# Patient Record
Sex: MALE | Race: WHITE | Employment: UNEMPLOYED | URBAN - METROPOLITAN AREA
[De-identification: names, ages, dates, MRNs, and addresses within clinical notes are randomized per-mention and may not be internally consistent; named-entity substitution may affect disease eponyms.]

---

## 2017-01-29 DIAGNOSIS — I10 PRIMARY HYPERTENSION: Primary | ICD-10-CM

## 2017-01-30 NOTE — TELEPHONE ENCOUNTER
metoprolol       Last Written Prescription Date: 12/26/16  Last Fill Quantity: 90, # refills: 0    Last Office Visit with G, P or [facility] prescribing provider:  8/31/16   Future Office Visit:        BP Readings from Last 3 Encounters:   12/23/16 134/102   08/31/16 160/110   05/14/15 132/84

## 2017-02-02 RX ORDER — METOPROLOL SUCCINATE 25 MG/1
TABLET, EXTENDED RELEASE ORAL
Qty: 90 TABLET | Refills: 3 | Status: SHIPPED | OUTPATIENT
Start: 2017-02-02 | End: 2017-06-07

## 2017-02-02 NOTE — TELEPHONE ENCOUNTER
Prescription approved per [facility] Refill Protocol.  Rsoelyn Marrero RN

## 2017-03-24 DIAGNOSIS — I10 ESSENTIAL HYPERTENSION WITH GOAL BLOOD PRESSURE LESS THAN 140/90: ICD-10-CM

## 2017-03-24 NOTE — TELEPHONE ENCOUNTER
hydrochlorothiazide      Last Written Prescription Date: 12/26/16  Last Fill Quantity: 90, # refills: 0  Last Office Visit with [facility], [facility] or [facility] prescribing provider: 08/31/16 Dr. Demarco         Potassium   Date Value Ref Range Status   08/31/2016 3.7 3.4 - 5.3 mmol/L Final     Creatinine   Date Value Ref Range Status   08/31/2016 0.79 0.66 - 1.25 mg/dL Final     BP Readings from Last 3 Encounters:   12/23/16 (!) 134/102   08/31/16 (!) 160/110   05/14/15 132/84     JOSH Kim (R)

## 2017-03-24 NOTE — TELEPHONE ENCOUNTER
Routing refill request to provider for review/approval because:  BP above goal last 2 readings  Anupama Youngblood RN

## 2017-03-25 RX ORDER — HYDROCHLOROTHIAZIDE 25 MG/1
25 TABLET ORAL DAILY
Qty: 90 TABLET | Refills: 0 | Status: SHIPPED | OUTPATIENT
Start: 2017-03-25 | End: 2017-06-30

## 2017-03-26 NOTE — TELEPHONE ENCOUNTER
Patient needs to be seen by Jennifer Demarco MD  (provider or resource)  Is there a time frame in which the patient should be seen Yes: within month  What does the patient need to be seen regarding BP   Does patient need to come fasting No  Phone: [phone] (home)  When workflow completed Close Encounter    Clinic: [facility] at [phone]    'Hi, this is (your name) I am calling from (provider's name) office. After reviewing your chart, (Provider's name) has indicated that you need an appointment to review (reason for visit). May I assist you in making this appointment? (Please contact us via [employer] or by phone at (Clinic name and Phone number) to schedule this appointment at your earliest convenience)'    Was first outreach attempted?No  If yes, the Second attempt due on:

## 2017-03-27 NOTE — TELEPHONE ENCOUNTER
Anay Lopez contacted Db on 03/27/17 and left a message. If patient calls back please schedule appointment in 1 month.      Anay OSHEA, Patient Care

## 2017-03-31 NOTE — TELEPHONE ENCOUNTER
Anay Lopez contacted Db on 03/31/17 and left a message. If patient calls back please schedule appointment in 1 month.      Anay OSHEA, Patient Care

## 2017-04-03 NOTE — TELEPHONE ENCOUNTER
LM for pt to call clinic.  3rd attempt, with no response.  Please advise.      Anay OSHEA, Patient Care

## 2017-05-30 ENCOUNTER — TELEPHONE (OUTPATIENT)
Dept: FAMILY MEDICINE | Facility: CLINIC | Age: 52
End: 2017-05-30

## 2017-05-30 NOTE — TELEPHONE ENCOUNTER
Called pt, he has not tried anything else and is unsure what is covered.   Insurance: [employer]   Bin: [id number]  ID#: [id number]    PA started.  Key Code: [id number]      Anthony Mustafa CMA

## 2017-05-30 NOTE — TELEPHONE ENCOUNTER
Call patient re:  Insurance not covering medication.  They may cover it better if filled from the eye doctor.  He could ask them to fill it again if he has gotten it there previously.  Alternatively, he can try artificial tears for use as needed - over the counter.   ALEX

## 2017-05-30 NOTE — TELEPHONE ENCOUNTER
Db Watson (Patel: [id number]) - [id number]  Restasis 0.05% emulsion  Status: PA Response - Denied  Created: May 30th, 2017  Sent: May 30th, 2017  Open  Archive      Routing to Dr. Demarco to review    Jewel Mazariegos MA

## 2017-05-30 NOTE — TELEPHONE ENCOUNTER
Attempt PA - will need to contact patient re:  What he has tried previously for eye and response.  ALMAK

## 2017-05-30 NOTE — TELEPHONE ENCOUNTER
Informed pt of message below. He will try the eye doctor option first.    Katey Warren MA

## 2017-05-30 NOTE — TELEPHONE ENCOUNTER
PA needed for cycloSPORINE (RESTASIS) 0.05 % ophthalmic emulsion. Please advise.      Anthony Mustafa, CMA

## 2017-06-07 DIAGNOSIS — I10 PRIMARY HYPERTENSION: ICD-10-CM

## 2017-06-07 NOTE — LETTER
[address]  [address]  [phone]    June 14, 2017      Db Watson  [address]   [place] [address]              Dear Db,    We have refilled your Metoprolol.    We will need to see you for an office visit before any additional refills can be given.  Please call [phone] to schedule this appointment.        Sincerely,    Jennifer Demarco M.D.

## 2017-06-09 RX ORDER — METOPROLOL SUCCINATE 25 MG/1
TABLET, EXTENDED RELEASE ORAL
Qty: 90 TABLET | Refills: 0 | Status: SHIPPED | OUTPATIENT
Start: 2017-06-09 | End: 2017-07-17 | Stop reason: DRUGHIGH

## 2017-06-09 NOTE — TELEPHONE ENCOUNTER
Routing refill request to provider for review/approval because:  BP above goal    Anupama Youngblood RN

## 2017-06-09 NOTE — TELEPHONE ENCOUNTER
metoprolol (TOPROL-XL) 25 MG 24 hr tablet      Last Written Prescription Date: 2/2/17  Last Fill Quantity: 90, # refills: 3    Last Office Visit with [facility] or [facility] prescribing provider:  8/31/17 Dr. Demarco     Future Office Visit:        BP Readings from Last 3 Encounters:   12/23/16 (!) 134/102   08/31/16 (!) 160/110   05/14/15 132/84

## 2017-06-10 NOTE — TELEPHONE ENCOUNTER
Patient needs to be seen by Jennifer Demarco MD  (provider or resource)  Is there a time frame in which the patient should be seen Yes: this month  What does the patient need to be seen regarding HTN, physical.     Does patient need to come fasting No  Phone: [phone] (home)  When workflow completed Close Encounter    Clinic: [facility] at [phone]    'Hi, this is (your name) I am calling from (provider's name) office. After reviewing your chart, (Provider's name) has indicated that you need an appointment to review (reason for visit). May I assist you in making this appointment? (Please contact us via [employer] or by phone at (Clinic name and Phone number) to schedule this appointment at your earliest convenience)'    Was first outreach attempted?No  If yes, the Second attempt due on:

## 2017-06-12 NOTE — TELEPHONE ENCOUNTER
This writer attempted to contact Db on 06/12/17      Reason for call appointment and left message to return call.      When patient calls back, please schedule Office Visit appointment within 1 month with PCP, document that pt called and close encounter .          Kartik Mazariegos MA

## 2017-06-13 NOTE — TELEPHONE ENCOUNTER
This writer attempted to contact Db on 06/13/17      Reason for call schedule appt and left message to return call.      When patient calls back, please schedule Office Visit appointment within 1 month with PCP, document that pt called and close encounter .          Anay Lopez MA

## 2017-06-14 NOTE — TELEPHONE ENCOUNTER
LM for pt to call clinic.  3rd attempt, with no response.  Letter sent.    Anay OSHEA, Patient Care         Anay OSHEA, Patient Care

## 2017-06-29 ENCOUNTER — TELEPHONE (OUTPATIENT)
Dept: FAMILY MEDICINE | Facility: CLINIC | Age: 52
End: 2017-06-29

## 2017-06-29 NOTE — TELEPHONE ENCOUNTER
cycloSPORINE (RESTASIS) 0.05 % ophthalmic emulsion not covered. Please advise PA or alternative.      Anthony Mustafa CMA

## 2017-06-29 NOTE — TELEPHONE ENCOUNTER
This writer attempted to contact Db on 06/29/17      Reason for call PA and left detailed message.      When patient calls back, please contact [place] Care Team (MA/TC). If no one available, document that pt called and route to care team. routine priority .          Anthony Mustafa, CMA

## 2017-06-29 NOTE — TELEPHONE ENCOUNTER
Contact patient re:  PA previously denied - patient was going to go through eye doctor for recommendations.    See copy below.    Katey Griffin CMA        5/30/17 2:23 PM   Note      Informed pt of message below. He will try the eye doctor option first.     Katey Warren MA                                 5/30/17 2:14 PM   You routed this conversation to [facility] [place]    Me        5/30/17 2:11 PM   Note      Call patient re:  Insurance not covering medication.  They may cover it better if filled from the eye doctor.  He could ask them to fill it again if he has gotten it there previously.  Alternatively, he can try artificial tears for use as needed - over the counter.   ALEX

## 2017-06-30 DIAGNOSIS — I10 ESSENTIAL HYPERTENSION WITH GOAL BLOOD PRESSURE LESS THAN 140/90: ICD-10-CM

## 2017-06-30 NOTE — TELEPHONE ENCOUNTER
hydrochlorothiazide (HYDRODIURIL) 25 MG tablet      Last Written Prescription Date: 03/25/17  Last Fill Quantity: 90, # refills: 0  Last Office Visit with G, P or [facility] prescribing provider: 08/31/16 Dr. Demarco       Potassium   Date Value Ref Range Status   08/31/2016 3.7 3.4 - 5.3 mmol/L Final     Creatinine   Date Value Ref Range Status   08/31/2016 0.79 0.66 - 1.25 mg/dL Final     BP Readings from Last 3 Encounters:   12/23/16 (!) 134/102   08/31/16 (!) 160/110   05/14/15 132/84

## 2017-07-03 ENCOUNTER — OFFICE VISIT (OUTPATIENT)
Dept: FAMILY MEDICINE | Facility: CLINIC | Age: 52
End: 2017-07-03
Payer: COMMERCIAL

## 2017-07-03 VITALS
BODY MASS INDEX: 38.65 KG/M2 | SYSTOLIC BLOOD PRESSURE: 166 MMHG | WEIGHT: 243.1 LBS | TEMPERATURE: 98.8 F | OXYGEN SATURATION: 100 % | DIASTOLIC BLOOD PRESSURE: 102 MMHG | HEART RATE: 84 BPM

## 2017-07-03 DIAGNOSIS — E66.01 MORBID OBESITY DUE TO EXCESS CALORIES (H): Chronic | ICD-10-CM

## 2017-07-03 DIAGNOSIS — K42.9 UMBILICAL HERNIA WITHOUT OBSTRUCTION AND WITHOUT GANGRENE: ICD-10-CM

## 2017-07-03 DIAGNOSIS — R79.89 ABNORMAL LFTS: ICD-10-CM

## 2017-07-03 DIAGNOSIS — I10 ESSENTIAL HYPERTENSION WITH GOAL BLOOD PRESSURE LESS THAN 140/90: Primary | ICD-10-CM

## 2017-07-03 LAB
ALBUMIN SERPL-MCNC: 3.9 G/DL (ref 3.4–5)
ALP SERPL-CCNC: 83 U/L (ref 40–150)
ALT SERPL W P-5'-P-CCNC: 70 U/L (ref 0–70)
ANION GAP SERPL CALCULATED.3IONS-SCNC: 11 MMOL/L (ref 3–14)
AST SERPL W P-5'-P-CCNC: 64 U/L (ref 0–45)
BILIRUB SERPL-MCNC: 0.6 MG/DL (ref 0.2–1.3)
BUN SERPL-MCNC: 12 MG/DL (ref 7–30)
CALCIUM SERPL-MCNC: 8.8 MG/DL (ref 8.5–10.1)
CHLORIDE SERPL-SCNC: 103 MMOL/L (ref 94–109)
CO2 SERPL-SCNC: 27 MMOL/L (ref 20–32)
CREAT SERPL-MCNC: 0.8 MG/DL (ref 0.66–1.25)
CREAT UR-MCNC: 192 MG/DL
GFR SERPL CREATININE-BSD FRML MDRD: ABNORMAL ML/MIN/1.7M2
GLUCOSE SERPL-MCNC: 101 MG/DL (ref 70–99)
MICROALBUMIN UR-MCNC: 37 MG/L
MICROALBUMIN/CREAT UR: 19.11 MG/G CR (ref 0–17)
POTASSIUM SERPL-SCNC: 3.4 MMOL/L (ref 3.4–5.3)
PROT SERPL-MCNC: 7.7 G/DL (ref 6.8–8.8)
SODIUM SERPL-SCNC: 141 MMOL/L (ref 133–144)

## 2017-07-03 PROCEDURE — 82043 UR ALBUMIN QUANTITATIVE: CPT | Performed by: FAMILY MEDICINE

## 2017-07-03 PROCEDURE — 36415 COLL VENOUS BLD VENIPUNCTURE: CPT | Performed by: FAMILY MEDICINE

## 2017-07-03 PROCEDURE — 99214 OFFICE O/P EST MOD 30 MIN: CPT | Performed by: FAMILY MEDICINE

## 2017-07-03 PROCEDURE — 80053 COMPREHEN METABOLIC PANEL: CPT | Performed by: FAMILY MEDICINE

## 2017-07-03 RX ORDER — HYDROCHLOROTHIAZIDE 25 MG/1
25 TABLET ORAL DAILY
Qty: 30 TABLET | Refills: 1 | Status: SHIPPED | OUTPATIENT
Start: 2017-07-03 | End: 2017-10-03

## 2017-07-03 RX ORDER — METOPROLOL SUCCINATE 50 MG/1
50 TABLET, EXTENDED RELEASE ORAL 2 TIMES DAILY
Qty: 60 TABLET | Refills: 1 | Status: SHIPPED | OUTPATIENT
Start: 2017-07-03 | End: 2017-10-03

## 2017-07-03 RX ORDER — HYDROCHLOROTHIAZIDE 25 MG/1
25 TABLET ORAL DAILY
Qty: 30 TABLET | Refills: 0 | Status: SHIPPED | OUTPATIENT
Start: 2017-07-03 | End: 2017-07-03

## 2017-07-03 NOTE — MR AVS SNAPSHOT
After Visit Summary   7/3/2017    Db Watson    MRN: [id number]           Patient Information     Date Of Birth          1965        Visit Information        Provider Department      7/3/2017 6:40 PM Jennifer Demarco MD [facility]        Today's Diagnoses     Umbilical hernia without obstruction and without gangrene    -  1    Essential hypertension with goal blood pressure less than 140/90          Care Instructions    Increase the metoprolol to 50 mg twice a day.  Continue the HCTZ once daily.  Labs today with results to carito and additional recommendations.    Referral general surgery for evaluation of the umbilical hernia.              Follow-ups after your visit        Additional Services     GENERAL SURG ADULT REFERRAL       Your provider has referred you to: FMG: [facility] [phone]   [email or web address]  UMP: [facility] [phone]   [email or web address]    Please be aware that coverage of these services is subject to the terms and limitations of your health insurance plan.  Call member services at your health plan with any benefit or coverage questions.      Please bring the following with you to your appointment:    (1) Any X-Rays, CTs or MRIs which have been performed.  Contact the facility where they were done to arrange for  prior to your scheduled appointment.   (2) List of current medications   (3) This referral request   (4) Any documents/labs given to you for this referral                  Who to contact     If you have questions or need follow up information about today's clinic visit or your schedule please contact [facility] directly at [phone].  Normal or non-critical lab and imaging results will be communicated to you by MyChart, letter or phone within 4 business days after the clinic  has received the results. If you do not hear from us within 7 days, please contact the clinic through [employer] or phone. If you have a critical or abnormal lab result, we will notify you by phone as soon as possible.  Submit refill requests through [employer] or call your pharmacy and they will forward the refill request to us. Please allow 3 business days for your refill to be completed.          Additional Information About Your Visit        [employer] Information     [employer] gives you secure access to your electronic health record. If you see a primary care provider, you can also send messages to your care team and make appointments. If you have questions, please call your primary care clinic.  If you do not have a primary care provider, please call [phone] and they will assist you.        Care EveryWhere ID     This is your Care EveryWhere ID. This could be used by other organizations to access your [facility] medical records  [id number]        Your Vitals Were     Pulse Temperature Pulse Oximetry BMI (Body Mass Index)          84 98.8  F (37.1  C) (Oral) 100% 38.65 kg/m2         Blood Pressure from Last 3 Encounters:   07/03/17 (!) 166/102   12/23/16 (!) 134/102   08/31/16 (!) 160/110    Weight from Last 3 Encounters:   07/03/17 110.3 kg (243 lb 1.6 oz)   08/31/16 107 kg (236 lb)   05/14/15 100 kg (220 lb 8 oz)              We Performed the Following     Albumin Random Urine Quantitative     Comprehensive metabolic panel (BMP + Alb, Alk Phos, ALT, AST, Total. Bili, TP)     GENERAL SURG ADULT REFERRAL          Today's Medication Changes          These changes are accurate as of: 7/3/17  7:16 PM.  If you have any questions, ask your nurse or doctor.               These medicines have changed or have updated prescriptions.        Dose/Directions    hydrochlorothiazide 25 MG tablet   Commonly known as:  HYDRODIURIL   This may have changed:  additional instructions   Used for:  Essential hypertension with goal blood  pressure less than 140/90   Changed by:  Jennifer Demarco MD        Dose:  25 mg   Take 1 tablet (25 mg) by mouth daily   Quantity:  30 tablet   Refills:  1       * metoprolol 25 MG 24 hr tablet   Commonly known as:  TOPROL-XL   This may have changed:  Another medication with the same name was added. Make sure you understand how and when to take each.   Used for:  Primary hypertension   Changed by:  Jennifer Demarco MD        TAKE THREE TABLETS BY MOUTH DAILY   Quantity:  90 tablet   Refills:  0       * metoprolol 50 MG 24 hr tablet   Commonly known as:  TOPROL-XL   This may have changed:  You were already taking a medication with the same name, and this prescription was added. Make sure you understand how and when to take each.   Used for:  Essential hypertension with goal blood pressure less than 140/90   Changed by:  Jennifer Demarco MD        Dose:  50 mg   Take 1 tablet (50 mg) by mouth 2 times daily   Quantity:  60 tablet   Refills:  1       * Notice:  This list has 2 medication(s) that are the same as other medications prescribed for you. Read the directions carefully, and ask your doctor or other care provider to review them with you.      Stop taking these medicines if you haven't already. Please contact your care team if you have questions.     cycloSPORINE 0.05 % ophthalmic emulsion   Commonly known as:  RESTASIS   Stopped by:  Jennifer Demarco MD                Where to get your medicines      These medications were sent to [id number] [place], [place] - [address]  [address] [address]     Phone:  [phone]     hydrochlorothiazide 25 MG tablet    metoprolol 50 MG 24 hr tablet                Primary Care Provider Office Phone #    [facility] [phone]       No address on file        Equal Access to Services     CARMENCITA RAMOS : Lynn Miller, mike palacios, katlin helton.  [facility] [phone].    ATENCIÓN: Si ibrahima beck, tiene a [place] disposición servicios gratuitos de asistencia lingüística. Mango jason [phone].    We comply with applicable federal civil rights laws and [place] laws. We do not discriminate on the basis of race, color, national origin, age, disability sex, sexual orientation or gender identity.            Thank you!     Thank you for choosing [facility]  for your care. Our goal is always to provide you with excellent care. Hearing back from our patients is one way we can continue to improve our services. Please take a few minutes to complete the written survey that you may receive in the mail after your visit with us. Thank you!             Your Updated Medication List - Protect others around you: Learn how to safely use, store and throw away your medicines at www.disposemymeds.org.          This list is accurate as of: 7/3/17  7:16 PM.  Always use your most recent med list.                   Brand Name Dispense Instructions for use Diagnosis    aspirin 81 MG tablet      Take 1 tablet by mouth daily.    Essential hypertension       fish oil-omega-3 fatty acids 1000 MG capsule      Take 2 g by mouth daily.        hydrochlorothiazide 25 MG tablet    HYDRODIURIL    30 tablet    Take 1 tablet (25 mg) by mouth daily    Essential hypertension with goal blood pressure less than 140/90       meclizine 25 MG Chew      Take 25 mg by mouth every 6 hours as needed.    Indications: Sensation of Spinning or Whirling        * metoprolol 25 MG 24 hr tablet    TOPROL-XL    90 tablet    TAKE THREE TABLETS BY MOUTH DAILY    Primary hypertension       * metoprolol 50 MG 24 hr tablet    TOPROL-XL    60 tablet    Take 1 tablet (50 mg) by mouth 2 times daily    Essential hypertension with goal blood pressure less than 140/90       vitamin B complex with vitamin C Tabs tablet    STRESS TAB    100 tablet    Take 1 tablet by mouth daily.        * Notice:  This list has 2  medication(s) that are the same as other medications prescribed for you. Read the directions carefully, and ask your doctor or other care provider to review them with you.

## 2017-07-03 NOTE — NURSING NOTE
"Chief Complaint   Patient presents with     Recheck Medication       Initial BP (!) 162/104 (BP Location: Right arm, Patient Position: Sitting, Cuff Size: Adult Large)  Pulse 84  Temp 98.8  F (37.1  C) (Oral)  Wt 110.3 kg (243 lb 1.6 oz)  SpO2 100%  BMI 38.65 kg/m2 Estimated body mass index is 38.65 kg/(m^2) as calculated from the following:    Height as of 8/31/16: 1.689 m (5' 6.5\").    Weight as of this encounter: 110.3 kg (243 lb 1.6 oz).  Medication Reconciliation: complete       Anthony Mustafa CMA      "

## 2017-07-03 NOTE — PROGRESS NOTES
SUBJECTIVE:                                                    Db Watson is a 51 year old male who presents to clinic today for the following health issues:      Hypertension Follow-up      Outpatient blood pressures are not being checked.    Low Salt Diet: no added salt      Amount of exercise or physical activity: 1-2 days/week for an average of 30-45 minutes  - walking, gym      Problems taking medications regularly: No    Medication side effects: none    Diet: low salt      Abnormality noted at belly button - present for weeks. No pain.  Squishy and pokes out at times.  No heavy lifting described. No N/V    Problem list and histories reviewed & adjusted, as indicated.  Additional history: as documented    BP Readings from Last 3 Encounters:   07/03/17 (!) 162/104   12/23/16 (!) 134/102   08/31/16 (!) 160/110    Wt Readings from Last 3 Encounters:   07/03/17 110.3 kg (243 lb 1.6 oz)   08/31/16 107 kg (236 lb)   05/14/15 100 kg (220 lb 8 oz)                    Reviewed and updated as needed this visit by clinical staff  Tobacco  Allergies  Meds  Med Hx  Surg Hx  Fam Hx  Soc Hx      Reviewed and updated as needed this visit by Provider  Tobacco  Allergies  Meds  Med Hx  Surg Hx  Fam Hx  Soc Hx        ROS:  Constitutional, HEENT, cardiovascular, pulmonary, gi and gu systems are negative, except as otherwise noted.    OBJECTIVE:     BP (!) 162/104 (BP Location: Right arm, Patient Position: Sitting, Cuff Size: Adult Large)  Pulse 84  Temp 98.8  F (37.1  C) (Oral)  Wt 110.3 kg (243 lb 1.6 oz)  SpO2 100%  BMI 38.65 kg/m2  Body mass index is 38.65 kg/(m^2).  GENERAL: alert, no distress and obese  NECK: no adenopathy, no asymmetry, masses, or scars and thyroid normal to palpation  RESP: lungs clear to auscultation - no rales, rhonchi or wheezes  CV: regular rate and rhythm, normal S1 S2, no S3 or S4, no murmur, click or rub, no peripheral edema and peripheral pulses strong  ABDOMEN: soft, nontender,  "without hepatosplenomegaly or masses, bowel sounds normal and hernia umbilical - reducible.  MS: IRISH, grossly normal with trace edema to ankles bilaterally   BACK: no CVA tenderness, no paralumbar tenderness    Diagnostic Test Results:  Results for orders placed or performed in visit on 07/03/17   Albumin Random Urine Quantitative   Result Value Ref Range    Creatinine Urine 192 mg/dL    Albumin Urine mg/L 37 mg/L    Albumin Urine mg/g Cr 19.11 (H) 0 - 17 mg/g Cr   Comprehensive metabolic panel (BMP + Alb, Alk Phos, ALT, AST, Total. Bili, TP)   Result Value Ref Range    Sodium 141 133 - 144 mmol/L    Potassium 3.4 3.4 - 5.3 mmol/L    Chloride 103 94 - 109 mmol/L    Carbon Dioxide 27 20 - 32 mmol/L    Anion Gap 11 3 - 14 mmol/L    Glucose 101 (H) 70 - 99 mg/dL    Urea Nitrogen 12 7 - 30 mg/dL    Creatinine 0.80 0.66 - 1.25 mg/dL    GFR Estimate >90  Non  GFR Calc   >60 mL/min/1.7m2    GFR Estimate If Black >90   GFR Calc   >60 mL/min/1.7m2    Calcium 8.8 8.5 - 10.1 mg/dL    Bilirubin Total 0.6 0.2 - 1.3 mg/dL    Albumin 3.9 3.4 - 5.0 g/dL    Protein Total 7.7 6.8 - 8.8 g/dL    Alkaline Phosphatase 83 40 - 150 U/L    ALT 70 0 - 70 U/L    AST 64 (H) 0 - 45 U/L       ASSESSMENT/PLAN:         BMI:   Estimated body mass index is 38.65 kg/(m^2) as calculated from the following:    Height as of 8/31/16: 1.689 m (5' 6.5\").    Weight as of this encounter: 110.3 kg (243 lb 1.6 oz).   Weight management plan: Discussed healthy diet and exercise guidelines and patient will follow up in 6 months in clinic to re-evaluate.      1. Essential hypertension with goal blood pressure less than 140/90  Increase metoprolol dose.  Continue HCTZ.    - metoprolol (TOPROL-XL) 50 MG 24 hr tablet; Take 1 tablet (50 mg) by mouth 2 times daily  Dispense: 60 tablet; Refill: 1  - hydrochlorothiazide (HYDRODIURIL) 25 MG tablet; Take 1 tablet (25 mg) by mouth daily  Dispense: 30 tablet; Refill: 1  - Albumin Random Urine " Quantitative  - Comprehensive metabolic panel (BMP + Alb, Alk Phos, ALT, AST, Total. Bili, TP)    2. Umbilical hernia without obstruction and without gangrene  - GENERAL SURG ADULT REFERRAL    3. Abnormal LFTs  Stable to improved.  Discussed limiting etoh.    4. Morbid obesity due to excess calories (H)  Exercise recommended.  Patient planning back to gym.      Patient Instructions   Increase the metoprolol to 50 mg twice a day.  Continue the HCTZ once daily.  Labs today with results to carito and additional recommendations.    Referral general surgery for evaluation of the umbilical hernia.          Jennifer Demarco MD  [facility]

## 2017-07-04 NOTE — PATIENT INSTRUCTIONS
Increase the metoprolol to 50 mg twice a day.  Continue the HCTZ once daily.  Labs today with results to carito and additional recommendations.    Referral general surgery for evaluation of the umbilical hernia.

## 2017-07-05 PROBLEM — E66.01 MORBID OBESITY (H): Status: ACTIVE | Noted: 2017-07-03

## 2017-07-05 NOTE — PROGRESS NOTES
Your blood sugar is listed as elevated but this is normal since you were not fasting.  Your kidney function is normal.  Liver testing is stable to improved.  Your urine testing does show a borderline elevated protein level but this is improved from previous.  Recheck in 6 months is recommended.  Please call or MyChart message me if you have any questions.    PSK

## 2017-07-17 ENCOUNTER — OFFICE VISIT (OUTPATIENT)
Dept: SURGERY | Facility: CLINIC | Age: 52
End: 2017-07-17
Payer: COMMERCIAL

## 2017-07-17 VITALS
DIASTOLIC BLOOD PRESSURE: 88 MMHG | WEIGHT: 243 LBS | SYSTOLIC BLOOD PRESSURE: 137 MMHG | HEART RATE: 77 BPM | BODY MASS INDEX: 38.63 KG/M2

## 2017-07-17 DIAGNOSIS — K42.9 UMBILICAL HERNIA WITHOUT OBSTRUCTION AND WITHOUT GANGRENE: Primary | ICD-10-CM

## 2017-07-17 PROCEDURE — 99244 OFF/OP CNSLTJ NEW/EST MOD 40: CPT | Performed by: SURGERY

## 2017-07-17 NOTE — NURSING NOTE
"Db Watson's goals for this visit include: hernia consult  He requests these members of his care team be copied on today's visit information: none    PCP: [facility] [place]    Referring Provider:  No referring provider defined for this encounter.    Chief Complaint   Patient presents with     Consult     hernia consult, Umbilical hernia without obstruction and without gangrene       Initial /88  Pulse 77  Wt 110.2 kg (243 lb)  BMI 38.63 kg/m2 Estimated body mass index is 38.63 kg/(m^2) as calculated from the following:    Height as of 8/31/16: 1.689 m (5' 6.5\").    Weight as of this encounter: 110.2 kg (243 lb).  Medication Reconciliation: complete    "

## 2017-07-17 NOTE — PROGRESS NOTES
Chief Complaint: Umbilical protrusion    History of Present Illness:   51 year old woman sent in consultation by Dr Jennifer Demarco for evaluation of a protrusion at the umbilicus which has been present for 7-8 months. He notes rare twinges of discomfort which are mild and do not radiate without any aggravating or alleviating factors. He is able to easily reduce the protrusion, and denies any episodes of incarceration.          Past Medical History:   Diagnosis Date     Abnormal MRI of head 1/11/2013     Atypical chest pain 9/4/2012     BMI 33.0-33.9,adult 5/24/2013     Hyperlipidemia LDL goal <160 5/4/2013     Hypertension      Ménière's disease      Past Surgical History:   Procedure Laterality Date     C NONSPECIFIC PROCEDURE      shoulder surgery for dislocation      C NONSPECIFIC PROCEDURE      eye surgery, lens implant right     EYE SURGERY       FRACTURE TX, WRIST RT/LT      Fracture TX Wrist RT/LT     ORTHOPEDIC SURGERY       Current Outpatient Prescriptions   Medication     metoprolol (TOPROL-XL) 50 MG 24 hr tablet     hydrochlorothiazide (HYDRODIURIL) 25 MG tablet     [DISCONTINUED] metoprolol (TOPROL-XL) 25 MG 24 hr tablet     fish oil-omega-3 fatty acids (FISH OIL) 1000 MG capsule     aspirin 81 MG tablet     meclizine 25 MG CHEW     B Complex Vitamins (VITAMIN B COMPLEX) tablet     No current facility-administered medications for this visit.       No Known Allergies  Social History     Social History     Marital status: Single     Spouse name: N/A     Number of children: N/A     Years of education: N/A     Occupational History     Not on file.     Social History Main Topics     Smoking status: Never Smoker     Smokeless tobacco: Never Used      Comment: rarely, once per year maybe      Alcohol use Yes      Comment: 10 drinks / week      Drug use: No     Sexual activity: Yes     Partners: Female     Other Topics Concern     Parent/Sibling W/ Cabg, Mi Or Angioplasty Before 65f 55m? Yes     Social History  Narrative     Family History   Problem Relation Age of Onset     Hypertension Mother      DIABETES Father      C.A.D. Father      bypass surgery           Review of Systems:  No chest pain, dyspnea, fevers or night sweats. Some weight gain over the last 3-4 years.  All other systems questioned and negative.     Exam:  Vital signs for exam: /88  Pulse 77  Wt 110.2 kg (243 lb)  BMI 38.63 kg/m2  Constitutional: healthy, alert and no distress.  Head: Normocephalic. No masses, lesions, tenderness or abnormalities.  ENT: ENT exam normal, no neck nodes or sinus tenderness.  Cardiovascular: Normal S1, S2, no murmurs  Respiratory: Clear to auscultation.   Gastrointestinal:  Abdomen soft, non-tender. Reducible umbilical hernia. 1.5 cm fascial defect. No tenderness to palpation   : Deferred  Musculoskeletal: extremities normal- no gross deformities noted and normal muscle tone  Skin: no jaundice, rashes or petechiae.   Neurologic: Gait normal.  Psychiatric: Mentation appears normal and affect normal.      IMPRESSION AND PLAN:  Minimally symptomatic umbilical hernia in an obese gentleman. We discussed options and he would like to proceed with weight loss and and contact us when his symptoms increase. At that time, if the hernia is of a similar size, we could consider an open repair with mesh. The small risks of incarceration discussed with the patient who expressed understanding.

## 2017-07-17 NOTE — MR AVS SNAPSHOT
After Visit Summary   7/17/2017    Db Watson    MRN: [id number]           Patient Information     Date Of Birth          1965        Visit Information        Provider Department      7/17/2017 8:30 AM Sisi Rachel MD [facility]        Today's Diagnoses     Umbilical hernia without obstruction and without gangrene    -  1       Follow-ups after your visit        Who to contact     If you have questions or need follow up information about today's clinic visit or your schedule please contact [facility] directly at [phone].  Normal or non-critical lab and imaging results will be communicated to you by [employer], letter or phone within 4 business days after the clinic has received the results. If you do not hear from us within 7 days, please contact the clinic through [employer] or phone. If you have a critical or abnormal lab result, we will notify you by phone as soon as possible.  Submit refill requests through [employer] or call your pharmacy and they will forward the refill request to us. Please allow 3 business days for your refill to be completed.          Additional Information About Your Visit        MyChart Information     [employer] gives you secure access to your electronic health record. If you see a primary care provider, you can also send messages to your care team and make appointments. If you have questions, please call your primary care clinic.  If you do not have a primary care provider, please call [phone] and they will assist you.      [employer] is an electronic gateway that provides easy, online access to your medical records. With [employer], you can request a clinic appointment, read your test results, renew a prescription or communicate with your care team.     To access your existing account, please contact your [facility] or call [phone] for assistance.        Care EveryWhere ID     This is your Care  EveryWhere ID. This could be used by other organizations to access your [place] medical records  [id number]        Your Vitals Were     Pulse BMI (Body Mass Index)                77 38.63 kg/m2           Blood Pressure from Last 3 Encounters:   07/17/17 137/88   07/03/17 (!) 166/102   12/23/16 (!) 134/102    Weight from Last 3 Encounters:   07/17/17 110.2 kg (243 lb)   07/03/17 110.3 kg (243 lb 1.6 oz)   08/31/16 107 kg (236 lb)              Today, you had the following     No orders found for display         Today's Medication Changes          These changes are accurate as of: 7/17/17  9:36 AM.  If you have any questions, ask your nurse or doctor.               These medicines have changed or have updated prescriptions.        Dose/Directions    metoprolol 50 MG 24 hr tablet   Commonly known as:  TOPROL-XL   This may have changed:  Another medication with the same name was removed. Continue taking this medication, and follow the directions you see here.   Used for:  Essential hypertension with goal blood pressure less than 140/90   Changed by:  Jennifer Demarco MD        Dose:  50 mg   Take 1 tablet (50 mg) by mouth 2 times daily   Quantity:  60 tablet   Refills:  1                Primary Care Provider Office Phone #    [facility] [phone]       No address on file        Equal Access to Services     CARMENCITA RAMOS AH: Hadii myles murphyo Sokatiln, waaxda luqadaha, qaybta kaalmada adeegyada, katlin cohen. So [facility] [phone].    ATENCIÓN: Si habla español, tiene a [place] disposición servicios gratuitos de asistencia lingüística. Llame al [phone].    We comply with applicable federal civil rights laws and [place] laws. We do not discriminate on the basis of race, color, national origin, age, disability sex, sexual orientation or gender identity.            Thank you!     Thank you for choosing [facility]  for your care. Our goal is always to  provide you with excellent care. Hearing back from our patients is one way we can continue to improve our services. Please take a few minutes to complete the written survey that you may receive in the mail after your visit with us. Thank you!             Your Updated Medication List - Protect others around you: Learn how to safely use, store and throw away your medicines at www.disposemymeds.org.          This list is accurate as of: 7/17/17  9:36 AM.  Always use your most recent med list.                   Brand Name Dispense Instructions for use Diagnosis    aspirin 81 MG tablet      Take 1 tablet by mouth daily.    Essential hypertension       fish oil-omega-3 fatty acids 1000 MG capsule      Take 2 g by mouth daily.        hydrochlorothiazide 25 MG tablet    HYDRODIURIL    30 tablet    Take 1 tablet (25 mg) by mouth daily    Essential hypertension with goal blood pressure less than 140/90       meclizine 25 MG Chew      Take 25 mg by mouth every 6 hours as needed.    Indications: Sensation of Spinning or Whirling        metoprolol 50 MG 24 hr tablet    TOPROL-XL    60 tablet    Take 1 tablet (50 mg) by mouth 2 times daily    Essential hypertension with goal blood pressure less than 140/90       vitamin B complex with vitamin C Tabs tablet    STRESS TAB    100 tablet    Take 1 tablet by mouth daily.

## 2017-07-19 ENCOUNTER — TELEPHONE (OUTPATIENT)
Dept: FAMILY MEDICINE | Facility: CLINIC | Age: 52
End: 2017-07-19

## 2017-10-03 DIAGNOSIS — I10 ESSENTIAL HYPERTENSION WITH GOAL BLOOD PRESSURE LESS THAN 140/90: ICD-10-CM

## 2017-10-04 NOTE — TELEPHONE ENCOUNTER
hydrochlorothiazide (HYDRODIURIL) 25 MG tablet      Last Written Prescription Date: 7/3/17  Last Fill Quantity: 30, # refills: 1  Last Office Visit with [facility], [facility] or  Health prescribing provider: 7/3/17       Potassium   Date Value Ref Range Status   07/03/2017 3.4 3.4 - 5.3 mmol/L Final     Creatinine   Date Value Ref Range Status   07/03/2017 0.80 0.66 - 1.25 mg/dL Final     BP Readings from Last 3 Encounters:   07/17/17 137/88   07/03/17 (!) 166/102   12/23/16 (!) 134/102     metoprolol (TOPROL-XL) 50 MG 24 hr tablet      Last Written Prescription Date: 7/3/17  Last Fill Quantity: 60, # refills: 1    Last Office Visit with [facility], [facility] or [facility] prescribing provider:  7/3/17   Future Office Visit:        BP Readings from Last 3 Encounters:   07/17/17 137/88   07/03/17 (!) 166/102   12/23/16 (!) 134/102

## 2017-10-07 DIAGNOSIS — I10 ESSENTIAL HYPERTENSION WITH GOAL BLOOD PRESSURE LESS THAN 140/90: ICD-10-CM

## 2017-10-09 RX ORDER — HYDROCHLOROTHIAZIDE 25 MG/1
TABLET ORAL
Qty: 30 TABLET | Refills: 2 | Status: SHIPPED | OUTPATIENT
Start: 2017-10-09 | End: 2017-11-28

## 2017-10-09 RX ORDER — METOPROLOL SUCCINATE 50 MG/1
TABLET, EXTENDED RELEASE ORAL
Qty: 60 TABLET | Refills: 2 | Status: SHIPPED | OUTPATIENT
Start: 2017-10-09 | End: 2017-12-20

## 2017-10-12 RX ORDER — HYDROCHLOROTHIAZIDE 25 MG/1
TABLET ORAL
Qty: 90 TABLET | Refills: 0 | Status: SHIPPED | OUTPATIENT
Start: 2017-10-12 | End: 2017-11-06

## 2017-10-12 RX ORDER — METOPROLOL SUCCINATE 50 MG/1
TABLET, EXTENDED RELEASE ORAL
Qty: 90 TABLET | Refills: 0 | Status: SHIPPED | OUTPATIENT
Start: 2017-10-12 | End: 2017-11-06

## 2017-11-06 ENCOUNTER — OFFICE VISIT (OUTPATIENT)
Dept: FAMILY MEDICINE | Facility: CLINIC | Age: 52
End: 2017-11-06
Payer: COMMERCIAL

## 2017-11-06 VITALS
WEIGHT: 226.4 LBS | OXYGEN SATURATION: 95 % | TEMPERATURE: 98 F | SYSTOLIC BLOOD PRESSURE: 136 MMHG | HEART RATE: 87 BPM | RESPIRATION RATE: 14 BRPM | DIASTOLIC BLOOD PRESSURE: 86 MMHG | BODY MASS INDEX: 35.99 KG/M2

## 2017-11-06 DIAGNOSIS — F32.A ANXIETY AND DEPRESSION: Primary | ICD-10-CM

## 2017-11-06 DIAGNOSIS — F41.9 ANXIETY AND DEPRESSION: Primary | ICD-10-CM

## 2017-11-06 PROCEDURE — 99213 OFFICE O/P EST LOW 20 MIN: CPT | Performed by: NURSE PRACTITIONER

## 2017-11-06 RX ORDER — SERTRALINE HYDROCHLORIDE 25 MG/1
25 TABLET, FILM COATED ORAL DAILY
Qty: 30 TABLET | Refills: 0 | Status: SHIPPED | OUTPATIENT
Start: 2017-11-06 | End: 2017-11-08

## 2017-11-06 ASSESSMENT — ANXIETY QUESTIONNAIRES
3. WORRYING TOO MUCH ABOUT DIFFERENT THINGS: SEVERAL DAYS
7. FEELING AFRAID AS IF SOMETHING AWFUL MIGHT HAPPEN: NOT AT ALL
2. NOT BEING ABLE TO STOP OR CONTROL WORRYING: SEVERAL DAYS
IF YOU CHECKED OFF ANY PROBLEMS ON THIS QUESTIONNAIRE, HOW DIFFICULT HAVE THESE PROBLEMS MADE IT FOR YOU TO DO YOUR WORK, TAKE CARE OF THINGS AT HOME, OR GET ALONG WITH OTHER PEOPLE: NOT DIFFICULT AT ALL
5. BEING SO RESTLESS THAT IT IS HARD TO SIT STILL: SEVERAL DAYS
6. BECOMING EASILY ANNOYED OR IRRITABLE: SEVERAL DAYS
GAD7 TOTAL SCORE: 6
1. FEELING NERVOUS, ANXIOUS, OR ON EDGE: SEVERAL DAYS

## 2017-11-06 ASSESSMENT — PATIENT HEALTH QUESTIONNAIRE - PHQ9
SUM OF ALL RESPONSES TO PHQ QUESTIONS 1-9: 6
5. POOR APPETITE OR OVEREATING: SEVERAL DAYS

## 2017-11-06 NOTE — PATIENT INSTRUCTIONS
Sertraline oral solution  Brand Name: Zoloft  What is this medicine?  SERTRALINE (SER tra rosy) is used to treat depression. It may also be used to treat obsessive compulsive disorder, panic disorder, post-trauma stress, premenstrual dysphoric disorder (PMDD) or social anxiety.  How should I use this medicine?  Take this medicine by mouth. Follow the directions on the prescription label.  Before taking your dose, you need to dilute the solution in a beverage. Measure your medicine dose using the dropper in the bottle. Next, place the measured dose in at least 4 ounces (one-half cup) of water, ginger-noris, lemon-lime soda, lemonade or orange juice and mix. Do not mix in grapefruit juice or in any other liquids other than those listed.  Drink all of mixed liquid immediately. Do not mix the dose and store it for later. It must be taken right away. You may take this medicine with or without food. Take your medicine at regular intervals. Do not take your medicine more often than directed. Do not stop taking this medicine suddenly except upon the advice of your doctor. Stopping this medicine too quickly may cause serious side effects or your condition may worsen.  A special MedGuide will be given to you by the pharmacist with each prescription and refill. Be sure to read this information carefully each time.  Talk to your pediatrician regarding the use of this medicine in children. While this drug may be prescribed for children as young as 7 years for selected conditions, precautions do apply.  What side effects may I notice from receiving this medicine?  Side effects that you should report to your doctor or health care professional as soon as possible:    allergic reactions like skin rash, itching or hives, swelling of the face, lips, or tongue    anxious    black, tarry stools    changes in vision    confusion    elevated mood, decreased need for sleep, racing thoughts, impulsive behavior    eye pain    fast,  irregular heartbeat    feeling faint or lightheaded, falls    feeling agitated, angry, or irritable    hallucination, loss of contact with reality    loss of balance or coordination    loss of memory    painful or prolonged erections    restlessness, pacing, inability to keep still    seizures    stiff muscles    suicidal thoughts or other mood changes    trouble sleeping    unusual bleeding or bruising    unusually weak or tired    vomiting  Side effects that usually do not require medical attention (report to your doctor or health care professional if they continue or are bothersome):    change in appetite or weight    change in sex drive or performance    diarrhea    increased sweating    indigestion, nausea    tremors  What may interact with this medicine?  Do not take this medicine with any of the following medications:    certain medicines for fungal infections like fluconazole, itraconazole, ketoconazole, posaconazole, voriconazole    cisapride    disulfiram    dofetilide    linezolid    MAOIs like Carbex, Eldepryl, Marplan, Nardil, and Parnate    metronidazole    methylene blue (injected into a vein)    pimozide    thioridazine    ziprasidone  This medicine may also interact with the following medications:    alcohol    amphetamines    aspirin and aspirin-like medicines    certain medicines for depression, anxiety, or psychotic disturbances    certain medicines for irregular heart beat like flecainide, propafenone    certain medicines for migraine headaches like almotriptan, eletriptan, frovatriptan, naratriptan, rizatriptan, sumatriptan, zolmitriptan    certain medicines for sleep    certain medicines for seizures like carbamazepine, valproic acid, phenytoin    certain medicines that treat or prevent blood clots like warfarin, enoxaparin, dalteparin    cimetidine    digoxin    diuretics    fentanyl    furazolidone    isoniazid    lithium    NSAIDs, medicines for pain and inflammation, like ibuprofen or  naproxen    other medicines that prolong the QT interval (cause an abnormal heart rhythm)    procarbazine    rasagiline    supplements like Argenis's wort, kava kava, valerian    tolbutamide    tramadol    tryptophan  What if I miss a dose?  If you miss a dose, take it as soon as you can. If it is almost time for your next dose, take only that dose. Do not take double or extra doses.  Where should I keep my medicine?  Keep out of the reach of children.  Store at room temperature between 15 and 30 degrees C (59 and 86 degrees F). Throw away any unused medicine after the expiration date.  What should I tell my health care provider before I take this medicine?  They need to know if you have any of these conditions:    bleeding disorders    bipolar disorder or a family history of bipolar disorder    glaucoma    heart disease    high blood pressure    history of irregular heartbeat    history of low levels of calcium, magnesium, or potassium in the blood    if you often drink alcohol    liver disease    receiving electroconvulsive therapy    seizures    suicidal thoughts, plans, or attempt; a previous suicide attempt by you or a family member    take medicines that treat or prevent blood clots    thyroid disease    an unusual or allergic reaction to sertraline, other medicines, foods, dyes, or preservatives    pregnant or trying to get pregnant    breast-feeding  What should I watch for while using this medicine?  Tell your doctor if your symptoms do not get better or if they get worse. Visit your doctor or health care professional for regular checks on your progress. Because it may take several weeks to see the full effects of this medicine, it is important to continue your treatment as prescribed by your doctor.  Patients and their families should watch out for new or worsening thoughts of suicide or depression. Also watch out for sudden changes in feelings such as feeling anxious, agitated, panicky, irritable,  hostile, aggressive, impulsive, severely restless, overly excited and hyperactive, or not being able to sleep. If this happens, especially at the beginning of treatment or after a change in dose, call your health care professional.  You may get drowsy or dizzy. Do not drive, use machinery, or do anything that needs mental alertness until you know how this medicine affects you. Do not stand or sit up quickly, especially if you are an older patient. This reduces the risk of dizzy or fainting spells. Alcohol may interfere with the effect of this medicine. Avoid alcoholic drinks.  This medicine contains a high percentage of alcohol that may interact with medicines used to treat alcohol abuse, like Antabuse (disulfiram). You should not take these medicines together.  Your mouth may get dry. Chewing sugarless gum or sucking hard candy, and drinking plenty of water may help. Contact your doctor if the problem does not go away or is severe.  Some products may contain alcohol. Ask your pharmacist or healthcare provider if this medicine contains alcohol. Be sure to tell all healthcare providers you are taking this medicine. Certain medicines, like metronidazole and disulfiram, can cause an unpleasant reaction when taken with alcohol. The reaction includes flushing, headache, nausea, vomiting, sweating, and increased thirst. The reaction can last from 30 minutes to several hours.  NOTE:This sheet is a summary. It may not cover all possible information. If you have questions about this medicine, talk to your doctor, pharmacist, or health care provider. Copyright  2017 [employer]

## 2017-11-06 NOTE — MR AVS SNAPSHOT
After Visit Summary   11/6/2017    Db Watson    MRN: [id number]           Patient Information     Date Of Birth          1965        Visit Information        Provider Department      11/6/2017 2:20 PM Rashmi Acosta NP [facility]        Today's Diagnoses     Anxiety and depression    -  1    Screen for colon cancer        Need for prophylactic vaccination and inoculation against influenza          Care Instructions        Sertraline oral solution  Brand Name: Zoloft  What is this medicine?  SERTRALINE (SER tra rosy) is used to treat depression. It may also be used to treat obsessive compulsive disorder, panic disorder, post-trauma stress, premenstrual dysphoric disorder (PMDD) or social anxiety.  How should I use this medicine?  Take this medicine by mouth. Follow the directions on the prescription label.  Before taking your dose, you need to dilute the solution in a beverage. Measure your medicine dose using the dropper in the bottle. Next, place the measured dose in at least 4 ounces (one-half cup) of water, ginger-noris, lemon-lime soda, lemonade or orange juice and mix. Do not mix in grapefruit juice or in any other liquids other than those listed.  Drink all of mixed liquid immediately. Do not mix the dose and store it for later. It must be taken right away. You may take this medicine with or without food. Take your medicine at regular intervals. Do not take your medicine more often than directed. Do not stop taking this medicine suddenly except upon the advice of your doctor. Stopping this medicine too quickly may cause serious side effects or your condition may worsen.  A special MedGuide will be given to you by the pharmacist with each prescription and refill. Be sure to read this information carefully each time.  Talk to your pediatrician regarding the use of this medicine in children. While this drug may be prescribed for children as young as 7 years for selected  conditions, precautions do apply.  What side effects may I notice from receiving this medicine?  Side effects that you should report to your doctor or health care professional as soon as possible:    allergic reactions like skin rash, itching or hives, swelling of the face, lips, or tongue    anxious    black, tarry stools    changes in vision    confusion    elevated mood, decreased need for sleep, racing thoughts, impulsive behavior    eye pain    fast, irregular heartbeat    feeling faint or lightheaded, falls    feeling agitated, angry, or irritable    hallucination, loss of contact with reality    loss of balance or coordination    loss of memory    painful or prolonged erections    restlessness, pacing, inability to keep still    seizures    stiff muscles    suicidal thoughts or other mood changes    trouble sleeping    unusual bleeding or bruising    unusually weak or tired    vomiting  Side effects that usually do not require medical attention (report to your doctor or health care professional if they continue or are bothersome):    change in appetite or weight    change in sex drive or performance    diarrhea    increased sweating    indigestion, nausea    tremors  What may interact with this medicine?  Do not take this medicine with any of the following medications:    certain medicines for fungal infections like fluconazole, itraconazole, ketoconazole, posaconazole, voriconazole    cisapride    disulfiram    dofetilide    linezolid    MAOIs like Carbex, Eldepryl, Marplan, Nardil, and Parnate    metronidazole    methylene blue (injected into a vein)    pimozide    thioridazine    ziprasidone  This medicine may also interact with the following medications:    alcohol    amphetamines    aspirin and aspirin-like medicines    certain medicines for depression, anxiety, or psychotic disturbances    certain medicines for irregular heart beat like flecainide, propafenone    certain medicines for migraine headaches  like almotriptan, eletriptan, frovatriptan, naratriptan, rizatriptan, sumatriptan, zolmitriptan    certain medicines for sleep    certain medicines for seizures like carbamazepine, valproic acid, phenytoin    certain medicines that treat or prevent blood clots like warfarin, enoxaparin, dalteparin    cimetidine    digoxin    diuretics    fentanyl    furazolidone    isoniazid    lithium    NSAIDs, medicines for pain and inflammation, like ibuprofen or naproxen    other medicines that prolong the QT interval (cause an abnormal heart rhythm)    procarbazine    rasagiline    supplements like Argenis's wort, kava kava, valerian    tolbutamide    tramadol    tryptophan  What if I miss a dose?  If you miss a dose, take it as soon as you can. If it is almost time for your next dose, take only that dose. Do not take double or extra doses.  Where should I keep my medicine?  Keep out of the reach of children.  Store at room temperature between 15 and 30 degrees C (59 and 86 degrees F). Throw away any unused medicine after the expiration date.  What should I tell my health care provider before I take this medicine?  They need to know if you have any of these conditions:    bleeding disorders    bipolar disorder or a family history of bipolar disorder    glaucoma    heart disease    high blood pressure    history of irregular heartbeat    history of low levels of calcium, magnesium, or potassium in the blood    if you often drink alcohol    liver disease    receiving electroconvulsive therapy    seizures    suicidal thoughts, plans, or attempt; a previous suicide attempt by you or a family member    take medicines that treat or prevent blood clots    thyroid disease    an unusual or allergic reaction to sertraline, other medicines, foods, dyes, or preservatives    pregnant or trying to get pregnant    breast-feeding  What should I watch for while using this medicine?  Tell your doctor if your symptoms do not get better or if  they get worse. Visit your doctor or health care professional for regular checks on your progress. Because it may take several weeks to see the full effects of this medicine, it is important to continue your treatment as prescribed by your doctor.  Patients and their families should watch out for new or worsening thoughts of suicide or depression. Also watch out for sudden changes in feelings such as feeling anxious, agitated, panicky, irritable, hostile, aggressive, impulsive, severely restless, overly excited and hyperactive, or not being able to sleep. If this happens, especially at the beginning of treatment or after a change in dose, call your health care professional.  You may get drowsy or dizzy. Do not drive, use machinery, or do anything that needs mental alertness until you know how this medicine affects you. Do not stand or sit up quickly, especially if you are an older patient. This reduces the risk of dizzy or fainting spells. Alcohol may interfere with the effect of this medicine. Avoid alcoholic drinks.  This medicine contains a high percentage of alcohol that may interact with medicines used to treat alcohol abuse, like Antabuse (disulfiram). You should not take these medicines together.  Your mouth may get dry. Chewing sugarless gum or sucking hard candy, and drinking plenty of water may help. Contact your doctor if the problem does not go away or is severe.  Some products may contain alcohol. Ask your pharmacist or healthcare provider if this medicine contains alcohol. Be sure to tell all healthcare providers you are taking this medicine. Certain medicines, like metronidazole and disulfiram, can cause an unpleasant reaction when taken with alcohol. The reaction includes flushing, headache, nausea, vomiting, sweating, and increased thirst. The reaction can last from 30 minutes to several hours.  NOTE:This sheet is a summary. It may not cover all possible information. If you have questions about this  medicine, talk to your doctor, pharmacist, or health care provider. Copyright  2017 [employer]                Follow-ups after your visit        Who to contact     If you have questions or need follow up information about today's clinic visit or your schedule please contact [facility] directly at [phone].  Normal or non-critical lab and imaging results will be communicated to you by MyChart, letter or phone within 4 business days after the clinic has received the results. If you do not hear from us within 7 days, please contact the clinic through [email or web address] or phone. If you have a critical or abnormal lab result, we will notify you by phone as soon as possible.  Submit refill requests through Myze or call your pharmacy and they will forward the refill request to us. Please allow 3 business days for your refill to be completed.          Additional Information About Your Visit        MyChart Information     Myze gives you secure access to your electronic health record. If you see a primary care provider, you can also send messages to your care team and make appointments. If you have questions, please call your primary care clinic.  If you do not have a primary care provider, please call [phone] and they will assist you.        Care EveryWhere ID     This is your Care EveryWhere ID. This could be used by other organizations to access your [place] medical records  [id number]        Your Vitals Were     Pulse Temperature Respirations Pulse Oximetry BMI (Body Mass Index)       87 98  F (36.7  C) (Oral) 14 95% 35.99 kg/m2        Blood Pressure from Last 3 Encounters:   11/06/17 136/86   07/17/17 137/88   07/03/17 (!) 166/102    Weight from Last 3 Encounters:   11/06/17 102.7 kg (226 lb 6.4 oz)   07/17/17 110.2 kg (243 lb)   07/03/17 110.3 kg (243 lb 1.6 oz)              Today, you had the following     No orders found for display         Today's Medication Changes          These changes are  accurate as of: 11/6/17  2:58 PM.  If you have any questions, ask your nurse or doctor.               Start taking these medicines.        Dose/Directions    sertraline 25 MG tablet   Commonly known as:  ZOLOFT   Used for:  Anxiety and depression   Started by:  Rashmi Acosta NP        Dose:  25 mg   Take 1 tablet (25 mg) by mouth daily   Quantity:  30 tablet   Refills:  0            Where to get your medicines      These medications were sent to [id number] [address] [address]     Phone:  [phone]     sertraline 25 MG tablet                Primary Care Provider Office Phone # Fax #    [facility] [phone] [address] [facility] [address]        Equal Access to Services     [facility] RICHARD : Hadii aad ku hadasho Sokatlin, waaxda luqadaha, qaybta kaalmada ademerissayacynthia, katlin cohen. So [facility] [phone].    ATENCIÓN: Si habla español, tiene a [place] disposición servicios gratuitos de asistencia lingüística. Mango al [phone].    We comply with applicable federal civil rights laws and [place] laws. We do not discriminate on the basis of race, color, national origin, age, disability, sex, sexual orientation, or gender identity.            Thank you!     Thank you for choosing [facility]  for your care. Our goal is always to provide you with excellent care. Hearing back from our patients is one way we can continue to improve our services. Please take a few minutes to complete the written survey that you may receive in the mail after your visit with us. Thank you!             Your Updated Medication List - Protect others around you: Learn how to safely use, store and throw away your medicines at www.disposemymeds.org.          This list is accurate as of: 11/6/17  2:58 PM.  Always use your most recent med list.                   Brand Name Dispense  Instructions for use Diagnosis    aspirin 81 MG tablet      Take 1 tablet by mouth daily.    Essential hypertension       fish oil-omega-3 fatty acids 1000 MG capsule      Take 2 g by mouth daily.        hydrochlorothiazide 25 MG tablet    HYDRODIURIL    30 tablet    TAKE 1 TABLET BY MOUTH ONCE DAILY    Essential hypertension with goal blood pressure less than 140/90       meclizine 25 MG Chew      Take 25 mg by mouth every 6 hours as needed.    Indications: Sensation of Spinning or Whirling        metoprolol 50 MG 24 hr tablet    TOPROL-XL    60 tablet    TAKE 1 TABLET BY MOUTH TWICE DAILY    Essential hypertension with goal blood pressure less than 140/90       sertraline 25 MG tablet    ZOLOFT    30 tablet    Take 1 tablet (25 mg) by mouth daily    Anxiety and depression       vitamin B complex with vitamin C Tabs tablet    STRESS TAB    100 tablet    Take 1 tablet by mouth daily.

## 2017-11-06 NOTE — NURSING NOTE
"Chief Complaint   Patient presents with     Anxiety       Initial /86 (BP Location: Right arm, Patient Position: Sitting, Cuff Size: Adult Large)  Pulse 87  Temp 98  F (36.7  C) (Oral)  Resp 14  Wt 102.7 kg (226 lb 6.4 oz)  SpO2 95%  BMI 35.99 kg/m2 Estimated body mass index is 35.99 kg/(m^2) as calculated from the following:    Height as of 8/31/16: 1.689 m (5' 6.5\").    Weight as of this encounter: 102.7 kg (226 lb 6.4 oz).  Medication Reconciliation: juanjose Warren        "

## 2017-11-06 NOTE — PROGRESS NOTES
SUBJECTIVE:   Db Watson is a 52 year old male who presents to clinic today for the following health issues:      Abnormal Mood Symptoms  Onset: this past weekend    Description:   Depression: YES  Anxiety: YES    Accompanying Signs & Symptoms:  Still participating in activities that you used to enjoy: no  Fatigue: YES  Irritability: no  Difficulty concentrating: YES  Changes in appetite: no  Problems with sleep: YES  Heart racing/beating fast : no  Thoughts of hurting yourself or others: yes    History:   Recent stress: YES-   Prior depression hospitalization: None  Family history of depression: no  Family history of anxiety: no    Precipitating factors:   Alcohol/drug use: YES    Alleviating factors:  nothing    Therapies Tried and outcome: None    Was with lady significant other for past 10 yrs, they had a house together, then 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 She then told him she couldn't be with him any longer. Then the business he was in started to fail. He does currently have a job which he is now having more difficulty doing due to his depression. Mood is very up/down as all of these things occurred since July this year. This weekend he normally watches sports and instead of doing that, slept which is not normal for him. He doesn't have hobby, wants to find one to help keep him busy. Is not interested in therapy. He talks to his brother daily, and has good support system. Denies current thoughts of hurting himself although it has crossed him mind in the past. He has know people to commit suicide and sees how devastating it is, he denies wanting to do that to his friends/family.       Problem list and histories reviewed & adjusted, as indicated.  Additional history: as documented    Patient Active Problem List   Diagnosis     Hypertension goal BP (blood pressure) < 140/90     Abnormal LFTs     Habitual alcohol use     Atypical chest pain     HL (hearing loss)     Tinnitus      Abnormal MRI of head     Hyperlipidemia LDL goal <160     BMI 33.0-33.9,adult     Dizziness     Elevated fasting glucose     Meniere's disease, left     Morbid obesity (H)     Past Surgical History:   Procedure Laterality Date     C NONSPECIFIC PROCEDURE      shoulder surgery for dislocation      C NONSPECIFIC PROCEDURE      eye surgery, lens implant right     EYE SURGERY       FRACTURE TX, WRIST RT/LT      Fracture TX Wrist RT/LT     ORTHOPEDIC SURGERY         Social History   Substance Use Topics     Smoking status: Never Smoker     Smokeless tobacco: Never Used      Comment: rarely, once per year maybe      Alcohol use Yes      Comment: 10 drinks / week      Family History   Problem Relation Age of Onset     Hypertension Mother      DIABETES Father      C.A.D. Father      bypass surgery         Current Outpatient Prescriptions   Medication Sig Dispense Refill     sertraline (ZOLOFT) 25 MG tablet Take 1 tablet (25 mg) by mouth daily 30 tablet 0     metoprolol (TOPROL-XL) 50 MG 24 hr tablet TAKE 1 TABLET BY MOUTH TWICE DAILY 60 tablet 2     hydrochlorothiazide (HYDRODIURIL) 25 MG tablet TAKE 1 TABLET BY MOUTH ONCE DAILY 30 tablet 2     fish oil-omega-3 fatty acids (FISH OIL) 1000 MG capsule Take 2 g by mouth daily.       aspirin 81 MG tablet Take 1 tablet by mouth daily.  3     meclizine 25 MG CHEW Take 25 mg by mouth every 6 hours as needed.    Indications: Sensation of Spinning or Whirling       B Complex Vitamins (VITAMIN B COMPLEX) tablet Take 1 tablet by mouth daily. 100 tablet 12     No Known Allergies      Reviewed and updated as needed this visit by clinical staffTobacco  Allergies  Meds  Problems  Med Hx  Surg Hx  Fam Hx  Soc Hx        Reviewed and updated as needed this visit by Provider  Allergies  Meds  Problems         ROS:  Constitutional, cardiovascular, pulmonary, psych systems are negative, except as otherwise noted in HPI.      OBJECTIVE:   /86 (BP Location: Right arm, Patient  Position: Sitting, Cuff Size: Adult Large)  Pulse 87  Temp 98  F (36.7  C) (Oral)  Resp 14  Wt 102.7 kg (226 lb 6.4 oz)  SpO2 95%  BMI 35.99 kg/m2  Body mass index is 35.99 kg/(m^2).  GENERAL: tired appearing, alert and no acute distress  RESP: lungs clear to auscultation - no rales, rhonchi or wheezes  CV: regular rate and rhythm, normal S1 S2, no S3 or S4, no murmur, click or rub  MS: no gross musculoskeletal defects noted, no edema  Psych: slightly scattered thought process, denies thoughts of SI, feels anxious and depressed like mood is 'up and down' a lot    Diagnostic Test Results:  none     ASSESSMENT/PLAN:       1. Anxiety and depression  Start sertraline daily. Discussed side effects. Return in 4 weeks for follow up. Sooner if worsening thoughts of depression. He does not want to be on this long term. Discussed that with this situational depression we should give it 6-8 months then talk about decreasing/weaning off medication. He is okay with this. He is not interested in therapy.   - sertraline (ZOLOFT) 25 MG tablet; Take 1 tablet (25 mg) by mouth daily  Dispense: 30 tablet; Refill: 0    FUTURE APPOINTMENTS:       - Follow-up visit in 4 weeks    LAURY Eugene, NP-C  [facility]

## 2017-11-07 ENCOUNTER — TELEPHONE (OUTPATIENT)
Dept: FAMILY MEDICINE | Facility: CLINIC | Age: 52
End: 2017-11-07

## 2017-11-07 ENCOUNTER — HOSPITAL ENCOUNTER (EMERGENCY)
Facility: CLINIC | Age: 52
Discharge: HOME | End: 2017-11-07
Attending: EMERGENCY MEDICINE | Admitting: EMERGENCY MEDICINE
Payer: COMMERCIAL

## 2017-11-07 VITALS
DIASTOLIC BLOOD PRESSURE: 100 MMHG | SYSTOLIC BLOOD PRESSURE: 167 MMHG | RESPIRATION RATE: 19 BRPM | TEMPERATURE: 98.5 F | HEART RATE: 76 BPM | HEIGHT: 67 IN | OXYGEN SATURATION: 100 % | WEIGHT: 224 LBS | BODY MASS INDEX: 35.16 KG/M2

## 2017-11-07 DIAGNOSIS — R42 DIZZINESS: ICD-10-CM

## 2017-11-07 DIAGNOSIS — F32.A ANXIETY AND DEPRESSION: Primary | ICD-10-CM

## 2017-11-07 DIAGNOSIS — F41.9 ANXIETY AND DEPRESSION: Primary | ICD-10-CM

## 2017-11-07 LAB
ANION GAP SERPL CALCULATED.3IONS-SCNC: 9 MMOL/L (ref 3–14)
BUN SERPL-MCNC: 8 MG/DL (ref 7–30)
CALCIUM SERPL-MCNC: 8.9 MG/DL (ref 8.5–10.1)
CHLORIDE SERPL-SCNC: 96 MMOL/L (ref 94–109)
CO2 SERPL-SCNC: 29 MMOL/L (ref 20–32)
CREAT SERPL-MCNC: 0.69 MG/DL (ref 0.66–1.25)
ERYTHROCYTE [DISTWIDTH] IN BLOOD BY AUTOMATED COUNT: 12.8 % (ref 10–15)
GFR SERPL CREATININE-BSD FRML MDRD: >90 ML/MIN/1.7M2
GLUCOSE SERPL-MCNC: 156 MG/DL (ref 70–99)
HCT VFR BLD AUTO: 43.6 % (ref 40–53)
HGB BLD-MCNC: 15.6 G/DL (ref 13.3–17.7)
INTERPRETATION ECG - MUSE: NORMAL
MCH RBC QN AUTO: 35.4 PG (ref 26.5–33)
MCHC RBC AUTO-ENTMCNC: 35.8 G/DL (ref 31.5–36.5)
MCV RBC AUTO: 99 FL (ref 78–100)
PLATELET # BLD AUTO: 104 10E9/L (ref 150–450)
POTASSIUM SERPL-SCNC: 3 MMOL/L (ref 3.4–5.3)
RBC # BLD AUTO: 4.41 10E12/L (ref 4.4–5.9)
SODIUM SERPL-SCNC: 134 MMOL/L (ref 133–144)
TROPONIN I SERPL-MCNC: <0.015 UG/L (ref 0–0.04)
WBC # BLD AUTO: 4.6 10E9/L (ref 4–11)

## 2017-11-07 PROCEDURE — 85027 COMPLETE CBC AUTOMATED: CPT | Performed by: EMERGENCY MEDICINE

## 2017-11-07 PROCEDURE — 84484 ASSAY OF TROPONIN QUANT: CPT | Performed by: EMERGENCY MEDICINE

## 2017-11-07 PROCEDURE — 25000132 ZZH RX MED GY IP 250 OP 250 PS 637: Performed by: EMERGENCY MEDICINE

## 2017-11-07 PROCEDURE — 93005 ELECTROCARDIOGRAM TRACING: CPT

## 2017-11-07 PROCEDURE — 99284 EMERGENCY DEPT VISIT MOD MDM: CPT | Mod: 25

## 2017-11-07 PROCEDURE — 96374 THER/PROPH/DIAG INJ IV PUSH: CPT

## 2017-11-07 PROCEDURE — 25000128 H RX IP 250 OP 636: Performed by: EMERGENCY MEDICINE

## 2017-11-07 PROCEDURE — 80048 BASIC METABOLIC PNL TOTAL CA: CPT | Performed by: EMERGENCY MEDICINE

## 2017-11-07 RX ORDER — ONDANSETRON 2 MG/ML
4 INJECTION INTRAMUSCULAR; INTRAVENOUS ONCE
Status: COMPLETED | OUTPATIENT
Start: 2017-11-07 | End: 2017-11-07

## 2017-11-07 RX ORDER — POTASSIUM CHLORIDE 1500 MG/1
40 TABLET, EXTENDED RELEASE ORAL ONCE
Status: COMPLETED | OUTPATIENT
Start: 2017-11-07 | End: 2017-11-07

## 2017-11-07 RX ADMIN — ONDANSETRON 4 MG: 2 SOLUTION INTRAMUSCULAR; INTRAVENOUS at 22:45

## 2017-11-07 RX ADMIN — POTASSIUM CHLORIDE 40 MEQ: 1500 TABLET, EXTENDED RELEASE ORAL at 22:45

## 2017-11-07 RX ADMIN — SODIUM CHLORIDE 1000 ML: 9 INJECTION, SOLUTION INTRAVENOUS at 22:47

## 2017-11-07 ASSESSMENT — ENCOUNTER SYMPTOMS
BACK PAIN: 0
COLOR CHANGE: 1
SHORTNESS OF BREATH: 0
ABDOMINAL PAIN: 0
LIGHT-HEADEDNESS: 1
NERVOUS/ANXIOUS: 1
HEADACHES: 1
VOMITING: 1
NAUSEA: 1
DIAPHORESIS: 1

## 2017-11-07 ASSESSMENT — ANXIETY QUESTIONNAIRES: GAD7 TOTAL SCORE: 6

## 2017-11-07 NOTE — ED AVS SNAPSHOT
Emergency Department    [address] [facility] [address]    Phone:  [phone]    Fax:  [phone]                                       Db Watson   MRN: [id number]    Department:   Emergency Department   Date of Visit:  11/7/2017           Patient Information     Date Of Birth          1965        Your diagnoses for this visit were:     Dizziness        You were seen by Fannie Hagen MD.      Follow-up Information     Schedule an appointment as soon as possible for a visit with Clinic, [facility].    Contact information:    [address]  [facility] [id number] [phone]          Follow up with  Emergency Department.    Specialty:  EMERGENCY MEDICINE    Why:  If symptoms worsen    Contact information:    [address] [facility] [address] [phone]        Discharge Instructions       Hold off on the zoloft until you talk to your doctor    Discharge Instructions  Dizziness (Lightheaded)  Today you were seen for dizziness.  Dizziness can be caused by many things and it can be very difficult to determine the cause of dizziness.  At this time, your provider has found no signs that your dizziness is due to a serious or life-threatening condition. However, sometimes there is a serious problem that does not show up right away, and it is important for you to follow up with your regular provider as instructed.  Generally, every Emergency Department visit should have a follow-up clinic visit with either a primary or a specialty clinic/provider. Please follow-up as instructed by your emergency provider today.      Return to the Emergency Department if:      You pass out (fainting or falling out), especially during exercise.      You develop chest pain, chest pressure or difficulty breathing.    Your feel an irregular heartbeat.    You have excessive vaginal bleeding, or blood in your stool or vomit (throw up).    You have a high fever.    Your  symptoms get worse or more frequent.    If when you begin to feel dizzy or lightheaded, it is important to sit down or lay down immediately to prevent injury from falling.  If you were given a prescription for medicine here today, be sure to read all of the information (including the package insert) that comes with your prescription.  This will include important information about the medicine, its side effects, and any warnings that you need to know about.  The pharmacist who fills the prescription can provide more information and answer questions you may have about the medicine.  If you have questions or concerns that the pharmacist cannot address, please call or return to the Emergency Department.   Remember that you can always come back to the Emergency Department if you are not able to see your regular provider in the amount of time listed above, if you get any new symptoms, or if there is anything that worries you.      24 Hour Appointment Hotline       To make an appointment at any [facility], call [phone] ([phone]). If you don't have a family doctor or clinic, we will help you find one. [facility] are conveniently located to serve the needs of you and your family.             Review of your medicines      Our records show that you are taking the medicines listed below. If these are incorrect, please call your family doctor or clinic.        Dose / Directions Last dose taken    aspirin 81 MG tablet   Dose:  81 mg        Take 1 tablet by mouth daily.   Refills:  3        fish oil-omega-3 fatty acids 1000 MG capsule   Dose:  2 g        Take 2 g by mouth daily.   Refills:  0        hydrochlorothiazide 25 MG tablet   Commonly known as:  HYDRODIURIL   Quantity:  30 tablet        TAKE 1 TABLET BY MOUTH ONCE DAILY   Refills:  2        meclizine 25 MG Chew   Dose:  25 mg   Indication:  Sensation of Spinning or Whirling        Take 25 mg by mouth every 6 hours as needed.    Indications:  Sensation of Spinning or Whirling   Refills:  0        metoprolol 50 MG 24 hr tablet   Commonly known as:  TOPROL-XL   Quantity:  60 tablet        TAKE 1 TABLET BY MOUTH TWICE DAILY   Refills:  2        sertraline 25 MG tablet   Commonly known as:  ZOLOFT   Dose:  25 mg   Quantity:  30 tablet        Take 1 tablet (25 mg) by mouth daily   Refills:  0        vitamin B complex with vitamin C Tabs tablet   Commonly known as:  STRESS TAB   Dose:  1 tablet   Quantity:  100 tablet        Take 1 tablet by mouth daily.   Refills:  12                Procedures and tests performed during your visit     Basic metabolic panel    CBC (+ platelets, no diff)    EKG 12 lead    Troponin I (now)      Orders Needing Specimen Collection     None      Pending Results     No orders found from 11/5/2017 to 11/8/2017.            Pending Culture Results     No orders found from 11/5/2017 to 11/8/2017.            Pending Results Instructions     If you had any lab results that were not finalized at the time of your Discharge, you can call the ED Lab Result RN at [phone]. You will be contacted by this team for any positive Lab results or changes in treatment. The nurses are available 7 days a week from 10A to 6:30P.  You can leave a message 24 hours per day and they will return your call.        Test Results From Your Hospital Stay        11/7/2017 10:31 PM      Component Results     Component Value Ref Range & Units Status    Sodium 134 133 - 144 mmol/L Final    Potassium 3.0 (L) 3.4 - 5.3 mmol/L Final    Chloride 96 94 - 109 mmol/L Final    Carbon Dioxide 29 20 - 32 mmol/L Final    Anion Gap 9 3 - 14 mmol/L Final    Glucose 156 (H) 70 - 99 mg/dL Final    Urea Nitrogen 8 7 - 30 mg/dL Final    Creatinine 0.69 0.66 - 1.25 mg/dL Final    GFR Estimate >90 >60 mL/min/1.7m2 Final    Non  GFR Calc    GFR Estimate If Black >90 >60 mL/min/1.7m2 Final    African American GFR Calc    Calcium 8.9 8.5 - 10.1 mg/dL Final          11/7/2017 10:15 PM      Component Results     Component Value Ref Range & Units Status    WBC 4.6 4.0 - 11.0 10e9/L Final    RBC Count 4.41 4.4 - 5.9 10e12/L Final    Hemoglobin 15.6 13.3 - 17.7 g/dL Final    Hematocrit 43.6 40.0 - 53.0 % Final    MCV 99 78 - 100 fl Final    MCH 35.4 (H) 26.5 - 33.0 pg Final    MCHC 35.8 31.5 - 36.5 g/dL Final    RDW 12.8 10.0 - 15.0 % Final    Platelet Count 104 (L) 150 - 450 10e9/L Final         11/7/2017 10:35 PM      Component Results     Component Value Ref Range & Units Status    Troponin I ES <0.015 0.000 - 0.045 ug/L Final    The 99th percentile for upper reference range is 0.045 ug/L.  Troponin values   in the range of 0.045 - 0.120 ug/L may be associated with risks of adverse   clinical events.                  Clinical Quality Measure: Blood Pressure Screening     Your blood pressure was checked while you were in the emergency department today. The last reading we obtained was  BP: (!) 167/100 . Please read the guidelines below about what these numbers mean and what you should do about them.  If your systolic blood pressure (the top number) is less than 120 and your diastolic blood pressure (the bottom number) is less than 80, then your blood pressure is normal. There is nothing more that you need to do about it.  If your systolic blood pressure (the top number) is 120-139 or your diastolic blood pressure (the bottom number) is 80-89, your blood pressure may be higher than it should be. You should have your blood pressure rechecked within a year by a primary care provider.  If your systolic blood pressure (the top number) is 140 or greater or your diastolic blood pressure (the bottom number) is 90 or greater, you may have high blood pressure. High blood pressure is treatable, but if left untreated over time it can put you at risk for heart attack, stroke, or kidney failure. You should have your blood pressure rechecked by a primary care provider within the next 4  weeks.  If your provider in the emergency department today gave you specific instructions to follow-up with your doctor or provider even sooner than that, you should follow that instruction and not wait for up to 4 weeks for your follow-up visit.        Thank you for choosing [facility]       Thank you for choosing [facility] for your care. Our goal is always to provide you with excellent care. Hearing back from our patients is one way we can continue to improve our services. Please take a few minutes to complete the written survey that you may receive in the mail after you visit with us. Thank you!        [employer] Information     [employer] gives you secure access to your electronic health record. If you see a primary care provider, you can also send messages to your care team and make appointments. If you have questions, please call your primary care clinic.  If you do not have a primary care provider, please call [phone] and they will assist you.        Care EveryWhere ID     This is your Care EveryWhere ID. This could be used by other organizations to access your [facility] medical records  [id number]        Equal Access to Services     CARMENCITA RAMOS : Hadii myles murphyo Sokatlin, waaxda luqadaha, qaybta kaalmada wes, katlin oneil . So [facility] [phone].    ATENCIÓN: Si habla español, tiene a [place] disposición servicios gratuitos de asistencia lingüística. Llame al [phone].    We comply with applicable federal civil rights laws and [place] laws. We do not discriminate on the basis of race, color, national origin, age, disability, sex, sexual orientation, or gender identity.            After Visit Summary       This is your record. Keep this with you and show to your community pharmacist(s) and doctor(s) at your next visit.

## 2017-11-07 NOTE — ED AVS SNAPSHOT
Emergency Department    [address] [address]    Phone:  [phone]    Fax:  [phone]                                       Db Watson   MRN: [id number]    Department:   Emergency Department   Date of Visit:  11/7/2017           After Visit Summary Signature Page     I have received my discharge instructions, and my questions have been answered. I have discussed any challenges I see with this plan with the nurse or doctor.    ..........................................................................................................................................  Patient/Patient Representative Signature      ..........................................................................................................................................  Patient Representative Print Name and Relationship to Patient    ..................................................               ................................................  Date                                            Time    ..........................................................................................................................................  Reviewed by Signature/Title    ...................................................              ..............................................  Date                                                            Time

## 2017-11-08 ENCOUNTER — MYC MEDICAL ADVICE (OUTPATIENT)
Dept: FAMILY MEDICINE | Facility: CLINIC | Age: 52
End: 2017-11-08

## 2017-11-08 RX ORDER — TRAZODONE HYDROCHLORIDE 50 MG/1
50 TABLET, FILM COATED ORAL
Qty: 30 TABLET | Refills: 0 | Status: SHIPPED | OUTPATIENT
Start: 2017-11-08 | End: 2017-11-28

## 2017-11-08 NOTE — TELEPHONE ENCOUNTER
Unsure if zoloft contributed to his symptoms but it is very possible. Will add to allergy list for now and discontinue the medication. Trazodone can be used for sleep and for anxiety. Will prescribe that, take 1 tab (50 mg) at bedtime for sleep and anxiety.  Common side effects can include dizziness, nausea, headaches, fatigue. Less common but more serious side effects include heart palpitations, low blood pressure. Call if he develops any of these symptoms or SOB or chest pain.  From the labs that were drawn in the hospital he also had slightly low potassium, encourage him to intake more bananas, yogurt, avocados, dairy to increase his potassium sources. If he is still feeling nauseated, electrolyte water can also help with that. His decreased appetite is likely from his anxiety and depression and should improve as his symptoms improve. Return to clinic sooner than 4 weeks if symptoms not improving.   LAURY Eugene, NP-C

## 2017-11-08 NOTE — PROGRESS NOTES
Pt states he was at work today when he felt sick. Describes vomiting and diaphoresis. Denies LOC. EMS called and cleared pt. Pt  [place] same symptoms at 20:30 this evening.

## 2017-11-08 NOTE — TELEPHONE ENCOUNTER
Patient sent in additional details to Dr. Demarco. Sending to Rashmi Acosta NP as she was the prescribing provider. Please advise.    Halina Strange, RN   [facility] Triage           11/8/17 5:56 AM   Case Demarco,     I was in on Monday and saw the NP because I was feeling anxious and she prescribed Zoloft.  I took one dosage on Tuesday at 9 am and immediately started getting sweaty and a developed a headache.  Around 4 pm I started dry heaving while in the restroom.   Upon returning to my workstation I was very anxious and when I stood up to discus with my manager I lost all color according to co-workers and turned gray.  [facility] was summoned and everything was ok.  I was forced to leave work and later that evening at 8:30 this was repeated and I had someone take me to [facility] where the did test and efrem blood.  They said I might have a virus but I have no body pain just general anxiety and sweating.  My appetite has been down (which results in some weight loss}  Do we have better options?  Maybe just a mild sedative so I can sleep?     Thanks     Db Watson

## 2017-11-08 NOTE — DISCHARGE INSTRUCTIONS
Hold off on the zoloft until you talk to your doctor    Discharge Instructions  Dizziness (Lightheaded)  Today you were seen for dizziness.  Dizziness can be caused by many things and it can be very difficult to determine the cause of dizziness.  At this time, your provider has found no signs that your dizziness is due to a serious or life-threatening condition. However, sometimes there is a serious problem that does not show up right away, and it is important for you to follow up with your regular provider as instructed.  Generally, every Emergency Department visit should have a follow-up clinic visit with either a primary or a specialty clinic/provider. Please follow-up as instructed by your emergency provider today.      Return to the Emergency Department if:      You pass out (fainting or falling out), especially during exercise.      You develop chest pain, chest pressure or difficulty breathing.    Your feel an irregular heartbeat.    You have excessive vaginal bleeding, or blood in your stool or vomit (throw up).    You have a high fever.    Your symptoms get worse or more frequent.    If when you begin to feel dizzy or lightheaded, it is important to sit down or lay down immediately to prevent injury from falling.  If you were given a prescription for medicine here today, be sure to read all of the information (including the package insert) that comes with your prescription.  This will include important information about the medicine, its side effects, and any warnings that you need to know about.  The pharmacist who fills the prescription can provide more information and answer questions you may have about the medicine.  If you have questions or concerns that the pharmacist cannot address, please call or return to the Emergency Department.   Remember that you can always come back to the Emergency Department if you are not able to see your regular provider in the amount of time listed above, if you get any  new symptoms, or if there is anything that worries you.

## 2017-11-08 NOTE — TELEPHONE ENCOUNTER
Patient called back and was given the information as written below by the provider.    Sara Barros RN, [facility]

## 2017-11-08 NOTE — TELEPHONE ENCOUNTER
Reason for Call:  Other call back    Detailed comments: Db was seen 11/06 by Rashmi Acosta. She prescribed some medication which he took this morning 11/07 with food at 9am.  He was not feeling too good afterwards by 4pm EMS was called to his place of employment.  He is unsure if the two are related but is going to hold off on taking anymore medication until he hears back from her.  Please call as soon as possible to advise.   Thank you     Phone Number Patient can be reached at: Home number on file [phone] (home)    Best Time: Any    Can we leave a detailed message on this number? YES    Call taken on 11/7/2017 at 6:03 PM by Aida Bernstein

## 2017-11-08 NOTE — ED PROVIDER NOTES
"  History     Chief Complaint:  Near Syncopal Event    HPI   Db Watson is a 52 year old male who presents after two near syncopal events. The patient was just started on Zoloft for depression this morning. While sitting at work today, the patient started feeling diaphoretic and went to the bathroom and started dry heaving. He returned to his desk and reports feeling extremely anxious with the onset of a lighheadedness. There was one moment where he felt like both his arms were falling asleep. He stood up to inform his boss that he thought that he should go home when he started to lose his vision, feel worsening lightheadedness. He sat down right away and reports that he did not fully lose consciousness. His coworkers report that he had turned \"gray.\" EMS was called and the patient was checked out. According to them he was cleared and he went home. He had a very similar event while at home tonight so he decided to present here to the emergency department. Was told by pharmacist that he could get diaphoretic with the zoloft. The patient denies having any rash, difficulty breathing, chest pain and abdominal pain.     Allergies:  Lisinopril    Medications:    Zoloft  Toprol  Hydrodiuril  Fish Oil  Aspirin  Meclizine  Vitamin B     Past Medical History:    Hypertension   Abnormal LFTs  Habitual alcohol use  Atypical chest pain  HL (hearing loss)  Tinnitus  Hyperlipidemia  Elevated fasting glucose  Meniere's disease  Morbid obesity     Past Surgical History:    Shoulder Surgery for Dislocation  Lens Implant Right  Eye Surgery  Fracture Wrist  Orthopedic Surgery    Family History:    Mother: Hypertension  Father: Diabetes, CAD    Social History:  Never Smoker  Alcohol Use Positive  Marital Status:  Single     Review of Systems   Constitutional: Positive for diaphoresis.   Respiratory: Negative for shortness of breath.    Cardiovascular: Negative for chest pain.   Gastrointestinal: Positive for nausea and vomiting. " "Negative for abdominal pain.   Musculoskeletal: Negative for back pain.   Skin: Positive for color change. Negative for rash.   Neurological: Positive for light-headedness and headaches. Negative for syncope.   Psychiatric/Behavioral: The patient is nervous/anxious.    All other systems reviewed and are negative.      Physical Exam     Patient Vitals for the past 24 hrs:   BP Temp Temp src Pulse Heart Rate Resp SpO2 Height Weight   11/07/17 2308 (!) 167/100 - - - 81 19 - - -   11/07/17 2207 (!) 169/101 - - - 65 20 100 % - -   11/07/17 2107 (!) 171/99 98.5  F (36.9  C) Oral 76 - 18 98 % 1.702 m (5' 7\") 101.6 kg (224 lb)       Physical Exam  General: Resting comfortably on the gurney  Eyes:  The pupils are equal and round  ENT:    Moist mucous membranes  Neck:  Normal range of motion  CV:  Regular rate and rhythm    Skin warm and well perfused   Resp:  Lungs are clear    Non-labored    No rales    No wheezing   GI:  Abdomen is soft, there is no rigidity    No distension    No rebound tenderness     No abdominal tenderness    No guarding  MS:  Normal muscular tone  Skin:  No rash or acute skin lesions noted. No diaphoresis  Neuro:   Awake, alert.      Speech is normal and fluent.    Face is symmetric.     Moves all extremities equally  Psych: Normal affect.  Appropriate interactions.      Emergency Department Course   ECG:  Indication: Near Syncopy  Time: 2110  Vent. Rate 78 bpm. AZ interval 138. QRS duration 106. QT/QTc 422/481. P-R-T axis 22 28 40. Normal sinus rhythm. Prolonged QT. Abnormal ECG. Read time: 2115    Laboratory:  BMP: Glucose 156 (H), Potassium 3.0(L), o/w WNL (Creatinine: 0.69)  CBC: WBC: 4.6, HGB: 15.6, PLT: 104 (L)  Troponin: <0.015    Interventions:  22:45 Zofran 4 mg IV  22:45 Potassium Chloride 40 mEq PO  22:47 0.9% Sodium Chloride 1,000 mL IV    Emergency Department Course:  Nursing notes and vitals reviewed.  I performed an exam of the patient as documented above.   EKG obtained in the ED, see " results above.   Blood drawn. This was sent to the lab for further testing, results above.  IV inserted. Medicine administered as documented above.   Oral medications were administered as documented above.    22:45 I reevaluated the patient and provided an update in regards to his ED course.      I personally reviewed the laboratory results with the Patient and answered all related questions prior to discharge.   Findings and plan explained to the Patient. Patient discharged home with instructions regarding supportive care, medications, and reasons to return. The importance of close follow-up was reviewed.       Impression & Plan      Medical Decision Making:  Db Watson is a 52 year old male who presents to the emergency department with dizziness and lightheadedness. He did not actually have loss of consciousness. Vital signs noted for hypertension. Otherwise unremarkable exam. He had no chest pain or SOB to suggest ACS or PE with these episodes. He was started on a new medication Zoloft today which the pharmacist told him he could have diaphoresis which is what he had twice today. Symptoms today may be side affects related to the Zoloft and he will not take it until he talks with his physician. Laboratory values were obtained and troponin is normal. Potassium is slightly low. Platelets slightly low, is fairly heavy drinker per nurse, this is likely cause. No evidence of alcohol withdrawal at this time. Did vomit in ED once which he states is from him not eating which happens to him frequently. Given zofran and ate food which resolved the vomiting. Continues to have no abdominal pain, chest pain. I do not feel further workup is indicated in the emergency department. He has no infectious symptoms that he notes to suggest infection as the cause of his diaphoresis today and he is afebrile with nl WBC count. Reasons to return to the emergency department were discussed with the patient.     Diagnosis:    ICD-10-CM     1. Dizziness R42        Disposition:  discharged to home    Discharge Medications:  New Prescriptions    No medications on file       I, Giovanni Mojica, am serving as a scribe on 11/7/2017 at 9:48 PM to personally document services performed by Fannie Hagen MD based on my observations and the provider's statements to me.     11/7/2017    EMERGENCY DEPARTMENT       Fannie Hagen MD  11/08/17 0053

## 2017-11-08 NOTE — TELEPHONE ENCOUNTER
This writer attempted to contact Db on 11/08/17      Reason for call results and left message to return call.      If patient calls back:   ESCOBAR SHARP.        Anupama Llamas, RN

## 2017-11-27 ENCOUNTER — HOSPITAL ENCOUNTER (EMERGENCY)
Facility: CLINIC | Age: 52
Discharge: HOME | End: 2017-11-27
Attending: EMERGENCY MEDICINE | Admitting: EMERGENCY MEDICINE
Payer: COMMERCIAL

## 2017-11-27 ENCOUNTER — OFFICE VISIT (OUTPATIENT)
Dept: URGENT CARE | Facility: URGENT CARE | Age: 52
End: 2017-11-27
Payer: COMMERCIAL

## 2017-11-27 VITALS
BODY MASS INDEX: 34.83 KG/M2 | WEIGHT: 222.4 LBS | OXYGEN SATURATION: 96 % | DIASTOLIC BLOOD PRESSURE: 80 MMHG | SYSTOLIC BLOOD PRESSURE: 158 MMHG | HEART RATE: 81 BPM | TEMPERATURE: 98.3 F

## 2017-11-27 VITALS
DIASTOLIC BLOOD PRESSURE: 76 MMHG | BODY MASS INDEX: 34.53 KG/M2 | SYSTOLIC BLOOD PRESSURE: 138 MMHG | WEIGHT: 220 LBS | HEART RATE: 84 BPM | TEMPERATURE: 98.5 F | HEIGHT: 67 IN | OXYGEN SATURATION: 99 % | RESPIRATION RATE: 16 BRPM

## 2017-11-27 DIAGNOSIS — F41.1 GAD (GENERALIZED ANXIETY DISORDER): Primary | ICD-10-CM

## 2017-11-27 DIAGNOSIS — I10 ELEVATED BLOOD PRESSURE READING WITH DIAGNOSIS OF HYPERTENSION: ICD-10-CM

## 2017-11-27 DIAGNOSIS — G47.00 INSOMNIA, UNSPECIFIED TYPE: ICD-10-CM

## 2017-11-27 DIAGNOSIS — F41.9 ANXIETY: ICD-10-CM

## 2017-11-27 LAB
ANION GAP SERPL CALCULATED.3IONS-SCNC: 14 MMOL/L (ref 3–14)
BUN SERPL-MCNC: 7 MG/DL (ref 7–30)
CALCIUM SERPL-MCNC: 8.7 MG/DL (ref 8.5–10.1)
CHLORIDE SERPL-SCNC: 98 MMOL/L (ref 94–109)
CO2 SERPL-SCNC: 25 MMOL/L (ref 20–32)
CREAT SERPL-MCNC: 0.68 MG/DL (ref 0.66–1.25)
GFR SERPL CREATININE-BSD FRML MDRD: >90 ML/MIN/1.7M2
GLUCOSE SERPL-MCNC: 84 MG/DL (ref 70–99)
POTASSIUM SERPL-SCNC: 3.2 MMOL/L (ref 3.4–5.3)
SODIUM SERPL-SCNC: 137 MMOL/L (ref 133–144)
TSH SERPL DL<=0.005 MIU/L-ACNC: 0.9 MU/L (ref 0.4–4)

## 2017-11-27 PROCEDURE — 84443 ASSAY THYROID STIM HORMONE: CPT | Performed by: PHYSICIAN ASSISTANT

## 2017-11-27 PROCEDURE — 36415 COLL VENOUS BLD VENIPUNCTURE: CPT | Performed by: PHYSICIAN ASSISTANT

## 2017-11-27 PROCEDURE — 99283 EMERGENCY DEPT VISIT LOW MDM: CPT

## 2017-11-27 PROCEDURE — 80048 BASIC METABOLIC PNL TOTAL CA: CPT | Performed by: PHYSICIAN ASSISTANT

## 2017-11-27 PROCEDURE — 99214 OFFICE O/P EST MOD 30 MIN: CPT | Performed by: PHYSICIAN ASSISTANT

## 2017-11-27 RX ORDER — HYDROXYZINE HYDROCHLORIDE 50 MG/1
50 TABLET, FILM COATED ORAL EVERY 6 HOURS PRN
Qty: 30 TABLET | Refills: 0 | Status: ON HOLD | OUTPATIENT
Start: 2017-11-27 | End: 2018-02-17

## 2017-11-27 ASSESSMENT — ENCOUNTER SYMPTOMS
DIARRHEA: 0
CONSTIPATION: 0
VOMITING: 1
BLOOD IN STOOL: 0
APPETITE CHANGE: 1
SLEEP DISTURBANCE: 1
NERVOUS/ANXIOUS: 1

## 2017-11-27 ASSESSMENT — ANXIETY QUESTIONNAIRES
IF YOU CHECKED OFF ANY PROBLEMS ON THIS QUESTIONNAIRE, HOW DIFFICULT HAVE THESE PROBLEMS MADE IT FOR YOU TO DO YOUR WORK, TAKE CARE OF THINGS AT HOME, OR GET ALONG WITH OTHER PEOPLE: SOMEWHAT DIFFICULT
5. BEING SO RESTLESS THAT IT IS HARD TO SIT STILL: MORE THAN HALF THE DAYS
GAD7 TOTAL SCORE: 7
2. NOT BEING ABLE TO STOP OR CONTROL WORRYING: SEVERAL DAYS
7. FEELING AFRAID AS IF SOMETHING AWFUL MIGHT HAPPEN: NOT AT ALL
1. FEELING NERVOUS, ANXIOUS, OR ON EDGE: SEVERAL DAYS
3. WORRYING TOO MUCH ABOUT DIFFERENT THINGS: SEVERAL DAYS
6. BECOMING EASILY ANNOYED OR IRRITABLE: SEVERAL DAYS

## 2017-11-27 ASSESSMENT — PATIENT HEALTH QUESTIONNAIRE - PHQ9
5. POOR APPETITE OR OVEREATING: SEVERAL DAYS
SUM OF ALL RESPONSES TO PHQ QUESTIONS 1-9: 17

## 2017-11-27 NOTE — MR AVS SNAPSHOT
After Visit Summary   11/27/2017    Db Watson    MRN: [id number]           Patient Information     Date Of Birth          1965        Visit Information        Provider Department      11/27/2017 11:45 AM Lesa Lyn PA-C [facility]        Today's Diagnoses     SHERMAN (generalized anxiety disorder)    -  1    Elevated blood pressure reading with diagnosis of hypertension           Follow-ups after your visit        Additional Services     PRIMARY CARE INTEGRATED BEHAVIORAL HEALTH REFERRAL       Services are provided by a Behavioral Health Clinican (B[facility]) for [facility] patients' with co-occuring medical / behavioral health needs or mental health / substance use issues referred by Care Team Members (MTM and Care Coordinators)    Services can be provided in person / in clinic or telephonically for the [place]: Javad Banks, Integrated Primary Care, and Complex Mobile Care Clinic patients.      Telephone / Consultation support can be provided to Care Team Members for [facility] patients.    C's will respond to routine orders within 3-5 business days.  C's will provide telephonic support to referred patients as indicated.    ~~~~~~~~~~~~~~~~~~~~~~~~~~~~~~~~~~~~~~~~~~~~~~~~~~~~~~~~~~~~~~~    Care Team Member creating referral: Pamela Lyn PA-C    REFERRAL REASON:    Anxiety    Provide additional details for Behavioral Health Clinician to best meet patient's current needs: Tried Zoloft, bad reaction.Stopped taking Trazodone after 2 nights.    Clinic Staff has discussed Behavioral Health Clinician Referral with the Patient/Caregiver: yes                  Who to contact     If you have questions or need follow up information about today's clinic visit or your schedule please contact [facility] directly at [phone].  Normal or non-critical lab and imaging results will be communicated to you by MyChart, letter or phone within 4 business days after the clinic has  received the results. If you do not hear from us within 7 days, please contact the clinic through [employer] or phone. If you have a critical or abnormal lab result, we will notify you by phone as soon as possible.  Submit refill requests through [employer] or call your pharmacy and they will forward the refill request to us. Please allow 3 business days for your refill to be completed.          Additional Information About Your Visit        [email or web address] Information     [employer] gives you secure access to your electronic health record. If you see a primary care provider, you can also send messages to your care team and make appointments. If you have questions, please call your primary care clinic.  If you do not have a primary care provider, please call [phone] and they will assist you.        Care EveryWhere ID     This is your Care EveryWhere ID. This could be used by other organizations to access your [facility] medical records  [id number]        Your Vitals Were     Pulse Temperature Pulse Oximetry BMI (Body Mass Index)          81 98.3  F (36.8  C) (Oral) 96% 34.83 kg/m2         Blood Pressure from Last 3 Encounters:   11/27/17 158/80   11/07/17 (!) 167/100   11/06/17 136/86    Weight from Last 3 Encounters:   11/27/17 222 lb 6.4 oz (100.9 kg)   11/07/17 224 lb (101.6 kg)   11/06/17 226 lb 6.4 oz (102.7 kg)              We Performed the Following     Basic metabolic panel  (Ca, Cl, CO2, Creat, Gluc, K, Na, BUN)     PRIMARY CARE INTEGRATED BEHAVIORAL HE[facility] REFERRAL     TSH with free T4 reflex        Primary Care Provider Office Phone # Fax #    [facility] [phone] [address] [address]        Equal Access to Services     CARMENCITA RAMOS : Hadii myles Miller, wakmda luqadaha, qaybaracelis kaalmada wes, katlin cohen. So [facility] [phone].    ATENCIÓN: Si habla español, tiene a [place] disposición servicios gratuitos de asistencia  lingüística. Mango al [phone].    We comply with applicable federal civil rights laws and [place] laws. We do not discriminate on the basis of race, color, national origin, age, disability, sex, sexual orientation, or gender identity.            Thank you!     Thank you for choosing [facility]  for your care. Our goal is always to provide you with excellent care. Hearing back from our patients is one way we can continue to improve our services. Please take a few minutes to complete the written survey that you may receive in the mail after your visit with us. Thank you!             Your Updated Medication List - Protect others around you: Learn how to safely use, store and throw away your medicines at www.disposemymeds.org.          This list is accurate as of: 11/27/17 12:43 PM.  Always use your most recent med list.                   Brand Name Dispense Instructions for use Diagnosis    aspirin 81 MG tablet      Take 1 tablet by mouth daily.    Essential hypertension       fish oil-omega-3 fatty acids 1000 MG capsule      Take 2 g by mouth daily.        hydrochlorothiazide 25 MG tablet    HYDRODIURIL    30 tablet    TAKE 1 TABLET BY MOUTH ONCE DAILY    Essential hypertension with goal blood pressure less than 140/90       meclizine 25 MG Chew      Take 25 mg by mouth every 6 hours as needed.    Indications: Sensation of Spinning or Whirling        metoprolol 50 MG 24 hr tablet    TOPROL-XL    60 tablet    TAKE 1 TABLET BY MOUTH TWICE DAILY    Essential hypertension with goal blood pressure less than 140/90       traZODone 50 MG tablet    DESYREL    30 tablet    Take 1 tablet (50 mg) by mouth nightly as needed for sleep    Anxiety and depression       vitamin B complex with vitamin C Tabs tablet    STRESS TAB    100 tablet    Take 1 tablet by mouth daily.

## 2017-11-27 NOTE — NURSING NOTE
"Chief Complaint   Patient presents with     Shaking     3x days loss of appetite, anxiety, recently change med's       Initial /80 (BP Location: Left arm, Patient Position: Chair, Cuff Size: Adult Large)  Pulse 81  Temp 98.3  F (36.8  C) (Oral)  Wt 222 lb 6.4 oz (100.9 kg)  SpO2 96%  BMI 34.83 kg/m2 Estimated body mass index is 34.83 kg/(m^2) as calculated from the following:    Height as of 11/7/17: 5' 7\" (1.702 m).    Weight as of this encounter: 222 lb 6.4 oz (100.9 kg).  Medication Reconciliation: complete     Vashti Moore MA      "

## 2017-11-27 NOTE — ED AVS SNAPSHOT
Emergency Department    [address] [place] [address]    Phone:  [phone]    Fax:  [phone]                                       Db Watson   MRN: [id number]    Department:   Emergency Department   Date of Visit:  11/27/2017           After Visit Summary Signature Page     I have received my discharge instructions, and my questions have been answered. I have discussed any challenges I see with this plan with the nurse or doctor.    ..........................................................................................................................................  Patient/Patient Representative Signature      ..........................................................................................................................................  Patient Representative Print Name and Relationship to Patient    ..................................................               ................................................  Date                                            Time    ..........................................................................................................................................  Reviewed by Signature/Title    ...................................................              ..............................................  Date                                                            Time

## 2017-11-27 NOTE — ED PROVIDER NOTES
History     Chief Complaint:  Insomnia and Vomiting    HPI   Db Watson is a 52 year old male who presents to the emergency department today for evaluation of anxiety and insomnia. He had a panic attack two days ago on 11/25/2017 in the morning. He woke up at 0300 this morning with racing thoughts and shakes. He notes that recently he has been having difficulty sleeping. He was seen in clinic at [place] today at which point it was felt that the patient was suffering from depression. He experienced loss of appetite and vomited after he tried eating lunch. He notes that he has experienced sleeplessness in the past for which he has been prescribed Trazodone. He notes that he has taken this Trazodone intermittently and it has helped. The patient reported that he drove himself to the hospital and does not have any thoughts of hurting himself or others. He has a history of high blood pressure and drinking, five to six drinks daily. His last drink was last night at 23:30. He denies any physical pain, chest pain, irregular bowel movements, or any significant medical issues.    Allergies:  Zoloft- reacts by nausea and vomiting     Medications:    Desyrel  Toprol  Hydrodiuril  Fish oil  Asprin   Meclizine  B complex vitamin    Past Medical History:    Abnormal MRI of head    Atypical chest pain   BMI 33.0-33.9  Hyperlipidemia LDL   Hypertension   Ménière's disease     Past Surgical History:    C  Nonspecific procedure shoulder  C Nonspecific procedure - eye   Eye surgery   Fracture TX, wrist RT/LT  Orthopedic surgery     Family History:    Mother- Hypertension  Father- Diabetes, C.A.D (bypasss)    Social History:  Non smoker or Tobacco user  Alcohol- 10 drinks per week  Marital Status:  Single [1]    Review of Systems   Constitutional: Positive for appetite change (Decreased).   Cardiovascular: Negative for chest pain.   Gastrointestinal: Positive for vomiting (Resolved). Negative for blood in stool, constipation and  "diarrhea.   Psychiatric/Behavioral: Positive for sleep disturbance. Negative for self-injury and suicidal ideas. The patient is nervous/anxious.    All other systems reviewed and are negative.    Physical Exam     Patient Vitals for the past 24 hrs:   BP Temp Temp src Pulse Resp SpO2 Height Weight   11/27/17 1700 138/76 - - 84 16 99 % - -   11/27/17 1502 147/82 98.5  F (36.9  C) Oral 87 18 98 % 1.702 m (5' 7\") 99.8 kg (220 lb)     Physical Exam  General: Alert and cooperative with exam. Patient in mild distress/anxious in appearance. Normal mentation.  Head:  Scalp is NC/AT  Eyes:  No scleral icterus, PERRL  ENT:  The external nose and ears are normal. The oropharynx is normal and without erythema; mucus membranes are moist.   Neck:  Normal range of motion without rigidity.  CV:  Regular rate  Resp:  Non-labored, no retractions or accessory muscle use  GI:  Abdomen is soft, no distension, no tenderness. Overweight  MS:  No lower extremity edema   Skin:  Warm and dry, No rash or lesions noted.  Neuro: Oriented x 3. No gross motor deficits.  Psych: Awake. Alert. Mildly anxious in appearance. Denies symptoms of kimberly, significant depression, or SI/HI        Emergency Department Course     Emergency Department Course:    1531 Nursing notes and vitals reviewed.    1553 I performed an exam of the patient as documented above.     1647 I personally reviewed the physical exam results with the patient and answered all related questions prior to discharge.    Impression & Plan      Medical Decision Making:  Db Watson is a 52 year old male who presents to the emergency department today with anxiety and insomnia. Patient's medical history and records were reviewed. On evaluation the patient did note that he drinks 5 alcoholic beverages per night. He was offered chemical dependency counseling which he deferred; no evidence or history of significant withdrawal symptoms. Given the patient's significant alcohol abuse, I elected " not to prescribe Benzodiazepines. The patient denied significant depression or concern for self harm or harming others. I recommended close follow up with mental health as previously recommended; patient has contact information available to set up outpatient appointment. Additionally I recommended close follow up with his primary care physician. I prescribed Hydroxyzine for anxiety and insomnia. Additional supportive care was discussed. The patient was discharged to home in good/stable condition. No indication for labs or imaging at this time.     Diagnosis:    ICD-10-CM    1. Anxiety F41.9    2. Insomnia, unspecified type G47.00      Disposition:   The patient is discharged to home.    Discharge Medications:  Discharge Medication List as of 11/27/2017  4:52 PM      START taking these medications    Details   hydrOXYzine (ATARAX) 50 MG tablet Take 1 tablet (50 mg) by mouth every 6 hours as needed for anxiety, Disp-30 tablet, R-0, Local Print           Scribe Disclosure:  I, Merly Merlos, am serving as a scribe at 3:38 PM on 11/27/2017 to document services personally performed by Jamie Yu DO, based on my observations and the provider's statements to me.     EMERGENCY DEPARTMENT       Jamie Yu DO  11/27/17 5234

## 2017-11-27 NOTE — PROGRESS NOTES
SUBJECTIVE:   Db Watson is a 52 year old male who presents to clinic today for the following health issues:      Shaky- he says he means constant state of anxiety by this.      Duration: 3x days    Description (location/character/radiation): All over body    Intensity:  moderate    Accompanying signs and symptoms: loss of appetite, shaky,    History (similar episodes/previous evaluation): None    Precipitating or alleviating factors: None, never had it until it seems on Metoprolol    Therapies tried and outcome: None     Drinks 3-4 alcohol beverages a day.  Not very compliant with meds. Worries that meds cause side effects. Skipped his Metoprolol last 2 days. Only took Trazodone for 2 nights. Also given Zoloft in the past but had nausea and vomiting from it. Denies any suicidal thoughts. Also has some relationship problems with his girlfriend        Allergies   Allergen Reactions     Zoloft [Sertraline] Nausea and Vomiting       Past Medical History:   Diagnosis Date     Abnormal MRI of head 1/11/2013     Atypical chest pain 9/4/2012     BMI 33.0-33.9,adult 5/24/2013     Hyperlipidemia LDL goal <160 5/4/2013     Hypertension      Ménière's disease          Current Outpatient Prescriptions on File Prior to Visit:  hydrochlorothiazide (HYDRODIURIL) 25 MG tablet TAKE 1 TABLET BY MOUTH ONCE DAILY   fish oil-omega-3 fatty acids (FISH OIL) 1000 MG capsule Take 2 g by mouth daily.   aspirin 81 MG tablet Take 1 tablet by mouth daily.   meclizine 25 MG CHEW Take 25 mg by mouth every 6 hours as needed.    Indications: Sensation of Spinning or Whirling   B Complex Vitamins (VITAMIN B COMPLEX) tablet Take 1 tablet by mouth daily.   traZODone (DESYREL) 50 MG tablet Take 1 tablet (50 mg) by mouth nightly as needed for sleep (Patient not taking: Reported on 11/27/2017)   metoprolol (TOPROL-XL) 50 MG 24 hr tablet TAKE 1 TABLET BY MOUTH TWICE DAILY (Patient not taking: Reported on 11/27/2017)     No current facility-administered  medications on file prior to visit.     Social History   Substance Use Topics     Smoking status: Never Smoker     Smokeless tobacco: Never Used      Comment: rarely, once per year maybe      Alcohol use Yes      Comment: 10 drinks / week        ROS:  CONSTITUTIONAL: Negative for fatigue or fever.  EYES: Negative for vision changes or eye problems.  ENT: As above.  RESP: As above.  CV: Negative for chest pains.  GI: Negative  For nausea, vomiting, constipation, diarrhea, abdominal pain.  : Negative for dysuria.  MUSCULOSKELETAL:  Negative for significant muscle or joint pains.  NEUROLOGIC: Negative for headaches, numbness, tingling, weakness.  Skin: Negative for rash.    OBJECTIVE:  BP (!) 151/95 (BP Location: Left arm, Patient Position: Chair, Cuff Size: Adult Large)  Pulse 81  Temp 98.3  F (36.8  C) (Oral)  Wt 222 lb 6.4 oz (100.9 kg)  SpO2 96%  BMI 34.83 kg/m2  GENERAL APPEARANCE: Healthy, alert and no distress.  EYES:Cconjunctiva/sclera clear.  EARS: No cerumen.   Ear canals w/o erythema.  TM's intact w/o erythema.    NOSE/MOUTH: Nose without ulcers, erythema or lesions.  THROAT: No erythema w/o tonsillar enlargement . No exudates.  NECK: Supple, nontender, no lymphadenopathy.  RESP: Lungs clear to auscultation - no rales, rhonchi or wheezes  CV: Regular rate and rhythm, normal S1 S2, no murmur noted.  NEURO: Awake, alert    SKIN: No rashes. No shaking of hands or head noted.  PHQ was 17, SHERMAN was 7    Results for orders placed or performed in visit on 11/27/17   TSH with free T4 reflex   Result Value Ref Range    TSH 0.90 0.40 - 4.00 mU/L   Basic metabolic panel  (Ca, Cl, CO2, Creat, Gluc, K, Na, BUN)   Result Value Ref Range    Sodium 137 133 - 144 mmol/L    Potassium 3.2 (L) 3.4 - 5.3 mmol/L    Chloride 98 94 - 109 mmol/L    Carbon Dioxide 25 20 - 32 mmol/L    Anion Gap 14 3 - 14 mmol/L    Glucose 84 70 - 99 mg/dL    Urea Nitrogen 7 7 - 30 mg/dL    Creatinine 0.68 0.66 - 1.25 mg/dL    GFR Estimate >90  >60 mL/min/1.7m2    GFR Estimate If Black >90 >60 mL/min/1.7m2    Calcium 8.7 8.5 - 10.1 mg/dL         ASSESSMENT:     ICD-10-CM    1. SHERMAN (generalized anxiety disorder) F41.1 PRIMARY CARE INTEGRATED BEHAVIORAL HEALTH REFERRAL     TSH with free T4 reflex     Basic metabolic panel  (Ca, Cl, CO2, Creat, Gluc, K, Na, BUN)   2. Elevated blood pressure reading with diagnosis of hypertension I10            PLAN: Extensive time spent trying to sort out what he was actually here for today. Seems to have anxiety about medications in addition to other things. Had Behavioral Health briefly speak to him- will get him in for appt this week. Told him to use metoprolol and his trazodone. F/U with PCP to discuss maybe trying Celexa or other med.  Lesa Lyn PA-C

## 2017-11-27 NOTE — ED AVS SNAPSHOT
Emergency Department    [address] [facility] [address]    Phone:  [phone]    Fax:  [phone]                                       Db Watson   MRN: [id number]    Department:   Emergency Department   Date of Visit:  11/27/2017           Patient Information     Date Of Birth          1965        Your diagnoses for this visit were:     Anxiety     Insomnia, unspecified type        You were seen by Jamie Yu DO.      Follow-up Information     Follow up with Clinic, [facility] In 3 days.    Contact information:    [address] [facility] [address] [phone]          Follow up with Mental health counselor In 1 week.    Why:  As directed        Discharge Instructions       May consider melatonin or take previously prescribed trazodone for insomnia    May consider hydroxyzine as prescribed for anxiety.    Discharge References/Attachments     ANXIETY REACTION (ENGLISH)      24 Hour Appointment Hotline       To make an appointment at any [facility], call [phone] ([phone]). If you don't have a family doctor or clinic, we will help you find one. [facility] are conveniently located to serve the needs of you and your family.             Review of your medicines      START taking        Dose / Directions Last dose taken    hydrOXYzine 50 MG tablet   Commonly known as:  ATARAX   Dose:  50 mg   Quantity:  30 tablet        Take 1 tablet (50 mg) by mouth every 6 hours as needed for anxiety   Refills:  0          Our records show that you are taking the medicines listed below. If these are incorrect, please call your family doctor or clinic.        Dose / Directions Last dose taken    aspirin 81 MG tablet   Dose:  81 mg        Take 1 tablet by mouth daily.   Refills:  3        fish oil-omega-3 fatty acids 1000 MG capsule   Dose:  2 g        Take 2 g by mouth daily.   Refills:  0        hydrochlorothiazide 25 MG tablet   Commonly known  as:  HYDRODIURIL   Quantity:  30 tablet        TAKE 1 TABLET BY MOUTH ONCE DAILY   Refills:  2        meclizine 25 MG Chew   Dose:  25 mg   Indication:  Sensation of Spinning or Whirling        Take 25 mg by mouth every 6 hours as needed.    Indications: Sensation of Spinning or Whirling   Refills:  0        metoprolol 50 MG 24 hr tablet   Commonly known as:  TOPROL-XL   Quantity:  60 tablet        TAKE 1 TABLET BY MOUTH TWICE DAILY   Refills:  2        traZODone 50 MG tablet   Commonly known as:  DESYREL   Dose:  50 mg   Quantity:  30 tablet        Take 1 tablet (50 mg) by mouth nightly as needed for sleep   Refills:  0        vitamin B complex with vitamin C Tabs tablet   Commonly known as:  STRESS TAB   Dose:  1 tablet   Quantity:  100 tablet        Take 1 tablet by mouth daily.   Refills:  12                Prescriptions were sent or printed at these locations (1 Prescription)                   Other Prescriptions                Printed at Department/Unit printer (1 of 1)         hydrOXYzine (ATARAX) 50 MG tablet                Orders Needing Specimen Collection     None      Pending Results     Date and Time Order Name Status Description    11/27/2017 1219 BASIC METABOLIC PANEL In process     11/27/2017 1219 TSH WITH FREE T4 REFLEX In process             Pending Culture Results     No orders found from 11/25/2017 to 11/28/2017.            Pending Results Instructions     If you had any lab results that were not finalized at the time of your Discharge, you can call the ED Lab Result RN at [phone]. You will be contacted by this team for any positive Lab results or changes in treatment. The nurses are available 7 days a week from 10A to 6:30P.  You can leave a message 24 hours per day and they will return your call.        Test Results From Your Hospital Stay               Clinical Quality Measure: Blood Pressure Screening     Your blood pressure was checked while you were in the emergency department today.  The last reading we obtained was  BP: 147/82 . Please read the guidelines below about what these numbers mean and what you should do about them.  If your systolic blood pressure (the top number) is less than 120 and your diastolic blood pressure (the bottom number) is less than 80, then your blood pressure is normal. There is nothing more that you need to do about it.  If your systolic blood pressure (the top number) is 120-139 or your diastolic blood pressure (the bottom number) is 80-89, your blood pressure may be higher than it should be. You should have your blood pressure rechecked within a year by a primary care provider.  If your systolic blood pressure (the top number) is 140 or greater or your diastolic blood pressure (the bottom number) is 90 or greater, you may have high blood pressure. High blood pressure is treatable, but if left untreated over time it can put you at risk for heart attack, stroke, or kidney failure. You should have your blood pressure rechecked by a primary care provider within the next 4 weeks.  If your provider in the emergency department today gave you specific instructions to follow-up with your doctor or provider even sooner than that, you should follow that instruction and not wait for up to 4 weeks for your follow-up visit.        Thank you for choosing [facility]       Thank you for choosing [facility] for your care. Our goal is always to provide you with excellent care. Hearing back from our patients is one way we can continue to improve our services. Please take a few minutes to complete the written survey that you may receive in the mail after you visit with us. Thank you!        [facility] Information     [facility] gives you secure access to your electronic health record. If you see a primary care provider, you can also send messages to your care team and make appointments. If you have questions, please call your primary care clinic.  If you do not have a primary care provider,  please call [phone] and they will assist you.        Care EveryWhere ID     This is your Care EveryWhere ID. This could be used by other organizations to access your [facility] medical records  [id number]        Equal Access to Services     CARMENCITA RAMOS : Lynn Miller, wajim palacios, qaybta kaalmada wes, katlin cohen. So [facility] [phone].    ATENCIÓN: Si habla español, tiene a [place] disposición servicios gratuitos de asistencia lingüística. Llame al [phone].    We comply with applicable federal civil rights laws and [place] laws. We do not discriminate on the basis of race, color, national origin, age, disability, sex, sexual orientation, or gender identity.            After Visit Summary       This is your record. Keep this with you and show to your community pharmacist(s) and doctor(s) at your next visit.

## 2017-11-27 NOTE — DISCHARGE INSTRUCTIONS
May consider melatonin or take previously prescribed trazodone for insomnia    May consider hydroxyzine as prescribed for anxiety.

## 2017-11-28 ENCOUNTER — NURSE TRIAGE (OUTPATIENT)
Dept: NURSING | Facility: CLINIC | Age: 52
End: 2017-11-28

## 2017-11-28 ENCOUNTER — MYC MEDICAL ADVICE (OUTPATIENT)
Dept: FAMILY MEDICINE | Facility: CLINIC | Age: 52
End: 2017-11-28

## 2017-11-28 ENCOUNTER — OFFICE VISIT (OUTPATIENT)
Dept: FAMILY MEDICINE | Facility: CLINIC | Age: 52
End: 2017-11-28
Payer: COMMERCIAL

## 2017-11-28 ENCOUNTER — TELEPHONE (OUTPATIENT)
Dept: FAMILY MEDICINE | Facility: CLINIC | Age: 52
End: 2017-11-28

## 2017-11-28 VITALS
BODY MASS INDEX: 34.53 KG/M2 | HEIGHT: 67 IN | SYSTOLIC BLOOD PRESSURE: 158 MMHG | DIASTOLIC BLOOD PRESSURE: 96 MMHG | OXYGEN SATURATION: 98 % | WEIGHT: 220 LBS | RESPIRATION RATE: 20 BRPM | HEART RATE: 76 BPM | TEMPERATURE: 98.2 F

## 2017-11-28 DIAGNOSIS — G47.00 INSOMNIA, UNSPECIFIED TYPE: ICD-10-CM

## 2017-11-28 DIAGNOSIS — E87.6 HYPOKALEMIA: ICD-10-CM

## 2017-11-28 DIAGNOSIS — I10 ESSENTIAL HYPERTENSION WITH GOAL BLOOD PRESSURE LESS THAN 140/90: ICD-10-CM

## 2017-11-28 DIAGNOSIS — F32.A ANXIETY AND DEPRESSION: Primary | ICD-10-CM

## 2017-11-28 DIAGNOSIS — Z12.11 SCREENING FOR COLON CANCER: ICD-10-CM

## 2017-11-28 DIAGNOSIS — F41.9 ANXIETY AND DEPRESSION: Primary | ICD-10-CM

## 2017-11-28 DIAGNOSIS — Z78.9 ALCOHOL USE: ICD-10-CM

## 2017-11-28 DIAGNOSIS — R79.89 ELEVATED LFTS: ICD-10-CM

## 2017-11-28 LAB
ALBUMIN SERPL-MCNC: 4 G/DL (ref 3.4–5)
ALP SERPL-CCNC: 95 U/L (ref 40–150)
ALT SERPL W P-5'-P-CCNC: 98 U/L (ref 0–70)
ANION GAP SERPL CALCULATED.3IONS-SCNC: 9 MMOL/L (ref 3–14)
AST SERPL W P-5'-P-CCNC: 205 U/L (ref 0–45)
BILIRUB DIRECT SERPL-MCNC: 0.7 MG/DL (ref 0–0.2)
BILIRUB SERPL-MCNC: 2.2 MG/DL (ref 0.2–1.3)
BUN SERPL-MCNC: 9 MG/DL (ref 7–30)
CALCIUM SERPL-MCNC: 9 MG/DL (ref 8.5–10.1)
CHLORIDE SERPL-SCNC: 98 MMOL/L (ref 94–109)
CO2 SERPL-SCNC: 28 MMOL/L (ref 20–32)
CREAT SERPL-MCNC: 0.66 MG/DL (ref 0.66–1.25)
GFR SERPL CREATININE-BSD FRML MDRD: >90 ML/MIN/1.7M2
GLUCOSE SERPL-MCNC: 90 MG/DL (ref 70–99)
POTASSIUM SERPL-SCNC: 3.8 MMOL/L (ref 3.4–5.3)
PROT SERPL-MCNC: 8.5 G/DL (ref 6.8–8.8)
SODIUM SERPL-SCNC: 135 MMOL/L (ref 133–144)

## 2017-11-28 PROCEDURE — 80048 BASIC METABOLIC PNL TOTAL CA: CPT | Performed by: PHYSICIAN ASSISTANT

## 2017-11-28 PROCEDURE — 80076 HEPATIC FUNCTION PANEL: CPT | Performed by: PHYSICIAN ASSISTANT

## 2017-11-28 PROCEDURE — 99214 OFFICE O/P EST MOD 30 MIN: CPT | Performed by: PHYSICIAN ASSISTANT

## 2017-11-28 PROCEDURE — 36415 COLL VENOUS BLD VENIPUNCTURE: CPT | Performed by: PHYSICIAN ASSISTANT

## 2017-11-28 RX ORDER — HYDROCHLOROTHIAZIDE 25 MG/1
25 TABLET ORAL DAILY
Qty: 30 TABLET | Refills: 0 | Status: ON HOLD | OUTPATIENT
Start: 2017-11-28 | End: 2018-07-20

## 2017-11-28 RX ORDER — AMLODIPINE BESYLATE 2.5 MG/1
2.5 TABLET ORAL DAILY
Qty: 30 TABLET | Refills: 0 | Status: ON HOLD | OUTPATIENT
Start: 2017-11-28 | End: 2018-02-17

## 2017-11-28 RX ORDER — METOPROLOL SUCCINATE 100 MG/1
100 TABLET, EXTENDED RELEASE ORAL DAILY
Qty: 30 TABLET | Refills: 0 | Status: SHIPPED | OUTPATIENT
Start: 2017-11-28 | End: 2017-11-28

## 2017-11-28 RX ORDER — TRAZODONE HYDROCHLORIDE 50 MG/1
TABLET, FILM COATED ORAL
Qty: 60 TABLET | Refills: 1 | Status: SHIPPED | OUTPATIENT
Start: 2017-11-28

## 2017-11-28 ASSESSMENT — ANXIETY QUESTIONNAIRES
6. BECOMING EASILY ANNOYED OR IRRITABLE: MORE THAN HALF THE DAYS
GAD7 TOTAL SCORE: 7
GAD7 TOTAL SCORE: 14
1. FEELING NERVOUS, ANXIOUS, OR ON EDGE: MORE THAN HALF THE DAYS
5. BEING SO RESTLESS THAT IT IS HARD TO SIT STILL: MORE THAN HALF THE DAYS
3. WORRYING TOO MUCH ABOUT DIFFERENT THINGS: MORE THAN HALF THE DAYS
7. FEELING AFRAID AS IF SOMETHING AWFUL MIGHT HAPPEN: MORE THAN HALF THE DAYS
IF YOU CHECKED OFF ANY PROBLEMS ON THIS QUESTIONNAIRE, HOW DIFFICULT HAVE THESE PROBLEMS MADE IT FOR YOU TO DO YOUR WORK, TAKE CARE OF THINGS AT HOME, OR GET ALONG WITH OTHER PEOPLE: SOMEWHAT DIFFICULT
2. NOT BEING ABLE TO STOP OR CONTROL WORRYING: MORE THAN HALF THE DAYS

## 2017-11-28 ASSESSMENT — PAIN SCALES - GENERAL: PAINLEVEL: NO PAIN (0)

## 2017-11-28 ASSESSMENT — PATIENT HEALTH QUESTIONNAIRE - PHQ9
5. POOR APPETITE OR OVEREATING: MORE THAN HALF THE DAYS
SUM OF ALL RESPONSES TO PHQ QUESTIONS 1-9: 9

## 2017-11-28 NOTE — NURSING NOTE
"Chief Complaint   Patient presents with     Anxiety     ER F/U       Initial BP (!) 158/96  Pulse 76  Temp 98.2  F (36.8  C) (Oral)  Resp 20  Ht 1.702 m (5' 7\")  Wt 99.8 kg (220 lb)  SpO2 98%  BMI 34.46 kg/m2 Estimated body mass index is 34.46 kg/(m^2) as calculated from the following:    Height as of this encounter: 1.702 m (5' 7\").    Weight as of this encounter: 99.8 kg (220 lb).  Medication Reconciliation: complete     Alicia Banks MA       "

## 2017-11-28 NOTE — TELEPHONE ENCOUNTER
Please call patient and verify that taking metoprolol XR 50 mg twice a day- after speaking with pharmacist I do not think the increase in metoprolol is the best plan.  Continue hydrochlorothiazide daily and metoprolol XR 50 mg twice a day and also start norvasc 2.5 mg daily for blood pressure and follow up with Dr. Demarco in approximately 3 weeks.  Please call with side effects or concerns.  Pharmacy is aware

## 2017-11-28 NOTE — PROGRESS NOTES
"  SUBJECTIVE:   Db Watson is a 52 year old male who presents to clinic today for the following health issues:      Depression and Anxiety Follow-Up    Status since last visit: Worsened     Other associated symptoms:unable to sleep    Complicating factors:     Significant life event: problems at work     Current substance abuse: Alcohol    PHQ-9 Score and MyChart F/U Questions 11/6/2017 11/27/2017 11/28/2017   Total Score 6 17 9   Q9: Suicide Ideation Not at all Not at all Not at all     SHERMAN-7 SCORE 11/6/2017 11/27/2017 11/28/2017   Total Score 6 7 14       PHQ-9  English  PHQ-9   Any Language  GAD7  Suicide Assessment Five-step Evaluation and Treatment (SAFE-T)      Amount of exercise or physical activity:walking    Problems taking medications regularly: No    Medication side effects: none    Diet: regular (no restrictions)    ED/UC Followup:    Facility:  [facility]  Date of visit: 11/27/17  Reason for visit: anxiety  Current Status: still having anxiety unable to sleep           Trazodone helpful with sleep but still very anxious.  In emergency department yesterday and given atarax and trazodone.  Unsure if could take together.  Anxiety worse at night.   No identifiable triggers.  Reports last night \"brain was going 90 miles an hour.\"    Reports anxiety is pretty well controlled until bedtime.  Reports will start fidgeting and can't get to sleep.  Has been  from girlfriend for awhile and doesn't like to be alone.  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   Was drinking a lot but feels \"he can turn it off.\"   5-7 drinks to put self to sleep at night.   Doesn't exercise \"as much as should.\"but did take an hour walk last night and does have  Gym membership.  Reports that he does have a really good support network.    Tries to walk 2-3 miles a day.  \"i'm not happy and used to be happy.\"  Reports a lot of symptoms and doesn't like that he turned [age].  Also bored " without a hobby  Was given number for a therapist- has not scheduled yet.     Problem list and histories reviewed & adjusted, as indicated.  Additional history: as documented    Patient Active Problem List   Diagnosis     Hypertension goal BP (blood pressure) < 140/90     Abnormal LFTs     Habitual alcohol use     Atypical chest pain     HL (hearing loss)     Tinnitus     Abnormal MRI of head     Hyperlipidemia LDL goal <160     BMI 33.0-33.9,adult     Dizziness     Elevated fasting glucose     Meniere's disease, left     Morbid obesity (H)     Past Surgical History:   Procedure Laterality Date     C NONSPECIFIC PROCEDURE      shoulder surgery for dislocation      C NONSPECIFIC PROCEDURE      eye surgery, lens implant right     EYE SURGERY       FRACTURE TX, WRIST RT/LT      Fracture TX Wrist RT/LT     ORTHOPEDIC SURGERY         Social History   Substance Use Topics     Smoking status: Never Smoker     Smokeless tobacco: Never Used      Comment: rarely, once per year maybe      Alcohol use Yes      Comment: 10 drinks / week      Family History   Problem Relation Age of Onset     Hypertension Mother      DIABETES Father      C.A.D. Father      bypass surgery         Current Outpatient Prescriptions   Medication Sig Dispense Refill     traZODone (DESYREL) 50 MG tablet 1-2 at bedtime as needed for sleep 60 tablet 1     hydrochlorothiazide (HYDRODIURIL) 25 MG tablet Take 1 tablet (25 mg) by mouth daily 30 tablet 0            hydrOXYzine (ATARAX) 50 MG tablet Take 1 tablet (50 mg) by mouth every 6 hours as needed for anxiety 30 tablet 0     metoprolol (TOPROL-XL) 50 MG 24 hr tablet TAKE 1 TABLET BY MOUTH TWICE DAILY 60 tablet 2     fish oil-omega-3 fatty acids (FISH OIL) 1000 MG capsule Take 2 g by mouth daily.       aspirin 81 MG tablet Take 1 tablet by mouth daily.  3     meclizine 25 MG CHEW Take 25 mg by mouth every 6 hours as needed.    Indications: Sensation of Spinning or Whirling       B Complex Vitamins (VITAMIN  "B COMPLEX) tablet Take 1 tablet by mouth daily. 100 tablet 12         Reviewed and updated as needed this visit by clinical staff     Reviewed and updated as needed this visit by Provider         ROS:  Constitutional, HEENT, cardiovascular, pulmonary, gi and gu systems are negative, except as otherwise noted.      OBJECTIVE:   BP (!) 158/96  Pulse 76  Temp 98.2  F (36.8  C) (Oral)  Resp 20  Ht 1.702 m (5' 7\")  Wt 99.8 kg (220 lb)  SpO2 98%  BMI 34.46 kg/m2  Body mass index is 34.46 kg/(m^2).  GENERAL: alert, no distress and obese  EYES: Eyes grossly normal to inspection, PERRL and conjunctivae and sclerae normal  HENT: ear canals and TM's normal, nose and mouth without ulcers or lesions  NECK: no adenopathy, no asymmetry, masses, or scars and thyroid normal to palpation  RESP: lungs clear to auscultation - no rales, rhonchi or wheezes  CV: regular rate and rhythm, normal S1 S2, no S3 or S4, no murmur, click or rub, no peripheral edema and peripheral pulses strong  ABDOMEN: soft, nontender, no hepatosplenomegaly, no masses and bowel sounds normal  MS: no gross musculoskeletal defects noted, no edema  SKIN: no suspicious lesions or rashes  NEURO: Normal strength and tone, mentation intact and speech normal  PSYCH: mentation appears normal, affect normal/bright, judgement and insight intact and appearance well groomed    Diagnostic Test Results:  Results for orders placed or performed in visit on 11/28/17   Hepatic panel   Result Value Ref Range    Bilirubin Direct 0.7 (H) 0.0 - 0.2 mg/dL    Bilirubin Total 2.2 (H) 0.2 - 1.3 mg/dL    Albumin 4.0 3.4 - 5.0 g/dL    Protein Total 8.5 6.8 - 8.8 g/dL    Alkaline Phosphatase 95 40 - 150 U/L    ALT 98 (H) 0 - 70 U/L     (H) 0 - 45 U/L   Basic metabolic panel   Result Value Ref Range    Sodium 135 133 - 144 mmol/L    Potassium 3.8 3.4 - 5.3 mmol/L    Chloride 98 94 - 109 mmol/L    Carbon Dioxide 28 20 - 32 mmol/L    Anion Gap 9 3 - 14 mmol/L    Glucose 90 70 - 99 " mg/dL    Urea Nitrogen 9 7 - 30 mg/dL    Creatinine 0.66 0.66 - 1.25 mg/dL    GFR Estimate >90 >60 mL/min/1.7m2    GFR Estimate If Black >90 >60 mL/min/1.7m2    Calcium 9.0 8.5 - 10.1 mg/dL       ASSESSMENT/PLAN:             1. Anxiety and depression  Continue trazodone for insomina.  Declines ssri at this time  - traZODone (DESYREL) 50 MG tablet; 1-2 at bedtime as needed for sleep  Dispense: 60 tablet; Refill: 1    2. Insomnia, unspecified type   as above- continue trazodone    3. Hypokalemia  Had hypokalemia in emergency department but normal on my testing  - Basic metabolic panel    4. Alcohol use  Elevated lfts - advised to discontinue alcohol- schedule ultrasound  - Hepatic panel    5. Essential hypertension with goal blood pressure less than 140/90  Blood pressure high. Advised to avoid alcohol.   Continue current med (already on hydrochlorothiazide and metoprolol) and add norvasc and follow up with Dr. Demarco in approximately 3 weeks   - hydrochlorothiazide (HYDRODIURIL) 25 MG tablet; Take 1 tablet (25 mg) by mouth daily  Dispense: 30 tablet; Refill: 0  - amLODIPine (NORVASC) 2.5 MG tablet; Take 1 tablet (2.5 mg) by mouth daily  Dispense: 30 tablet; Refill: 0    6. Elevated LFTs  As above   - US Abdomen Complete; Future    7. Screening for colon cancer   declines colonoscopy.  Agreeable to stool card  - Fecal colorectal cancer screen FIT; Future    Patient Instructions     Take trazodone 50 mg 1-2 at bedtime as needed for sleep.   May take hydroxyzine as needed during the day  Added norvasc- see phone note   Work on diet and exercise to reduce blood pressure   Discontinue alcohol use.    Schedule follow up with psychologist for therapy  Follow up with Dr. Demarco in approximately 3 weeks.        At [facility], we strive to deliver an exceptional experience to you, every time we see you.  If you receive a survey in the mail, please send us back your thoughts. We really do value your  feedback.    Based on your medical history, these are the current health maintenance/preventive care services that you are due for (some may have been done at this visit.)  Health Maintenance Due   Topic Date Due     DEPRESSION ACTION PLAN Q1 YR  07/22/1985     COLON CANCER SCREEN (SYSTEM ASSIGNED)  07/22/2017     INFLUENZA VACCINE (SYSTEM ASSIGNED)  09/01/2017         Suggested websites for health information:  Www.[employer].org : Up to date and easily searchable information on multiple topics.  Www.medlineplus.gov : medication info, interactive tutorials, watch real surgeries online  Www.familydoctor.org : good info from the Academy of Family Physicians  Www.cdc.gov : public health info, travel advisories, epidemics (H1N1)  Www.aap.org : children's health info, normal development, vaccinations  Www.health.[facility].[place].us : [place] dept of health, public health issues in [place], N1N1    Your care team:     Family Medicine   RENA Hastings MD Emily Bunt, APRN [facility]   S. MD Jennifer Bates MD Angela Wermerskirchen, MD         Clinic hours: Monday - Wednesday 7 am-7 pm   Thursdays and Fridays 7 am-5 pm.     [facility]: Monday - Friday 11 am-9 pm,   Saturday and Sunday 9 am-5 pm.    [facility]: Monday -Thursday 8 am-8 pm; Friday 8 am-6 pm; Saturday and Sunday 9 am-5 pm.     [facility]: Monday - Thursday 8 am - 7 pm; Friday 8 am - 6 pm    Clinic: [phone]   [facility]: [phone]   [facility]: [phone]         July Doll PA-C  [facility]

## 2017-11-28 NOTE — TELEPHONE ENCOUNTER
Advised that he should not exceed prescribed dosage.  Andra Nguyen RN  [facility]

## 2017-11-28 NOTE — MR AVS SNAPSHOT
After Visit Summary   11/28/2017    Db Watson    MRN: [id number]           Patient Information     Date Of Birth          1965        Visit Information        Provider Department      11/28/2017 11:20 AM July Doll PA-C [facility]        Today's Diagnoses     Insomnia, unspecified type    -  1    Anxiety and depression        Hypokalemia        Alcohol use        Screening for colon cancer        Essential hypertension with goal blood pressure less than 140/90          Care Instructions    Take trazodone 50 mg 1-2 at bedtime as needed for sleep.   May take hydroxyzine as needed during the day  Increase metoprolol from 50 mg daily to 100 mg daily  Work on diet and exercise to reduce blood pressure   Discontinue alcohol use.    Schedule follow up with psychologist for therapy  Follow up with Dr. Demarco in approximately 3 weeks.        At [facility], we strive to deliver an exceptional experience to you, every time we see you.  If you receive a survey in the mail, please send us back your thoughts. We really do value your feedback.    Based on your medical history, these are the current health maintenance/preventive care services that you are due for (some may have been done at this visit.)  Health Maintenance Due   Topic Date Due     DEPRESSION ACTION PLAN Q1 YR  07/22/1985     COLON CANCER SCREEN (SYSTEM ASSIGNED)  07/22/2017     INFLUENZA VACCINE (SYSTEM ASSIGNED)  09/01/2017         Suggested websites for health information:  Www.TV Volume Wizard App : Up to date and easily searchable information on multiple topics.  Www.medlineplus.gov : medication info, interactive tutorials, watch real surgeries online  Www.familydoctor.org : good info from the Academy of Family Physicians  Www.cdc.gov : public health info, travel advisories, epidemics (H1N1)  Www.aap.org : children's health info, normal development, vaccinations  Www.health.[facility].[place].us : [place] dept of  health, public health issues in [place], N1N1    Your care team:     Family Medicine   RENA Hastings MD Emily Bunt, APRN CNP   S. MD Jennifer Bates MD Angela Wermerskirchen, MD         Clinic hours: Monday - Wednesday 7 am-7 pm   Thursdays and Fridays 7 am-5 pm.     [facility]: Monday - Friday 11 am-9 pm,   Saturday and Sunday 9 am-5 pm.    [facility]: Monday -Thursday 8 am-8 pm; Friday 8 am-6 pm; Saturday and Sunday 9 am-5 pm.     [facility]: Monday - Thursday 8 am - 7 pm; Friday 8 am - 6 pm    Clinic: [phone]   [facility]: [phone]   [facility]: [phone]            Follow-ups after your visit        Future tests that were ordered for you today     Open Future Orders        Priority Expected Expires Ordered    Fecal colorectal cancer screen FIT Routine 12/19/2017 2/20/2018 11/28/2017            Who to contact     If you have questions or need follow up information about today's clinic visit or your schedule please contact [facility] directly at [phone].  Normal or non-critical lab and imaging results will be communicated to you by MyChart, letter or phone within 4 business days after the clinic has received the results. If you do not hear from us within 7 days, please contact the clinic through Slantrangehart or phone. If you have a critical or abnormal lab result, we will notify you by phone as soon as possible.  Submit refill requests through [employer] or call your pharmacy and they will forward the refill request to us. Please allow 3 business days for your refill to be completed.          Additional Information About Your Visit        Slantrangehart Information     [employer] gives you secure access to your electronic health record. If you see a primary care provider, you can also send messages to your care team and make appointments. If you have questions, please  "call your primary care clinic.  If you do not have a primary care provider, please call [phone] and they will assist you.        Care EveryWhere ID     This is your Care EveryWhere ID. This could be used by other organizations to access your [facility] medical records  [id number]        Your Vitals Were     Pulse Temperature Respirations Height Pulse Oximetry BMI (Body Mass Index)    76 98.2  F (36.8  C) (Oral) 20 1.702 m (5' 7\") 98% 34.46 kg/m2       Blood Pressure from Last 3 Encounters:   11/28/17 (!) 158/96   11/27/17 138/76   11/27/17 158/80    Weight from Last 3 Encounters:   11/28/17 99.8 kg (220 lb)   11/27/17 99.8 kg (220 lb)   11/27/17 100.9 kg (222 lb 6.4 oz)              We Performed the Following     Basic metabolic panel     Hepatic panel          Today's Medication Changes          These changes are accurate as of: 11/28/17 11:46 AM.  If you have any questions, ask your nurse or doctor.               These medicines have changed or have updated prescriptions.        Dose/Directions    hydrochlorothiazide 25 MG tablet   Commonly known as:  HYDRODIURIL   This may have changed:  See the new instructions.   Used for:  Essential hypertension with goal blood pressure less than 140/90   Changed by:  July Doll PA-C        Dose:  25 mg   Take 1 tablet (25 mg) by mouth daily   Quantity:  30 tablet   Refills:  0       * metoprolol 50 MG 24 hr tablet   Commonly known as:  TOPROL-XL   This may have changed:  Another medication with the same name was added. Make sure you understand how and when to take each.   Used for:  Essential hypertension with goal blood pressure less than 140/90   Changed by:  Jennifer Demarco MD        TAKE 1 TABLET BY MOUTH TWICE DAILY   Quantity:  60 tablet   Refills:  2       * metoprolol 100 MG 24 hr tablet   Commonly known as:  TOPROL-XL   This may have changed:  You were already taking a medication with the same name, and this prescription was added. Make sure you " understand how and when to take each.   Used for:  Essential hypertension with goal blood pressure less than 140/90   Changed by:  July Doll PA-C        Dose:  100 mg   Take 1 tablet (100 mg) by mouth daily   Quantity:  30 tablet   Refills:  0       traZODone 50 MG tablet   Commonly known as:  DESYREL   This may have changed:    - how much to take  - how to take this  - when to take this  - reasons to take this  - additional instructions   Used for:  Anxiety and depression   Changed by:  July Doll PA-C        1-2 at bedtime as needed for sleep   Quantity:  60 tablet   Refills:  1       * Notice:  This list has 2 medication(s) that are the same as other medications prescribed for you. Read the directions carefully, and ask your doctor or other care provider to review them with you.         Where to get your medicines      These medications were sent to [id number] [place] [address] [address]     Phone:  [phone]     hydrochlorothiazide 25 MG tablet    metoprolol 100 MG 24 hr tablet    traZODone 50 MG tablet                Primary Care Provider Office Phone # Fax #    [facility] [phone] [address] [facility] [address]        Equal Access to Services     CARMENCITA RAMOS AH: Hadii aad ku hadasho Soomaali, waaxda luqadaha, qaybta kaalmada adeegyada, waxay broderick cohen. So [facility] [phone].    ATENCIÓN: Si habla español, tiene a [place] disposición servicios gratuitos de asistencia lingüística. Llame al [phone].    We comply with applicable federal civil rights laws and [place] laws. We do not discriminate on the basis of race, color, national origin, age, disability, sex, sexual orientation, or gender identity.            Thank you!     Thank you for choosing [facility]  for your care. Our goal is always to provide you with excellent  care. Hearing back from our patients is one way we can continue to improve our services. Please take a few minutes to complete the written survey that you may receive in the mail after your visit with us. Thank you!             Your Updated Medication List - Protect others around you: Learn how to safely use, store and throw away your medicines at www.disposemymeds.org.          This list is accurate as of: 11/28/17 11:46 AM.  Always use your most recent med list.                   Brand Name Dispense Instructions for use Diagnosis    aspirin 81 MG tablet      Take 1 tablet by mouth daily.    Essential hypertension       fish oil-omega-3 fatty acids 1000 MG capsule      Take 2 g by mouth daily.        hydrochlorothiazide 25 MG tablet    HYDRODIURIL    30 tablet    Take 1 tablet (25 mg) by mouth daily    Essential hypertension with goal blood pressure less than 140/90       hydrOXYzine 50 MG tablet    ATARAX    30 tablet    Take 1 tablet (50 mg) by mouth every 6 hours as needed for anxiety        meclizine 25 MG Chew      Take 25 mg by mouth every 6 hours as needed.    Indications: Sensation of Spinning or Whirling        * metoprolol 50 MG 24 hr tablet    TOPROL-XL    60 tablet    TAKE 1 TABLET BY MOUTH TWICE DAILY    Essential hypertension with goal blood pressure less than 140/90       * metoprolol 100 MG 24 hr tablet    TOPROL-XL    30 tablet    Take 1 tablet (100 mg) by mouth daily    Essential hypertension with goal blood pressure less than 140/90       traZODone 50 MG tablet    DESYREL    60 tablet    1-2 at bedtime as needed for sleep    Anxiety and depression       vitamin B complex with vitamin C Tabs tablet    STRESS TAB    100 tablet    Take 1 tablet by mouth daily.        * Notice:  This list has 2 medication(s) that are the same as other medications prescribed for you. Read the directions carefully, and ask your doctor or other care provider to review them with you.

## 2017-11-28 NOTE — PATIENT INSTRUCTIONS
Take trazodone 50 mg 1-2 at bedtime as needed for sleep.   May take hydroxyzine as needed during the day    Work on diet and exercise to reduce blood pressure   Discontinue alcohol use.    Schedule follow up with psychologist for therapy  Follow up with Dr. Demarco in approximately 3 weeks.        At [facility], we strive to deliver an exceptional experience to you, every time we see you.  If you receive a survey in the mail, please send us back your thoughts. We really do value your feedback.    Based on your medical history, these are the current health maintenance/preventive care services that you are due for (some may have been done at this visit.)  Health Maintenance Due   Topic Date Due     DEPRESSION ACTION PLAN Q1 YR  07/22/1985     COLON CANCER SCREEN (SYSTEM ASSIGNED)  07/22/2017     INFLUENZA VACCINE (SYSTEM ASSIGNED)  09/01/2017         Suggested websites for health information:  Www.Zane Prep : Up to date and easily searchable information on multiple topics.  Www.clickTRUE.gov : medication info, interactive tutorials, watch real surgeries online  Www.familydoctor.org : good info from the Academy of Family Physicians  Www.cdc.gov : public health info, travel advisories, epidemics (H1N1)  Www.aap.org : children's health info, normal development, vaccinations  Www.health.[facility].[place].us : [place] dept of health, public health issues in [place], N1N1    Your care team:     Family Medicine   RENA Hastings MD Emily Bunt, APRN CNP   S. MD Jennifer Bates MD Angela Wermerskirchen, MD         Clinic hours: Monday - Wednesday 7 am-7 pm   Thursdays and Fridays 7 am-5 pm.     [facility]: Monday - Friday 11 am-9 pm,   Saturday and Sunday 9 am-5 pm.    [facility]: Monday -Thursday 8 am-8 pm; Friday 8 am-6 pm; Saturday and Sunday 9 am-5 pm.     [facility]: Monday Thursday 8 am   7 pm; Friday 8 am   6 pm    Clinic:  [phone]   [facility]: [phone]   [facility]: [phone]

## 2017-11-28 NOTE — TELEPHONE ENCOUNTER
Patient has been seen multiple times this month for mental health issues. Provider to advise if ED follow up in clinic is appropriate.    Sara Barros RN, [facility] Triage

## 2017-11-29 ASSESSMENT — ANXIETY QUESTIONNAIRES: GAD7 TOTAL SCORE: 14

## 2017-11-29 NOTE — PROGRESS NOTES
Please call patient and advise- I also sent mychart message    Case Ace  Your liver function tests were very abnormal.  This is likely related to your alcohol use.   You need to stop drinking and schedule an abdominal ultrasound at [facility] ([phone]).    Your electrolytes and kidney function were normal.   Please call or MyChart my office with any questions or concerns.    uJly Doll, PAC

## 2017-11-30 ENCOUNTER — TELEPHONE (OUTPATIENT)
Dept: FAMILY MEDICINE | Facility: CLINIC | Age: 52
End: 2017-11-30

## 2017-11-30 NOTE — TELEPHONE ENCOUNTER
...Reason for Call:  Other     Detailed comments: Patient called said he received a call and was calling back, I informed him someone from the care team will call back,    Phone Number Patient can be reached at: Home number on file [phone] (home)    Best Time: anytime    Can we leave a detailed message on this number? YES    Call taken on 11/30/2017 at 9:04 AM by Debbie Cheung

## 2017-11-30 NOTE — TELEPHONE ENCOUNTER
Patient  returned call    Best number to reach caller: Cell number on file:    Telephone Information:   Mobile [phone]       Is it ok to leave a detailed message: YES     Patient is available anytime before 9am due his work hours.

## 2017-11-30 NOTE — TELEPHONE ENCOUNTER
This writer attempted to contact Db on 11/30/17      Reason for call relay results and left message to return call.      If patient calls back:   LYNC RN.    Halina Strange, RN   [facility]

## 2017-11-30 NOTE — TELEPHONE ENCOUNTER
This writer attempted to contact Db on 11/30/17      Reason for call relay results message and left message to return call.    Entered by July Doll PA-C at 11/29/2017  2:05 PM   Read by Db Watson at 11/30/2017 12:21 AM   Case Ace   Your liver function tests were very abnormal.  This is likely related to your alcohol use.   You need to stop drinking and schedule an abdominal ultrasound at [facility] ([phone]).     Your electrolytes and kidney function were normal.   Please call or MyChart my office with any questions or concerns.     July Doll, PAC            If patient calls back:   Patient contacted by clinic RN team. Inform patient that someone from the team will contact them, document that pt called and route to care team. .    Halina Strange, LILA   [facility] Triage

## 2017-12-20 DIAGNOSIS — I10 ESSENTIAL HYPERTENSION WITH GOAL BLOOD PRESSURE LESS THAN 140/90: ICD-10-CM

## 2017-12-20 NOTE — TELEPHONE ENCOUNTER
metoprolol (TOPROL-XL) 50 MG 24 hr tablet      Last Written Prescription Date: 10/9/17  Last Fill Quantity: 60, # refills: 2    Last Office Visit with [facility], [facility] or [facility] prescribing provider:  11/28/17   Future Office Visit:        BP Readings from Last 3 Encounters:   11/28/17 (!) 158/96   11/27/17 138/76   11/27/17 158/80

## 2017-12-22 RX ORDER — METOPROLOL SUCCINATE 50 MG/1
50 TABLET, EXTENDED RELEASE ORAL 2 TIMES DAILY
Qty: 60 TABLET | Refills: 1 | Status: SHIPPED | OUTPATIENT
Start: 2017-12-22 | End: 2018-02-21

## 2017-12-22 NOTE — TELEPHONE ENCOUNTER
Reason for Call:  Other prescription    Detailed comments: Pt calling to follow up on medication request and would like to see if Dr. Demarco or covering provider can send in Rx as soon as possible. He would also like for Dr. Demarco to check on dosage for Rx for Pharmacy received two different sets of instructions and are not sure which one to fill.    Phone Number Patient can be reached at: Home number on file [phone] (home)    Best Time: anytime    Can we leave a detailed message on this number? YES    Call taken on 12/22/2017 at 3:24 PM by Paul Guerra

## 2017-12-22 NOTE — TELEPHONE ENCOUNTER
Provider to review. Patient was started on Norvasc and instructed to follow-up around 12/15/2017.     Halina Strange RN   [facility] Triage

## 2018-02-10 ENCOUNTER — NURSE TRIAGE (OUTPATIENT)
Dept: NURSING | Facility: CLINIC | Age: 53
End: 2018-02-10

## 2018-02-10 ENCOUNTER — HOSPITAL ENCOUNTER (EMERGENCY)
Facility: CLINIC | Age: 53
Discharge: HOME | End: 2018-02-11
Attending: EMERGENCY MEDICINE | Admitting: EMERGENCY MEDICINE
Payer: COMMERCIAL

## 2018-02-10 VITALS
WEIGHT: 220 LBS | OXYGEN SATURATION: 98 % | HEIGHT: 67 IN | DIASTOLIC BLOOD PRESSURE: 84 MMHG | HEART RATE: 77 BPM | BODY MASS INDEX: 34.53 KG/M2 | TEMPERATURE: 98 F | SYSTOLIC BLOOD PRESSURE: 149 MMHG | RESPIRATION RATE: 16 BRPM

## 2018-02-10 DIAGNOSIS — R53.1 GENERALIZED WEAKNESS: ICD-10-CM

## 2018-02-10 LAB
ALBUMIN SERPL-MCNC: 4.1 G/DL (ref 3.4–5)
ALP SERPL-CCNC: 93 U/L (ref 40–150)
ALT SERPL W P-5'-P-CCNC: 66 U/L (ref 0–70)
ANION GAP SERPL CALCULATED.3IONS-SCNC: 10 MMOL/L (ref 3–14)
AST SERPL W P-5'-P-CCNC: 106 U/L (ref 0–45)
BASOPHILS # BLD AUTO: 0 10E9/L (ref 0–0.2)
BASOPHILS NFR BLD AUTO: 0.8 %
BILIRUB SERPL-MCNC: 1.3 MG/DL (ref 0.2–1.3)
BUN SERPL-MCNC: 7 MG/DL (ref 7–30)
CALCIUM SERPL-MCNC: 8.3 MG/DL (ref 8.5–10.1)
CHLORIDE SERPL-SCNC: 104 MMOL/L (ref 94–109)
CO2 SERPL-SCNC: 26 MMOL/L (ref 20–32)
CREAT SERPL-MCNC: 0.66 MG/DL (ref 0.66–1.25)
DIFFERENTIAL METHOD BLD: ABNORMAL
EOSINOPHIL # BLD AUTO: 0.1 10E9/L (ref 0–0.7)
EOSINOPHIL NFR BLD AUTO: 1.7 %
ERYTHROCYTE [DISTWIDTH] IN BLOOD BY AUTOMATED COUNT: 13.8 % (ref 10–15)
FLUAV+FLUBV AG SPEC QL: NEGATIVE
FLUAV+FLUBV AG SPEC QL: NEGATIVE
GFR SERPL CREATININE-BSD FRML MDRD: >90 ML/MIN/1.7M2
GLUCOSE SERPL-MCNC: 94 MG/DL (ref 70–99)
HCT VFR BLD AUTO: 43.1 % (ref 40–53)
HGB BLD-MCNC: 14.9 G/DL (ref 13.3–17.7)
IMM GRANULOCYTES # BLD: 0 10E9/L (ref 0–0.4)
IMM GRANULOCYTES NFR BLD: 0.2 %
LYMPHOCYTES # BLD AUTO: 1.7 10E9/L (ref 0.8–5.3)
LYMPHOCYTES NFR BLD AUTO: 36.1 %
MCH RBC QN AUTO: 34.8 PG (ref 26.5–33)
MCHC RBC AUTO-ENTMCNC: 34.6 G/DL (ref 31.5–36.5)
MCV RBC AUTO: 101 FL (ref 78–100)
MONOCYTES # BLD AUTO: 0.5 10E9/L (ref 0–1.3)
MONOCYTES NFR BLD AUTO: 10.4 %
NEUTROPHILS # BLD AUTO: 2.4 10E9/L (ref 1.6–8.3)
NEUTROPHILS NFR BLD AUTO: 50.8 %
NRBC # BLD AUTO: 0 10*3/UL
NRBC BLD AUTO-RTO: 0 /100
PLATELET # BLD AUTO: 108 10E9/L (ref 150–450)
POTASSIUM SERPL-SCNC: 3.5 MMOL/L (ref 3.4–5.3)
PROT SERPL-MCNC: 8.2 G/DL (ref 6.8–8.8)
RBC # BLD AUTO: 4.28 10E12/L (ref 4.4–5.9)
SODIUM SERPL-SCNC: 140 MMOL/L (ref 133–144)
SPECIMEN SOURCE: NORMAL
WBC # BLD AUTO: 4.8 10E9/L (ref 4–11)

## 2018-02-10 PROCEDURE — 99284 EMERGENCY DEPT VISIT MOD MDM: CPT | Mod: 25

## 2018-02-10 PROCEDURE — 85025 COMPLETE CBC W/AUTO DIFF WBC: CPT | Performed by: PHYSICIAN ASSISTANT

## 2018-02-10 PROCEDURE — 80053 COMPREHEN METABOLIC PANEL: CPT | Performed by: PHYSICIAN ASSISTANT

## 2018-02-10 PROCEDURE — 87804 INFLUENZA ASSAY W/OPTIC: CPT | Performed by: PHYSICIAN ASSISTANT

## 2018-02-10 PROCEDURE — 25000128 H RX IP 250 OP 636: Performed by: PHYSICIAN ASSISTANT

## 2018-02-10 PROCEDURE — 93005 ELECTROCARDIOGRAM TRACING: CPT

## 2018-02-10 PROCEDURE — 96361 HYDRATE IV INFUSION ADD-ON: CPT

## 2018-02-10 PROCEDURE — 96360 HYDRATION IV INFUSION INIT: CPT

## 2018-02-10 RX ADMIN — SODIUM CHLORIDE 1000 ML: 9 INJECTION, SOLUTION INTRAVENOUS at 22:38

## 2018-02-10 NOTE — TELEPHONE ENCOUNTER
Reason for Disposition    [1] SEVERE weakness (i.e., unable to walk or barely able to walk, requires support) AND     [2] new onset or worsening    Additional Information    Negative: Severe difficulty breathing (e.g., struggling for each breath, speaks in single words)    Negative: Shock suspected (e.g., cold/pale/clammy skin, too weak to stand, low BP, rapid pulse)    Negative: Difficult to awaken or acting confused  (e.g., disoriented, slurred speech)    Negative: [1] Fainted > 15 minutes ago AND [2] still feels too weak or dizzy to stand    Protocols used: WEAKNESS (GENERALIZED) AND FATIGUE-ADULT-AH

## 2018-02-10 NOTE — ED AVS SNAPSHOT
Emergency Department    [address] [place] [address]    Phone:  [phone]    Fax:  [phone]                                       Db Watson   MRN: [id number]    Department:   Emergency Department   Date of Visit:  2/10/2018           After Visit Summary Signature Page     I have received my discharge instructions, and my questions have been answered. I have discussed any challenges I see with this plan with the nurse or doctor.    ..........................................................................................................................................  Patient/Patient Representative Signature      ..........................................................................................................................................  Patient Representative Print Name and Relationship to Patient    ..................................................               ................................................  Date                                            Time    ..........................................................................................................................................  Reviewed by Signature/Title    ...................................................              ..............................................  Date                                                            Time

## 2018-02-10 NOTE — ED AVS SNAPSHOT
Emergency Department    [address] [facility] [address]    Phone:  [phone]    Fax:  [phone]                                       Db Watson   MRN: [id number]    Department:   Emergency Department   Date of Visit:  2/10/2018           Patient Information     Date Of Birth          1965        Your diagnoses for this visit were:     Generalized weakness        You were seen by Robert Borjas MD.      Follow-up Information     Follow up with Jennifer Demarco MD In 2 days.    Specialty:  Family Practice    Contact information:    [address]  [facility] [address]  [phone]          Follow up with  Emergency Department.    Specialty:  EMERGENCY MEDICINE    Why:  As needed, If symptoms worsen    Contact information:    [address] [facility] [address] [phone]      Discharge References/Attachments     FATIGUE, MANAGING (ENGLISH)      24 Hour Appointment Hotline       To make an appointment at any [facility], call [phone] ([phone]). If you don't have a family doctor or clinic, we will help you find one. [facility] are conveniently located to serve the needs of you and your family.             Review of your medicines      Our records show that you are taking the medicines listed below. If these are incorrect, please call your family doctor or clinic.        Dose / Directions Last dose taken    amLODIPine 2.5 MG tablet   Commonly known as:  NORVASC   Dose:  2.5 mg   Quantity:  30 tablet        Take 1 tablet (2.5 mg) by mouth daily   Refills:  0        aspirin 81 MG tablet   Dose:  81 mg        Take 1 tablet by mouth daily.   Refills:  3        fish oil-omega-3 fatty acids 1000 MG capsule   Dose:  2 g        Take 2 g by mouth daily.   Refills:  0        hydrochlorothiazide 25 MG tablet   Commonly known as:  HYDRODIURIL   Dose:  25 mg   Quantity:  30 tablet        Take 1 tablet (25 mg) by mouth daily    Refills:  0        hydrOXYzine 50 MG tablet   Commonly known as:  ATARAX   Dose:  50 mg   Quantity:  30 tablet        Take 1 tablet (50 mg) by mouth every 6 hours as needed for anxiety   Refills:  0        meclizine 25 MG Chew   Dose:  25 mg   Indication:  Sensation of Spinning or Whirling        Take 25 mg by mouth every 6 hours as needed.    Indications: Sensation of Spinning or Whirling   Refills:  0        metoprolol succinate 50 MG 24 hr tablet   Commonly known as:  TOPROL-XL   Dose:  50 mg   Quantity:  60 tablet        Take 1 tablet (50 mg) by mouth 2 times daily   Refills:  1        traZODone 50 MG tablet   Commonly known as:  DESYREL   Quantity:  60 tablet        1-2 at bedtime as needed for sleep   Refills:  1        vitamin B complex with vitamin C Tabs tablet   Commonly known as:  STRESS TAB   Dose:  1 tablet   Quantity:  100 tablet        Take 1 tablet by mouth daily.   Refills:  12                Procedures and tests performed during your visit     CBC with platelets differential    Comprehensive metabolic panel    EKG 12 lead    Influenza A/B antigen      Orders Needing Specimen Collection     None      Pending Results     Date and Time Order Name Status Description    2/10/2018 2226 EKG 12 lead Preliminary             Pending Culture Results     No orders found for last 3 day(s).            Pending Results Instructions     If you had any lab results that were not finalized at the time of your Discharge, you can call the ED Lab Result RN at [phone]. You will be contacted by this team for any positive Lab results or changes in treatment. The nurses are available 7 days a week from 10A to 6:30P.  You can leave a message 24 hours per day and they will return your call.        Test Results From Your Hospital Stay        2/10/2018 10:53 PM      Component Results     Component Value Ref Range & Units Status    WBC 4.8 4.0 - 11.0 10e9/L Final    RBC Count 4.28 (L) 4.4 - 5.9 10e12/L Final    Hemoglobin  14.9 13.3 - 17.7 g/dL Final    Hematocrit 43.1 40.0 - 53.0 % Final     (H) 78 - 100 fl Final    MCH 34.8 (H) 26.5 - 33.0 pg Final    MCHC 34.6 31.5 - 36.5 g/dL Final    RDW 13.8 10.0 - 15.0 % Final    Platelet Count 108 (L) 150 - 450 10e9/L Final    Diff Method Automated Method  Final    % Neutrophils 50.8 % Final    % Lymphocytes 36.1 % Final    % Monocytes 10.4 % Final    % Eosinophils 1.7 % Final    % Basophils 0.8 % Final    % Immature Granulocytes 0.2 % Final    Nucleated RBCs 0 0 /100 Final    Absolute Neutrophil 2.4 1.6 - 8.3 10e9/L Final    Absolute Lymphocytes 1.7 0.8 - 5.3 10e9/L Final    Absolute Monocytes 0.5 0.0 - 1.3 10e9/L Final    Absolute Eosinophils 0.1 0.0 - 0.7 10e9/L Final    Absolute Basophils 0.0 0.0 - 0.2 10e9/L Final    Abs Immature Granulocytes 0.0 0 - 0.4 10e9/L Final    Absolute Nucleated RBC 0.0  Final         2/10/2018 11:25 PM      Component Results     Component Value Ref Range & Units Status    Sodium 140 133 - 144 mmol/L Final    Potassium 3.5 3.4 - 5.3 mmol/L Final    Chloride 104 94 - 109 mmol/L Final    Carbon Dioxide 26 20 - 32 mmol/L Final    Anion Gap 10 3 - 14 mmol/L Final    Glucose 94 70 - 99 mg/dL Final    Urea Nitrogen 7 7 - 30 mg/dL Final    Creatinine 0.66 0.66 - 1.25 mg/dL Final    GFR Estimate >90 >60 mL/min/1.7m2 Final    Non  GFR Calc    GFR Estimate If Black >90 >60 mL/min/1.7m2 Final    African American GFR Calc    Calcium 8.3 (L) 8.5 - 10.1 mg/dL Final    Bilirubin Total 1.3 0.2 - 1.3 mg/dL Final    Albumin 4.1 3.4 - 5.0 g/dL Final    Protein Total 8.2 6.8 - 8.8 g/dL Final    Alkaline Phosphatase 93 40 - 150 U/L Final    ALT 66 0 - 70 U/L Final     (H) 0 - 45 U/L Final         2/10/2018 11:27 PM      Component Results     Component Value Ref Range & Units Status    Influenza A/B Agn Specimen Nasal  Final    SWAB    Influenza A Negative NEG^Negative Final    Influenza B Negative NEG^Negative Final    Test results must be correlated  with clinical data. If necessary, results   should be confirmed by a molecular assay or viral culture.                  Clinical Quality Measure: Blood Pressure Screening     Your blood pressure was checked while you were in the emergency department today. The last reading we obtained was  BP: 149/84 . Please read the guidelines below about what these numbers mean and what you should do about them.  If your systolic blood pressure (the top number) is less than 120 and your diastolic blood pressure (the bottom number) is less than 80, then your blood pressure is normal. There is nothing more that you need to do about it.  If your systolic blood pressure (the top number) is 120-139 or your diastolic blood pressure (the bottom number) is 80-89, your blood pressure may be higher than it should be. You should have your blood pressure rechecked within a year by a primary care provider.  If your systolic blood pressure (the top number) is 140 or greater or your diastolic blood pressure (the bottom number) is 90 or greater, you may have high blood pressure. High blood pressure is treatable, but if left untreated over time it can put you at risk for heart attack, stroke, or kidney failure. You should have your blood pressure rechecked by a primary care provider within the next 4 weeks.  If your provider in the emergency department today gave you specific instructions to follow-up with your doctor or provider even sooner than that, you should follow that instruction and not wait for up to 4 weeks for your follow-up visit.        Thank you for choosing [facility]       Thank you for choosing [facility] for your care. Our goal is always to provide you with excellent care. Hearing back from our patients is one way we can continue to improve our services. Please take a few minutes to complete the written survey that you may receive in the mail after you visit with us. Thank you!        [facility] Information     [facility] gives you secure  access to your electronic health record. If you see a primary care provider, you can also send messages to your care team and make appointments. If you have questions, please call your primary care clinic.  If you do not have a primary care provider, please call [phone] and they will assist you.        Care EveryWhere ID     This is your Care EveryWhere ID. This could be used by other organizations to access your [facility] medical records  [id number]        Equal Access to Services     CARMENCITA RAMOS : Lynn murphyo Sokatlin, wajim palacios, qamadonna kaalmacynthia harvey, katlin cohen. So [facility] [phone].    ATENCIÓN: Si habla español, tiene a [place] disposición servicios gratuitos de asistencia lingüística. Llame al [phone].    We comply with applicable federal civil rights laws and [place] laws. We do not discriminate on the basis of race, color, national origin, age, disability, sex, sexual orientation, or gender identity.            After Visit Summary       This is your record. Keep this with you and show to your community pharmacist(s) and doctor(s) at your next visit.

## 2018-02-10 NOTE — TELEPHONE ENCOUNTER
"\"I have had the flu symptoms.\" Patient calling reporting \"flu symptoms starting in past 6 days.\" Afebrile. Reporting laying on couch \"just shaking.\" \"I have been drinking a lot of water.\" Denies other symptoms. Stating he does not trust him self on his feet. Weakness, fatigue.   Advised 911. Patient verbalized understanding. Denies further questions.     Lizzie Small RN  [facility]      "

## 2018-02-11 LAB — INTERPRETATION ECG - MUSE: NORMAL

## 2018-02-11 ASSESSMENT — ENCOUNTER SYMPTOMS
FEVER: 0
WEAKNESS: 1
CHILLS: 0
COUGH: 0
SHORTNESS OF BREATH: 0

## 2018-02-11 NOTE — ED NOTES
Pt. States he is feeling improved, but still concerned about tremor in hands. Pt. Able to ambulate to BR without assist, steady on feet. Pt. States he has had poor intake for last 5 days, states last meal was around 1pm. Pt. Given sandwich and juice - will reevaluate.

## 2018-02-11 NOTE — ED PROVIDER NOTES
"  History     Chief Complaint:  Generalized Weakness    HPI   Db Watson is a 52 year old male, with a history of anxiety, hypertension, and hyperlipidemia who presents with generalized weakness.  Patient reports 5 days of lying in bed and increased amounts of sleeping.  Also notes a decreased appetite. Denies chest pain, cough, or shortness of breath.  Denies abdominal pain, nausea, vomiting.  He is concerned he has influenza, but also notes recent emotional issues that may be contributing to his symptoms.  With further discussion, patient reports a history of anxiety noting his mind races at night keeping him up.  He was prescribed trazodone, but has not been taking this for the last 2 weeks.  Patient voices no other areas of concern.    Allergies:  Zoloft [Sertraline]      Medications:    Metoprolol  Trazodone  Hydrodiuril  Aspirin  Meclizine  Amlodipine  Atarax    Past Medical History:    Hyperlipidemia  Hypertension  Meniere's disease    Past Surgical History:    Shoulder surgery for dislocation  Eye surgery, lens implant right  Eye surgery  Fracture wrist  Orthopedic surgery     Family History:    Hypertension  Diabetes  CAD    Social History:  Presents alone   Negative for tobacco use.  Positive for alcohol use.    Marital Status:  Single     Review of Systems   Constitutional: Negative for chills and fever.   Respiratory: Negative for cough and shortness of breath.    Cardiovascular: Negative for chest pain.   Neurological: Positive for weakness (generalized).   All other systems reviewed and are negative.    Physical Exam     Patient Vitals for the past 24 hrs:   BP Temp Temp src Pulse Heart Rate Resp SpO2 Height Weight   02/10/18 2339 149/84 - - 77 - - 98 % - -   02/10/18 2134 (!) 177/95 98  F (36.7  C) Oral - 82 16 96 % 1.702 m (5' 7\") 99.8 kg (220 lb)       Physical Exam  General: Alert, interactive in no distress.   Head:  Scalp is atraumatic.  Eyes:  EOM intact. The pupils are equal, round, and " reactive to light. No scleral icterus.  ENT:                                      Ears:  The external ears are normal.  Nose:  The external nose is normal.  Throat:  The oropharynx is normal without erythema.  Mucus membranes are moist.                 Neck:  Normal range of motion. There is no rigidity.   CV:  Regular rate and rhythm. No murmur. 2+ radial pulses.   Resp:  Breath sounds are clear bilaterally. Non-labored, no retractions or accessory muscle use.  GI:  Abdomen is soft, no distension, no tenderness.   MS:  Normal strength in all 4 extremities.   Skin:  Warm and dry, No rash or lesions noted.  Neuro:  Strength 5/5 x4. Sensation intact in all 4 extremities.  Psych:  Awake. Alert.  Appropriate interactions.   Lymph: No anterior or posterior cervical lymphadenopathy noted.       Emergency Department Course   ECG:  Indication: Weakness  Time: 2245  Vent. Rate 71 bpm. CT interval 146. QRS duration 106. QT/QTc 446/484. P-R-T axis 24 38 19. Undetermined rhythm. Prolonged QT. Abnormal ECG. Read time: 2247     Laboratory:  Labs Ordered and Resulted from Time of ED Arrival Up to the Time of Departure from the ED   CBC WITH PLATELETS DIFFERENTIAL - Abnormal; Notable for the following:        Result Value    RBC Count 4.28 (*)      (*)     MCH 34.8 (*)     Platelet Count 108 (*)     All other components within normal limits   COMPREHENSIVE METABOLIC PANEL - Abnormal; Notable for the following:     Calcium 8.3 (*)      (*)     All other components within normal limits   INFLUENZA A/B ANTIGEN    Influenza: negative     Interventions:  Medications   0.9% sodium chloride BOLUS (1,000 mLs Intravenous New Bag 2/10/18 2238)        Emergency Department Course:  Past medical records, nursing notes, and vitals reviewed.   I performed an exam of the patient and obtained history, as documented above.     Blood drawn and IV inserted  Supervising provider also interviewed and examined the patient at bedside and  agrees with the plan.     I rechecked the patient.   Findings and plan explained to the patient. Patient discharged home with instructions regarding supportive care, medications, and reasons to return. The importance of close follow-up was reviewed.      Impression & Plan      Medical Decision Making:  Db Watson is a 52 year old male with medical history including anxiety, hypertension, and hyperlipidemia who presents with generalized weakness.  Patient reports 5 days of lying in bed and increased amount of sleeping.  He denies fever/chills, chest pain, shortness of breath, cough, or body aches.  History and physical exam not consistent with influenza, though the patient was very concerned about this so an influenza test was ordered, which was negative.  Lungs clear and afebrile, do not suspect pneumonia. Due to vague generalized weakness an EKG was completed that showed normal sinus rhythm without acute ST changes.  CBC and CMP overall unremarkable with no sign of anemia.     With further discussion with the patient, he was prescribed trazodone at night to help with anxiety as he stays awake at night with racing thoughts. He has not taken this for the last 2 weeks and I suspect due to him stopping trazodone he has not been sleeping well at night which caused him increased fatigue over the last 5 days.  He also notes increased emotional stress this week that may be contributing to symptoms. He denied feeling depressed and denied suicidal ideation.  Of note, patient reports daily alcohol use and I recommended he talk to his primary care about properly treating his anxiety as I suspect he is using alcohol to cope with anxiety. The patient was reassured with the workup here and all questions/concerns were addressed.  Return precautions discussed including fevers greater than 102, abdominal pain, chest pain, or any other new/concerning symptoms.    Diagnosis:    ICD-10-CM    1. Generalized weakness R53.1         Disposition:  discharged to home    I, Renita Patrick, am serving as a scribe on 2/10/2018 at 10:05 PM to personally document services performed by Robert Borjas MD based on my observations and the provider's statements to me.     Donna Restrepo PA-C  Supervising provider, Dr. Borjas    2/10/2018    EMERGENCY DEPARTMENT       Donna Restrepo PA-C  02/11/18 0018

## 2018-02-11 NOTE — ED NOTES
Emergency Department Attending Supervision Note  2/11/2018  12:38 AM      I evaluated this patient in conjunction with Donna Restrepo      Briefly, the patient presented with generalized weakness for 5 days, with no focal symptoms.  Does state he sometimes has hand shakiness, but states this may be since he has not eaten since 1 PM.  Currently denies any pain shortness of breath, states he feels as though he had the flu.  Does state he is getting better slowly, concerned about being dehydrated as well.      On my exam,   Vitals: reviewed by me  General: Pt seen on [facility], pleasant, cooperative, and alert to conversation  Eyes: Tracking well, clear conjunctiva BL  ENT: MMM, midline trachea.   Lungs:   No tachypnea, no accessory muscle use. No respiratory distress.  Lungs are clear to auscultation bilaterally.  CV: Rate as above, regular rhythm.    MSK: no peripheral edema or joint effusion.  No evidence of trauma  Skin: No rash, normal turgor and temperature  Neuro: Clear speech and no facial droop.  Bilateral upper extremities with no evidence of tremor.  Patient is ambulatory with steady and unassisted gait in the emergency room.  Psych: Not RIS, no e/o AH/VH        My impression is is that Mr. Drew likely had a flulike illness, that kept him in bed for several days, and is improving as expected.  Patient has a normal cardiovascular exam here, no indication for antibiotics or other interventions.  Whenever very clear return to ED precautions as well as instructions for hydration.  Patient's without evidence of sepsis or infection, and I do feel he is safe to follow safely primary care doctor.  Labs and electrolytes are also reassuring.      Diagnosis    ICD-10-CM    1. Generalized weakness R53.1          No att. providers found        Robert Borjas MD  02/11/18 0040

## 2018-02-15 ENCOUNTER — NURSE TRIAGE (OUTPATIENT)
Dept: NURSING | Facility: CLINIC | Age: 53
End: 2018-02-15

## 2018-02-16 ENCOUNTER — APPOINTMENT (OUTPATIENT)
Dept: CT IMAGING | Facility: CLINIC | Age: 53
End: 2018-02-16
Attending: EMERGENCY MEDICINE
Payer: COMMERCIAL

## 2018-02-16 ENCOUNTER — HOSPITAL ENCOUNTER (OUTPATIENT)
Facility: CLINIC | Age: 53
Discharge: HOME | End: 2018-02-18
Attending: EMERGENCY MEDICINE | Admitting: HOSPITALIST
Payer: COMMERCIAL

## 2018-02-16 ENCOUNTER — NURSE TRIAGE (OUTPATIENT)
Dept: NURSING | Facility: CLINIC | Age: 53
End: 2018-02-16

## 2018-02-16 DIAGNOSIS — R74.8 ELEVATED LIVER ENZYMES: ICD-10-CM

## 2018-02-16 DIAGNOSIS — E87.6 HYPOKALEMIA: ICD-10-CM

## 2018-02-16 DIAGNOSIS — K56.609 SBO (SMALL BOWEL OBSTRUCTION) (H): ICD-10-CM

## 2018-02-16 DIAGNOSIS — K42.0 UMBILICAL HERNIA WITH OBSTRUCTION: ICD-10-CM

## 2018-02-16 DIAGNOSIS — I10 ESSENTIAL HYPERTENSION: ICD-10-CM

## 2018-02-16 LAB
ALBUMIN SERPL-MCNC: 4.2 G/DL (ref 3.4–5)
ALBUMIN UR-MCNC: 10 MG/DL
ALP SERPL-CCNC: 90 U/L (ref 40–150)
ALT SERPL W P-5'-P-CCNC: 83 U/L (ref 0–70)
ANION GAP SERPL CALCULATED.3IONS-SCNC: 8 MMOL/L (ref 3–14)
APPEARANCE UR: CLEAR
AST SERPL W P-5'-P-CCNC: 86 U/L (ref 0–45)
BASOPHILS # BLD AUTO: 0 10E9/L (ref 0–0.2)
BASOPHILS NFR BLD AUTO: 0 %
BILIRUB SERPL-MCNC: 1.2 MG/DL (ref 0.2–1.3)
BILIRUB UR QL STRIP: NEGATIVE
BUN SERPL-MCNC: 13 MG/DL (ref 7–30)
CALCIUM SERPL-MCNC: 8.4 MG/DL (ref 8.5–10.1)
CHLORIDE SERPL-SCNC: 95 MMOL/L (ref 94–109)
CO2 SERPL-SCNC: 31 MMOL/L (ref 20–32)
COLOR UR AUTO: YELLOW
CREAT SERPL-MCNC: 0.71 MG/DL (ref 0.66–1.25)
DIFFERENTIAL METHOD BLD: ABNORMAL
EOSINOPHIL # BLD AUTO: 0 10E9/L (ref 0–0.7)
EOSINOPHIL NFR BLD AUTO: 0.9 %
ERYTHROCYTE [DISTWIDTH] IN BLOOD BY AUTOMATED COUNT: 13.2 % (ref 10–15)
GFR SERPL CREATININE-BSD FRML MDRD: >90 ML/MIN/1.7M2
GLUCOSE SERPL-MCNC: 94 MG/DL (ref 70–99)
GLUCOSE UR STRIP-MCNC: NEGATIVE MG/DL
HCT VFR BLD AUTO: 45.4 % (ref 40–53)
HGB BLD-MCNC: 16 G/DL (ref 13.3–17.7)
HGB UR QL STRIP: NEGATIVE
IMM GRANULOCYTES # BLD: 0 10E9/L (ref 0–0.4)
IMM GRANULOCYTES NFR BLD: 0.3 %
KETONES UR STRIP-MCNC: 5 MG/DL
LEUKOCYTE ESTERASE UR QL STRIP: NEGATIVE
LIPASE SERPL-CCNC: 232 U/L (ref 73–393)
LYMPHOCYTES # BLD AUTO: 0.5 10E9/L (ref 0.8–5.3)
LYMPHOCYTES NFR BLD AUTO: 15.5 %
MCH RBC QN AUTO: 35 PG (ref 26.5–33)
MCHC RBC AUTO-ENTMCNC: 35.2 G/DL (ref 31.5–36.5)
MCV RBC AUTO: 99 FL (ref 78–100)
MONOCYTES # BLD AUTO: 0.2 10E9/L (ref 0–1.3)
MONOCYTES NFR BLD AUTO: 5.2 %
MUCOUS THREADS #/AREA URNS LPF: PRESENT /LPF
NEUTROPHILS # BLD AUTO: 2.6 10E9/L (ref 1.6–8.3)
NEUTROPHILS NFR BLD AUTO: 78.1 %
NITRATE UR QL: NEGATIVE
NRBC # BLD AUTO: 0 10*3/UL
NRBC BLD AUTO-RTO: 0 /100
PH UR STRIP: 6 PH (ref 5–7)
PLATELET # BLD AUTO: 109 10E9/L (ref 150–450)
POTASSIUM SERPL-SCNC: 2.7 MMOL/L (ref 3.4–5.3)
PROT SERPL-MCNC: 8.5 G/DL (ref 6.8–8.8)
RBC # BLD AUTO: 4.57 10E12/L (ref 4.4–5.9)
RBC #/AREA URNS AUTO: <1 /HPF (ref 0–2)
SODIUM SERPL-SCNC: 134 MMOL/L (ref 133–144)
SOURCE: ABNORMAL
SP GR UR STRIP: 1 (ref 1–1.03)
UROBILINOGEN UR STRIP-MCNC: NORMAL MG/DL (ref 0–2)
WBC # BLD AUTO: 3.3 10E9/L (ref 4–11)
WBC #/AREA URNS AUTO: 1 /HPF (ref 0–2)

## 2018-02-16 PROCEDURE — 81001 URINALYSIS AUTO W/SCOPE: CPT | Performed by: EMERGENCY MEDICINE

## 2018-02-16 PROCEDURE — 96365 THER/PROPH/DIAG IV INF INIT: CPT | Mod: 59

## 2018-02-16 PROCEDURE — 80053 COMPREHEN METABOLIC PANEL: CPT | Performed by: EMERGENCY MEDICINE

## 2018-02-16 PROCEDURE — 96361 HYDRATE IV INFUSION ADD-ON: CPT

## 2018-02-16 PROCEDURE — 74177 CT ABD & PELVIS W/CONTRAST: CPT

## 2018-02-16 PROCEDURE — 25000125 ZZHC RX 250: Performed by: EMERGENCY MEDICINE

## 2018-02-16 PROCEDURE — 85025 COMPLETE CBC W/AUTO DIFF WBC: CPT | Performed by: EMERGENCY MEDICINE

## 2018-02-16 PROCEDURE — 25000128 H RX IP 250 OP 636: Performed by: EMERGENCY MEDICINE

## 2018-02-16 PROCEDURE — 96375 TX/PRO/DX INJ NEW DRUG ADDON: CPT

## 2018-02-16 PROCEDURE — 83690 ASSAY OF LIPASE: CPT | Performed by: EMERGENCY MEDICINE

## 2018-02-16 PROCEDURE — 99285 EMERGENCY DEPT VISIT HI MDM: CPT | Mod: 25

## 2018-02-16 RX ORDER — SODIUM CHLORIDE 9 MG/ML
1000 INJECTION, SOLUTION INTRAVENOUS CONTINUOUS
Status: DISCONTINUED | OUTPATIENT
Start: 2018-02-16 | End: 2018-02-18 | Stop reason: HOSPADM

## 2018-02-16 RX ORDER — IOPAMIDOL 755 MG/ML
111 INJECTION, SOLUTION INTRAVASCULAR ONCE
Status: COMPLETED | OUTPATIENT
Start: 2018-02-16 | End: 2018-02-16

## 2018-02-16 RX ORDER — SODIUM CHLORIDE AND POTASSIUM CHLORIDE 150; 900 MG/100ML; MG/100ML
INJECTION, SOLUTION INTRAVENOUS CONTINUOUS
Status: DISCONTINUED | OUTPATIENT
Start: 2018-02-16 | End: 2018-02-17

## 2018-02-16 RX ORDER — ONDANSETRON 2 MG/ML
4 INJECTION INTRAMUSCULAR; INTRAVENOUS
Status: COMPLETED | OUTPATIENT
Start: 2018-02-16 | End: 2018-02-16

## 2018-02-16 RX ADMIN — SODIUM CHLORIDE 1000 ML: 9 INJECTION, SOLUTION INTRAVENOUS at 21:37

## 2018-02-16 RX ADMIN — POTASSIUM CHLORIDE AND SODIUM CHLORIDE: 900; 150 INJECTION, SOLUTION INTRAVENOUS at 23:45

## 2018-02-16 RX ADMIN — IOPAMIDOL 111 ML: 755 INJECTION, SOLUTION INTRAVENOUS at 22:47

## 2018-02-16 RX ADMIN — SODIUM CHLORIDE 75 ML: 9 INJECTION, SOLUTION INTRAVENOUS at 22:47

## 2018-02-16 RX ADMIN — ONDANSETRON 4 MG: 2 INJECTION INTRAMUSCULAR; INTRAVENOUS at 21:37

## 2018-02-16 ASSESSMENT — ENCOUNTER SYMPTOMS
ABDOMINAL PAIN: 1
FATIGUE: 1
FEVER: 1
HEMATURIA: 0
DYSURIA: 0
DIFFICULTY URINATING: 1
DIARRHEA: 1

## 2018-02-16 NOTE — TELEPHONE ENCOUNTER
"Patient called reporting that on 2/13/18 he had diarrhea x9, none on 2/14/18, and 3 bowel movements today (2 normal and 1 diarrhea). Reports abdominal discomfort rated at a \"3.\" Drinking fluids and urinating every 12 hours. Denies fever. Advised home care. Encouraged to call back if symptoms worsen or any other concerns.    Additional Information    Negative: Shock suspected (e.g., cold/pale/clammy skin, too weak to stand, low BP, rapid pulse)    Negative: Difficult to awaken or acting confused  (e.g., disoriented, slurred speech)    Negative: Sounds like a life-threatening emergency to the triager    Negative: Vomiting also present and worse than the diarrhea    Negative: [1] Blood in stool AND [2] without diarrhea    Negative: [1] SEVERE abdominal pain (e.g., excruciating) AND [2] present > 1 hour    Negative: [1] SEVERE abdominal pain AND [2] age > 60    Negative: [1] Blood in the stool AND [2] moderate or large amount of blood    Negative: Black or tarry bowel movements  (Exception: chronic-unchanged  black-grey bowel movements AND is taking iron pills or Pepto-bismol)    Negative: [1] Drinking very little AND [2] dehydration suspected (e.g., no urine > 12 hours, very dry mouth, very lightheaded)    Negative: Patient sounds very sick or weak to the triager    Negative: [1] SEVERE diarrhea (e.g., 7 or more times / day more than normal) AND [2]  age > 60 years    Negative: [1] Constant abdominal pain AND [2] present > 2 hours    Negative: [1] Fever > 103 F (39.4 C) AND [2] not able to get the fever down using Fever Care Advice    Negative: [1] SEVERE diarrhea (e.g., 7 or more times / day more than normal) AND [2] present > 24 hours (1 day)    Negative: [1] MODERATE diarrhea (e.g., 4-6 times / day more than normal) AND [2] present > 48 hours (2 days)    Negative: [1] MODERATE diarrhea (e.g., 4-6 times / day more than normal) AND [2] age > 70 years    Negative: Fever > 101 F (38.3 C)    Negative: Fever present > 3 " days (72 hours)    Negative: Abdominal pain  (Exception: Pain clears with each passage of diarrhea stool)    Negative: [1] Blood in the stool AND [2] small amount of blood   (Exception: only on toilet paper. Reason: diarrhea can cause rectal irritation with blood on wiping)    Negative: [1] Mucus or pus in stool AND [2] present > 2 days AND [3] diarrhea is more than mild    Negative: [1] Recent antibiotic therapy (i.e., within last 2 months) AND [2] > 3 days since antibiotic was stopped    Negative: Weak immune system (e.g., HIV positive, cancer chemo, splenectomy, organ transplant, chronic steroids)    Negative: Tube feedings (e.g., nasogastric, g-tube, j-tube)    Negative: Travel to a foreign country in past month    Negative: [1] MILD (e.g., 1-3 or more stools than normal in past 24 hours) diarrhea without known cause AND [2] present >  7 days    Negative: Diarrhea is a chronic symptom (recurrent or ongoing AND present > 4 weeks)    Negative: SEVERE diarrhea (e.g., 7 or more times / day more than normal) (all triage questions negative)    MILD-MODERATE diarrhea (e.g., 1-6 times / day more than normal) (all triage questions negative)    Protocols used: DIARRHEA-ADULT-

## 2018-02-16 NOTE — IP AVS SNAPSHOT
MRN:[id number]                      After Visit Summary   2/16/2018    Db Watson    MRN: [id number]           Thank you!     Thank you for choosing [facility] for your care. Our goal is always to provide you with excellent care. Hearing back from our patients is one way we can continue to improve our services. Please take a few minutes to complete the written survey that you may receive in the mail after you visit with us. Thank you!        Patient Information     Date Of Birth          1965        Designated Caregiver       Most Recent Value    Caregiver    Will someone help with your care after discharge? no      About your hospital stay     You were admitted on:  February 17, 2018 You last received care in the:  [facility] [address] [facility]    You were discharged on:  February 18, 2018        Reason for your hospital stay       Reducible omblecal hernia  Bowel obstruction                  Who to Call     For medical emergencies, please call 911.  For non-urgent questions about your medical care, please call your primary care provider or clinic, [phone]          Attending Provider     Provider Specialty    Trigger, Josue Davila MD Emergency Medicine    Fercho, Toño Orourke MD Internal Medicine    [facility]       Primary Care Provider Office Phone # Fax #    Jennifer Demarco [phone]      After Care Instructions     Activity       Your activity upon discharge: activity as tolerated            Diet       Follow this diet upon discharge: Orders Placed This Encounter      Advance Diet as Tolerated: Full Liquid Diet                  Follow-up Appointments     Follow-up and recommended labs and tests        Follow up with primary care provider, Jennifer Demarco, within 7 days for hospital follow- up.  No follow up labs or test are needed.  F/u with surgery (patient will make appointment with his surgeon.  Potassium in 2-3 days.          "         Your next 10 appointments already scheduled     Feb 26, 2018  6:20 PM CST   [facility]co [facility]   [facility]    [address] [place] [id number] [phone]           Bring a current list of meds and any records pertaining to this visit. For Physicals, please bring immunization records and any forms needing to be filled out. Please arrive 10 minutes early to complete paperwork.              Further instructions from your care team       A follow-up appointment was scheduled for you [see above].  It is very important to go to it--bring these papers and your insurance card with you.  At the visit, talk about your hospital stay.  Tell your doctor how you feel.  Show your new list of medications.  Your doctor will update records, make sure you are still doing OK, and decide if any tests or medication changes are needed.          Pending Results     No orders found from 2/14/2018 to 2/17/2018.            Statement of Approval     Ordered          02/17/18 1249  I have reviewed and agree with all the recommendations and orders detailed in this document.  EFFECTIVE NOW     Approved and electronically signed by:  Stephen Rolle MD             Admission Information     Date & Time Provider Department Dept. Phone    2/16/2018 Stephen Rolle MD [address] [phone]      Your Vitals Were     Blood Pressure Pulse Temperature Respirations Height Weight    139/82 (BP Location: Left arm) 117 98.3  F (36.8  C) (Oral) 16 1.727 m (5' 8\") 99.8 kg (220 lb)    Pulse Oximetry BMI (Body Mass Index)                95% 33.45 kg/m2          MyChart Information     [facility] gives you secure access to your electronic health record. If you see a primary care provider, you can also send messages to your care team and make appointments. If you have questions, please call your primary care clinic.  If you do not have a primary care " provider, please call [phone] and they will assist you.        Care EveryWhere ID     This is your Care EveryWhere ID. This could be used by other organizations to access your [facility] medical records  [id number]        Equal Access to Services     CARMENCITA RAMOS : Hadii aad ku hadmargareth Miller, wakmda luqadaha, qaybta kaalmada wes, katlin gabein hayaajacob garymerissa zavala clarice cohne. So [facility] [phone].    ATENCIÓN: Si habla español, tiene a [place] disposición servicios gratuitos de asistencia lingüística. Llame al [phone].    We comply with applicable federal civil rights laws and [place] laws. We do not discriminate on the basis of race, color, national origin, age, disability, sex, sexual orientation, or gender identity.               Review of your medicines      CONTINUE these medicines which have NOT CHANGED        Dose / Directions    aspirin 81 MG tablet   Used for:  Essential hypertension        Dose:  81 mg   Take 1 tablet by mouth daily.   Refills:  3       fish oil-omega-3 fatty acids 1000 MG capsule        Dose:  1 g   Take 1 g by mouth daily   Refills:  0       hydrochlorothiazide 25 MG tablet   Commonly known as:  HYDRODIURIL   Used for:  Essential hypertension with goal blood pressure less than 140/90        Dose:  25 mg   Take 1 tablet (25 mg) by mouth daily   Quantity:  30 tablet   Refills:  0       meclizine 25 MG Chew   Indication:  Sensation of Spinning or Whirling        Dose:  25 mg   Take 25 mg by mouth every 6 hours as needed.    Indications: Sensation of Spinning or Whirling   Refills:  0       MELATONIN PO        Dose:  5 mg   Take 5 mg by mouth nightly as needed   Refills:  0       metoprolol succinate 50 MG 24 hr tablet   Commonly known as:  TOPROL-XL   Used for:  Essential hypertension with goal blood pressure less than 140/90        Dose:  50 mg   Take 1 tablet (50 mg) by mouth 2 times daily   Quantity:  60 tablet   Refills:  1       traZODone 50 MG tablet   Commonly known as:   DESYREL   Used for:  Anxiety and depression        1-2 at bedtime as needed for sleep   Quantity:  60 tablet   Refills:  1       vitamin B complex with vitamin C Tabs tablet   Commonly known as:  STRESS TAB        Dose:  1 tablet   Take 1 tablet by mouth daily.   Quantity:  100 tablet   Refills:  12                Protect others around you: Learn how to safely use, store and throw away your medicines at www.disposemymeds.org.             Medication List: This is a list of all your medications and when to take them. Check marks below indicate your daily home schedule. Keep this list as a reference.      Medications           Morning Afternoon Evening Bedtime As Needed    aspirin 81 MG tablet   Take 1 tablet by mouth daily.                                fish oil-omega-3 fatty acids 1000 MG capsule   Take 1 g by mouth daily                                hydrochlorothiazide 25 MG tablet   Commonly known as:  HYDRODIURIL   Take 1 tablet (25 mg) by mouth daily                                meclizine 25 MG Chew   Take 25 mg by mouth every 6 hours as needed.    Indications: Sensation of Spinning or Whirling                                MELATONIN PO   Take 5 mg by mouth nightly as needed                                metoprolol succinate 50 MG 24 hr tablet   Commonly known as:  TOPROL-XL   Take 1 tablet (50 mg) by mouth 2 times daily                                traZODone 50 MG tablet   Commonly known as:  DESYREL   1-2 at bedtime as needed for sleep                                vitamin B complex with vitamin C Tabs tablet   Commonly known as:  STRESS TAB   Take 1 tablet by mouth daily.                                          More Information        Small Bowel Obstruction     Small bowel obstruction can lead to tissue damage and even tissue death.   A small bowel obstruction occurs when part or all of the small intestine (bowel) is blocked. As a result, digestive contents can t move through the bowel properly and  out of the body. Treatment is needed right away to remove the blockage. This can ease painful symptoms. It can also prevent serious problems, such as tissue death or bursting (rupture) of the small bowel. Without treatment, a small bowel obstruction can be fatal.  Causes of small bowel obstruction  A small bowel obstruction can be caused by:    Scar tissue (adhesions). These may form after belly (abdominal) surgery or an infection.    Hernia. A hernia is when an organ pushes through a weak spot or tear in the abdomen wall. Part of the small bowel can push out and be seen as a bulge under the belly. Hernias can also occur internally.    Certain health problems. These include when part of the bowel slides inside another part (intussusception). Other causes include irritable bowel disease such as Crohn s disease, and inflammation and sores in the intestine (ulcerative colitis).    Abnormal tissue growths (tumors). These can form on the inside or outside of the small bowel. They are usually due to cancer.  Symptoms of small bowel obstruction  Common symptoms include:    Belly cramping and pain    Belly swelling and bloating    Upset stomach (nausea) and vomiting    Can't  pass gas    Can't pass stool (constipation)    Diarrhea  Diagnosing small bowel obstruction  Your provider will ask about your symptoms and health history. You ll also have a physical exam. Tests may also be done to confirm the problem. These can include:    Imaging tests. These provide pictures of the small bowel. Common tests include X-rays and a CT scan.    Blood tests. These check for infection and other problems, such as excess fluid loss (dehydration).    Upper GI (gastrointestinal) series with a small bowel follow-through. This test takes X-rays of the upper digestive tract from the mouth through the small bowel. An X-ray dye (contrast fluid) is used. The dye coats the inside of your upper digestive tract so it will show up clearly on  X-rays.  Treating small bowel obstruction  Treatment takes place in a hospital. As part of your care, the following may be done:    No food or drink is given by mouth. This allows your bowels to rest.    An IV (intravenous) line is placed in a vein in your arm or hand. The IV line is used to give fluids. It may also be used to give medicines. These may be needed to ease pain, nausea, and other symptoms. They may also be needed to treat or prevent infections.    A soft, thin, flexible tube (nasogastric tube) is inserted through your nose and into your stomach. The tube is used to remove extra gas and fluid in your stomach and bowels. This helps to ease symptoms such as pain and swelling.    In severe cases, surgery is done. This may be needed if the small bowel is almost or totally blocked, or there is a hole in the bowel (bowel perforation). During surgery, the blockage is removed. Parts of the bowel may also be removed if there is tissue death. Other repair may be done as well, depending on what caused the blockage. Your healthcare provider will give you more information about surgery, if needed.    You ll be watched closely in the hospital until your symptoms improve. Your provider will tell you when you can go home.  Long-term concerns   After treatment, most people recover with no lasting effects. If a long part of the bowel is removed, there is a greater chance for lifelong digestive problems. Bowel movements may become irregular. Work with your provider to learn the best ways to manage any symptoms you may have, and to protect your health.  When to call your healthcare provider  Call your provider right away if you have any of the following:    Severe pain (Call 911)    Belly swelling or cramping that won t go away    Can t pass stool or gas    Nausea or vomiting (especially if the vomit looks or smells like stool)     Date Last Reviewed: 7/1/2016 2000-2017 [employer]. [address] [facility],  [place] [address]. All rights reserved. This information is not intended as a substitute for professional medical care. Always follow your healthcare professional's instructions.

## 2018-02-16 NOTE — IP AVS SNAPSHOT
[address]    [place] [address]    Phone:  [phone]                                       After Visit Summary   2/16/2018    Db Watson    MRN: [id number]           After Visit Summary Signature Page     I have received my discharge instructions, and my questions have been answered. I have discussed any challenges I see with this plan with the nurse or doctor.    ..........................................................................................................................................  Patient/Patient Representative Signature      ..........................................................................................................................................  Patient Representative Print Name and Relationship to Patient    ..................................................               ................................................  Date                                            Time    ..........................................................................................................................................  Reviewed by Signature/Title    ...................................................              ..............................................  Date                                                            Time

## 2018-02-17 PROBLEM — K56.609 BOWEL OBSTRUCTION (H): Status: ACTIVE | Noted: 2018-02-17

## 2018-02-17 PROBLEM — K56.609 SBO (SMALL BOWEL OBSTRUCTION) (H): Status: ACTIVE | Noted: 2018-02-17

## 2018-02-17 LAB
LACTATE BLD-SCNC: 0.9 MMOL/L (ref 0.7–2)
POTASSIUM SERPL-SCNC: 2.7 MMOL/L (ref 3.4–5.3)
POTASSIUM SERPL-SCNC: 3 MMOL/L (ref 3.4–5.3)
POTASSIUM SERPL-SCNC: 3.2 MMOL/L (ref 3.4–5.3)

## 2018-02-17 PROCEDURE — 25000132 ZZH RX MED GY IP 250 OP 250 PS 637: Performed by: INTERNAL MEDICINE

## 2018-02-17 PROCEDURE — 25000125 ZZHC RX 250: Performed by: INTERNAL MEDICINE

## 2018-02-17 PROCEDURE — G0378 HOSPITAL OBSERVATION PER HR: HCPCS

## 2018-02-17 PROCEDURE — 84132 ASSAY OF SERUM POTASSIUM: CPT | Performed by: HOSPITALIST

## 2018-02-17 PROCEDURE — 99223 1ST HOSP IP/OBS HIGH 75: CPT | Mod: AI | Performed by: INTERNAL MEDICINE

## 2018-02-17 PROCEDURE — 36415 COLL VENOUS BLD VENIPUNCTURE: CPT | Performed by: INTERNAL MEDICINE

## 2018-02-17 PROCEDURE — 25000128 H RX IP 250 OP 636: Performed by: EMERGENCY MEDICINE

## 2018-02-17 PROCEDURE — 99207 ZZC NON-BILLABLE SERV PER CHARTING: CPT | Performed by: HOSPITALIST

## 2018-02-17 PROCEDURE — 96375 TX/PRO/DX INJ NEW DRUG ADDON: CPT

## 2018-02-17 PROCEDURE — 84132 ASSAY OF SERUM POTASSIUM: CPT | Performed by: INTERNAL MEDICINE

## 2018-02-17 PROCEDURE — 36415 COLL VENOUS BLD VENIPUNCTURE: CPT | Performed by: HOSPITALIST

## 2018-02-17 PROCEDURE — 96376 TX/PRO/DX INJ SAME DRUG ADON: CPT

## 2018-02-17 PROCEDURE — 25000128 H RX IP 250 OP 636: Performed by: INTERNAL MEDICINE

## 2018-02-17 PROCEDURE — 99254 IP/OBS CNSLTJ NEW/EST MOD 60: CPT | Performed by: SURGERY

## 2018-02-17 PROCEDURE — 83605 ASSAY OF LACTIC ACID: CPT | Performed by: INTERNAL MEDICINE

## 2018-02-17 RX ORDER — LORAZEPAM 0.5 MG/1
.5-1 TABLET ORAL EVERY 4 HOURS PRN
Status: DISCONTINUED | OUTPATIENT
Start: 2018-02-17 | End: 2018-02-18 | Stop reason: HOSPADM

## 2018-02-17 RX ORDER — HYDRALAZINE HYDROCHLORIDE 20 MG/ML
10 INJECTION INTRAMUSCULAR; INTRAVENOUS EVERY 4 HOURS PRN
Status: DISCONTINUED | OUTPATIENT
Start: 2018-02-17 | End: 2018-02-18 | Stop reason: HOSPADM

## 2018-02-17 RX ORDER — POTASSIUM CHLORIDE 29.8 MG/ML
20 INJECTION INTRAVENOUS
Status: DISCONTINUED | OUTPATIENT
Start: 2018-02-17 | End: 2018-02-18 | Stop reason: HOSPADM

## 2018-02-17 RX ORDER — LORAZEPAM 1 MG/1
1-2 TABLET ORAL EVERY 30 MIN PRN
Status: DISCONTINUED | OUTPATIENT
Start: 2018-02-17 | End: 2018-02-18 | Stop reason: HOSPADM

## 2018-02-17 RX ORDER — POTASSIUM CHLORIDE 7.45 MG/ML
10 INJECTION INTRAVENOUS
Status: DISCONTINUED | OUTPATIENT
Start: 2018-02-17 | End: 2018-02-18 | Stop reason: HOSPADM

## 2018-02-17 RX ORDER — POTASSIUM CHLORIDE 1.5 G/1.58G
20-40 POWDER, FOR SOLUTION ORAL
Status: DISCONTINUED | OUTPATIENT
Start: 2018-02-17 | End: 2018-02-18 | Stop reason: HOSPADM

## 2018-02-17 RX ORDER — LORAZEPAM 2 MG/ML
1-2 INJECTION INTRAMUSCULAR EVERY 30 MIN PRN
Status: DISCONTINUED | OUTPATIENT
Start: 2018-02-17 | End: 2018-02-18 | Stop reason: HOSPADM

## 2018-02-17 RX ORDER — POTASSIUM CHLORIDE 1500 MG/1
20-40 TABLET, EXTENDED RELEASE ORAL
Status: DISCONTINUED | OUTPATIENT
Start: 2018-02-17 | End: 2018-02-18 | Stop reason: HOSPADM

## 2018-02-17 RX ORDER — POTASSIUM CL/LIDO/0.9 % NACL 10MEQ/0.1L
10 INTRAVENOUS SOLUTION, PIGGYBACK (ML) INTRAVENOUS
Status: DISCONTINUED | OUTPATIENT
Start: 2018-02-17 | End: 2018-02-18 | Stop reason: HOSPADM

## 2018-02-17 RX ADMIN — Medication 10 MEQ: at 07:25

## 2018-02-17 RX ADMIN — Medication 10 MEQ: at 04:23

## 2018-02-17 RX ADMIN — Medication 10 MEQ: at 09:07

## 2018-02-17 RX ADMIN — POTASSIUM CHLORIDE 40 MEQ: 1500 TABLET, EXTENDED RELEASE ORAL at 14:25

## 2018-02-17 RX ADMIN — POTASSIUM CHLORIDE 40 MEQ: 1500 TABLET, EXTENDED RELEASE ORAL at 16:53

## 2018-02-17 RX ADMIN — FOLIC ACID: 5 INJECTION, SOLUTION INTRAMUSCULAR; INTRAVENOUS; SUBCUTANEOUS at 02:17

## 2018-02-17 RX ADMIN — Medication 10 MEQ: at 05:29

## 2018-02-17 RX ADMIN — POTASSIUM CHLORIDE 20 MEQ: 1500 TABLET, EXTENDED RELEASE ORAL at 23:21

## 2018-02-17 RX ADMIN — SODIUM CHLORIDE 1000 ML: 9 INJECTION, SOLUTION INTRAVENOUS at 02:25

## 2018-02-17 RX ADMIN — POTASSIUM CHLORIDE 40 MEQ: 1500 TABLET, EXTENDED RELEASE ORAL at 21:14

## 2018-02-17 NOTE — PROVIDER NOTIFICATION
MD Notification    Notified Person:  MD    Notified Persons Name: Dr. Brantley     Notification Date/Time: February 17, 2018 0315    Notification Interaction:  Talked with Physician    Purpose of Notification: K+ 2.7 in ED, given 1L bolus with 20 mEq KCL. Want on K+ protocol & K+ rechecked?    Orders Received: Place on protocol, recheck potassium.    Comments:

## 2018-02-17 NOTE — UTILIZATION REVIEW
"Admission Status; Secondary Review Determination      Under the authority of the Utilization Management Committee, the utilization review process indicated a secondary review on the above patient. The review outcome is based on review of the medical records, discussions with staff, and applying clinical experience noted on the date of the review.      (X) Observation Status Appropriate - This patient does not meet hospital inpatient criteria and is placed in observation status. If this patient's primary payer is Medicare and was admitted as an inpatient, Condition Code 44 should be used and patient status changed to \"observation\".      RATIONALE FOR DETERMINATION:  51 yo male who presented with abdominal pain and found to have an umbilical hernia causing a SBO.  On admission the hernia was reducible and he was pain free.  He has been evaluated by surgery and the plan is outpatient hernia repair.  The patient has been started on a diet and discharge is anticipated later today if he tolerates oral intake.       The severity of illness, intensity of service provided, expected LOS and risk for adverse outcome make the care appropriate for further observation; however, doesn't meet criteria for hospital inpatient admission. This was communicated to the attending physician Dr. Rolle  who concurred with this determination.     The information on this document is developed by the utilization review team in order for the business office to ensure compliance. This only denotes the appropriateness of proper admission status and does not reflect the quality of care rendered.      The definitions of Inpatient Status and Observation Status used in making the determination above are those provided in the CMS Coverage Manual, Chapter 1 and Chapter 6, section 70.4.      Sincerely,      Reg Castillo MD  Physician Advisor  Utilization Management  [facility]                    "

## 2018-02-17 NOTE — PROVIDER NOTIFICATION
MD Notification    Notified Person:  MD    Notified Persons Name:  Aquilino    Notification Date/Time: 02/17/2018, 5:13 PM      Notification Interaction:  Talked with Physician    Purpose of Notification: K+ recheck 2.7.  Potassium replaced via protocol ,but should pt d/c. Pt stioll having diarrhea.    Orders Received: recheck K+ @ 7pm. Since pt stil having diarrhea, if K is still low, pt must stay overnight.  If K+ is normal, okay to discharge , but add Potassium tabs 40 mEq to be taken Sunday and Monday if diarrhea persists.  Must recheck K+ at pt's clinic on Monday.    Comments:

## 2018-02-17 NOTE — DISCHARGE SUMMARY
"Discharge Summary    Db Watson MRN# [id number]   YOB: 1965 Age: 52 year old     Date of Admission:  2/16/2018  Date of Discharge:  2/18/2018  Admitting Physician:  Stephen Rolle MD  Discharge Physician:  Stephen Rolle MD Pager [phone] Office [phone]  Discharging Service:  [facility]     Primary Provider: Jennifer Demarco          Discharge Diagnosis:   See problem-oriented hospital course for diagnoses addressed during this hospital stay.             Discharge Disposition:   Discharged to home          Condition on Discharge:   Discharge condition: Stable   Discharge vitals: Blood pressure 117/64, pulse 117, temperature 98.7  F (37.1  C), temperature source Oral, resp. rate 16, height 1.727 m (5' 8\"), weight 99.8 kg (220 lb), SpO2 95 %.   Code status on discharge: Full Code          Discharge Medications:      Db Watson   Home Medication Instructions ALEXANDRA:[id number]    Printed on:02/17/18 124   Medication Information                      aspirin 81 MG tablet  Take 1 tablet by mouth daily.             B Complex Vitamins (VITAMIN B COMPLEX) tablet  Take 1 tablet by mouth daily.             fish oil-omega-3 fatty acids (FISH OIL) 1000 MG capsule  Take 1 g by mouth daily              hydrochlorothiazide (HYDRODIURIL) 25 MG tablet  Take 1 tablet (25 mg) by mouth daily             meclizine 25 MG CHEW  Take 25 mg by mouth every 6 hours as needed.    Indications: Sensation of Spinning or Whirling             MELATONIN PO  Take 5 mg by mouth nightly as needed             metoprolol (TOPROL-XL) 50 MG 24 hr tablet  Take 1 tablet (50 mg) by mouth 2 times daily             traZODone (DESYREL) 50 MG tablet  1-2 at bedtime as needed for sleep                  Consultations:   surgery      Brief hx and Hospital Course:   Mr. Watson presented with nausea vomiting abdominal pain. The pain was increasing in intensity.  He promptly presented emergency department.  CT scan showed a small " bowel obstruction in the area of his periumbilical hernia which was reducible on exam.  admitted for further care. sxs resolved. Having BMs. Passing gas. Benign abd exam other than non tender reducible umbilical hernia. Surgery saw the patient and discussed about hernia repair. Pt stated that he prefers to arrange that with his surgeons. Instructions given regarding reducing hernia.  - F/U with PCP and surgery.    Hypokalemia: likely GI related. Had diarrhea which has improved (neg C diff). Potassium level at d/c is 3.5. A dose of potassium given this am.  - Recheck in 2-3 days.    Pt is being discharged home in stable conditions. Asymptomatic.     DISCHARGE EXAM:   Temp:  [98.6  F (37  C)-99.8  F (37.7  C)] 98.7  F (37.1  C)  Pulse:  [117] 117  Heart Rate:  [] 93  Resp:  [16-21] 16  BP: (117-195)/() 117/64  SpO2:  [92 %-99 %] 95 %  Wt Readings from Last 5 Encounters:   02/16/18 99.8 kg (220 lb)   02/10/18 99.8 kg (220 lb)   11/28/17 99.8 kg (220 lb)   11/27/17 99.8 kg (220 lb)   11/27/17 100.9 kg (222 lb 6.4 oz)     Constitutional: Awake, alert, cooperative, no apparent distress    Lungs: Clear to auscultation bilaterally, no crackles or wheezing    Cardiovascular: Regular rate and rhythm, normal S1 and S2, and no murmur noted   Abdomen: Normal bowel sounds, soft, non-distended, non-tender. Reducible oblecal hernia.   Skin: No rashes, no cyanosis, no edema   Other:           Pertinent Tests and Procedures:   CT abd/pelvis             Pending Results:   none          Discharge Instructions and Follow-Up:   Discharge diet:   Active Diet Order      Advance Diet as Tolerated: Full Liquid Diet      Diet   Discharge activity: Activity as tolerated   Discharge follow-up: Follow up with primary care provider in 1 weeks  Follow up with surgery (pt to make appointment)   Outpatient therapy: None    Home Care agency: None    Supplies and equipment: None   Lines and drains: None    Wound care: None   Other  instructions: None      More than 30 minutes were required for coordination of care for this discharge.          Procedures / Labs / Imaging:     Results for orders placed or performed during the hospital encounter of 02/16/18   CT Abdomen Pelvis w Contrast    Narrative    CT ABDOMEN PELVIS W CONTRAST 2/16/2018 10:51 PM    HISTORY: Right lower quadrant abdominal pain.    COMPARISON: None.    TECHNIQUE:  Abdomen and pelvis CT was performed following intravenous  administration of 111 mL Isovue 370.  Radiation dose for this scan was  reduced using automated exposure control, adjustment of the mA and/or  kV according to patient size, or iterative reconstruction technique.    FINDINGS: Lung bases are clear.    Liver, gallbladder, spleen, pancreas, adrenal glands and kidneys  appear normal.    The appendix is normal. However, there is a distal small bowel  obstruction with a transition point which appears to be at the site of  an umbilical hernia. No free air. No free or loculated fluid  collection. The large bowel has normal caliber.    Urinary bladder is partially distended with normal wall thickness. No  free fluid in the pelvis.      Impression    IMPRESSION:   1. Normal appendix.  2. Distal small bowel obstruction with a transition point at the site  of a partial periumbilical hernia.  3. No free air or abscess.    KARMEN DARNELL MD   CBC with platelets differential   Result Value Ref Range    WBC 3.3 (L) 4.0 - 11.0 10e9/L    RBC Count 4.57 4.4 - 5.9 10e12/L    Hemoglobin 16.0 13.3 - 17.7 g/dL    Hematocrit 45.4 40.0 - 53.0 %    MCV 99 78 - 100 fl    MCH 35.0 (H) 26.5 - 33.0 pg    MCHC 35.2 31.5 - 36.5 g/dL    RDW 13.2 10.0 - 15.0 %    Platelet Count 109 (L) 150 - 450 10e9/L    Diff Method Automated Method     % Neutrophils 78.1 %    % Lymphocytes 15.5 %    % Monocytes 5.2 %    % Eosinophils 0.9 %    % Basophils 0.0 %    % Immature Granulocytes 0.3 %    Nucleated RBCs 0 0 /100    Absolute Neutrophil 2.6 1.6 - 8.3  10e9/L    Absolute Lymphocytes 0.5 (L) 0.8 - 5.3 10e9/L    Absolute Monocytes 0.2 0.0 - 1.3 10e9/L    Absolute Eosinophils 0.0 0.0 - 0.7 10e9/L    Absolute Basophils 0.0 0.0 - 0.2 10e9/L    Abs Immature Granulocytes 0.0 0 - 0.4 10e9/L    Absolute Nucleated RBC 0.0    Comprehensive metabolic panel   Result Value Ref Range    Sodium 134 133 - 144 mmol/L    Potassium 2.7 (L) 3.4 - 5.3 mmol/L    Chloride 95 94 - 109 mmol/L    Carbon Dioxide 31 20 - 32 mmol/L    Anion Gap 8 3 - 14 mmol/L    Glucose 94 70 - 99 mg/dL    Urea Nitrogen 13 7 - 30 mg/dL    Creatinine 0.71 0.66 - 1.25 mg/dL    GFR Estimate >90 >60 mL/min/1.7m2    GFR Estimate If Black >90 >60 mL/min/1.7m2    Calcium 8.4 (L) 8.5 - 10.1 mg/dL    Bilirubin Total 1.2 0.2 - 1.3 mg/dL    Albumin 4.2 3.4 - 5.0 g/dL    Protein Total 8.5 6.8 - 8.8 g/dL    Alkaline Phosphatase 90 40 - 150 U/L    ALT 83 (H) 0 - 70 U/L    AST 86 (H) 0 - 45 U/L   Lipase   Result Value Ref Range    Lipase 232 73 - 393 U/L   UA with Microscopic   Result Value Ref Range    Color Urine Yellow     Appearance Urine Clear     Glucose Urine Negative NEG^Negative mg/dL    Bilirubin Urine Negative NEG^Negative    Ketones Urine 5 (A) NEG^Negative mg/dL    Specific Gravity Urine 1.005 1.003 - 1.035    Blood Urine Negative NEG^Negative    pH Urine 6.0 5.0 - 7.0 pH    Protein Albumin Urine 10 (A) NEG^Negative mg/dL    Urobilinogen mg/dL Normal 0.0 - 2.0 mg/dL    Nitrite Urine Negative NEG^Negative    Leukocyte Esterase Urine Negative NEG^Negative    Source Midstream Urine     WBC Urine 1 0 - 2 /HPF    RBC Urine <1 0 - 2 /HPF    Mucous Urine Present (A) NEG^Negative /LPF   Lactic acid level STAT   Result Value Ref Range    Lactic Acid 0.9 0.7 - 2.0 mmol/L   Potassium   Result Value Ref Range    Potassium 3.0 (L) 3.4 - 5.3 mmol/L   Surgery General IP Consult: Patient to be seen: Routine - within 24 hours; SBO with periumbilical hernia; Consultant may enter orders: Yes    Harry Mclaughlin MD      2/17/2018  9:22 AM  General Surgery Consultation      Db Watson MRN# [id number]   YOB: 1965 Age: 52 year old   Date of Admission: 2/16/2018     Reason for consult: I was asked by Dr. Brantley to evaluate this   patient for small bowel obstruction secondary to ventral   umbilical hernia.           Assessment and Plan:   Reducible umbilical hernia.    I discussed with the patient the rationale for repair, he   understands.  At the moment given the resolution of his symptoms   he will prefer to schedule this operation later time due to some   work conflicts.  Instructions were given on how to keep the   hernia reduced and or address it should it become a problem   again.  If he is able to tolerate a diet he prefers to go home   today and set up repair in the future.             Chief Complaint:   Abdominal pain, umbilical hernia, small bowel obstruction-like   symptoms secondary to this.    History is obtained from the patient, electronic health record   and emergency department physician         History of Present Illness:   This patient is a 52 year old male who presents with over a week   of some general malaise ultimately abdominal pain multiple   episodes of diarrhea.  He believes his hernia has been reducible   along nonetheless CT scan done in the emergency room shows this   area being the potential source of his small bowel obstruction.    Overnight his symptoms have disappeared, he is pain-free, hungry   his abdomen is has become softer.  He has been having multiple   bowel movements.              Past Medical History:     Past Medical History:   Diagnosis Date     Abnormal MRI of head 1/11/2013     Atypical chest pain 9/4/2012     BMI 33.0-33.9,adult 5/24/2013     Hyperlipidemia LDL goal <160 5/4/2013     Hypertension      Ménière's disease              Past Surgical History:     Past Surgical History:   Procedure Laterality Date     C NONSPECIFIC PROCEDURE      shoulder surgery for  dislocation      C NONSPECIFIC PROCEDURE      eye surgery, lens implant right     EYE SURGERY       FRACTURE TX, WRIST RT/LT      Fracture TX Wrist RT/LT     ORTHOPEDIC SURGERY                 Social History:   I have reviewed this patient's social history          Family History:   I have reviewed this patient's family history          Immunizations:     Immunization History   Administered Date(s) Administered     TD (ADULT, 7+) 01/01/2006     TDAP Vaccine (Adacel) 05/14/2015             Allergies:     Allergies   Allergen Reactions     Zoloft [Sertraline] Nausea and Vomiting             Medications:     Prescriptions Prior to Admission   Medication Sig Dispense Refill Last Dose     metoprolol (TOPROL-XL) 50 MG 24 hr tablet Take 1 tablet (50 mg)   by mouth 2 times daily 60 tablet 1      traZODone (DESYREL) 50 MG tablet 1-2 at bedtime as needed for   sleep 60 tablet 1      hydrochlorothiazide (HYDRODIURIL) 25 MG tablet Take 1 tablet   (25 mg) by mouth daily 30 tablet 0      amLODIPine (NORVASC) 2.5 MG tablet Take 1 tablet (2.5 mg) by   mouth daily 30 tablet 0      hydrOXYzine (ATARAX) 50 MG tablet Take 1 tablet (50 mg) by   mouth every 6 hours as needed for anxiety 30 tablet 0 Taking     fish oil-omega-3 fatty acids (FISH OIL) 1000 MG capsule Take 2   g by mouth daily.   Taking     aspirin 81 MG tablet Take 1 tablet by mouth daily.  3 Taking     meclizine 25 MG CHEW Take 25 mg by mouth every 6 hours as   needed.    Indications: Sensation of Spinning or Whirling     Taking     B Complex Vitamins (VITAMIN B COMPLEX) tablet Take 1 tablet by   mouth daily. 100 tablet 12 Taking             Review of Systems:   The 10 point Review of Systems is negative other than noted in   the HPI            Physical Exam:   Vitals were reviewed  Temp: 98.7  F (37.1  C) Temp src: Oral BP: 117/64 Pulse: 117   Heart Rate: 93 Resp: 16 SpO2: 95 % O2 Device: None (Room air)    Constitutional:   awake, alert, cooperative, no apparent  distress, appears stated   age and mildly obese     Eyes:   sclera clear     ENT:   normocepalic, without obvious abnormality     Lungs:   no increased work of breathing, good air exchange and no   retractions     Abdomen:   no scars, soft,  globose , non-tender, involuntary guarding   absent, no masses palpated and hernia present at the umbilicus,   easily reducible, skin appears intact.     Musculoskeletal:   tone is normal     Skin:   normal skin color, texture, turgor          Data:   All laboratory and imaging data in the past 24 hours reviewed

## 2018-02-17 NOTE — PROGRESS NOTES
Hospitalist update (please refer to the H&P done earlier this am):  Pt is asymptomatic. Had BM this am. paasing gas. No abd tenderness.  -[place] surgery input.    Disp: pending surgery eval.

## 2018-02-17 NOTE — TELEPHONE ENCOUNTER
"Patient has intermittent lower abdominal pain that he rates 3-4/10. Said pain is in the middle of his abdomen and goes over toward his right side. Temp 99.8. Vomited once last evening. Reports having a large diarrhea stool tonight. No blood noted in emesis or stool. Patient reports that he has an umbilical hernia that he is planning to have repaired later this year. He expresses concern about a possible appendicitis.     Protocol reviewed.   Caller states understanding of the recommended disposition.    Cindy Carpenter RN/FNA    Reason for Disposition    [1] Vomiting AND [2] abdomen looks much more swollen than usual    Additional Information    Negative: Shock suspected (e.g., cold/pale/clammy skin, too weak to stand, low BP, rapid pulse)    Negative: Difficult to awaken or acting confused  (e.g., disoriented, slurred speech)    Negative: Passed out (i.e., lost consciousness, collapsed and was not responding)    Negative: Sounds like a life-threatening emergency to the triager    Negative: Chest pain    Negative: Pain is mainly in upper abdomen  (if needed ask: \"is it mainly above the belly button?\")    Negative: Followed an abdomen (stomach) injury    Negative: [1] SEVERE pain (e.g., excruciating) AND [2] present > 1 hour    Negative: [1] SEVERE pain AND [2] age > 60    Negative: [1] Vomiting AND [2] contains red blood or black (\"coffee ground\") material  (Exception: few red streaks in vomit that only happened once)    Negative: Blood in bowel movements  (Exception: Blood on surface of BM with constipation)    Negative: Black or tarry bowel movements  (Exception: chronic-unchanged  black-grey bowel movements AND is taking iron pills or Pepto-bismol)    Negative: [1] Unable to urinate (or only a few drops) > 4 hours AND     [2] bladder feels very full (e.g., palpable bladder or strong urge to urinate)    Negative: [1] Pain in the scrotum or testicle AND [2] present > 1 hour    Negative: Patient sounds very sick or " weak to the triager    Negative: [1] MILD-MODERATE pain AND [2] constant AND [3] present > 2 hours    Protocols used: ABDOMINAL PAIN - MALE-ADULT-

## 2018-02-17 NOTE — ED PROVIDER NOTES
History     Chief Complaint:  Abdominal Pain    SHELIA aWtson is a 52 year old male with history of anxiety who presents to the emergency department today for evaluation of abdominal pain. The patient reports intermittent cramping diffuse lower abdominal pain for the last three days. He states the pain started three days ago with associated diarrhea. Today he was able to go to work and felt well, but then got home and had worsened abdominal pain and one large diarrheal bowel movement. He states his bowel movements the last four days seem to be alternating from formed and normal to loose. He also reports measuring his temperature at home and found himself to have a low-grade fever to 99.9. The patient also reports an umbilical hernia that has not been surgically repaired. The patient was seen in the emergency department 6 days ago (as shown below)and states that at that time he believed he was ill with the flu, but he was subsequently discharged with generalized weakness. He states urination seems to be mildly more difficult this week as well. he denies any hematuria or dysuria. Patient reports a history of significant anxiety and states he frequently gets himself worked up about health issues. He states he has not had any alcohol in the last week.    2/10/2018 EMERGENCY DEPARTMENT COURSE:  SHELIA Watson is a 52 year old male, with a history of anxiety, hypertension, and hyperlipidemia who presents with generalized weakness.  Patient reports 5 days of lying in bed and increased amounts of sleeping.  Also notes a decreased appetite. Denies chest pain, cough, or shortness of breath.  Denies abdominal pain, nausea, vomiting.  He is concerned he has influenza, but also notes recent emotional issues that may be contributing to his symptoms.  With further discussion, patient reports a history of anxiety noting his mind races at night keeping him up.  He was prescribed trazodone, but has not been taking this for  the last 2 weeks.  Patient voices no other areas of concern.  ECG:  Indication: Weakness  Time: 2245  Vent. Rate 71 bpm. AR interval 146. QRS duration 106. QT/QTc 446/484. P-R-T axis 24 38 19. Undetermined rhythm. Prolonged QT. Abnormal ECG. Read time: 2247   Laboratory:        Labs Ordered and Resulted from Time of ED Arrival Up to the Time of Departure from the ED   CBC WITH PLATELETS DIFFERENTIAL - Abnormal; Notable for the following:        Result Value      RBC Count 4.28 (*)        (*)       MCH 34.8 (*)       Platelet Count 108 (*)       All other components within normal limits   COMPREHENSIVE METABOLIC PANEL - Abnormal; Notable for the following:      Calcium 8.3 (*)        (*)       All other components within normal limits   INFLUENZA A/B ANTIGEN    Influenza: negative   Interventions:  Medications   0.9% sodium chloride BOLUS (1,000 mLs Intravenous New Bag 2/10/18 2238)       Allergies:  Zoloft [Sertraline]      Medications:    metoprolol (TOPROL-XL) 50 MG 24 hr tablet  traZODone (DESYREL) 50 MG tablet  hydrochlorothiazide (HYDRODIURIL) 25 MG tablet  amLODIPine (NORVASC) 2.5 MG tablet  hydrOXYzine (ATARAX) 50 MG tablet  fish oil-omega-3 fatty acids (FISH OIL) 1000 MG capsule  aspirin 81 MG tablet    Past Medical History:    Atypical chest pain   Hyperlipidemia   Morbid obesity  Hypertension  Meniere's disease     Past Surgical History:    Shoulder surgery  Eye lens implant   Wrist fracture surgery    Family History:    Hypertension  Diabetes   CAD    Social History:  The patient was accompanied to the ED by himself.  Smoking Status: Never smoker  Smokeless Tobacco: Never used  Alcohol Use: Yes    Marital Status:  Single      Review of Systems   Constitutional: Positive for fatigue and fever.   Gastrointestinal: Positive for abdominal pain (lower) and diarrhea.   Genitourinary: Positive for difficulty urinating. Negative for dysuria and hematuria.   All other systems reviewed and are  "negative.    Physical Exam     Patient Vitals for the past 24 hrs:   BP Temp Temp src Pulse Resp SpO2 Height Weight   02/16/18 2330 (!) 148/95 - - - - - - -   02/16/18 2315 - - - - - 95 % - -   02/16/18 2300 (!) 151/95 - - - - 92 % - -   02/16/18 2230 136/85 - - - - 92 % - -   02/16/18 2200 (!) 166/96 - - - - 93 % - -   02/16/18 2145 - - - - - 99 % - -   02/16/18 2130 (!) 172/101 - - - - - - -   02/16/18 2042 (!) 195/113 98.6  F (37  C) Oral 117 16 98 % 1.727 m (5' 8\") 99.8 kg (220 lb)      Physical Exam  General: Alert, interactive in mild distress  Head:  Scalp is atraumatic  Eyes:  The pupils are equal, round, and reactive to light    EOM's intact    No scleral icterus  ENT:      Nose:  The external nose is normal  Ears:  External ears are normal  Mouth/Throat: The oropharynx is normal    Mucus membranes are moist      Neck:  Normal range of motion.      There is no rigidity.    Trachea is in the midline         CV:  Regular rate and rhythm    No murmur   Resp:  Breath sounds are clear bilaterally    Non-labored, no retractions or accessory muscle use      GI:  Abdomen is soft, no distension, periumbilical and right sided abdominal tenderness. No CVA tenderness. Easily reducible umbilical hernia.       MS:  Normal strength in all 4 extremities, No midline cervical, thoracic, or lumbar tenderness  Skin:  Warm and dry, No rash or lesions noted.  Neuro: Strength 5/5 x4.    Psych:  Awake. Alert.  Anxious affect.      Appropriate interactions.     Emergency Department Course     Imaging:  Radiology findings were communicated with the patient who voiced understanding of the findings.    CT Abdomen/Pelvis w Contrast:  IMPRESSION:   1. Normal appendix.  2. Distal small bowel obstruction with a transition point at the site  of a partial periumbilical hernia.  3. No free air or abscess.  Report per radiology       Laboratory:  Laboratory findings were communicated with the patient who voiced understanding of the " findings.    CBC: WBC 3.3 (L), HGB 16.0,  (L)  CMP: Potassium 2.7 (L), Calcium 8.4 (L), ALT 83 (H), AST 86 (H), o/w WNL. (Creatinine 0.71)   Lipase: 232     UA: mild ketonuria o/w WNL       Interventions:  2137 Zofran 4mg IV   2137 NS Bolus 1,000mL IV  NS + 20 mEq KCl 1000mL IV    Emergency Department Course:  Nursing notes and vitals reviewed.  2108 My PA student evaluated the patient and gathered a history.  2145 I performed an exam of the patient as documented above.   The patient was sent for a CT abdomen/pelvis while here in the emergency department, findings above.   IV was inserted and blood was drawn for laboratory testing, results above.   The patient provided a urine sample here in the emergency department. This was sent for laboratory testing, findings above.   2336 I spoke with Dr. Brantley of the Hospitalist service regarding patient's presentation, findings, and plan of care.   I discussed the treatment plan with the patient. They expressed understanding of this plan and consented to admission. I discussed the patient with Dr. Brantley, who will admit the patient to a monitored bed for further evaluation and treatment. I personally reviewed the laboratory and imaging results with the Patient and answered all related questions prior to admission.   Impression & Plan      Medical Decision Making:  Db Watson is a 52 year old male who presents for evaluation of diarrhea and lower abdominal pain.  There has been decreased rectal gas and frequent loose stools. Clinically this is consistent with bowel obstruction, CT confirms this.  Patient has been resuscitated with IVF.  Will admit to medicine for further cares and bowel rest.  No indication at this point for emergency surgical consult.  There are no signs of surgical emergencies or intraabdominal catastrophes such as volvulus, gut ischemia, perforation.  The patient's blood pressure trended down nicely during his time in the ER.  He was informed  of his elevated liver enzymes, this is been a chronic problem he states he has been trying to cut back on his drinking and has not had any alcohol in 1 week's time.  He did receive potassium supplementation in the emergency department.    Diagnosis:    ICD-10-CM    1. SBO (small bowel obstruction) K56.609    2. Umbilical hernia with obstruction K42.0    3. Hypokalemia E87.6    4. Elevated liver enzymes R74.8    5. Essential hypertension I10        Disposition:  Admitted under the care of Dr. Brantley.       Scribe Disclosure:  I, Bruno Chapin, am serving as a scribe at 9:02 PM on 2/16/2018 to document services personally performed by Josue Sutton MD based on my observations and the provider's statements to me.    2/16/2018    EMERGENCY DEPARTMENT       Josue Sutton MD  02/17/18 0045

## 2018-02-17 NOTE — PHARMACY-ADMISSION MEDICATION HISTORY
Admission medication history interview status for the 2/16/2018  admission is complete. See EPIC admission navigator for prior to admission medications     Medication history source reliability:Good    Actions taken by pharmacist (provider contacted, etc):None     Additional medication history information not noted on PTA med list :None    Medication reconciliation/reorder completed by provider prior to medication history? No    Time spent in this activity: 15 min    Prior to Admission medications    Medication Sig Last Dose Taking? Auth Provider   MELATONIN PO Take 5 mg by mouth nightly as needed  Yes Unknown, Entered By History   metoprolol (TOPROL-XL) 50 MG 24 hr tablet Take 1 tablet (50 mg) by mouth 2 times daily 2/16/2018 at Unknown time Yes Jennifer Demarco MD   traZODone (DESYREL) 50 MG tablet 1-2 at bedtime as needed for sleep  Yes July Doll PA-C   hydrochlorothiazide (HYDRODIURIL) 25 MG tablet Take 1 tablet (25 mg) by mouth daily 2/16/2018 at Unknown time Yes July Doll PA-C   fish oil-omega-3 fatty acids (FISH OIL) 1000 MG capsule Take 1 g by mouth daily  2/16/2018 at Unknown time Yes Reported, Patient   aspirin 81 MG tablet Take 1 tablet by mouth daily. 2/16/2018 at Unknown time Yes Julian Ballesteros MD   meclizine 25 MG CHEW Take 25 mg by mouth every 6 hours as needed.    Indications: Sensation of Spinning or Whirling  Yes Reported, Patient   B Complex Vitamins (VITAMIN B COMPLEX) tablet Take 1 tablet by mouth daily. Past Week at Unknown time Yes Linda Wilcox MD

## 2018-02-17 NOTE — PROGRESS NOTES
Care Coordination:    Noted d/c orders for this patient, include order to followup with PCP within 7 days.  Viewed PCP's schedule / appointment availability.  PCP is out of the office in the next week but does have a 9:40am appointment available Monday 2/26.    Met with patient in room, introduced self and role.  He would prefer an appointment after work on Monday.  PCP does have availability at 6:20pm.  Pt would prefer this appointment.  [place]:   Feb 26, 2018  6:20 PM CST   Office Visit with Jennifer Demarco MD   [facility]    [address] [place] [address]           Also provided pt with OBS brochure, reviewed/explained.    Roselyn Munguia RN, BSN  Community [facility] Care Coordinator   Mobile Phone: [phone]

## 2018-02-17 NOTE — CONSULTS
General Surgery Consultation      Db Watson MRN# [id number]   YOB: 1965 Age: 52 year old   Date of Admission: 2/16/2018     Reason for consult: I was asked by Dr. Brantley to evaluate this patient for small bowel obstruction secondary to ventral umbilical hernia.           Assessment and Plan:   Reducible umbilical hernia.    I discussed with the patient the rationale for repair, he understands.  At the moment given the resolution of his symptoms he will prefer to schedule this operation later time due to some work conflicts.  Instructions were given on how to keep the hernia reduced and or address it should it become a problem again.  If he is able to tolerate a diet he prefers to go home today and set up repair in the future.             Chief Complaint:   Abdominal pain, umbilical hernia, small bowel obstruction-like symptoms secondary to this.    History is obtained from the patient, electronic health record and emergency department physician         History of Present Illness:   This patient is a 52 year old male who presents with over a week of some general malaise ultimately abdominal pain multiple episodes of diarrhea.  He believes his hernia has been reducible along nonetheless CT scan done in the emergency room shows this area being the potential source of his small bowel obstruction.  Overnight his symptoms have disappeared, he is pain-free, hungry his abdomen is has become softer.  He has been having multiple bowel movements.              Past Medical History:     Past Medical History:   Diagnosis Date     Abnormal MRI of head 1/11/2013     Atypical chest pain 9/4/2012     BMI 33.0-33.9,adult 5/24/2013     Hyperlipidemia LDL goal <160 5/4/2013     Hypertension      Ménière's disease              Past Surgical History:     Past Surgical History:   Procedure Laterality Date     C NONSPECIFIC PROCEDURE      shoulder surgery for dislocation      C NONSPECIFIC PROCEDURE      eye surgery,  lens implant right     EYE SURGERY       FRACTURE TX, WRIST RT/LT      Fracture TX Wrist RT/LT     ORTHOPEDIC SURGERY                 Social History:   I have reviewed this patient's social history          Family History:   I have reviewed this patient's family history          Immunizations:     Immunization History   Administered Date(s) Administered     TD (ADULT, 7+) 01/01/2006     TDAP Vaccine (Adacel) 05/14/2015             Allergies:     Allergies   Allergen Reactions     Zoloft [Sertraline] Nausea and Vomiting             Medications:     Prescriptions Prior to Admission   Medication Sig Dispense Refill Last Dose     metoprolol (TOPROL-XL) 50 MG 24 hr tablet Take 1 tablet (50 mg) by mouth 2 times daily 60 tablet 1      traZODone (DESYREL) 50 MG tablet 1-2 at bedtime as needed for sleep 60 tablet 1      hydrochlorothiazide (HYDRODIURIL) 25 MG tablet Take 1 tablet (25 mg) by mouth daily 30 tablet 0      amLODIPine (NORVASC) 2.5 MG tablet Take 1 tablet (2.5 mg) by mouth daily 30 tablet 0      hydrOXYzine (ATARAX) 50 MG tablet Take 1 tablet (50 mg) by mouth every 6 hours as needed for anxiety 30 tablet 0 Taking     fish oil-omega-3 fatty acids (FISH OIL) 1000 MG capsule Take 2 g by mouth daily.   Taking     aspirin 81 MG tablet Take 1 tablet by mouth daily.  3 Taking     meclizine 25 MG CHEW Take 25 mg by mouth every 6 hours as needed.    Indications: Sensation of Spinning or Whirling   Taking     B Complex Vitamins (VITAMIN B COMPLEX) tablet Take 1 tablet by mouth daily. 100 tablet 12 Taking             Review of Systems:   The 10 point Review of Systems is negative other than noted in the HPI            Physical Exam:   Vitals were reviewed  Temp: 98.7  F (37.1  C) Temp src: Oral BP: 117/64 Pulse: 117 Heart Rate: 93 Resp: 16 SpO2: 95 % O2 Device: None (Room air)    Constitutional:   awake, alert, cooperative, no apparent distress, appears stated age and mildly obese     Eyes:   sclera clear     ENT:    normocepalic, without obvious abnormality     Lungs:   no increased work of breathing, good air exchange and no retractions     Abdomen:   no scars, soft,  globose , non-tender, involuntary guarding absent, no masses palpated and hernia present at the umbilicus, easily reducible, skin appears intact.     Musculoskeletal:   tone is normal     Skin:   normal skin color, texture, turgor          Data:   All laboratory and imaging data in the past 24 hours reviewed

## 2018-02-17 NOTE — PROGRESS NOTES
RECEIVING UNIT ED HANDOFF REVIEW    ED Nurse Handoff Report was reviewed by: Aida Chaney on February 17, 2018 at 12:12 AM

## 2018-02-17 NOTE — PLAN OF CARE
Problem: Bowel Obstruction (Adult)  Goal: Signs and Symptoms of Listed Potential Problems Will be Absent, Minimized or Managed (Bowel Obstruction)  Signs and symptoms of listed potential problems will be absent, minimized or managed by discharge/transition of care (reference Bowel Obstruction (Adult) CPG).   Outcome: No Change  A&Ox4, very anxious. Scored 4 on CIWA d/t anxiety. Intermittent abd pain, declined intervention. VSS. Sips of water + ice. BS+, several loose stools. Denies nausea. PIV infusing banana bag q 24 hr, IVF @ 125/hr after. K+ 2.7 in ED, recheck 3.0; currently replacing bag 3/4. Independent. Pt appeared to rest comfortably throughout the night. Will continue to monitor. Plan: General surgery to see this AM.

## 2018-02-17 NOTE — DISCHARGE INSTRUCTIONS
A follow-up appointment was scheduled for you [see above].  It is very important to go to it--bring these papers and your insurance card with you.  At the visit, talk about your hospital stay.  Tell your doctor how you feel.  Show your new list of medications.  Your doctor will update records, make sure you are still doing OK, and decide if any tests or medication changes are needed.

## 2018-02-17 NOTE — H&P
[facility]    History and Physical  Hospitalist       Date of Admission:  2/16/2018  Date of Service (when I saw the patient): 02/17/18    Assessment & Plan   Db Watson is a 52 year old male who presents with abdominal pain, nausea/vomiting    Small bowel obstruction  Mr. Watson presented to the hospital today with nausea vomiting abdominal pain.  He had waxing and waning abdominal pain over the last week or so on.  He reports today of admission that he had a pretty severe pain in a bandlike area around his mid abdomen area.  The pain was increasing in intensity.  He promptly presented emergency department.  CT scan showed a small bowel obstruction in the area of his periumbilical hernia.  At the time of visit with the patient is not having any pain.  He has a reducible hernia.  He denies fevers, chills, nausea/vomiting.  He has noted a couple episodes of diarrhea.  -N.p.o. except ice chips and water sips  -IV hydration  -Pain management with IV morphine as needed  -Antiemetics as needed as well.  -Given the nature of his obstruction, wonder if it would be reasonable to have surgery see the patient to see if they want to repair his periumbilical hernias this appears to be the area of his obstruction.  -Is very clear the patient to let us know if he has any increasing pain in severity or duration compared with previous case there is incarceration.    History of hypertension  Prior to admission he was on Toprol-XL 50 mg twice daily as well as hydrochlorothiazide.  -Given n.p.o. status will hold his oral meds  -As needed hydralazine for systolic blood pressure over 170 will be available    History of Ménière disease  Normally uses meclizine for this time.  Will need to hold this for now.    Anxiety  History of apparent excessive alcohol use  Patient reports anxiety, for which he uses trazodone as needed.  Since he is n.p.o. we will have IV Ativan as needed for anxiety control in the  short-term.  -Given his history of alcohol use we will place him on the CIWA protocol. Although it doesn't appear as if by history he drinks enough to withdraw, will err on the side of safety.     DVT Prophylaxis: Pneumatic Compression Devices and Ambulate every shift  Code Status: Full Code    Disposition: Expected discharge in 2+ days once the above issues have been ironed out.    Toño Brantley MD  [phone] (P)  Text Page     Primary Care Physician   Dr Jennifer Demarco    Chief Complaint   Abdominal pain, nausea and vomiting    History is obtained from the patient and medical records    History of Present Illness   Db Watson is a 52 year old male who presents with the above complaints.  Over the last week or 2 the patient has had waxing and waning abdominal pain.  The evening before admission he began having worsening pain on.  He also had a fever up to 100.2 but she states broke couple days prior to presentation.  The day of presentation to the emergency department he had increasing pain in the mid at his mid abdomen area.  He has some nausea as well as an episode of vomiting.  The pain was getting worse she decided to present.  He denies any current fevers chills.  Denies nausea vomiting at this time.  Denies chest pain denies shortness of breath.  He has not had any history of small bowel obstructions in the past.  He does have a known periumbilical hernia.    Past Medical History    I have reviewed this patient's medical history and updated it with pertinent information if needed.   Past Medical History:   Diagnosis Date     Abnormal MRI of head 1/11/2013     Atypical chest pain 9/4/2012     BMI 33.0-33.9,adult 5/24/2013     Hyperlipidemia LDL goal <160 5/4/2013     Hypertension      Ménière's disease        Past Surgical History   I have reviewed this patient's surgical history and updated it with pertinent information if needed.  Past Surgical History:   Procedure Laterality Date     C  NONSPECIFIC PROCEDURE      shoulder surgery for dislocation      C NONSPECIFIC PROCEDURE      eye surgery, lens implant right     EYE SURGERY       FRACTURE TX, WRIST RT/LT      Fracture TX Wrist RT/LT     ORTHOPEDIC SURGERY         Prior to Admission Medications   Prior to Admission Medications   Prescriptions Last Dose Informant Patient Reported? Taking?   B Complex Vitamins (VITAMIN B COMPLEX) tablet   Yes No   Sig: Take 1 tablet by mouth daily.   amLODIPine (NORVASC) 2.5 MG tablet   No No   Sig: Take 1 tablet (2.5 mg) by mouth daily   aspirin 81 MG tablet   No No   Sig: Take 1 tablet by mouth daily.   fish oil-omega-3 fatty acids (FISH OIL) 1000 MG capsule   Yes No   Sig: Take 2 g by mouth daily.   hydrOXYzine (ATARAX) 50 MG tablet   No No   Sig: Take 1 tablet (50 mg) by mouth every 6 hours as needed for anxiety   hydrochlorothiazide (HYDRODIURIL) 25 MG tablet   No No   Sig: Take 1 tablet (25 mg) by mouth daily   meclizine 25 MG CHEW   Yes No   Sig: Take 25 mg by mouth every 6 hours as needed.    Indications: Sensation of Spinning or Whirling   metoprolol (TOPROL-XL) 50 MG 24 hr tablet   No No   Sig: Take 1 tablet (50 mg) by mouth 2 times daily   traZODone (DESYREL) 50 MG tablet   No No   Si-2 at bedtime as needed for sleep      Facility-Administered Medications: None     Allergies   Allergies   Allergen Reactions     Zoloft [Sertraline] Nausea and Vomiting       Social History   I have reviewed this patient's social history and updated it with pertinent information if needed. Db Watson  reports that he has never smoked. He has never used smokeless tobacco. He reports that he drinks alcohol. He reports that he does not use illicit drugs.    Family History   I have reviewed this patient's family history and updated it with pertinent information if needed.   Family History   Problem Relation Age of Onset     Hypertension Mother      DIABETES Father      C.A.D. Father      bypass surgery       Review of  Systems   The 10 point Review of Systems is negative other than noted in the HPI or here.      Physical Exam   Temp: 98.6  F (37  C) Temp src: Axillary BP: 136/80 Pulse: 117 Heart Rate: 105 Resp: 20 SpO2: 96 % O2 Device: None (Room air)    Vital Signs with Ranges  220 lbs 0 oz    Constitutional: alert, oriented and in no acute distress  Eyes: EOMI, PERRL  HEENT: OP clear  Respiratory: CTA B without w/c  Cardiovascular: RRR without m/r/g  GI: soft, largely nontender.  He has a obvious periumbilical hernia with reducible bowel in it.  Lymph/Hematologic: no cervical LAD  Genitourinary: deferred  Skin: no rashes or lesions grossly  Musculoskeletal: no deformities or arthritis  Neurologic: CN II-XII, MINA, sensation grossly intact  Psychiatric: mood and affect wnl, slightly anxious    Data   Data reviewed today:  I personally reviewed the abdominal CT image(s) showing Air-fluid levels consistent with small bowel obstruction.    Recent Labs  Lab 02/17/18  0130 02/16/18  2107 02/10/18  2232   WBC  --  3.3* 4.8   HGB  --  16.0 14.9   MCV  --  99 101*   PLT  --  109* 108*   NA  --  134 140   POTASSIUM 3.0* 2.7* 3.5   CHLORIDE  --  95 104   CO2  --  31 26   BUN  --  13 7   CR  --  0.71 0.66   ANIONGAP  --  8 10   DEMETRIA  --  8.4* 8.3*   GLC  --  94 94   ALBUMIN  --  4.2 4.1   PROTTOTAL  --  8.5 8.2   BILITOTAL  --  1.2 1.3   ALKPHOS  --  90 93   ALT  --  83* 66   AST  --  86* 106*   LIPASE  --  232  --        Recent Results (from the past 24 hour(s))   CT Abdomen Pelvis w Contrast    Narrative    CT ABDOMEN PELVIS W CONTRAST 2/16/2018 10:51 PM    HISTORY: Right lower quadrant abdominal pain.    COMPARISON: None.    TECHNIQUE:  Abdomen and pelvis CT was performed following intravenous  administration of 111 mL Isovue 370.  Radiation dose for this scan was  reduced using automated exposure control, adjustment of the mA and/or  kV according to patient size, or iterative reconstruction technique.    FINDINGS: Lung bases are  clear.    Liver, gallbladder, spleen, pancreas, adrenal glands and kidneys  appear normal.    The appendix is normal. However, there is a distal small bowel  obstruction with a transition point which appears to be at the site of  an umbilical hernia. No free air. No free or loculated fluid  collection. The large bowel has normal caliber.    Urinary bladder is partially distended with normal wall thickness. No  free fluid in the pelvis.      Impression    IMPRESSION:   1. Normal appendix.  2. Distal small bowel obstruction with a transition point at the site  of a partial periumbilical hernia.  3. No free air or abscess.    KARMEN DARNELL MD

## 2018-02-17 NOTE — ED NOTES
[facility]  ED Nurse Handoff Report    ED Chief complaint: Abdominal Pain (Patient in with complaints of lower abdominal pain. States started with diarrhea Tuesday. States at 1920 checked temp and it  was 98.1 at 2020 temp was at 99.9. States vomited this am. )      ED Diagnosis:   Final diagnoses:   SBO (small bowel obstruction)   Umbilical hernia with obstruction   Hypokalemia   Elevated liver enzymes   Essential hypertension       Code Status: Full Code    Allergies:   Allergies   Allergen Reactions     Zoloft [Sertraline] Nausea and Vomiting       Activity level - Baseline/Home:  Independent    Activity Level - Current:   Independent     Needed?: No    Isolation: No  Infection: Not Applicable    Bariatric?: No    Vital Signs:   Vitals:    02/16/18 2230 02/16/18 2300 02/16/18 2315 02/16/18 2330   BP: 136/85 (!) 151/95  (!) 148/95   Pulse:       Resp:       Temp:       TempSrc:       SpO2: 92% 92% 95%    Weight:       Height:           Cardiac Rhythm: ,        Pain level: 0-10 Pain Scale: 4    Is this patient confused?: No    Patient Report: Initial Complaint: Abd pain  Focused Assessment: A & O.  VSS.  Up independently.  C/O abd pain and some nausea.  Nausea subsided with zofran.    Tests Performed: CT, labs  Abnormal Results: CT, Labs  Treatments provided: zofran, 1L NS, 1L NS +K    Family Comments: NA    OBS brochure/video discussed/provided to patient: N/A    ED Medications:   Medications   0.9% sodium chloride BOLUS (0 mLs Intravenous Stopped 2/16/18 2252)     Followed by   sodium chloride 0.9% infusion (not administered)   0.9% sodium chloride + KCl 20 mEq/L infusion ( Intravenous New Bag 2/16/18 2345)   ondansetron (ZOFRAN) injection 4 mg (4 mg Intravenous Given 2/16/18 2137)   iopamidol (ISOVUE-370) solution 111 mL (111 mLs Intravenous Given 2/16/18 2247)   saline flush (75 mLs Intravenous Given 2/16/18 2247)       Drips infusing?: Yes      ED NURSE PHONE NUMBER: *[phone]

## 2018-02-18 VITALS
RESPIRATION RATE: 16 BRPM | OXYGEN SATURATION: 95 % | HEIGHT: 68 IN | SYSTOLIC BLOOD PRESSURE: 139 MMHG | BODY MASS INDEX: 33.34 KG/M2 | WEIGHT: 220 LBS | DIASTOLIC BLOOD PRESSURE: 82 MMHG | HEART RATE: 117 BPM | TEMPERATURE: 98.3 F

## 2018-02-18 LAB — POTASSIUM SERPL-SCNC: 3.5 MMOL/L (ref 3.4–5.3)

## 2018-02-18 PROCEDURE — 36415 COLL VENOUS BLD VENIPUNCTURE: CPT | Performed by: HOSPITALIST

## 2018-02-18 PROCEDURE — 25000125 ZZHC RX 250: Performed by: INTERNAL MEDICINE

## 2018-02-18 PROCEDURE — 84132 ASSAY OF SERUM POTASSIUM: CPT | Performed by: HOSPITALIST

## 2018-02-18 PROCEDURE — 25000132 ZZH RX MED GY IP 250 OP 250 PS 637: Performed by: HOSPITALIST

## 2018-02-18 PROCEDURE — G0378 HOSPITAL OBSERVATION PER HR: HCPCS

## 2018-02-18 PROCEDURE — 96376 TX/PRO/DX INJ SAME DRUG ADON: CPT

## 2018-02-18 PROCEDURE — 99239 HOSP IP/OBS DSCHRG MGMT >30: CPT | Performed by: HOSPITALIST

## 2018-02-18 PROCEDURE — 25000128 H RX IP 250 OP 636: Performed by: INTERNAL MEDICINE

## 2018-02-18 RX ORDER — POTASSIUM CHLORIDE 1500 MG/1
40 TABLET, EXTENDED RELEASE ORAL ONCE
Status: COMPLETED | OUTPATIENT
Start: 2018-02-18 | End: 2018-02-18

## 2018-02-18 RX ADMIN — FOLIC ACID: 5 INJECTION, SOLUTION INTRAMUSCULAR; INTRAVENOUS; SUBCUTANEOUS at 02:42

## 2018-02-18 RX ADMIN — POTASSIUM CHLORIDE 40 MEQ: 1500 TABLET, EXTENDED RELEASE ORAL at 10:10

## 2018-02-18 NOTE — PLAN OF CARE
Problem: Patient Care Overview  Goal: Plan of Care/Patient Progress Review  Outcome: Improving  A&Ox4, VSS, some anxiety. CIWA scores 1. No c/o pain overnight, still having loose stools, has been tolerating low fiber diet.  Independent. Likely d/c today since K+ w/in normal range.  Will continue to monitor.

## 2018-02-18 NOTE — PLAN OF CARE
Problem: Patient Care Overview  Goal: Plan of Care/Patient Progress Review  Outcome: Adequate for Discharge Date Met: 02/18/18  Pt vitally stable.  Still having looser stools, but they seem to be forming up nicely.  D/c instructions given, all questions answered.  Pt escorted to transportation with volunteer and his belongings.

## 2018-02-18 NOTE — PLAN OF CARE
Problem: Patient Care Overview  Goal: Plan of Care/Patient Progress Review  Outcome: Improving  Pt A/O.  VSS.  Up ind in room.  Pt states that pain is much improved and only has intermittent gas pains.  Tolerating low fiber diet. Abdomen still a bit distended, but umbilical hernia is non-tender and easily reducible.  Having frequent loose stools.   Not d/c'd d/t low K+ and loose stools.  Will probably d/c tomorrow, if K+ is stabilized.

## 2018-02-19 ENCOUNTER — TELEPHONE (OUTPATIENT)
Dept: FAMILY MEDICINE | Facility: CLINIC | Age: 53
End: 2018-02-19

## 2018-02-19 NOTE — TELEPHONE ENCOUNTER
Patient discharged from Inpatient.     Discharge location: [facility]  Discharge date: 2/18/18  Diagnosis: Small Bowel Obstruction    Please follow up as appropriate. If no follow up required, please close encounter.      Anay OSHEA, Patient Care

## 2018-02-19 NOTE — TELEPHONE ENCOUNTER
Reason for Call:  Other call back    Detailed comments: Patient called back stated that he is at work and can answer the phone after 6:00PM    Phone Number Patient can be reached at: Home number on file [phone] (home)    Best Time: 6:00PM or Later     Can we leave a detailed message on this number? YES    Call taken on 2/19/2018 at 2:58 PM by Virgil Moore

## 2018-02-19 NOTE — TELEPHONE ENCOUNTER
This writer attempted to contact pt on 02/19/18      Reason for call hospital f/u and left message to return call.      If patient calls back:   Patient contacted by clinic RN team. Inform patient that someone from the team will contact them, document that pt called and route to care team.         Galina Lloyd RN

## 2018-02-20 ENCOUNTER — DOCUMENTATION ONLY (OUTPATIENT)
Dept: LAB | Facility: CLINIC | Age: 53
End: 2018-02-20

## 2018-02-20 DIAGNOSIS — E87.6 HYPOKALEMIA: Primary | ICD-10-CM

## 2018-02-20 NOTE — PROGRESS NOTES
Please place or confirm lab orders for upcoming lab appointment on 2/21/2018, Physician appointment on  2/26/2018. SEBASTIAN Ayala CMA.

## 2018-02-20 NOTE — TELEPHONE ENCOUNTER
Pt has an apt scheduled on 2/26 for a hospital f/u.  Please attempt to contact pt after 6:00 pm.  Galina Lloyd RN

## 2018-02-21 DIAGNOSIS — I10 ESSENTIAL HYPERTENSION WITH GOAL BLOOD PRESSURE LESS THAN 140/90: ICD-10-CM

## 2018-02-21 DIAGNOSIS — E87.6 HYPOKALEMIA: ICD-10-CM

## 2018-02-21 PROCEDURE — 36415 COLL VENOUS BLD VENIPUNCTURE: CPT | Performed by: FAMILY MEDICINE

## 2018-02-21 PROCEDURE — 84132 ASSAY OF SERUM POTASSIUM: CPT | Performed by: FAMILY MEDICINE

## 2018-02-21 NOTE — TELEPHONE ENCOUNTER
"Requested Prescriptions   Pending Prescriptions Disp Refills     metoprolol succinate (TOPROL-XL) 50 MG 24 hr tablet [Pharmacy Med Name: Metoprolol Succinate ER Oral Tablet Extended Release 24 Hour 50 MG] 60 tablet 0     Sig: Take 1 tablet (50 mg) by mouth 2 times daily    Beta-Blockers Protocol Passed    2/21/2018  7:00 AM       Passed - Blood pressure under 140/90 in past 12 months    BP Readings from Last 3 Encounters:   02/18/18 139/82   02/10/18 149/84   11/28/17 (!) 158/96                Passed - Patient is age 6 or older       Passed - Recent or future visit with authorizing provider's specialty    Patient had office visit in the last year or has a visit in the next 30 days with authorizing provider.  See \"Patient Info\" tab in inbasket, or \"Choose Columns\" in Meds & Orders section of the refill encounter.             metoprolol (TOPROL-XL) 50 MG 24 hr tablet  Last Written Prescription Date:  12/22/17  Last Fill Quantity: 60,  # refills: 1   Last office visit: 11/28/2017 with prescribing provider:  Dr. Demarco   Future Office Visit:   Next 5 appointments (look out 90 days)     Feb 26, 2018  6:20 PM CST   Office Visit with Jennifer Demarco MD   [facility]    [address] [address] [phone]                   "

## 2018-02-22 LAB — POTASSIUM SERPL-SCNC: 3.7 MMOL/L (ref 3.4–5.3)

## 2018-02-22 RX ORDER — METOPROLOL SUCCINATE 50 MG/1
TABLET, EXTENDED RELEASE ORAL
Qty: 60 TABLET | Refills: 8 | Status: SHIPPED | OUTPATIENT
Start: 2018-02-22 | End: 2018-07-24

## 2018-02-22 NOTE — TELEPHONE ENCOUNTER
This writer attempted to contact Db on 02/22/18      Reason for call hospital follow up and left detailed message.  Left message for patient that clinic calling to check in with him following his hospital discharge. Left message stating his follow up clinic appointment is scheduled for 2/26/18 and he should bring his hospital discharge paperwork with him and in the meantime he could call the clinic back if he has questions or concerns and left him clinic phone number.    Jennifer Gutierrez RN

## 2018-02-22 NOTE — TELEPHONE ENCOUNTER
"Requested Prescriptions   Signed Prescriptions Disp Refills     metoprolol succinate (TOPROL-XL) 50 MG 24 hr tablet 60 tablet 8     Sig: Take 1 tablet (50 mg) by mouth 2 times daily    Beta-Blockers Protocol Passed    2/21/2018  8:08 AM       Passed - Blood pressure under 140/90 in past 12 months    BP Readings from Last 3 Encounters:   02/18/18 139/82   02/10/18 149/84   11/28/17 (!) 158/96                Passed - Patient is age 6 or older       Passed - Recent or future visit with authorizing provider's specialty    Patient had office visit in the last year or has a visit in the next 30 days with authorizing provider.  See \"Patient Info\" tab in inbasket, or \"Choose Columns\" in Meds & Orders section of the refill encounter.             Prescription approved per [facility] Refill Protocol.    Fina Solis RN, BSN    "

## 2018-02-26 ENCOUNTER — OFFICE VISIT (OUTPATIENT)
Dept: FAMILY MEDICINE | Facility: CLINIC | Age: 53
End: 2018-02-26
Payer: COMMERCIAL

## 2018-02-26 VITALS
BODY MASS INDEX: 36.14 KG/M2 | TEMPERATURE: 98.6 F | SYSTOLIC BLOOD PRESSURE: 150 MMHG | RESPIRATION RATE: 16 BRPM | WEIGHT: 237.7 LBS | OXYGEN SATURATION: 99 % | HEART RATE: 81 BPM | DIASTOLIC BLOOD PRESSURE: 98 MMHG

## 2018-02-26 DIAGNOSIS — I10 HYPERTENSION GOAL BP (BLOOD PRESSURE) < 140/90: ICD-10-CM

## 2018-02-26 DIAGNOSIS — K42.0 UMBILICAL HERNIA WITH OBSTRUCTION: Primary | ICD-10-CM

## 2018-02-26 PROCEDURE — 99214 OFFICE O/P EST MOD 30 MIN: CPT | Performed by: FAMILY MEDICINE

## 2018-02-26 RX ORDER — AMLODIPINE BESYLATE 2.5 MG/1
2.5 TABLET ORAL DAILY
Qty: 30 TABLET | Refills: 0 | Status: SHIPPED | OUTPATIENT
Start: 2018-02-26 | End: 2018-05-12

## 2018-02-26 ASSESSMENT — PATIENT HEALTH QUESTIONNAIRE - PHQ9: 5. POOR APPETITE OR OVEREATING: NOT AT ALL

## 2018-02-26 ASSESSMENT — ANXIETY QUESTIONNAIRES
3. WORRYING TOO MUCH ABOUT DIFFERENT THINGS: NOT AT ALL
5. BEING SO RESTLESS THAT IT IS HARD TO SIT STILL: NOT AT ALL
1. FEELING NERVOUS, ANXIOUS, OR ON EDGE: NOT AT ALL
GAD7 TOTAL SCORE: 0
7. FEELING AFRAID AS IF SOMETHING AWFUL MIGHT HAPPEN: NOT AT ALL
2. NOT BEING ABLE TO STOP OR CONTROL WORRYING: NOT AT ALL
6. BECOMING EASILY ANNOYED OR IRRITABLE: NOT AT ALL

## 2018-02-26 ASSESSMENT — PAIN SCALES - GENERAL: PAINLEVEL: NO PAIN (0)

## 2018-02-26 NOTE — MR AVS SNAPSHOT
After Visit Summary   2/26/2018    Db Watson    MRN: [id number]           Patient Information     Date Of Birth          1965        Visit Information        Provider Department      2/26/2018 6:20 PM Jennifer Demarco MD [facility]        Today's Diagnoses     Umbilical hernia with obstruction    -  1    Hypertension goal BP (blood pressure) < 140/90          Care Instructions    Referral to schedule another appointment with Dr. CEBALLOS for umbilical hernia.   You can call [phone] to schedule this at the [facility] in [place] (formerly the [facility]).      Additional refill for Norvasc. Begin Norvasc (amlodipine)  2.5 mg once daily for BP control.  Continue metoprolol and hydrochlorothiazide as previous.  Follow up in 2-3 weeks for recheck BP with medical assistant.      At [facility], we strive to deliver an exceptional experience to you, every time we see you.  If you receive a survey in the mail, please send us back your thoughts. We really do value your feedback.    Based on your medical history, these are the current health maintenance/preventive care services that you are due for (some may have been done at this visit.)  Health Maintenance Due   Topic Date Due     DEPRESSION ACTION PLAN Q1 YR  07/22/1985     COLON CANCER SCREEN (SYSTEM ASSIGNED)  07/22/2017     INFLUENZA VACCINE (SYSTEM ASSIGNED)  09/01/2017         Suggested websites for health information:  Www.[employer].org : Up to date and easily searchable information on multiple topics.  Www.medlineplus.gov : medication info, interactive tutorials, watch real surgeries online  Www.familydoctor.org : good info from the Academy of Family Physicians  Www.cdc.gov : public health info, travel advisories, epidemics (H1N1)  Www.aap.org : children's health info, normal development, vaccinations  Www.health.state[place]us : [place] dept of health, public health  issues in [place], N1N1    Your care team:     Family Medicine   RENA Hastings MD Emily Bunt, LAURY CARLIN   S. MD Jennifer Bates MD Angela Wermerskirchen, MD         Clinic hours: Monday - Wednesday 7 am-7 pm   Thursdays and Fridays 7 am-5 pm.     [facility]: Monday - Friday 11 am-9 pm,   Saturday and Sunday 9 am-5 pm.    [facility]: Monday -Thursday 8 am-8 pm; Friday 8 am-6 pm; Saturday and Sunday 9 am-5 pm.     [facility]: Monday - Thursday 8 am - 7 pm; Friday 8 am - 6 pm    Clinic: [phone]   [facility]: [phone]   [facility]: [phone]                  Follow-ups after your visit        Additional Services     GENERAL SURG ADULT REFERRAL       Your provider has referred you to: [facility]: [facility] [phone]   [email or web address]  DAVIS CEBALLOS    Please be aware that coverage of these services is subject to the terms and limitations of your health insurance plan.  Call member services at your health plan with any benefit or coverage questions.      Please bring the following with you to your appointment:    (1) Any X-Rays, CTs or MRIs which have been performed.  Contact the facility where they were done to arrange for  prior to your scheduled appointment.   (2) List of current medications   (3) This referral request   (4) Any documents/labs given to you for this referral                  Who to contact     If you have questions or need follow up information about today's clinic visit or your schedule please contact [facility] directly at [phone].  Normal or non-critical lab and imaging results will be communicated to you by MyChart, letter or phone within 4 business days after the clinic has received the results. If you do not hear from us within 7  days, please contact the clinic through [employer] or phone. If you have a critical or abnormal lab result, we will notify you by phone as soon as possible.  Submit refill requests through [employer] or call your pharmacy and they will forward the refill request to us. Please allow 3 business days for your refill to be completed.          Additional Information About Your Visit        adRiseharNarrable Information     [employer] gives you secure access to your electronic health record. If you see a primary care provider, you can also send messages to your care team and make appointments. If you have questions, please call your primary care clinic.  If you do not have a primary care provider, please call [phone] and they will assist you.        Care EveryWhere ID     This is your Care EveryWhere ID. This could be used by other organizations to access your [facility] medical records  [id number]        Your Vitals Were     Pulse Temperature Respirations Pulse Oximetry BMI (Body Mass Index)       81 98.6  F (37  C) (Oral) 16 99% 36.14 kg/m2        Blood Pressure from Last 3 Encounters:   02/26/18 (!) 150/98   02/18/18 139/82   02/10/18 149/84    Weight from Last 3 Encounters:   02/26/18 107.8 kg (237 lb 11.2 oz)   02/16/18 99.8 kg (220 lb)   02/10/18 99.8 kg (220 lb)              We Performed the Following     GENERAL SURG ADULT REFERRAL          Today's Medication Changes          These changes are accurate as of 2/26/18  6:39 PM.  If you have any questions, ask your nurse or doctor.               Start taking these medicines.        Dose/Directions    amLODIPine 2.5 MG tablet   Commonly known as:  NORVASC   Used for:  Hypertension goal BP (blood pressure) < 140/90   Started by:  Jennifer Demarco MD        Dose:  2.5 mg   Take 1 tablet (2.5 mg) by mouth daily   Quantity:  30 tablet   Refills:  0            Where to get your medicines      These medications were sent to [facility][id number] [address]  [address] [address]     Phone:  [phone]     amLODIPine 2.5 MG tablet                Primary Care Provider Office Phone # Fax #    Jennifer Demarco [phone] [address]  [facility] [address]        Equal Access to Services     CARMENCITA RAMOS : Hadii aad ku hadasho Soomaali, waaxda luqadaha, qaybta kaalmada adeegyada, waxay idiin hayaan adeeg kharash la'aan . So [facility] [phone].    ATENCIÓN: Si habla español, tiene a [place] disposición servicios gratuitos de asistencia lingüística. Mango al [phone].    We comply with applicable federal civil rights laws and [place] laws. We do not discriminate on the basis of race, color, national origin, age, disability, sex, sexual orientation, or gender identity.            Thank you!     Thank you for choosing [facility]  for your care. Our goal is always to provide you with excellent care. Hearing back from our patients is one way we can continue to improve our services. Please take a few minutes to complete the written survey that you may receive in the mail after your visit with us. Thank you!             Your Updated Medication List - Protect others around you: Learn how to safely use, store and throw away your medicines at www.disposemymeds.org.          This list is accurate as of 2/26/18  6:39 PM.  Always use your most recent med list.                   Brand Name Dispense Instructions for use Diagnosis    amLODIPine 2.5 MG tablet    NORVASC    30 tablet    Take 1 tablet (2.5 mg) by mouth daily    Hypertension goal BP (blood pressure) < 140/90       aspirin 81 MG tablet      Take 1 tablet by mouth daily.    Essential hypertension       fish oil-omega-3 fatty acids 1000 MG capsule      Take 1 g by mouth daily        hydrochlorothiazide 25 MG tablet    HYDRODIURIL    30 tablet    Take 1 tablet (25 mg) by mouth daily    Essential hypertension with goal blood pressure less than 140/90        meclizine 25 MG Chew      Take 25 mg by mouth every 6 hours as needed.    Indications: Sensation of Spinning or Whirling        MELATONIN PO      Take 5 mg by mouth nightly as needed        metoprolol succinate 50 MG 24 hr tablet    TOPROL-XL    60 tablet    Take 1 tablet (50 mg) by mouth 2 times daily    Essential hypertension with goal blood pressure less than 140/90       traZODone 50 MG tablet    DESYREL    60 tablet    1-2 at bedtime as needed for sleep    Anxiety and depression       vitamin B complex with vitamin C Tabs tablet    STRESS TAB    100 tablet    Take 1 tablet by mouth daily.

## 2018-02-27 ASSESSMENT — PATIENT HEALTH QUESTIONNAIRE - PHQ9: SUM OF ALL RESPONSES TO PHQ QUESTIONS 1-9: 0

## 2018-02-27 ASSESSMENT — ANXIETY QUESTIONNAIRES: GAD7 TOTAL SCORE: 0

## 2018-02-27 NOTE — PROGRESS NOTES
SUBJECTIVE:   Db Watson is a 52 year old male who presents to clinic today for the following health issues:    Hospital Follow-up Visit:    Hospital/Nursing Home/IP Rehab Facility: [facility]  Date of Admission: 02/16/2018  Date of Discharge: 02/16/2018  Reason(s) for Admission: diarrhea             Problems taking medications regularly:  Forget sometimes       Medication changes since discharge: None       Problems adhering to non-medication therapy:  None    Summary of hospitalization:  [facility] discharge summary reviewed  Diagnostic Tests/Treatments reviewed.  Follow up needed: none  Other Healthcare Providers Involved in Patient s Care:         None  Update since discharge: improved.     Post Discharge Medication Reconciliation: discharge medications reconciled and changed, per note/orders (see AVS).  Plan of care communicated with patient     Coding guidelines for this visit:  Type of Medical   Decision Making Face-to-Face Visit       within 7 Days of discharge Face-to-Face Visit        within 14 days of discharge   Moderate Complexity [id number]   High Complexity 98376 83678          Hypertension Follow-up      Outpatient blood pressures are not being checked.    Low Salt Diet: no added salt    Small bowel obstruction:  No recurrent pain or other symptoms since discharge.    Patient states that his bowel movements and diarrhea symptoms have improved since being in the hospital. He claims to be feeling like he is back to normal. He states that his umbilical hernia is the same as it was before. He was told that if it does not get fixed then it will happen again.      Problem list and histories reviewed & adjusted, as indicated.  Additional history: as documented    BP Readings from Last 3 Encounters:   02/26/18 (!) 150/98   02/18/18 139/82   02/10/18 149/84    Wt Readings from Last 3 Encounters:   02/26/18 (!) 148.6 kg (327 lb 11.2 oz)   02/16/18 99.8 kg (220 lb)   02/10/18 99.8 kg  (220 lb)           Reviewed and updated as needed this visit by clinical staff  Tobacco  Allergies  Meds  Med Hx  Surg Hx  Fam Hx  Soc Hx      Reviewed and updated as needed this visit by Provider  Tobacco  Allergies  Meds  Med Hx  Surg Hx  Fam Hx  Soc Hx      ROS:  Constitutional, HEENT, cardiovascular, pulmonary, GI, , musculoskeletal, neuro, skin, endocrine and psych systems are negative, except as otherwise noted.    This document serves as a record of the services and decisions personally performed and made by Jennifer Demarco MD. It was created on her behalf by RAFY GREEN, a trained medical scribe. The creation of this document is based on the provider's statements to the medical scribe.  RAFY GREEN 6:20 PM February 26, 2018    OBJECTIVE:     BP (!) 150/98 (BP Location: Right arm, Patient Position: Sitting, Cuff Size: Adult Large)  Pulse 81  Temp 98.6  F (37  C) (Oral)  Resp 16  Wt (!) 148.6 kg (327 lb 11.2 oz)  SpO2 99%  BMI 49.83 kg/m2  Body mass index is 49.83 kg/(m^2).  GENERAL: alert, no distress and obese  RESP: lungs clear to auscultation - no rales, rhonchi or wheezes  CV: regular rate and rhythm, normal S1 S2, no S3 or S4, no murmur, click or rub, no peripheral edema and peripheral pulses strong  ABDOMEN: soft, nontender, without hepatosplenomegaly or masses, bowel sounds normal and hernia umbilical easily reducible, non tender  MS: no gross musculoskeletal defects noted, no edema    Diagnostic Test Results:  none     ASSESSMENT/PLAN:     1. Umbilical hernia with obstruction  Referral to schedule another appointment with Dr. CEBALLOS for umbilical hernia repair.  - GENERAL SURG ADULT REFERRAL    2. Hypertension goal BP (blood pressure) < 140/90  Was given amlodipine previously and has script at home but has not been taking this.  Additional refill  Given for Norvasc.   Begin Norvasc (amlodipine)  2.5 mg once daily for BP control. Continue metoprolol and hydrochlorothiazide  as previous. Follow up in 2-3 weeks for recheck BP with medical assistant.   - amLODIPine (NORVASC) 2.5 MG tablet; Take 1 tablet (2.5 mg) by mouth daily  Dispense: 30 tablet; Refill: 0    Patient Instructions     Referral to schedule another appointment with Dr. CEBALLOS for umbilical hernia.   You can call [phone] to schedule this at the [facility] in [place] (formerly the [facility]).      Additional refill for Norvasc. Begin Norvasc (amlodipine)  2.5 mg once daily for BP control.  Continue metoprolol and hydrochlorothiazide as previous.  Follow up in 2-3 weeks for recheck BP with medical assistant.              The information in this document, created by the medical scribe for me, accurately reflects the services I personally performed and the decisions made by me. I have reviewed and approved this document for accuracy prior to leaving the patient care area.  February 26, 2018 7:18 PM    Jennifer Demarco MD  [facility]

## 2018-02-27 NOTE — PATIENT INSTRUCTIONS
Referral to schedule another appointment with Dr. CEBALLOS for umbilical hernia.   You can call [phone] to schedule this at the [facility] in [place] (formerly the [facility]).      Additional refill for Norvasc. Begin Norvasc (amlodipine)  2.5 mg once daily for BP control.  Continue metoprolol and hydrochlorothiazide as previous.  Follow up in 2-3 weeks for recheck BP with medical assistant.      At [facility], we strive to deliver an exceptional experience to you, every time we see you.  If you receive a survey in the mail, please send us back your thoughts. We really do value your feedback.    Based on your medical history, these are the current health maintenance/preventive care services that you are due for (some may have been done at this visit.)  Health Maintenance Due   Topic Date Due     DEPRESSION ACTION PLAN Q1 YR  07/22/1985     COLON CANCER SCREEN (SYSTEM ASSIGNED)  07/22/2017     INFLUENZA VACCINE (SYSTEM ASSIGNED)  09/01/2017         Suggested websites for health information:  Www.[employer] : Up to date and easily searchable information on multiple topics.  Www.medlineplus.gov : medication info, interactive tutorials, watch real surgeries online  Www.familydoctor.org : good info from the Academy of Family Physicians  Www.cdc.gov : public health info, travel advisories, epidemics (H1N1)  Www.aap.org : children's health info, normal development, vaccinations  Www.health.[facility].[place].us : [place] dept of health, public health issues in [place], N1N1    Your care team:     Family Medicine   RENA Hastings MD Emily Bunt, APRN CNP   S. MD Jennifer Bates MD Angela Wermerskirchen, MD         Clinic hours: Monday - Wednesday 7 am-7 pm   Thursdays and Fridays 7 am-5 pm.     [facility]: Monday - Friday 11 am-9 pm,   Saturday and Sunday 9 am-5 pm.    [facility]: Monday  -Thursday 8 am-8 pm; Friday 8 am-6 pm; Saturday and Sunday 9 am-5 pm.     [facility]: Monday Thursday 8 am   7 pm; Friday 8 am   6 pm    Clinic: [phone]   [facility]: [phone]   [facility]: [phone]

## 2018-03-02 ENCOUNTER — TELEPHONE (OUTPATIENT)
Dept: SURGERY | Facility: CLINIC | Age: 53
End: 2018-03-02

## 2018-03-02 NOTE — TELEPHONE ENCOUNTER
Received message from the patient wanting to schedule the surgery for his hernia with Dr. Rachel. A message was sent to Dr. Rachel to see if she needs to see the patient back before scheduling the surgery as he hasn't been seen since July 2017.    Debra Rowe, Surgery Scheduling Coordinator

## 2018-03-16 NOTE — TELEPHONE ENCOUNTER
Message from Dr. Rachel left in a VM for the patient.    He will need to come back and see me in clinic please. I need to reassess his hernia prior to recommending open or laparoscopic repair.     Debra Rowe, Surgery Scheduling Coordinator

## 2018-04-20 ENCOUNTER — TELEPHONE (OUTPATIENT)
Dept: FAMILY MEDICINE | Facility: CLINIC | Age: 53
End: 2018-04-20

## 2018-04-20 NOTE — TELEPHONE ENCOUNTER
Db Watson is a 52 year old male  who calls with abdominal pain.    NURSING ASSESSMENT:  The pain began 1 day ago.    Pain scale (0-10): 3/10  The pain is described as pressure, sharp, colicky and irritated and is located epigastric and periumbilical, which is without radiation.  Symptom associated with the abdominal pain: constipation, bloating, belching and flatus.  Patient has not had previous abdominal surgery, but did encounter an umbilical hernia in late March 2018 for which he saw Dr. Demarco for, he does have an appointment with Dr. Rachel for surgery consult for hernia on 4/30/18.  Pain is aggravated by movement, activity, pressure, sitting up, recumbency and bowel movement, and relieved by antacids.  Allergies:   Allergies   Allergen Reactions     Zoloft [Sertraline] Nausea and Vomiting       MEDICATIONS:   Taking medication(s) as prescribed? Yes  Taking over the counter medication(s)? Yes  Any medication side effects? No significant side effects    Any barriers to taking medication(s) as prescribed?  No  Medication(s) improving/managing symptoms?  No  Medication reconciliation completed: Yes    NURSING PLAN: Routed to provider Yes    RECOMMENDED DISPOSITION:  To ED/UC for evaluation, another person to drive - Due to patient's history with small bowel obstruction with his umbilical hernia and similar symptoms as last hospitalization patient was instructed to be evaluated in either Urgent Care or ED for symptoms. He denies shortness of breath, chest pain, or abdominal pain that radiates anywhere. He does report his bowel movements are becoming smaller and harder, similar to last experience. He is having flatulence and belching, able to pass gas. Pain is localized in umbilical area and above umbilicus. Patient informed that he could potentially be sent to hospital from Urgent Care due to history and symptoms but patient is refusing to go to ED at this time and will be going to Urgent Care after work.    Will comply with recommendation: Yes  If further questions/concerns or if symptoms do not improve, worsen or new symptoms develop, call your PCP or [facility] as soon as possible.    Guideline used:  Telephone Triage Protocols for Nurses, Fifth Edition, Natasha Cline    Routing to provider to update, and review/advise on patient plan.    Veronica Hargrove RN

## 2018-04-20 NOTE — TELEPHONE ENCOUNTER
Reason for call:  Patient reporting a symptom    Symptom or request: Symptoms    Duration (how long have symptoms been present): on ging    Have you been treated for this before? Yes    Additional comments: Pt  Calling for he feels he is experiencing the same umbilical hernia obstruction symptoms as he had felt back in 02/26/18 . Pt was later transferred to a Triage Nurse.    Phone Number patient can be reached at:  Home number on file [phone] (home)    Best Time:  anytime    Can we leave a detailed message on this number:  YES    Call taken on 4/20/2018 at 3:24 PM by Paul Guerra

## 2018-05-12 DIAGNOSIS — I10 HYPERTENSION GOAL BP (BLOOD PRESSURE) < 140/90: ICD-10-CM

## 2018-05-14 NOTE — TELEPHONE ENCOUNTER
"Requested Prescriptions   Pending Prescriptions Disp Refills     amLODIPine (NORVASC) 2.5 MG tablet [Pharmacy Med Name: AmLODIPine Besylate Oral Tablet 2.5 MG] 30 tablet 0     Sig: TAKE ONE TABLET BY MOUTH ONE TIME DAILY    Calcium Channel Blockers Protocol  Failed    5/12/2018  5:51 PM       Failed - Blood pressure under 140/90 in past 12 months    BP Readings from Last 3 Encounters:   02/26/18 (!) 150/98   02/18/18 139/82   02/10/18 149/84                Passed - Recent (12 mo) or future (30 days) visit within the authorizing provider's specialty    Patient had office visit in the last 12 months or has a visit in the next 30 days with authorizing provider or within the authorizing provider's specialty.  See \"Patient Info\" tab in inbasket, or \"Choose Columns\" in Meds & Orders section of the refill encounter.           Passed - Patient is age 18 or older       Passed - Normal serum creatinine on file in past 12 months    Recent Labs   Lab Test  02/16/18   2107   CR  0.71             amLODIPine (NORVASC) 2.5 MG tablet  Last Written Prescription Date:  2/26/18  Last Fill Quantity: 30,  # refills: 0   Last office visit: 2/26/2018 with prescribing provider:  Dr. Demarco   Future Office Visit:      "

## 2018-05-15 RX ORDER — AMLODIPINE BESYLATE 2.5 MG/1
2.5 TABLET ORAL DAILY
Qty: 30 TABLET | Refills: 0 | Status: ON HOLD | OUTPATIENT
Start: 2018-05-15 | End: 2018-07-20

## 2018-06-20 ENCOUNTER — TELEPHONE (OUTPATIENT)
Dept: FAMILY MEDICINE | Facility: CLINIC | Age: 53
End: 2018-06-20

## 2018-06-20 NOTE — TELEPHONE ENCOUNTER
Panel Management Review      BP Readings from Last 1 Encounters:   02/26/18 (!) 150/98      Last Office Visit with this department: 4/20/2018        Patient is due/failing the following:   BP CHECK    Action needed:   Patient needs office visit for hypertension with provider.    Type of outreach:    Sent [employer] message. and Sent letter.    Nasreen Le, Medical Assistant

## 2018-06-20 NOTE — LETTER
June 20, 2018      Db Watson  [address] [place] [id number]        Dear Db,       Please call [phone] to schedule a blood pressure recheck appointment with your provider. You are overdue for a visit.       Thanks,   [facility]

## 2018-06-25 ENCOUNTER — TELEPHONE (OUTPATIENT)
Dept: SURGERY | Facility: CLINIC | Age: 53
End: 2018-06-25

## 2018-06-25 ENCOUNTER — APPOINTMENT (OUTPATIENT)
Dept: ULTRASOUND IMAGING | Facility: CLINIC | Age: 53
DRG: 166 | End: 2018-06-25
Attending: EMERGENCY MEDICINE
Payer: COMMERCIAL

## 2018-06-25 ENCOUNTER — HOSPITAL ENCOUNTER (INPATIENT)
Facility: CLINIC | Age: 53
LOS: 26 days | Discharge: HOME | DRG: 166 | End: 2018-07-21
Attending: EMERGENCY MEDICINE | Admitting: HOSPITALIST
Payer: COMMERCIAL

## 2018-06-25 DIAGNOSIS — I10 HYPERTENSION GOAL BP (BLOOD PRESSURE) < 140/90: ICD-10-CM

## 2018-06-25 DIAGNOSIS — K85.20 ALCOHOL-INDUCED ACUTE PANCREATITIS, UNSPECIFIED COMPLICATION STATUS: ICD-10-CM

## 2018-06-25 PROBLEM — K85.90 PANCREATITIS: Status: ACTIVE | Noted: 2018-06-25

## 2018-06-25 LAB
ALBUMIN SERPL-MCNC: 3.7 G/DL (ref 3.4–5)
ALP SERPL-CCNC: 110 U/L (ref 40–150)
ALT SERPL W P-5'-P-CCNC: 97 U/L (ref 0–70)
ANION GAP SERPL CALCULATED.3IONS-SCNC: 8 MMOL/L (ref 3–14)
AST SERPL W P-5'-P-CCNC: 171 U/L (ref 0–45)
BASOPHILS # BLD AUTO: 0 10E9/L (ref 0–0.2)
BASOPHILS NFR BLD AUTO: 0.5 %
BILIRUB SERPL-MCNC: 1.3 MG/DL (ref 0.2–1.3)
BUN SERPL-MCNC: 6 MG/DL (ref 7–30)
CALCIUM SERPL-MCNC: 9.2 MG/DL (ref 8.5–10.1)
CHLORIDE SERPL-SCNC: 102 MMOL/L (ref 94–109)
CO2 SERPL-SCNC: 31 MMOL/L (ref 20–32)
CREAT SERPL-MCNC: 0.71 MG/DL (ref 0.66–1.25)
DIFFERENTIAL METHOD BLD: ABNORMAL
EOSINOPHIL # BLD AUTO: 0.1 10E9/L (ref 0–0.7)
EOSINOPHIL NFR BLD AUTO: 1.2 %
ERYTHROCYTE [DISTWIDTH] IN BLOOD BY AUTOMATED COUNT: 13.3 % (ref 10–15)
GFR SERPL CREATININE-BSD FRML MDRD: >90 ML/MIN/1.7M2
GLUCOSE SERPL-MCNC: 111 MG/DL (ref 70–99)
HCT VFR BLD AUTO: 40 % (ref 40–53)
HGB BLD-MCNC: 14.1 G/DL (ref 13.3–17.7)
IMM GRANULOCYTES # BLD: 0 10E9/L (ref 0–0.4)
IMM GRANULOCYTES NFR BLD: 0.2 %
LIPASE SERPL-CCNC: 785 U/L (ref 73–393)
LYMPHOCYTES # BLD AUTO: 0.7 10E9/L (ref 0.8–5.3)
LYMPHOCYTES NFR BLD AUTO: 17.9 %
MAGNESIUM SERPL-MCNC: 1.5 MG/DL (ref 1.6–2.3)
MCH RBC QN AUTO: 34.9 PG (ref 26.5–33)
MCHC RBC AUTO-ENTMCNC: 35.3 G/DL (ref 31.5–36.5)
MCV RBC AUTO: 99 FL (ref 78–100)
MONOCYTES # BLD AUTO: 0.7 10E9/L (ref 0–1.3)
MONOCYTES NFR BLD AUTO: 18.1 %
NEUTROPHILS # BLD AUTO: 2.5 10E9/L (ref 1.6–8.3)
NEUTROPHILS NFR BLD AUTO: 62.1 %
NRBC # BLD AUTO: 0 10*3/UL
NRBC BLD AUTO-RTO: 0 /100
PLATELET # BLD AUTO: 72 10E9/L (ref 150–450)
POTASSIUM SERPL-SCNC: 3.2 MMOL/L (ref 3.4–5.3)
PROT SERPL-MCNC: 8 G/DL (ref 6.8–8.8)
RBC # BLD AUTO: 4.04 10E12/L (ref 4.4–5.9)
SODIUM SERPL-SCNC: 141 MMOL/L (ref 133–144)
WBC # BLD AUTO: 4 10E9/L (ref 4–11)

## 2018-06-25 PROCEDURE — 12000000 ZZH R&B MED SURG/OB

## 2018-06-25 PROCEDURE — 25000128 H RX IP 250 OP 636: Performed by: HOSPITALIST

## 2018-06-25 PROCEDURE — 83690 ASSAY OF LIPASE: CPT | Performed by: EMERGENCY MEDICINE

## 2018-06-25 PROCEDURE — 85025 COMPLETE CBC W/AUTO DIFF WBC: CPT | Performed by: EMERGENCY MEDICINE

## 2018-06-25 PROCEDURE — 96374 THER/PROPH/DIAG INJ IV PUSH: CPT

## 2018-06-25 PROCEDURE — 80053 COMPREHEN METABOLIC PANEL: CPT | Performed by: EMERGENCY MEDICINE

## 2018-06-25 PROCEDURE — 76705 ECHO EXAM OF ABDOMEN: CPT

## 2018-06-25 PROCEDURE — 25000132 ZZH RX MED GY IP 250 OP 250 PS 637: Performed by: HOSPITALIST

## 2018-06-25 PROCEDURE — 99285 EMERGENCY DEPT VISIT HI MDM: CPT | Mod: 25

## 2018-06-25 PROCEDURE — 96375 TX/PRO/DX INJ NEW DRUG ADDON: CPT

## 2018-06-25 PROCEDURE — 99223 1ST HOSP IP/OBS HIGH 75: CPT | Mod: AI | Performed by: HOSPITALIST

## 2018-06-25 PROCEDURE — 25000128 H RX IP 250 OP 636: Performed by: EMERGENCY MEDICINE

## 2018-06-25 PROCEDURE — 83735 ASSAY OF MAGNESIUM: CPT | Performed by: EMERGENCY MEDICINE

## 2018-06-25 RX ORDER — AMOXICILLIN 250 MG
1 CAPSULE ORAL 2 TIMES DAILY PRN
Status: DISCONTINUED | OUTPATIENT
Start: 2018-06-25 | End: 2018-07-21 | Stop reason: HOSPADM

## 2018-06-25 RX ORDER — LANOLIN ALCOHOL/MO/W.PET/CERES
3 CREAM (GRAM) TOPICAL
Status: DISCONTINUED | OUTPATIENT
Start: 2018-06-25 | End: 2018-07-21 | Stop reason: HOSPADM

## 2018-06-25 RX ORDER — LANOLIN ALCOHOL/MO/W.PET/CERES
100 CREAM (GRAM) TOPICAL DAILY
Status: COMPLETED | OUTPATIENT
Start: 2018-06-26 | End: 2018-06-30

## 2018-06-25 RX ORDER — MAGNESIUM SULFATE HEPTAHYDRATE 40 MG/ML
4 INJECTION, SOLUTION INTRAVENOUS EVERY 4 HOURS PRN
Status: DISCONTINUED | OUTPATIENT
Start: 2018-06-25 | End: 2018-07-21 | Stop reason: HOSPADM

## 2018-06-25 RX ORDER — TRAZODONE HYDROCHLORIDE 50 MG/1
50 TABLET, FILM COATED ORAL
Status: DISCONTINUED | OUTPATIENT
Start: 2018-06-25 | End: 2018-06-26

## 2018-06-25 RX ORDER — POTASSIUM CHLORIDE 7.45 MG/ML
10 INJECTION INTRAVENOUS
Status: DISCONTINUED | OUTPATIENT
Start: 2018-06-25 | End: 2018-07-21 | Stop reason: HOSPADM

## 2018-06-25 RX ORDER — LIDOCAINE 40 MG/G
CREAM TOPICAL
Status: DISCONTINUED | OUTPATIENT
Start: 2018-06-25 | End: 2018-07-21 | Stop reason: HOSPADM

## 2018-06-25 RX ORDER — MULTIPLE VITAMINS W/ MINERALS TAB 9MG-400MCG
1 TAB ORAL DAILY
Status: DISCONTINUED | OUTPATIENT
Start: 2018-06-26 | End: 2018-06-27

## 2018-06-25 RX ORDER — LORAZEPAM 2 MG/ML
0.5 INJECTION INTRAMUSCULAR ONCE
Status: COMPLETED | OUTPATIENT
Start: 2018-06-25 | End: 2018-06-25

## 2018-06-25 RX ORDER — METOPROLOL SUCCINATE 50 MG/1
50 TABLET, EXTENDED RELEASE ORAL 2 TIMES DAILY
Status: DISCONTINUED | OUTPATIENT
Start: 2018-06-25 | End: 2018-06-27

## 2018-06-25 RX ORDER — ONDANSETRON 2 MG/ML
4 INJECTION INTRAMUSCULAR; INTRAVENOUS EVERY 6 HOURS PRN
Status: DISCONTINUED | OUTPATIENT
Start: 2018-06-25 | End: 2018-07-21 | Stop reason: HOSPADM

## 2018-06-25 RX ORDER — LORAZEPAM 1 MG/1
1-2 TABLET ORAL EVERY 30 MIN PRN
Status: DISCONTINUED | OUTPATIENT
Start: 2018-06-25 | End: 2018-07-07

## 2018-06-25 RX ORDER — OXYCODONE HYDROCHLORIDE 5 MG/1
5-10 TABLET ORAL
Status: DISCONTINUED | OUTPATIENT
Start: 2018-06-25 | End: 2018-07-13

## 2018-06-25 RX ORDER — LORAZEPAM 1 MG/1
1 TABLET ORAL AT BEDTIME
Status: DISCONTINUED | OUTPATIENT
Start: 2018-06-25 | End: 2018-07-01

## 2018-06-25 RX ORDER — LORAZEPAM 2 MG/ML
1-2 INJECTION INTRAMUSCULAR EVERY 30 MIN PRN
Status: DISCONTINUED | OUTPATIENT
Start: 2018-06-25 | End: 2018-07-07

## 2018-06-25 RX ORDER — POTASSIUM CHLORIDE 29.8 MG/ML
20 INJECTION INTRAVENOUS
Status: DISCONTINUED | OUTPATIENT
Start: 2018-06-25 | End: 2018-07-21 | Stop reason: HOSPADM

## 2018-06-25 RX ORDER — POTASSIUM CHLORIDE 1500 MG/1
20-40 TABLET, EXTENDED RELEASE ORAL
Status: DISCONTINUED | OUTPATIENT
Start: 2018-06-25 | End: 2018-07-21 | Stop reason: HOSPADM

## 2018-06-25 RX ORDER — AMOXICILLIN 250 MG
2 CAPSULE ORAL 2 TIMES DAILY PRN
Status: DISCONTINUED | OUTPATIENT
Start: 2018-06-25 | End: 2018-07-21 | Stop reason: HOSPADM

## 2018-06-25 RX ORDER — ONDANSETRON 2 MG/ML
4 INJECTION INTRAMUSCULAR; INTRAVENOUS EVERY 30 MIN PRN
Status: DISCONTINUED | OUTPATIENT
Start: 2018-06-25 | End: 2018-06-26

## 2018-06-25 RX ORDER — DOCUSATE SODIUM 100 MG/1
100 CAPSULE, LIQUID FILLED ORAL 2 TIMES DAILY
Status: DISCONTINUED | OUTPATIENT
Start: 2018-06-25 | End: 2018-06-27

## 2018-06-25 RX ORDER — FOLIC ACID 1 MG/1
1 TABLET ORAL DAILY
Status: DISCONTINUED | OUTPATIENT
Start: 2018-06-26 | End: 2018-07-02

## 2018-06-25 RX ORDER — AMLODIPINE BESYLATE 2.5 MG/1
2.5 TABLET ORAL DAILY
Status: DISCONTINUED | OUTPATIENT
Start: 2018-06-26 | End: 2018-07-17

## 2018-06-25 RX ORDER — NALOXONE HYDROCHLORIDE 0.4 MG/ML
.1-.4 INJECTION, SOLUTION INTRAMUSCULAR; INTRAVENOUS; SUBCUTANEOUS
Status: DISCONTINUED | OUTPATIENT
Start: 2018-06-25 | End: 2018-07-21 | Stop reason: HOSPADM

## 2018-06-25 RX ORDER — POTASSIUM CL/LIDO/0.9 % NACL 10MEQ/0.1L
10 INTRAVENOUS SOLUTION, PIGGYBACK (ML) INTRAVENOUS
Status: DISCONTINUED | OUTPATIENT
Start: 2018-06-25 | End: 2018-07-21 | Stop reason: HOSPADM

## 2018-06-25 RX ORDER — POLYETHYLENE GLYCOL 3350 17 G/17G
17 POWDER, FOR SOLUTION ORAL DAILY PRN
Status: DISCONTINUED | OUTPATIENT
Start: 2018-06-25 | End: 2018-07-21 | Stop reason: HOSPADM

## 2018-06-25 RX ORDER — HYDROMORPHONE HYDROCHLORIDE 1 MG/ML
.3-.5 INJECTION, SOLUTION INTRAMUSCULAR; INTRAVENOUS; SUBCUTANEOUS
Status: DISCONTINUED | OUTPATIENT
Start: 2018-06-25 | End: 2018-07-13

## 2018-06-25 RX ORDER — ACETAMINOPHEN 325 MG/1
325 TABLET ORAL EVERY 8 HOURS PRN
Status: DISCONTINUED | OUTPATIENT
Start: 2018-06-25 | End: 2018-07-14

## 2018-06-25 RX ORDER — SODIUM CHLORIDE 9 MG/ML
INJECTION, SOLUTION INTRAVENOUS CONTINUOUS
Status: ACTIVE | OUTPATIENT
Start: 2018-06-25 | End: 2018-06-27

## 2018-06-25 RX ORDER — MAGNESIUM SULFATE HEPTAHYDRATE 40 MG/ML
2 INJECTION, SOLUTION INTRAVENOUS DAILY PRN
Status: DISCONTINUED | OUTPATIENT
Start: 2018-06-25 | End: 2018-07-21 | Stop reason: HOSPADM

## 2018-06-25 RX ORDER — BISACODYL 10 MG
10 SUPPOSITORY, RECTAL RECTAL DAILY PRN
Status: DISCONTINUED | OUTPATIENT
Start: 2018-06-25 | End: 2018-07-21 | Stop reason: HOSPADM

## 2018-06-25 RX ORDER — POTASSIUM CHLORIDE 1.5 G/1.58G
20-40 POWDER, FOR SOLUTION ORAL
Status: DISCONTINUED | OUTPATIENT
Start: 2018-06-25 | End: 2018-07-21 | Stop reason: HOSPADM

## 2018-06-25 RX ORDER — ONDANSETRON 4 MG/1
4 TABLET, ORALLY DISINTEGRATING ORAL EVERY 6 HOURS PRN
Status: DISCONTINUED | OUTPATIENT
Start: 2018-06-25 | End: 2018-07-21 | Stop reason: HOSPADM

## 2018-06-25 RX ADMIN — LORAZEPAM 0.5 MG: 2 INJECTION INTRAMUSCULAR; INTRAVENOUS at 19:40

## 2018-06-25 RX ADMIN — DOCUSATE SODIUM 100 MG: 100 CAPSULE, LIQUID FILLED ORAL at 22:31

## 2018-06-25 RX ADMIN — SODIUM CHLORIDE: 9 INJECTION, SOLUTION INTRAVENOUS at 22:32

## 2018-06-25 RX ADMIN — ONDANSETRON 4 MG: 2 INJECTION INTRAMUSCULAR; INTRAVENOUS at 19:40

## 2018-06-25 RX ADMIN — LORAZEPAM 1 MG: 1 TABLET ORAL at 22:31

## 2018-06-25 ASSESSMENT — ENCOUNTER SYMPTOMS
VOMITING: 1
NAUSEA: 0
ABDOMINAL PAIN: 1
BLOOD IN STOOL: 0

## 2018-06-25 NOTE — IP AVS SNAPSHOT
MRN:[id number]                      After Visit Summary   6/25/2018    Db Watson    MRN: [id number]           Thank you!     Thank you for choosing [facility] for your care. Our goal is always to provide you with excellent care. Hearing back from our patients is one way we can continue to improve our services. Please take a few minutes to complete the written survey that you may receive in the mail after you visit with us. Thank you!        Patient Information     Date Of Birth          1965        Designated Caregiver       Most Recent Value    Caregiver    Will someone help with your care after discharge? no      About your hospital stay     You were admitted on:  June 25, 2018 You last received care in the:  [place] [address] [facility]    You were discharged on:  July 21, 2018        Reason for your hospital stay       Fungemia                  Who to Call     For medical emergencies, please call 911.  For non-urgent questions about your medical care, please call your primary care provider or clinic, [phone]          Attending Provider     Provider Specialty    Sara Cortes MD Emergency Medicine    [facility], Aristides Dean MD Internal Medicine       Primary Care Provider Office Phone # Fax #    Jennifer Demarco [phone]      After Care Instructions     Activity       Your activity upon discharge: activity as tolerated            Diet       Follow this diet upon discharge: Orders Placed This Encounter      Room Service      Advance Diet as Tolerated: Regular Diet Adult                    Follow-up Appointments     Follow-up and recommended labs and tests       Follow up with primary care provider, Jennifer Demarco, within 7 days for hospital follow- up.  The following labs/tests are recommended: BMP and blood culture X 2 on Tuesday 7/24/18. Please fax BC reports to Dr Kaiser office.  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 For Physicals, please bring immunization records and any forms needing to be filled out. Please arrive 10 minutes early to complete paperwork.              Further instructions from your care team       If you do decide you would like a Substance Abuse/Chemical Dependency evaluation you can call [facility]  [phone].  The menu option to press is \"0\".    Please Call Neurology Clinic in 2 weeks once insurance has been approved  [phone]    Pending Results     Date and Time Order Name Status Description    7/17/2018 0912 Blood culture Preliminary     7/17/2018 0912 Blood culture Preliminary             Statement of Approval     Ordered          07/21/18 0848  I have reviewed and agree with all the recommendations and orders detailed in this document.  EFFECTIVE NOW     Approved and electronically signed by:  Annabelle Manuel MD             Admission Information     Date & Time Provider Department Dept. Phone    6/25/2018 Aristides Bustos MD [place] [address] [facility] [phone]      Your Vitals Were     Blood Pressure Pulse Temperature Respirations Height Weight    139/96 (BP Location: Left arm) 80 98.3  F (36.8  C) (Oral) 16 1.727 m (5' 8\") 97.8 kg (215 lb 9.8 oz)    Pulse Oximetry BMI (Body Mass Index)                95% 32.78 kg/m2          MyChart Information     [facility] gives you secure access to your electronic health record. If you see a primary care provider, you can also send messages to your care team and make appointments. If you have questions, please call your primary care clinic.  If you do not have a primary care provider, please " call [phone] and they will assist you.        Care EveryWhere ID     This is your Care EveryWhere ID. This could be used by other organizations to access your [facility] medical records  [id number]        Equal Access to Services     CARMENCITA RAMOS : Hadii aad ku hadandrewnaldo Paul, waaxda luqadaha, qaybta kaalmada wes, katlin ruddjacob graymerissa zavala clarice cohen. So [facility] [phone].    ATENCIÓN: Si habla español, tiene a [place] disposición servicios gratuitos de asistencia lingüística. Llame al [phone].    We comply with applicable federal civil rights laws and [place] laws. We do not discriminate on the basis of race, color, national origin, age, disability, sex, sexual orientation, or gender identity.               Review of your medicines      CONTINUE these medicines which may have CHANGED, or have new prescriptions. If we are uncertain of the size of tablets/capsules you have at home, strength may be listed as something that might have changed.        Dose / Directions    amLODIPine 10 MG tablet   Commonly known as:  NORVASC   This may have changed:    - medication strength  - how much to take   Used for:  Hypertension goal BP (blood pressure) < 140/90        Dose:  10 mg   Take 1 tablet (10 mg) by mouth daily +++ appointment needed for additional refills +++   Quantity:  30 tablet   Refills:  1         CONTINUE these medicines which have NOT CHANGED        Dose / Directions    aspirin 81 MG tablet   Used for:  Essential hypertension        Dose:  81 mg   Take 1 tablet by mouth daily.   Refills:  3       fish oil-omega-3 fatty acids 1000 MG capsule        Dose:  1 g   Take 1 g by mouth daily   Refills:  0       hypromellose 0.5 % Soln ophthalmic solution   Commonly known as:  ARTIFICIAL TEARS        Dose:  1 drop   Place 1 drop into both eyes every hour as needed for dry eyes   Refills:  0       meclizine 25 MG Chew   Indication:  Sensation of Spinning or Whirling        Dose:  25 mg   Take 25 mg by  mouth every 6 hours as needed.    Indications: Sensation of Spinning or Whirling   Refills:  0       MELATONIN PO        Dose:  5 mg   Take 5 mg by mouth nightly as needed   Refills:  0       metoprolol succinate 50 MG 24 hr tablet   Commonly known as:  TOPROL-XL   Used for:  Essential hypertension with goal blood pressure less than 140/90   Notes to Patient:  1 dose taken today, need 1 more dose today        Take 1 tablet (50 mg) by mouth 2 times daily   Quantity:  60 tablet   Refills:  8       traZODone 50 MG tablet   Commonly known as:  DESYREL   Used for:  Anxiety and depression        1-2 at bedtime as needed for sleep   Quantity:  60 tablet   Refills:  1       vitamin B complex with vitamin C Tabs tablet   Commonly known as:  STRESS TAB        Dose:  1 tablet   Take 1 tablet by mouth daily.   Quantity:  100 tablet   Refills:  12         STOP taking     hydrochlorothiazide 25 MG tablet   Commonly known as:  HYDRODIURIL                Where to get your medicines      These medications were sent to [facility] - [address]  [address], [place] [address]     Phone:  [phone]     amLODIPine 10 MG tablet                Protect others around you: Learn how to safely use, store and throw away your medicines at www.disposemymeds.org.             Medication List: This is a list of all your medications and when to take them. Check marks below indicate your daily home schedule. Keep this list as a reference.      Medications           Morning Afternoon Evening Bedtime As Needed    amLODIPine 10 MG tablet   Commonly known as:  NORVASC   Take 1 tablet (10 mg) by mouth daily +++ appointment needed for additional refills +++   Last time this was given:  10 mg on 7/21/2018  8:59 AM   Next Dose Due:  Tomorrow                                  aspirin 81 MG tablet   Take 1 tablet by mouth daily.   Last time this was given:  Not taken in hospital   Next Dose Due:  Tomorrow                                 fish oil-omega-3 fatty acids 1000 MG capsule   Take 1 g by mouth daily   Last time this was given:  Not taken in hospital     Next Dose Due:  Tomorrow                                hypromellose 0.5 % Soln ophthalmic solution   Commonly known as:  ARTIFICIAL TEARS   Place 1 drop into both eyes every hour as needed for dry eyes                                   meclizine 25 MG Chew   Take 25 mg by mouth every 6 hours as needed.    Indications: Sensation of Spinning or Whirling                                   MELATONIN PO   Take 5 mg by mouth nightly as needed   Last time this was given:  3 mg on 6/29/2018 11:03 PM                                      metoprolol succinate 50 MG 24 hr tablet   Commonly known as:  TOPROL-XL   Take 1 tablet (50 mg) by mouth 2 times daily   Last time this was given:  50 mg on 6/26/2018  9:34 PM   Notes to Patient:  1 dose taken today, need 1 more dose today                                   traZODone 50 MG tablet   Commonly known as:  DESYREL   1-2 at bedtime as needed for sleep                                   vitamin B complex with vitamin C Tabs tablet   Commonly known as:  STRESS TAB   Take 1 tablet by mouth daily.   Last time this was given:  Not taken in hosptial   Next Dose Due:  Tomorrow

## 2018-06-25 NOTE — IP AVS SNAPSHOT
[place] [address] [facility]    [address] [place] [address]    Phone:  [phone]                                       After Visit Summary   6/25/2018    Db Watson    MRN: [id number]           After Visit Summary Signature Page     I have received my discharge instructions, and my questions have been answered. I have discussed any challenges I see with this plan with the nurse or doctor.    ..........................................................................................................................................  Patient/Patient Representative Signature      ..........................................................................................................................................  Patient Representative Print Name and Relationship to Patient    ..................................................               ................................................  Date                                            Time    ..........................................................................................................................................  Reviewed by Signature/Title    ...................................................              ..............................................  Date                                                            Time

## 2018-06-25 NOTE — TELEPHONE ENCOUNTER
Nurse returned call, updated him that Dr. Rachel does not see patients at the [facility] and she is not back again till Monday. He said he is having symptoms, suck as vomiting and he cannot keep anything down, he is not having pain at this time. He was recently in the hospital for bowel obstruction and advised that he be seen in the ER or urgent care for possibly another SBO. He said he is not having a fever or abd pain, and is not sure that is what it is. Nurse again highly recommended he be seen more urgently with PCP, urgent care or ER. He said he will continue monitoring his symptoms and will if it gets worse. He scheduled a return appointment with Dr. Rachel on Monday.     Pallavi García RN, BSN, PHN  [facility]  GI/Gen Surg/Hepatology Care Coordinator

## 2018-06-25 NOTE — TELEPHONE ENCOUNTER
Writer returned patients call to let him know that Dr. Gonzales is not able to see him in clinic today. He requested to schedule with Virginie at the [facility] as soon as possible as he does not feel like it can wait till she's back in clinic next Monday.    Patient stated he was in the hospital a month ago for a bowel obstruction.    Per Dr. Gonzales, writer asked him if he was able to push the hernia back in and patient stated yes. Dr. Gonzales stated that if he couldn't push it back in he needs to go to the ER.    Patient reports new symptoms of burping, hiccups, dry heaving and pain below the hernia site.    Forwarding message to Dr. Rachel's RN Pallavi to advise.    Kylee Chaney LPN

## 2018-06-25 NOTE — TELEPHONE ENCOUNTER
----- Message from Zach Cartagena sent at 6/25/2018 12:58 PM CDT -----  Regarding: Janet's Schedule Today  Contact: [phone]  Hi Ladies,     I had this patient call to see if he soul be seen today/this week for his painful abdominal hernia. Dr Rachel is not in at all, patient asked me to ask if there was anyway Dr Gonzales would be able to see him today. If not patient would like a call to discuss what he should do.    Thank you    Zach Cartagena  Clinic Scheduler

## 2018-06-25 NOTE — ED PROVIDER NOTES
"  History     Chief Complaint:  Abdominal Pain    HPI   Db Watson is a 52 year old male who presents with abdominal pain and a history of a hernia, bowel obstruction and anxiety. According to the patient he has an umbilical hernia with abdominal pain below the herniated region of the abdomen. However, he is able to push it back into his abdomen and the pain below the abdomen has since gone away during his visit to the Emergency Department. The patient noted that on Saturday and Sunday he started to have a lot of burping and hiccups. He was able to keep some solid food down, but he noticed the vomiting had worsened. He denies having blood in the stool and pain in the abdomen. However, he still has small bowel movements with lose stools. The patient still has his gallbladder.    Allergies:  Zoloft     Medications:    Norvasc  Aspirin  Hydrodiuril  Meclizine  Torpol-xl  Desyrel     Past Medical History:    Abnormal MRI of head (1/11/2013)  Hyperlipidemia  Meniere's disease    Past Surgical History:    Shoulder surgery for dislocation  Eye surgery, lens implant right  Fracture TX wrist R/L  Orthopedic surgery    Family History:    Mother: HTN  Father: Diabetes, CAD    Social History:  Negative for tobacco use.  Positive for alcohol use; about 10 drinks per week.  Marital Status: Single    Review of Systems   Gastrointestinal: Positive for abdominal pain and vomiting. Negative for blood in stool and nausea.   10 point review of systems performed and is negative except as above and in HPI.    Physical Exam   Vitals:  Patient Vitals for the past 24 hrs:   BP Temp Temp src Heart Rate Resp SpO2 Height Weight   06/25/18 2200 (!) 172/107 98.9  F (37.2  C) Oral 85 - 97 % - -   06/25/18 2120 (!) 172/101 - - - - 94 % - -   06/25/18 1904 - - - - - 96 % - -   06/25/18 1903 (!) 182/115 - - - - - - -   06/25/18 1812 (!) 169/104 98.8  F (37.1  C) Oral 82 18 100 % 1.727 m (5' 8\") 104.3 kg (230 lb)     Physical Exam  General: Resting " on the gurney, appears uncomfortable  Head:  The scalp, face, and head appear normal  Mouth/Throat: Mucus membranes are moist  CV:  Regular rate    Normal S1 and S2  No pathological murmur   Resp:  Breath sounds clear and equal bilaterally    Non-labored, no retractions or accessory muscle use    No coarseness    No wheezing   GI:  Abdomen is soft, no rigidity    Tenderness to palpation in the upper abdomen. Voluntary guarding no rebound. Umbilical hernia is easily reduced.  MS:  Normal motor assessment of all extremities.    Good capillary refill noted.  Skin:  No rash or lesions noted.  Neuro: Speech is normal and fluent. No apparent deficit.  Psych:  Awake. Alert.  Normal affect.      Appropriate interactions.    Emergency Department Course   Imaging:  Radiology findings were communicated with the patient who voiced understanding of the findings.  US Abdomen Limited:  1. No gallstones are identified.  2. Echogenic liver consistent with fatty infiltration.  Per Radiologist.      Laboratory:  Laboratory findings were communicated with the patient who voiced understanding of the findings.  CBC: PLT: 72 (L), RBC: 4.04 (L), MCH: 34.9 (H), Absolute Lymphocytes: 0.7 (L) o/w WNL. (WBC 4.0, HGB 14.1)   CMP: Potassium: 3.2 (L), Glucose: 111 (H), BUN: 6 (L), ALT: 97 (H), AST: 171 (H) o/w WNL (Creatinine 0.71)  Lipase: 785 (H)     Interventions:  1940 Zofran, 4 mg, IV   1940 Ativan, 0.5 mg, IV      Emergency Department Course:  Nursing notes and vitals reviewed.  1855 I had my initial encounter with the patient.  I performed an exam of the patient as documented above.   IV was inserted and blood was drawn for laboratory testing, results above.  The patient was sent for a US while in the emergency department, results above.   2012 I revisited the patient in his room.    2122 I discussed the treatment plan with the patient. They expressed understanding of this plan and consented to admission. I discussed the patient with   Akash, who will admit the patient to a monitored bed for further evaluation and treatment.    Impression & Plan      Medical Decision Making:  Db Watson is a 52 year old male who presents with epigastric abdominal pain.  The differential diagnosis would include GERD, GIB, esophageal spasm, atypical cardiac sx's, pancreatitis, biliary colic or gallstone disease, AAA, gastroenteritis, gastritis, large vs small bowel disease, etc.  Based on history, PE and labs, the most likely explanation is pancreatitis.  Ultrasound of gallbladder shows no gallstones identified. Echogenic liver consistent with fatty infiltration. Abdominal exam is benign at this point. Pain control in ED was successful.  Based on this, will admit the patient for hydration and anticipated alcohol withdrawal. Patients questions answered.      Diagnosis:    ICD-10-CM    1. Alcohol-induced acute pancreatitis, unspecified complication status K85.20 Magnesium     Magnesium     CANCELED: Magnesium     Disposition:   Admission    Scribe Disclosure:  I, Servando Landis, am serving as a scribe at 7:17 PM on 6/25/2018 to document services personally performed by Sara Cortes MD, based on my observations and the provider's statements to me.    6/25/2018    EMERGENCY DEPARTMENT       Sara Cortes MD  06/27/18 1017

## 2018-06-26 LAB
ALBUMIN SERPL-MCNC: 3.8 G/DL (ref 3.4–5)
ALP SERPL-CCNC: 113 U/L (ref 40–150)
ALT SERPL W P-5'-P-CCNC: 91 U/L (ref 0–70)
ANION GAP SERPL CALCULATED.3IONS-SCNC: 10 MMOL/L (ref 3–14)
AST SERPL W P-5'-P-CCNC: 146 U/L (ref 0–45)
BASOPHILS # BLD AUTO: 0 10E9/L (ref 0–0.2)
BASOPHILS NFR BLD AUTO: 0.6 %
BILIRUB SERPL-MCNC: 2.1 MG/DL (ref 0.2–1.3)
BUN SERPL-MCNC: 6 MG/DL (ref 7–30)
CALCIUM SERPL-MCNC: 8.6 MG/DL (ref 8.5–10.1)
CHLORIDE SERPL-SCNC: 101 MMOL/L (ref 94–109)
CO2 SERPL-SCNC: 26 MMOL/L (ref 20–32)
CREAT SERPL-MCNC: 0.68 MG/DL (ref 0.66–1.25)
DIFFERENTIAL METHOD BLD: ABNORMAL
EOSINOPHIL # BLD AUTO: 0.1 10E9/L (ref 0–0.7)
EOSINOPHIL NFR BLD AUTO: 2.7 %
ERYTHROCYTE [DISTWIDTH] IN BLOOD BY AUTOMATED COUNT: 13.4 % (ref 10–15)
GFR SERPL CREATININE-BSD FRML MDRD: >90 ML/MIN/1.7M2
GLUCOSE SERPL-MCNC: 104 MG/DL (ref 70–99)
HCT VFR BLD AUTO: 40.9 % (ref 40–53)
HGB BLD-MCNC: 14.1 G/DL (ref 13.3–17.7)
IMM GRANULOCYTES # BLD: 0 10E9/L (ref 0–0.4)
IMM GRANULOCYTES NFR BLD: 0.2 %
LIPASE SERPL-CCNC: 903 U/L (ref 73–393)
LYMPHOCYTES # BLD AUTO: 1.1 10E9/L (ref 0.8–5.3)
LYMPHOCYTES NFR BLD AUTO: 23.6 %
MAGNESIUM SERPL-MCNC: 2.3 MG/DL (ref 1.6–2.3)
MAGNESIUM SERPL-MCNC: 2.5 MG/DL (ref 1.6–2.3)
MCH RBC QN AUTO: 34.6 PG (ref 26.5–33)
MCHC RBC AUTO-ENTMCNC: 34.5 G/DL (ref 31.5–36.5)
MCV RBC AUTO: 101 FL (ref 78–100)
MONOCYTES # BLD AUTO: 0.8 10E9/L (ref 0–1.3)
MONOCYTES NFR BLD AUTO: 16.7 %
NEUTROPHILS # BLD AUTO: 2.7 10E9/L (ref 1.6–8.3)
NEUTROPHILS NFR BLD AUTO: 56.2 %
NRBC # BLD AUTO: 0 10*3/UL
NRBC BLD AUTO-RTO: 0 /100
PLATELET # BLD AUTO: 76 10E9/L (ref 150–450)
POTASSIUM SERPL-SCNC: 3.5 MMOL/L (ref 3.4–5.3)
PROT SERPL-MCNC: 8.4 G/DL (ref 6.8–8.8)
RBC # BLD AUTO: 4.07 10E12/L (ref 4.4–5.9)
SODIUM SERPL-SCNC: 137 MMOL/L (ref 133–144)
WBC # BLD AUTO: 4.8 10E9/L (ref 4–11)

## 2018-06-26 PROCEDURE — 83735 ASSAY OF MAGNESIUM: CPT | Performed by: HOSPITALIST

## 2018-06-26 PROCEDURE — 25000125 ZZHC RX 250: Performed by: INTERNAL MEDICINE

## 2018-06-26 PROCEDURE — 80053 COMPREHEN METABOLIC PANEL: CPT | Performed by: HOSPITALIST

## 2018-06-26 PROCEDURE — 25000132 ZZH RX MED GY IP 250 OP 250 PS 637: Performed by: PSYCHIATRY & NEUROLOGY

## 2018-06-26 PROCEDURE — 99232 SBSQ HOSP IP/OBS MODERATE 35: CPT | Performed by: INTERNAL MEDICINE

## 2018-06-26 PROCEDURE — 99221 1ST HOSP IP/OBS SF/LOW 40: CPT | Performed by: PSYCHIATRY & NEUROLOGY

## 2018-06-26 PROCEDURE — 25000128 H RX IP 250 OP 636: Performed by: INTERNAL MEDICINE

## 2018-06-26 PROCEDURE — 85025 COMPLETE CBC W/AUTO DIFF WBC: CPT | Performed by: HOSPITALIST

## 2018-06-26 PROCEDURE — 83690 ASSAY OF LIPASE: CPT | Performed by: HOSPITALIST

## 2018-06-26 PROCEDURE — 25000132 ZZH RX MED GY IP 250 OP 250 PS 637: Performed by: HOSPITALIST

## 2018-06-26 PROCEDURE — 25000125 ZZHC RX 250: Performed by: HOSPITALIST

## 2018-06-26 PROCEDURE — 12000000 ZZH R&B MED SURG/OB

## 2018-06-26 PROCEDURE — 25000128 H RX IP 250 OP 636: Performed by: HOSPITALIST

## 2018-06-26 PROCEDURE — 36415 COLL VENOUS BLD VENIPUNCTURE: CPT | Performed by: HOSPITALIST

## 2018-06-26 RX ORDER — QUETIAPINE FUMARATE 25 MG/1
25 TABLET, FILM COATED ORAL
Status: DISCONTINUED | OUTPATIENT
Start: 2018-06-26 | End: 2018-07-02

## 2018-06-26 RX ORDER — MIRTAZAPINE 7.5 MG/1
7.5 TABLET, FILM COATED ORAL AT BEDTIME
Status: DISCONTINUED | OUTPATIENT
Start: 2018-06-26 | End: 2018-06-27

## 2018-06-26 RX ADMIN — AMLODIPINE BESYLATE 2.5 MG: 2.5 TABLET ORAL at 09:33

## 2018-06-26 RX ADMIN — LORAZEPAM 1 MG: 1 TABLET ORAL at 21:34

## 2018-06-26 RX ADMIN — POTASSIUM CHLORIDE 40 MEQ: 1500 TABLET, EXTENDED RELEASE ORAL at 00:39

## 2018-06-26 RX ADMIN — QUETIAPINE FUMARATE 25 MG: 25 TABLET ORAL at 23:30

## 2018-06-26 RX ADMIN — MULTIPLE VITAMINS W/ MINERALS TAB 1 TABLET: TAB at 09:33

## 2018-06-26 RX ADMIN — FAMOTIDINE 20 MG: 10 INJECTION INTRAVENOUS at 00:39

## 2018-06-26 RX ADMIN — Medication 100 MG: at 09:33

## 2018-06-26 RX ADMIN — PANTOPRAZOLE SODIUM 40 MG: 40 INJECTION, POWDER, FOR SOLUTION INTRAVENOUS at 21:34

## 2018-06-26 RX ADMIN — LORAZEPAM 1 MG: 1 TABLET ORAL at 23:07

## 2018-06-26 RX ADMIN — POTASSIUM CHLORIDE 20 MEQ: 1500 TABLET, EXTENDED RELEASE ORAL at 04:08

## 2018-06-26 RX ADMIN — SODIUM CHLORIDE: 9 INJECTION, SOLUTION INTRAVENOUS at 09:35

## 2018-06-26 RX ADMIN — SODIUM CHLORIDE: 9 INJECTION, SOLUTION INTRAVENOUS at 21:57

## 2018-06-26 RX ADMIN — FAMOTIDINE 20 MG: 10 INJECTION INTRAVENOUS at 09:36

## 2018-06-26 RX ADMIN — METOPROLOL SUCCINATE 50 MG: 50 TABLET, EXTENDED RELEASE ORAL at 09:33

## 2018-06-26 RX ADMIN — FOLIC ACID 1 MG: 1 TABLET ORAL at 09:33

## 2018-06-26 RX ADMIN — MAGNESIUM SULFATE IN WATER 4 G: 40 INJECTION, SOLUTION INTRAVENOUS at 00:40

## 2018-06-26 RX ADMIN — METOPROLOL SUCCINATE 50 MG: 50 TABLET, EXTENDED RELEASE ORAL at 00:39

## 2018-06-26 RX ADMIN — METOPROLOL SUCCINATE 50 MG: 50 TABLET, EXTENDED RELEASE ORAL at 21:34

## 2018-06-26 RX ADMIN — MIRTAZAPINE 7.5 MG: 7.5 TABLET ORAL at 21:34

## 2018-06-26 RX ADMIN — DOCUSATE SODIUM 100 MG: 100 CAPSULE, LIQUID FILLED ORAL at 09:42

## 2018-06-26 NOTE — PHARMACY-ADMISSION MEDICATION HISTORY
Admission Medication History    Admission medication history interview status for the 6/25/2018 admission is complete. See EPIC admission navigator for prior to admission medications     Medication history source reliability:Good    Actions taken by pharmacist (provider contacted, etc):None     Additional medication history information not noted on PTA med list :None    Medication reconciliation/reorder completed by provider prior to medication history? No    Time spent in this activity: 15 minutes    Prior to Admission medications    Medication Sig Last Dose Taking? Auth Provider   amLODIPine (NORVASC) 2.5 MG tablet Take 1 tablet (2.5 mg) by mouth daily +++ appointment needed for additional refills +++ 6/25/2018 at AM Yes Jennifer Demarco MD   aspirin 81 MG tablet Take 1 tablet by mouth daily. 6/25/2018 at AM Yes Julian Ballesteros MD   B Complex Vitamins (VITAMIN B COMPLEX) tablet Take 1 tablet by mouth daily. Past Week at Unknown time Yes Linda Wilcox MD   fish oil-omega-3 fatty acids (FISH OIL) 1000 MG capsule Take 1 g by mouth daily  Past Week at Unknown time Yes Reported, Patient   hypromellose (ARTIFICIAL TEARS) 0.5 % SOLN ophthalmic solution Place 1 drop into both eyes every hour as needed for dry eyes  at PRN Yes Unknown, Entered By History   meclizine 25 MG CHEW Take 25 mg by mouth every 6 hours as needed.    Indications: Sensation of Spinning or Whirling Past Month at PRN Yes Reported, Patient   MELATONIN PO Take 5 mg by mouth nightly as needed Past Month at PRN Yes Unknown, Entered By History   metoprolol succinate (TOPROL-XL) 50 MG 24 hr tablet Take 1 tablet (50 mg) by mouth 2 times daily  6/25/2018 at AM Yes Jennifer Demarco MD   traZODone (DESYREL) 50 MG tablet 1-2 at bedtime as needed for sleep Past Week at PRN Yes July Doll PA-C   hydrochlorothiazide (HYDRODIURIL) 25 MG tablet Take 1 tablet (25 mg) by mouth daily   July Doll PA-C David S. Cline, PharmD

## 2018-06-26 NOTE — PROGRESS NOTES
[facility]    Hospitalist Progress Note    Assessment & Plan   Db Watson is a 52 year old gentleman with chronic active alcohol abuse and suspected dependence as well as chronic anxiety and insomnia, prior SBO due to ventral hernia, hypertension, dyslipidemia and Meniere's Disease who presents with nausea, vomiting, abdominal pain and tremor and is found to have suspected alcoholic hepatitis and pancreatitis.      Suspected alcoholic pancreatitis:   Elevated LFTs  -Lipase elevated at 785, still peaking, likely has mild acute pancreatitis although other differential could be alcoholic gastritis  -Pain much better at this time  -Continue IV fluids at 1 25 mL/h  -Oxycodone and IV Dilaudid for pain  -IV Protonix twice daily  -Diet as tolerated  -Recheck LFTs and lipase in the morning  -Hold off on further imaging as patient appears to be improving  -Thiamine and multivitamin   -Elevated LFTs most likely due to ongoing alcohol use, doubt alcoholic hepatitis clinically  -Right upper quadrant ultrasonogram without gallstones, echogenic liver consistent with fatty infiltration.     Hypokalemia:  -Replace per protocol     Thrombocytopenia:   -Appears to be chronic as he was thrombocytopenic in February 2018 also   -Likely due to ongoing alcohol use; +/-alcoholic liver disease; fatty liver.     Hypertension:  - Hold HCTZ  -Continue Toprol and Amlodipine    Alcohol use disorder  Insomnia:  -Continue CIWA protocol, at risk for withdrawal  -Chemical dependency and psychiatry evaluation today.    # Pain Assessment:  Current Pain Score 6/26/2018   Patient currently in pain? denies   Pain score (0-10) -   - Db is experiencing pain due to acute gastritis/pancreatitis. Pain management was discussed and the plan was created in a collaborative fashion.  Db's response to the current recommendations: engaged  - Please see the plan for pain management as documented above          D/W: RN  DVT Prophylaxis:  Pneumatic Compression Devices  Code Status: Full Code    Disposition: Expected discharge in 1-2 days    Cristian Woodson MD    Interval History   Abdominal pain better.  Patient anxious about his alcohol use and insomnia.  Denies nausea at this time.  Afebrile    -Data reviewed today: I reviewed all new labs and imaging results over the last 24 hours. I personally reviewed no images or EKG's today.      Physical Exam   Temp: 99  F (37.2  C) Temp src: Oral BP: (!) 161/100   Heart Rate: 86 Resp: 18 SpO2: 94 % O2 Device: None (Room air)    Vitals:    06/25/18 1812 06/26/18 0620   Weight: 104.3 kg (230 lb) 102.5 kg (225 lb 15.5 oz)     Vital Signs with Ranges  Temp:  [98.5  F (36.9  C)-99  F (37.2  C)] 99  F (37.2  C)  Heart Rate:  [77-86] 86  Resp:  [18] 18  BP: (158-182)/(100-115) 161/100  SpO2:  [94 %-100 %] 94 %  I/O last 3 completed shifts:  In: 1577 [P.O.:770; I.V.:807]  Out: -     Constitutional: AAOX3, NAD  HEENT: Moist oral mucosa, no oral lesions, No pallor or icterus  Neck- Supple, Good ROM, No JVD  Respiratory:  No crackles, No wheezes, CTA B/L, Normal WOB  Cardiovascular: RRR, No murmur  GI: Soft, Non- tender, BS- normoactive, No Guarding/rebound/rigidity  Skin/Integument: Warm and dry, no rashes  MSK: No joint deformity or swelling, no edema  Neuro: CN- grossly intact, mild upper extremity tremors.        Medications       sodium chloride 125 mL/hr at 06/26/18 0935         amLODIPine  2.5 mg Oral Daily     docusate sodium  100 mg Oral BID     famotidine  20 mg Intravenous Q12H     folic acid  1 mg Oral Daily     LORazepam  1 mg Oral At Bedtime     metoprolol succinate  50 mg Oral BID     mirtazapine  7.5 mg Oral At Bedtime     multivitamin, therapeutic with minerals  1 tablet Oral Daily     sodium chloride (PF)  3 mL Intracatheter Q8H     thiamine  100 mg Oral Daily       Data       Recent Labs  Lab 06/26/18  0755 06/25/18  1930   WBC 4.8 4.0   HGB 14.1 14.1   * 99   PLT 76* 72*    141    POTASSIUM 3.5 3.2*   CHLORIDE 101 102   CO2 26 31   BUN 6* 6*   CR 0.68 0.71   ANIONGAP 10 8   DEMETRIA 8.6 9.2   * 111*   ALBUMIN 3.8 3.7   PROTTOTAL 8.4 8.0   BILITOTAL 2.1* 1.3   ALKPHOS 113 110   ALT 91* 97*   * 171*   LIPASE 903* 785*       Recent Results (from the past 24 hour(s))   US Abdomen Limited    Narrative    RIGHT UPPER QUADRANT ULTRASOUND 6/25/2018 8:02 PM    HISTORY:  RUQ pain;     COMPARISON: None.    FINDINGS:    Gallbladder:  Normal with no cholelithiasis, wall thickening or focal  tenderness.      Bile ducts:   CHD is normal diameter.  No intrahepatic biliary  dilatation.    Liver:  Liver is echogenic consistent with fatty infiltration    Pancreas:  Totally obscured by gas.    Right kidney:   Normal.       Impression    IMPRESSION:    1. No gallstones are identified.  2. Echogenic liver consistent with fatty infiltration.    MANASA FRYE MD

## 2018-06-26 NOTE — CONSULTS
"Consult Date:  06/26/2018      REASON FOR CONSULTATION:  Alcohol abuse, anxiety and insomnia.      REQUESTING PHYSICIAN:  Aristides Bustos MD      IDENTIFYING DATA:  Db Watson is a 52-year-old man who reports he is single and has no children.  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  He presented to [facility] ED due to abdominal pain which he thought was reminiscent of the pain he experienced from small-bowel obstruction earlier in the year and so he presented to the ED.  He had been experiencing a lot of burping and hiccups and was thought to be experiencing alcohol-induced acute pancreatitis.  Psychiatry was consulted at the request of the patient to evaluate his anxiety, insomnia and alcohol use disorder.  Information was gathered through direct patient contact as well as chart review.      CHIEF COMPLAINT:  \"I came in for the abdominal pain but I have not been able to sleep well in the past few weeks.\"      HISTORY OF PRESENT ILLNESS:  Db aWtson reports that he has been drinking alcohol for quite a while.  He states that he used to average about 3-4 drinks about 3 times a week, but he has been trying to cut down to about 1-2 drinks a week.  However, he admits that his use of alcohol had escalated in recent months on account of boredom.  He tells me at the peak of his use he was drinking 6 rum or whiskey mixed drinks a day, but since he lost his job he has been averaging 1-2 drinks a night.  He states he gets bored in his apartment and goes to the bars to while time away.  He also claims that he cut down his use of alcohol because he was spending time looking for another job.  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  He states he used to be associated with several [employer] programs and spent most of his afterhours fellowshipping with parents of the kids he had in " "his program and other volunteers.  However, since he moved to the [facility], he states he has not had any social network and has spent his time at the bars.      With respect to anxiety spectrum symptoms, the patient endorsed ruminative worry, but denies experiencing panic attacks, obsessions, compulsions or symptoms suggestive of PTSD.  He reports that when he lies down to sleep at night he finds himself ruminating and states he is unable to shut his mind down.  He tells me he did see his primary care physician for this same problem and was prescribed trazodone, which he states has yet to take.  He does not endorse any other illicit drug use.  He has never tried any other medications.  He states he does not really feel depressed but \"a little stressed because of my current situation.\"  He states he is trying to help himself by going back to [employer] and seeking out more social supports.  He denies self-harm thoughts, plans, or intent.  He does not endorse any symptoms suggestive of kimberly, psychoses or attentional problems.  He denies symptoms suggestive of an eating disorder.      PAST PSYCHIATRIC HISTORY:  None reported.      CHEMICAL USE HISTORY:  As described in history of present illness.  The patient also reports that he has never gone through drug or alcohol treatment.      PAST MEDICAL HISTORY:  Hyperlipidemia, Meniere disease, abnormal MRI of head, history of small-bowel obstruction, shoulder surgery for dislocation, eye surgery with lens implantation in right eye, bilateral wrist surgery for fractures.      MEDICATIONS PRIOR TO ADMISSION:  Norvasc 2.5 mg p.o. daily, aspirin 81 mg p.o. daily, vitamin B complex 1 p.o. daily, fish oil 1000 mg p.o. daily, artificial tears 0.5% ophthalmic solution 1 drop every hour as needed for dry eyes, meclizine 25 mg q.6 hours p.r.n., melatonin 5 mg at bedtime p.r.n., Toprol XL 50 mg b.i.d., trazodone 50 mg 1-2 p.o. at bedtime p.r.n., HydroDIURIL 25 mg p.o. daily.    "   ALLERGIES:  ZOLOFT.      FAMILY PSYCHIATRIC HISTORY:  None reported.      SOCIAL HISTORY:  The patient reports he grew up in [place], [place].  He has 1 older brother.  He denies history of physical, emotional or sexual abuse.  He denies that he had any academic problems in school.  He attended a [employer] in [place] as well as [employer].  He states he has never been  and has no children.  He denies  or criminal history.      REVIEW OF SYSTEMS:  I refer the reader to the 10-point review of systems documented by Aristides Bustos MD on 06/25/2018 at 9:18 p.m. which I have reviewed.      VITAL SIGNS:  Blood pressure 161/100, pulse 86, respirations 18, temperature 99, weight 225 pounds.      MENTAL STATUS EXAMINATION:  This is a bespectacled, rotund, middle-aged man who appears his stated age of 52.  He is seen at his bedside where he is dressed in hospital gown and is receiving intravenous fluids.  He makes good eye contact and speaks clearly and coherently.  His mood is described as anxious and his affect is mood congruent.  His thought process is logical, relevant and goal directed.  There is no evidence of psychosis.  He denies auditory or visual hallucinations.  There are no delusions elicited.  He is alert and oriented to time, place and person.  His gait and station are not assessed, but his muscle strength is adequate.  His language is appropriate.  His recall of recent events is fair and his long-term recall is adequate.  His attention span and concentration are fair.  His impulse control is good.  Risk assessment at this time is considered moderate.  He displays fair insight and judgment.      DIAGNOSTIC IMPRESSION:  Db Watson is a 52-year-old man who presented to the hospital on account of abdominal pain, initially thought to be from small-bowel obstruction similar to what he had expressed in February of this year but was found to have evidence of alcohol-induced  pancreatitis for which he is currently receiving management.  He has been drinking large amounts of alcohol, but he claims he has cut down.  He reports ruminative worry as well as insomnia and is willing to accept medications to address his symptoms; however, he is not interested in chemical use treatment or medication assisted treatment for his alcohol use disorder at this time.      DIAGNOSES:   1.  Alcohol use disorder with alcohol-induced hepatitis and pancreatitis.   2.  Unspecified anxiety disorder.   3.  Adjustment disorder with mixed anxiety and depressed mood.   4.  Hypokalemia.   5.  Thrombocytopenia.   6.  Hypertension.      PLAN:   1.  Medical management as you are.   2.  Time was spent educating the patient about his alcohol use problems.  He does not see this as a problem at this time as he claims he has been successful at cutting back on his use in the past.  He declined use of naltrexone or Antabuse but expressed interest in mitigating his insomnia.  He was therefore offered mirtazapine at 7.5 mg at bedtime while using quetiapine 25-50 mg at bedtime p.r.n., intrusive thoughts or insomnia.  The common side effects of these medications were discussed with him and he gave consent for a trial.      Thanks for the consult.         ZARI LEVINE MD             D: 2018   T: 2018   MT: JAMES      Name:     JOEL CROUCH   MRN:      [id number]        Account:       [id number]   :      1965           Consult Date:  2018      Document: [id number]       cc: Jennifer Bustos MD

## 2018-06-26 NOTE — H&P
[facility]    History and Physical  Hospitalist       Date of Admission:  6/25/2018  Date of Service (when I saw the patient): 06/25/18    Assessment & Plan   Db Watson is a markedly pleasant 52 year old gentleman with chronic active alcohol abuse and suspected dependence as well as chronic anxiety and insomnia, prior SBO due to ventral hernia, hypertension, dyslipidemia and Meniere's Disease who presents with nausea, vomiting, abdominal pain and tremor and is found to have suspected alcoholic hepatitis and pancreatitis.      1) Suspected alcoholic hepatitis and pancreatitis: He is afebrile in the ED, /115, pulse 82, not hypoxic. He is very anxious and tremulous. CBC shows WBC 4, HGB 14, PLT 72. CMP shows Na 141, K 3.2, Cl 102, CO2 31, BUN 6, Cr 0.7, Alb 3.7, Prot 8, T bili 1.3, Alk phos 110, ALT 97, . Lipase is elevated at 785. Ultrasound showed no gall stones and an echogenic liver.    Overall we suspect alcoholic gastritis and possible early pancreatitis (the elevated Lipase could also be due to recent vomiting). He could be having an anxiety attack, or the early stages of alcohol withdrawal. Clinically his bowels sounds are normal and abdomen non-distended, making recurrent SBO less likely.      -- Inpatient     -- ADAT; 125 cc/hour NS    -- Oxycodone, IV Dilaudid as needed for pain; 50 mg IV Zantac TID; anti-emetics     -- Repeat Lipase in AM      -- CIWA ordered in case he withdraws overnight; he requests a dose of medication to help him sleep and I have ordered a one time dose of night time 1 mg PO Ativan for that       -- Psychiatry and CD consulted for further evaluation, both at his request      -- Oral Thiamine, Folate and MVI ordered     -- If symptoms worsen would get CT to rule out developing obstruction or complications of pancreatitis    2) Hypokalemia: Replacing    3) Thrombocytopenia: Suspect due to alcohol toxicity. Monitor for bleeding while hospitalized.    4)  Hypertension: Hold HCTZ for hypokalemia. Continue Toprol and Amlodipine    # Pain Assessment:  Current Pain Score 2/17/2018   Patient currently in pain? denies   Pain score (0-10) -   - bD is experiencing pain due to stomach. Pain management was discussed and the plan was created in a collaborative fashion.  Db's response to the current recommendations: engaged  - Please see the plan for pain management as documented above    DVT Prophylaxis: Pneumatic Compression Devices  Code Status: Full Code    Disposition: Expected discharge in 2-3 days.    Aristides Bustos MD    Primary Care Physician   *Jennifer Demarco    Chief Complaint   Nausea and vomiting    History is obtained from the patient and the ED physician whom I have spoken with    History of Present Illness   Db Watson is a markedly pleasant 52 year old gentleman who presents with two weeks of nausea and vomiting after he tries to eat food, associated with dull pain in the lower parts of his belly that has been intermittent, non-radiating, as well as progressive anxiety and insomnia at night, which he has been trying to treat at home by drinking multiple alcoholic beverages every night. He is now concerned he will need to quit drinking but doesn't know how. He was recently given a prescription for Trazodone but hasn't tried it yet. He was hospitalized here for SBO 2/2018 due to a ventral hernia, which resolved with conservative treatment, and he has not yet followed up to have further discussions about surgery. He has not had constipation or diarrhea, or fever, chills or sweats, or melena, hematochezia, or hematemesis, or dyspnea, chest pain, or any other acute complaints.    Past Medical History    I have reviewed this patient's medical history and updated it with pertinent information if needed.   Past Medical History:   Diagnosis Date     Abnormal MRI of head 1/11/2013     Atypical chest pain 9/4/2012     BMI 33.0-33.9,adult 5/24/2013      Hyperlipidemia LDL goal <160 2013     Hypertension      Ménière's disease        Past Surgical History   I have reviewed this patient's surgical history and updated it with pertinent information if needed.  Past Surgical History:   Procedure Laterality Date     C NONSPECIFIC PROCEDURE      shoulder surgery for dislocation      C NONSPECIFIC PROCEDURE      eye surgery, lens implant right     EYE SURGERY       FRACTURE TX, WRIST RT/LT      Fracture TX Wrist RT/LT     ORTHOPEDIC SURGERY         Prior to Admission Medications   Prior to Admission Medications   Prescriptions Last Dose Informant Patient Reported? Taking?   B Complex Vitamins (VITAMIN B COMPLEX) tablet  Self Yes No   Sig: Take 1 tablet by mouth daily.   MELATONIN PO  Self Yes No   Sig: Take 5 mg by mouth nightly as needed   amLODIPine (NORVASC) 2.5 MG tablet   No No   Sig: Take 1 tablet (2.5 mg) by mouth daily +++ appointment needed for additional refills +++   aspirin 81 MG tablet  Self No No   Sig: Take 1 tablet by mouth daily.   fish oil-omega-3 fatty acids (FISH OIL) 1000 MG capsule  Self Yes No   Sig: Take 1 g by mouth daily    hydrochlorothiazide (HYDRODIURIL) 25 MG tablet  Self No No   Sig: Take 1 tablet (25 mg) by mouth daily   meclizine 25 MG CHEW  Self Yes No   Sig: Take 25 mg by mouth every 6 hours as needed.    Indications: Sensation of Spinning or Whirling   metoprolol succinate (TOPROL-XL) 50 MG 24 hr tablet   No No   Sig: Take 1 tablet (50 mg) by mouth 2 times daily    traZODone (DESYREL) 50 MG tablet  Self No No   Si-2 at bedtime as needed for sleep      Facility-Administered Medications: None     Allergies   Allergies   Allergen Reactions     Zoloft [Sertraline] Nausea and Vomiting       Social History   I have reviewed this patient's social history and updated it with pertinent information if needed. Db Walter  reports that he has never smoked. He has never used smokeless tobacco. He reports that he drinks alcohol. He reports  that he does not use illicit drugs.    Family History   Family history assessed and, except as above, is non-contributory.    Family History   Problem Relation Age of Onset     Hypertension Mother      Diabetes Father      C.A.D. Father      bypass surgery       Review of Systems   The 10 point Review of Systems is negative other than noted in the HPI or here.     Physical Exam   Temp: 98.8  F (37.1  C) Temp src: Oral BP: (!) 182/115   Heart Rate: 82 Resp: 18 SpO2: 96 % O2 Device: None (Room air)    Vital Signs with Ranges  Temp:  [98.8  F (37.1  C)] 98.8  F (37.1  C)  Heart Rate:  [82] 82  Resp:  [18] 18  BP: (169-182)/(104-115) 182/115  SpO2:  [96 %-100 %] 96 %  230 lbs 0 oz    Constitutional: Alert and oriented to person, place and time; tremulous and anxious  HEENT: normocephalic moist mucus membranes  Respiratory: lungs clear to auscultation bilaterally  Cardiovascular: regular S1 S2   GI: abdomen soft mildly tender in lower quadrants, non distended bowel sounds positive  Lymph/Hematologic: no pallor, no cervical lymphadenopathy  Skin: no rash, good turgor  Musculoskeletal: no clubbing, cyanosis or edema  Neurologic: extra-ocular muscles intact; moves all four extremities  Psychiatric: anxious affect, speech non-tangential    Data   Data reviewed today:  I personally reviewed no images or EKG's today.    Recent Labs  Lab 06/25/18  1930   WBC 4.0   HGB 14.1   MCV 99   PLT 72*      POTASSIUM 3.2*   CHLORIDE 102   CO2 31   BUN 6*   CR 0.71   ANIONGAP 8   DEMETRIA 9.2   *   ALBUMIN 3.7   PROTTOTAL 8.0   BILITOTAL 1.3   ALKPHOS 110   ALT 97*   *   LIPASE 785*       Recent Results (from the past 24 hour(s))   US Abdomen Limited    Narrative    RIGHT UPPER QUADRANT ULTRASOUND 6/25/2018 8:02 PM    HISTORY:  RUQ pain;     COMPARISON: None.    FINDINGS:    Gallbladder:  Normal with no cholelithiasis, wall thickening or focal  tenderness.      Bile ducts:   CHD is normal diameter.  No intrahepatic  biliary  dilatation.    Liver:  Liver is echogenic consistent with fatty infiltration    Pancreas:  Totally obscured by gas.    Right kidney:   Normal.       Impression    IMPRESSION:    1. No gallstones are identified.  2. Echogenic liver consistent with fatty infiltration.    MANASA FRYE MD

## 2018-06-26 NOTE — ED NOTES
"[facility]  ED Nurse Handoff Report    ED Chief complaint: Abdominal Pain (hx of untreated hernia and bowel obstruction; dry heavies and anxiety; has had BM )      ED Diagnosis:   Final diagnoses:   Alcohol-induced acute pancreatitis, unspecified complication status       Code Status: Full    Allergies:   Allergies   Allergen Reactions     Zoloft [Sertraline] Nausea and Vomiting       Activity level - Baseline/Home:  Independent    Activity Level - Current:   Independent     Needed?: No    Isolation: No  Infection: Not Applicable  Bariatric?: No    Vital Signs:   Vitals:    06/25/18 1812 06/25/18 1903 06/25/18 1904   BP: (!) 169/104 (!) 182/115    Resp: 18     Temp: 98.8  F (37.1  C)     TempSrc: Oral     SpO2: 100%  96%   Weight: 104.3 kg (230 lb)     Height: 1.727 m (5' 8\")         Cardiac Rhythm: ,        Pain level:      Is this patient confused?: No   [place]   Suicide Severity Rating Scale Completed? Yes  If yes, what color did the patient score?  White    Patient Report: Initial Complaint: Pt presents to the ED with abdominal pain, nausea, and evaluation of known hernia.  Focused Assessment: Pt has protruding umbilical hernia, pt states he has intermittent pain under his umbilicus, currently denies pain.  Pt reports nausea with \"dry heaving\" over the last several days.  Pt reports having small but regular bowel movements.   Pt reports feeling anxious and recently losing his job.  Pt hypertensive, states this is usually his baseline.  Other vital signs unremarkable.  Pt alert and oriented x4 and able to ambulate without difficulty.    Tests Performed: CBC, CMP, Lipase, abdominal US.  Abnormal Results: Lipase 785, K+3.2, Alt 97, , US reports shows fatty infiltration of liver.  Treatments provided: Pt given 4mg IV zofran, 0.5mg IV ativan.  18G IV placed in right AC.    Family Comments: None    OBS brochure/video discussed/provided to patient: N/A    ED Medications: "   Medications   ondansetron (ZOFRAN) injection 4 mg (4 mg Intravenous Given 6/25/18 1940)   LORazepam (ATIVAN) injection 0.5 mg (0.5 mg Intravenous Given 6/25/18 1940)       Drips infusing?:  No    For the majority of the shift this patient was Green.   Interventions performed were NA.    Severe Sepsis OR Septic Shock Diagnosis Present: No      ED NURSE PHONE NUMBER: [phone]

## 2018-06-26 NOTE — PLAN OF CARE
Problem: Patient Care Overview  Goal: Plan of Care/Patient Progress Review  Outcome: Improving  Patient is A&Ox4. Up independently. VSS on room air except BP is elevated. Denies pain. Tolerating clear liquid diet. NS at 125mL/hr. CIWA 3 & 3, scoring only for anxiety. 2+ edema bilateral feet & ankles. K+ 3.2, was replaced orally, recheck pending. Mg 1.5, was replaced, recheck 2.5. Chem dep and psych consult today.

## 2018-06-26 NOTE — PLAN OF CARE
Problem: Patient Care Overview  Goal: Plan of Care/Patient Progress Review  Outcome: Improving  A&O x4. BP elevated, otherwise VSS on RA. Independent. LS clear, denies SOB. CIWA 1,1 for mild anxiety. Denies pain, did c/o some lower abd tenderness on palpation. BS+, tolerating full liquid diet, advanced to regular diet and advised on appropriate food choices. +2 BLE edema. K recheck 3.5, Mag recheck 2.3.

## 2018-06-26 NOTE — PROGRESS NOTES
RECEIVING UNIT ED HANDOFF REVIEW    ED Nurse Handoff Report was reviewed by: Cuong Venegas on June 25, 2018 at 10:02 PM

## 2018-06-26 NOTE — CONSULTS
Pt seen for initial psychiatric evaluation, please see my dictation for details and recommendations. Offered Remeron 7.5 mg qhs and Seroquel qhs prn.

## 2018-06-27 ENCOUNTER — APPOINTMENT (OUTPATIENT)
Dept: GENERAL RADIOLOGY | Facility: CLINIC | Age: 53
DRG: 166 | End: 2018-06-27
Attending: NURSE PRACTITIONER
Payer: COMMERCIAL

## 2018-06-27 ENCOUNTER — ANESTHESIA (OUTPATIENT)
Dept: INTENSIVE CARE | Facility: CLINIC | Age: 53
DRG: 166 | End: 2018-06-27
Payer: COMMERCIAL

## 2018-06-27 ENCOUNTER — ANESTHESIA EVENT (OUTPATIENT)
Dept: INTENSIVE CARE | Facility: CLINIC | Age: 53
DRG: 166 | End: 2018-06-27
Payer: COMMERCIAL

## 2018-06-27 PROBLEM — F10.932 ALCOHOL WITHDRAWAL HALLUCINOSIS (H): Status: ACTIVE | Noted: 2018-06-27

## 2018-06-27 LAB
ALBUMIN SERPL-MCNC: 3.2 G/DL (ref 3.4–5)
ALP SERPL-CCNC: 100 U/L (ref 40–150)
ALT SERPL W P-5'-P-CCNC: 71 U/L (ref 0–70)
ANION GAP SERPL CALCULATED.3IONS-SCNC: 9 MMOL/L (ref 3–14)
AST SERPL W P-5'-P-CCNC: 102 U/L (ref 0–45)
BASE EXCESS BLDA CALC-SCNC: 1.5 MMOL/L
BILIRUB DIRECT SERPL-MCNC: 0.8 MG/DL (ref 0–0.2)
BILIRUB SERPL-MCNC: 2.3 MG/DL (ref 0.2–1.3)
BUN SERPL-MCNC: 8 MG/DL (ref 7–30)
CALCIUM SERPL-MCNC: 8.2 MG/DL (ref 8.5–10.1)
CHLORIDE SERPL-SCNC: 109 MMOL/L (ref 94–109)
CO2 SERPL-SCNC: 23 MMOL/L (ref 20–32)
CREAT SERPL-MCNC: 0.6 MG/DL (ref 0.66–1.25)
ERYTHROCYTE [DISTWIDTH] IN BLOOD BY AUTOMATED COUNT: 13.2 % (ref 10–15)
GFR SERPL CREATININE-BSD FRML MDRD: >90 ML/MIN/1.7M2
GLUCOSE BLDC GLUCOMTR-MCNC: 83 MG/DL (ref 70–99)
GLUCOSE BLDC GLUCOMTR-MCNC: 86 MG/DL (ref 70–99)
GLUCOSE BLDC GLUCOMTR-MCNC: 88 MG/DL (ref 70–99)
GLUCOSE BLDC GLUCOMTR-MCNC: 92 MG/DL (ref 70–99)
GLUCOSE BLDC GLUCOMTR-MCNC: 95 MG/DL (ref 70–99)
GLUCOSE SERPL-MCNC: 87 MG/DL (ref 70–99)
HCO3 BLD-SCNC: 26 MMOL/L (ref 21–28)
HCT VFR BLD AUTO: 40.1 % (ref 40–53)
HGB BLD-MCNC: 13.6 G/DL (ref 13.3–17.7)
LIPASE SERPL-CCNC: 396 U/L (ref 73–393)
MCH RBC QN AUTO: 34.4 PG (ref 26.5–33)
MCHC RBC AUTO-ENTMCNC: 33.9 G/DL (ref 31.5–36.5)
MCV RBC AUTO: 102 FL (ref 78–100)
O2/TOTAL GAS SETTING VFR VENT: ABNORMAL %
OXYHGB MFR BLD: 99 % (ref 92–100)
PCO2 BLD: 41 MM HG (ref 35–45)
PH BLD: 7.42 PH (ref 7.35–7.45)
PLATELET # BLD AUTO: 68 10E9/L (ref 150–450)
PO2 BLD: 165 MM HG (ref 80–105)
POTASSIUM SERPL-SCNC: 3.4 MMOL/L (ref 3.4–5.3)
PROT SERPL-MCNC: 7.4 G/DL (ref 6.8–8.8)
RBC # BLD AUTO: 3.95 10E12/L (ref 4.4–5.9)
SODIUM SERPL-SCNC: 141 MMOL/L (ref 133–144)
WBC # BLD AUTO: 4.8 10E9/L (ref 4–11)

## 2018-06-27 PROCEDURE — 25000128 H RX IP 250 OP 636

## 2018-06-27 PROCEDURE — 99292 CRITICAL CARE ADDL 30 MIN: CPT | Performed by: INTERNAL MEDICINE

## 2018-06-27 PROCEDURE — 83690 ASSAY OF LIPASE: CPT | Performed by: INTERNAL MEDICINE

## 2018-06-27 PROCEDURE — 85027 COMPLETE CBC AUTOMATED: CPT | Performed by: INTERNAL MEDICINE

## 2018-06-27 PROCEDURE — 40000986 XR CHEST PORT 1 VW

## 2018-06-27 PROCEDURE — 25000125 ZZHC RX 250: Performed by: INTERNAL MEDICINE

## 2018-06-27 PROCEDURE — 25000125 ZZHC RX 250: Performed by: SURGERY

## 2018-06-27 PROCEDURE — 40000275 ZZH STATISTIC RCP TIME EA 10 MIN

## 2018-06-27 PROCEDURE — 00000146 ZZHCL STATISTIC GLUCOSE BY METER IP

## 2018-06-27 PROCEDURE — 82805 BLOOD GASES W/O2 SATURATION: CPT | Performed by: INTERNAL MEDICINE

## 2018-06-27 PROCEDURE — 36415 COLL VENOUS BLD VENIPUNCTURE: CPT | Performed by: INTERNAL MEDICINE

## 2018-06-27 PROCEDURE — 25000132 ZZH RX MED GY IP 250 OP 250 PS 637: Performed by: HOSPITALIST

## 2018-06-27 PROCEDURE — 31500 INSERT EMERGENCY AIRWAY: CPT | Performed by: NURSE ANESTHETIST, CERTIFIED REGISTERED

## 2018-06-27 PROCEDURE — 40000671 ZZH STATISTIC ANESTHESIA CASE

## 2018-06-27 PROCEDURE — 99207 ZZC NON-BILLABLE SERV PER CHARTING: CPT | Performed by: INTERNAL MEDICINE

## 2018-06-27 PROCEDURE — 25000128 H RX IP 250 OP 636: Performed by: NURSE PRACTITIONER

## 2018-06-27 PROCEDURE — 94002 VENT MGMT INPAT INIT DAY: CPT

## 2018-06-27 PROCEDURE — 25000132 ZZH RX MED GY IP 250 OP 250 PS 637: Performed by: INTERNAL MEDICINE

## 2018-06-27 PROCEDURE — 99291 CRITICAL CARE FIRST HOUR: CPT | Performed by: SURGERY

## 2018-06-27 PROCEDURE — 20000003 ZZH R&B ICU

## 2018-06-27 PROCEDURE — 80048 BASIC METABOLIC PNL TOTAL CA: CPT | Performed by: INTERNAL MEDICINE

## 2018-06-27 PROCEDURE — 25000128 H RX IP 250 OP 636: Performed by: NURSE ANESTHETIST, CERTIFIED REGISTERED

## 2018-06-27 PROCEDURE — 36600 WITHDRAWAL OF ARTERIAL BLOOD: CPT

## 2018-06-27 PROCEDURE — 27210437 ZZH NUTRITION PRODUCT SEMIELEM INTERMED LITER

## 2018-06-27 PROCEDURE — 40000008 ZZH STATISTIC AIRWAY CARE

## 2018-06-27 PROCEDURE — 80076 HEPATIC FUNCTION PANEL: CPT | Performed by: INTERNAL MEDICINE

## 2018-06-27 PROCEDURE — 25000128 H RX IP 250 OP 636: Performed by: HOSPITALIST

## 2018-06-27 PROCEDURE — 5A1955Z RESPIRATORY VENTILATION, GREATER THAN 96 CONSECUTIVE HOURS: ICD-10-PCS | Performed by: INTERNAL MEDICINE

## 2018-06-27 PROCEDURE — 31500 INSERT EMERGENCY AIRWAY: CPT

## 2018-06-27 PROCEDURE — 25000128 H RX IP 250 OP 636: Performed by: SURGERY

## 2018-06-27 PROCEDURE — 25000132 ZZH RX MED GY IP 250 OP 250 PS 637: Performed by: SURGERY

## 2018-06-27 RX ORDER — GABAPENTIN 300 MG/1
300 CAPSULE ORAL 3 TIMES DAILY
Status: DISCONTINUED | OUTPATIENT
Start: 2018-06-27 | End: 2018-06-27

## 2018-06-27 RX ORDER — CHLORHEXIDINE GLUCONATE ORAL RINSE 1.2 MG/ML
15 SOLUTION DENTAL 2 TIMES DAILY
Status: DISCONTINUED | OUTPATIENT
Start: 2018-06-27 | End: 2018-07-08

## 2018-06-27 RX ORDER — LORAZEPAM 2 MG/ML
2 INJECTION INTRAMUSCULAR ONCE
Status: DISCONTINUED | OUTPATIENT
Start: 2018-06-27 | End: 2018-06-29

## 2018-06-27 RX ORDER — LORAZEPAM 2 MG/ML
INJECTION INTRAMUSCULAR
Status: COMPLETED
Start: 2018-06-27 | End: 2018-06-27

## 2018-06-27 RX ORDER — LORAZEPAM 2 MG/ML
2 INJECTION INTRAMUSCULAR ONCE
Status: COMPLETED | OUTPATIENT
Start: 2018-06-27 | End: 2018-06-27

## 2018-06-27 RX ORDER — SODIUM CHLORIDE, SODIUM LACTATE, POTASSIUM CHLORIDE, CALCIUM CHLORIDE 600; 310; 30; 20 MG/100ML; MG/100ML; MG/100ML; MG/100ML
INJECTION, SOLUTION INTRAVENOUS CONTINUOUS
Status: DISCONTINUED | OUTPATIENT
Start: 2018-06-27 | End: 2018-07-01

## 2018-06-27 RX ORDER — GABAPENTIN 250 MG/5ML
300 SOLUTION ORAL 3 TIMES DAILY
Status: DISCONTINUED | OUTPATIENT
Start: 2018-06-27 | End: 2018-07-05

## 2018-06-27 RX ORDER — METOPROLOL TARTRATE 50 MG
50 TABLET ORAL 2 TIMES DAILY
Status: DISCONTINUED | OUTPATIENT
Start: 2018-06-27 | End: 2018-06-27

## 2018-06-27 RX ORDER — PROPOFOL 10 MG/ML
INJECTION, EMULSION INTRAVENOUS PRN
Status: DISCONTINUED | OUTPATIENT
Start: 2018-06-27 | End: 2018-06-27

## 2018-06-27 RX ORDER — PROPOFOL 10 MG/ML
5-75 INJECTION, EMULSION INTRAVENOUS CONTINUOUS
Status: DISCONTINUED | OUTPATIENT
Start: 2018-06-27 | End: 2018-06-29

## 2018-06-27 RX ORDER — DIAZEPAM 5 MG
20 TABLET ORAL ONCE
Status: DISCONTINUED | OUTPATIENT
Start: 2018-06-27 | End: 2018-06-29

## 2018-06-27 RX ORDER — LORAZEPAM 2 MG/ML
4 INJECTION INTRAMUSCULAR ONCE
Status: COMPLETED | OUTPATIENT
Start: 2018-06-27 | End: 2018-06-27

## 2018-06-27 RX ORDER — PROPOFOL 10 MG/ML
INJECTION, EMULSION INTRAVENOUS
Status: COMPLETED
Start: 2018-06-27 | End: 2018-06-27

## 2018-06-27 RX ORDER — CHLORHEXIDINE GLUCONATE ORAL RINSE 1.2 MG/ML
15 SOLUTION DENTAL 2 TIMES DAILY
Status: DISCONTINUED | OUTPATIENT
Start: 2018-06-27 | End: 2018-06-27

## 2018-06-27 RX ADMIN — LORAZEPAM 4 MG: 2 INJECTION INTRAMUSCULAR at 03:02

## 2018-06-27 RX ADMIN — SODIUM CHLORIDE, POTASSIUM CHLORIDE, SODIUM LACTATE AND CALCIUM CHLORIDE: 600; 310; 30; 20 INJECTION, SOLUTION INTRAVENOUS at 13:09

## 2018-06-27 RX ADMIN — AMLODIPINE BESYLATE 2.5 MG: 2.5 TABLET ORAL at 10:19

## 2018-06-27 RX ADMIN — PROPOFOL 70 MG: 10 INJECTION, EMULSION INTRAVENOUS at 04:05

## 2018-06-27 RX ADMIN — LORAZEPAM 4 MG: 2 INJECTION INTRAMUSCULAR; INTRAVENOUS at 03:27

## 2018-06-27 RX ADMIN — DOCUSATE SODIUM 100 MG: 50 LIQUID ORAL at 22:44

## 2018-06-27 RX ADMIN — Medication 100 MG: at 13:54

## 2018-06-27 RX ADMIN — CHLORHEXIDINE GLUCONATE 15 ML: 1.2 RINSE ORAL at 20:16

## 2018-06-27 RX ADMIN — VALPROATE SODIUM 500 MG: 100 INJECTION, SOLUTION INTRAVENOUS at 15:54

## 2018-06-27 RX ADMIN — SODIUM CHLORIDE, POTASSIUM CHLORIDE, SODIUM LACTATE AND CALCIUM CHLORIDE: 600; 310; 30; 20 INJECTION, SOLUTION INTRAVENOUS at 04:35

## 2018-06-27 RX ADMIN — PROPOFOL 30 MCG/KG/MIN: 10 INJECTION, EMULSION INTRAVENOUS at 21:06

## 2018-06-27 RX ADMIN — GABAPENTIN 300 MG: 300 CAPSULE ORAL at 10:19

## 2018-06-27 RX ADMIN — PANTOPRAZOLE SODIUM 40 MG: 40 INJECTION, POWDER, FOR SOLUTION INTRAVENOUS at 10:15

## 2018-06-27 RX ADMIN — Medication 15 ML: at 13:54

## 2018-06-27 RX ADMIN — LORAZEPAM 2 MG: 2 INJECTION INTRAMUSCULAR; INTRAVENOUS at 02:03

## 2018-06-27 RX ADMIN — LORAZEPAM 2 MG: 2 INJECTION INTRAMUSCULAR; INTRAVENOUS at 00:38

## 2018-06-27 RX ADMIN — LORAZEPAM 2 MG: 2 INJECTION INTRAMUSCULAR; INTRAVENOUS at 01:24

## 2018-06-27 RX ADMIN — LORAZEPAM 4 MG: 2 INJECTION INTRAMUSCULAR; INTRAVENOUS at 03:02

## 2018-06-27 RX ADMIN — PROPOFOL 30 MCG/KG/MIN: 10 INJECTION, EMULSION INTRAVENOUS at 15:04

## 2018-06-27 RX ADMIN — FOLIC ACID 1 MG: 1 TABLET ORAL at 13:54

## 2018-06-27 RX ADMIN — PANTOPRAZOLE SODIUM 40 MG: 40 INJECTION, POWDER, FOR SOLUTION INTRAVENOUS at 21:57

## 2018-06-27 RX ADMIN — PROPOFOL 30 MCG/KG/MIN: 10 INJECTION, EMULSION INTRAVENOUS at 09:06

## 2018-06-27 RX ADMIN — SODIUM CHLORIDE, POTASSIUM CHLORIDE, SODIUM LACTATE AND CALCIUM CHLORIDE: 600; 310; 30; 20 INJECTION, SOLUTION INTRAVENOUS at 22:14

## 2018-06-27 RX ADMIN — METOPROLOL TARTRATE 50 MG: 100 TABLET ORAL at 22:45

## 2018-06-27 RX ADMIN — LORAZEPAM 2 MG: 2 INJECTION INTRAMUSCULAR; INTRAVENOUS at 02:30

## 2018-06-27 RX ADMIN — PROPOFOL 20 MCG/KG/MIN: 10 INJECTION, EMULSION INTRAVENOUS at 04:25

## 2018-06-27 RX ADMIN — LORAZEPAM 2 MG: 2 INJECTION INTRAMUSCULAR; INTRAVENOUS at 02:17

## 2018-06-27 RX ADMIN — GABAPENTIN 300 MG: 300 CAPSULE ORAL at 15:54

## 2018-06-27 RX ADMIN — VALPROATE SODIUM 1000 MG: 100 INJECTION, SOLUTION INTRAVENOUS at 04:35

## 2018-06-27 RX ADMIN — GABAPENTIN 300 MG: 250 SUSPENSION ORAL at 22:44

## 2018-06-27 RX ADMIN — CHLORHEXIDINE GLUCONATE 15 ML: 1.2 RINSE ORAL at 10:22

## 2018-06-27 RX ADMIN — Medication 0.5 MG: at 19:35

## 2018-06-27 RX ADMIN — SUCCINYLCHOLINE CHLORIDE 100 MG: 20 INJECTION, SOLUTION INTRAMUSCULAR; INTRAVENOUS; PARENTERAL at 04:05

## 2018-06-27 NOTE — ANESTHESIA CARE TRANSFER NOTE
Patient: Db Watson    * No procedures listed *    Diagnosis: * No pre-op diagnosis entered *  Diagnosis Additional Information: No value filed.    Anesthesia Type:   No value filed.     Note:  Airway :ETT  Patient transferred to:ICU  ICU Handoff: Call for PAUSE to initiate/utilize ICU HANDOFF, Identified Patient, Identified Responsible Provider, Reviewed the Pertinent Medical History, Discussed Surgical Course, Reviewed Intra-OP Anesthesia Management and Issues during Anesthesia, Set Expectations for Post Procedure Period and Allowed Opportunity for Questions and Acknowledgement of Understanding      Vitals: (Last set prior to Anesthesia Care Transfer)              Electronically Signed By: LAURY Shaw CRNA  June 27, 2018  4:14 AM

## 2018-06-27 NOTE — H&P
[facility]    History and Physical  [facility] Intensive Care     Date of Admission:  6/25/2018    Assessment & Plan   Db Watson is a 52 year old male presenting with  1.  Acute pancreatitis  2.  ETOH withdrawal with agitated delirium, hallucinations    On arrival to the ICU, his HR and blood pressure improved some with administration of 4 mg of ativan.  However, he was wavering between being calm but not protecting his airway versus being aggressive/combative and tachycardic.    The decision was made to intubate for airway protection and to be able to adequately sedate in anticipation of ongoing withdrawal.        Neurology:  Acute alcohol withdrawal syndrome with agitated delirium  Intubated now.  Will transition from precedex to propofol  Valproate 1 g IV, followed by 500 mg BID  Gabapentin 300 mg TID  Wean propofol as able    Menieres disease:  Resume PTA meclizine once tolerating PO    Cardiovascular:  Tachycardic and hypertensive mainly secondary to ETOH withdrawal.  Propofol gtt    Cont PTA  metoprolol at 50 mg PO BID  Cont PTA amlodipine.      On HCTZ prior to admission which was held in the setting of hypokalemia.  Reassess in a.m. for restarting    Pulmonary:        Intubated for airway protection in the setting of ETOH withdrawal.  Post intubation CXR pending    Renal  Redmond catheter for strict monitoring of ins and outs    ID:         No acute issues    GI  Acute ETOH pancreatitis without signs of necrosis  Cont IVF at 125 cc / hour  Repeat lipase, CMP in a.m    Place OGT for medications    Nutrition  Consider starting TFs in a.m. if extubation not anticipated      On Thiamine/ folate replaement    Heme/Onc:  No acute issue    DVT Prophylaxis: Enoxaparin (Lovenox) SQ  GI Prophylaxis: PPI    Restraints: Restraints for medical healing needed: YES    Family update by me today: No    Natasha Teran    Time Spent on this Encounter   Billing:  I spent 60 minutes bedside and on  the inpatient unit today managing the critical care of Db Watson in relation to the issues listed in this note.    Code Status   Full Code    Primary Care Physician   Jennifer Demarco    Chief Complaint   ETOH withdrawal    Unable to obtain a history from the patient due to intubation    History of Present Illness   Db Watson is a 52 year old male with a history of ETOH abuse who  initially presented to [facility]  ER 06/25 with complaints of abdominal pain, nausea and vomiting which he thought was similar to discomfort he had experienced with a bowel obstruction earlier this year.  He was found to have gastritis/ early  pancreatitis and was admitted to the hospital for management of this.  Over the course of the evening he has become progressively more agitated, diaphoretic, tachycardic, and hypertensive.  Symptoms are consistent with ETOH withdrawal. He received multiple doses ativan and valium while on the floor but despite this was combative and hallucinating, muttering incoherent sentences and being aggressive toward staff.     Past Medical History    I have reviewed this patient's medical history and updated it with pertinent information if needed.   Past Medical History:   Diagnosis Date     Abnormal MRI of head 1/11/2013     Atypical chest pain 9/4/2012     BMI 33.0-33.9,adult 5/24/2013     Hyperlipidemia LDL goal <160 5/4/2013     Hypertension      Ménière's disease        Past Surgical History   I have reviewed this patient's surgical history and updated it with pertinent information if needed.  Past Surgical History:   Procedure Laterality Date     C NONSPECIFIC PROCEDURE      shoulder surgery for dislocation      C NONSPECIFIC PROCEDURE      eye surgery, lens implant right     EYE SURGERY       FRACTURE TX, WRIST RT/LT      Fracture TX Wrist RT/LT     ORTHOPEDIC SURGERY         Prior to Admission Medications   Prior to Admission Medications   Prescriptions Last Dose Informant Patient Reported?  Taking?   B Complex Vitamins (VITAMIN B COMPLEX) tablet Past Week at Unknown time Self Yes Yes   Sig: Take 1 tablet by mouth daily.   MELATONIN PO Past Month at PRN Self Yes Yes   Sig: Take 5 mg by mouth nightly as needed   amLODIPine (NORVASC) 2.5 MG tablet 2018 at AM Self No Yes   Sig: Take 1 tablet (2.5 mg) by mouth daily +++ appointment needed for additional refills +++   aspirin 81 MG tablet 2018 at AM Self No Yes   Sig: Take 1 tablet by mouth daily.   fish oil-omega-3 fatty acids (FISH OIL) 1000 MG capsule Past Week at Unknown time Self Yes Yes   Sig: Take 1 g by mouth daily    hydrochlorothiazide (HYDRODIURIL) 25 MG tablet  Self No No   Sig: Take 1 tablet (25 mg) by mouth daily   hypromellose (ARTIFICIAL TEARS) 0.5 % SOLN ophthalmic solution  at PRN Self Yes Yes   Sig: Place 1 drop into both eyes every hour as needed for dry eyes   meclizine 25 MG CHEW Past Month at PRN Self Yes Yes   Sig: Take 25 mg by mouth every 6 hours as needed.    Indications: Sensation of Spinning or Whirling   metoprolol succinate (TOPROL-XL) 50 MG 24 hr tablet 2018 at AM Self No Yes   Sig: Take 1 tablet (50 mg) by mouth 2 times daily    traZODone (DESYREL) 50 MG tablet Past Week at PRN Self No Yes   Si-2 at bedtime as needed for sleep      Facility-Administered Medications: None     Allergies   Allergies   Allergen Reactions     Zoloft [Sertraline] Nausea and Vomiting       Social History   I have reviewed this patient's social history and updated it with pertinent information if needed. Db Walter  reports that he has never smoked. He has never used smokeless tobacco. He reports that he drinks alcohol. He reports that he does not use illicit drugs.    Family History   I have reviewed this patient's family history and updated it with pertinent information if needed.   Family History   Problem Relation Age of Onset     Hypertension Mother      Diabetes Father      C.A.D. Father      bypass surgery       Review of  Systems   Review of systems not obtained due to patient factors - intubation and sedation    Physical Exam   Temp: 97.9  F (36.6  C) Temp src: Oral Temp  Min: 97.9  F (36.6  C)  Max: 99  F (37.2  C) BP: (!) 147/96   Heart Rate: 127 Resp: 18 SpO2: 95 % O2 Device: None (Room air)    Vital Signs with Ranges  Temp:  [97.9  F (36.6  C)-99  F (37.2  C)] 97.9  F (36.6  C)  Heart Rate:  [] 127  Resp:  [16-18] 18  BP: (147-161)/() 147/96  SpO2:  [94 %-95 %] 95 %  225 lbs 15.54 oz    Constitutional: Agitated, confused, muttering incoherent sentences, diaphoretic  Eyes: PERRLA, opens eyes to pain  ENT: Dry mucous membranes  Respiratory: intermittently obstruction ariray, loud snoring noises;   ~ up to 10 second apneic pauses.  Lung fields clear  Cardiovascular: tachycardic, regular  GI: soft, non distended  Lymph/Hematologic: No LAD  Genitourinary: Redmond placed.  Minimal tara urine  Skin: warm, clammy  Musculoskeletal: moving all extremities strongly  Neurologic: Agitated, disoriented  Neuropsychiatric: agitated, confused.    Data   Results for orders placed or performed during the hospital encounter of 06/25/18 (from the past 24 hour(s))   Magnesium   Result Value Ref Range    Magnesium 2.5 (H) 1.6 - 2.3 mg/dL   Comprehensive metabolic panel   Result Value Ref Range    Sodium 137 133 - 144 mmol/L    Potassium 3.5 3.4 - 5.3 mmol/L    Chloride 101 94 - 109 mmol/L    Carbon Dioxide 26 20 - 32 mmol/L    Anion Gap 10 3 - 14 mmol/L    Glucose 104 (H) 70 - 99 mg/dL    Urea Nitrogen 6 (L) 7 - 30 mg/dL    Creatinine 0.68 0.66 - 1.25 mg/dL    GFR Estimate >90 >60 mL/min/1.7m2    GFR Estimate If Black >90 >60 mL/min/1.7m2    Calcium 8.6 8.5 - 10.1 mg/dL    Bilirubin Total 2.1 (H) 0.2 - 1.3 mg/dL    Albumin 3.8 3.4 - 5.0 g/dL    Protein Total 8.4 6.8 - 8.8 g/dL    Alkaline Phosphatase 113 40 - 150 U/L    ALT 91 (H) 0 - 70 U/L     (H) 0 - 45 U/L   CBC with platelets differential   Result Value Ref Range    WBC 4.8 4.0  - 11.0 10e9/L    RBC Count 4.07 (L) 4.4 - 5.9 10e12/L    Hemoglobin 14.1 13.3 - 17.7 g/dL    Hematocrit 40.9 40.0 - 53.0 %     (H) 78 - 100 fl    MCH 34.6 (H) 26.5 - 33.0 pg    MCHC 34.5 31.5 - 36.5 g/dL    RDW 13.4 10.0 - 15.0 %    Platelet Count 76 (L) 150 - 450 10e9/L    Diff Method Automated Method     % Neutrophils 56.2 %    % Lymphocytes 23.6 %    % Monocytes 16.7 %    % Eosinophils 2.7 %    % Basophils 0.6 %    % Immature Granulocytes 0.2 %    Nucleated RBCs 0 0 /100    Absolute Neutrophil 2.7 1.6 - 8.3 10e9/L    Absolute Lymphocytes 1.1 0.8 - 5.3 10e9/L    Absolute Monocytes 0.8 0.0 - 1.3 10e9/L    Absolute Eosinophils 0.1 0.0 - 0.7 10e9/L    Absolute Basophils 0.0 0.0 - 0.2 10e9/L    Abs Immature Granulocytes 0.0 0 - 0.4 10e9/L    Absolute Nucleated RBC 0.0    Lipase   Result Value Ref Range    Lipase 903 (H) 73 - 393 U/L   Magnesium   Result Value Ref Range    Magnesium 2.3 1.6 - 2.3 mg/dL

## 2018-06-27 NOTE — PROVIDER NOTIFICATION
MD Notification    Notified Person: MD    Notified Person Name:  Giovanni    Notification Date/Time: 6/27/18, 0055    Notification Interaction: Spoke to MD    Purpose of Notification: Patient is withdrawing from alcohol and behaviors are escalating.  Recent CIWA scores of 9, 13. Has received multiple doses of ativan and a PRN dose of seroquel.     Orders Received: MD to enter order for valium    Comments:

## 2018-06-27 NOTE — PLAN OF CARE
Problem: Patient Care Overview  Goal: Plan of Care/Patient Progress Review  Outcome: Declining  Pt arrived from medical floor at 0400 in 4-point hard restraints; extremely combative and agitation. Pt intubated for airway protection at 0420, hard restraints removed and soft wrist restraints applied.  N: Propofol for sedation. PERRL. Moves all extremities. Not following commands.  CV: Tele shows SR. BPs elevated.   Pulm: Lung sounds clear. Tolerating full vent support.  GI/: Redmond patent with good UO. OG to LIS. BG WNL.  Skin: Abrasion on right great toe.    RN attempted to contact family, voicemail left. Will continue to monitor closely.

## 2018-06-27 NOTE — SIGNIFICANT EVENT
Patient behaviors started escalating at 2300. Patient became increasingly confused and agitated, was disoriented to time, place, and situation. Was hallucinating, described seeing other people in the room. Speaking illogically. Was restless and would not follow directions. Ativan administered for CIWA score 13. Code 21 called as patient started to become aggressive towards staff. Was assisted back to room and a second code 21 was called as patient continued to be aggressive towards staff and started to walk down the hallway. Put in hard restraints at 0200 per orders. Patient fighting restraints, kicking and continued to speak illogically. Was started on precedex drip and transferred to ICU. Report called to [facility].

## 2018-06-27 NOTE — PROGRESS NOTES
[facility] ICU RESPIRATORY NOTE  Date of Admission: 6/25/2018  Date of Intubation (most recent): 6/27/18  Reason for Mechanical Ventilation: Airway protection  Number of Days on Mechanical Ventilation: 1  Met Criteria for Pressure Support Trial: No  Length of Pressure Support Trial:  Reason for Stopping Pressure Support Trial:  Reason for No Pressure Support Trial: Per MD, Patient intubated early this am    Ventilation Mode: CMV/AC  (Continuous Mandatory Ventilation/ Assist Control)  FiO2 (%): 35 %  Rate Set (breaths/minute): 14 breaths/min  Tidal Volume Set (mL): 550 mL  PEEP (cm H2O): 5 cmH2O  Oxygen Concentration (%): 35 %  Resp: 14    Significant Events Today:    ABG Results:      Recent Labs  Lab 06/27/18  0725   PH 7.42   PCO2 41   PO2 165*   HCO3 26   O2PER 40% VENT       Plan:    Continue full ventilatory support.  Assess for weaning readiness daily.

## 2018-06-27 NOTE — CONSULTS
"CLINICAL NUTRITION SERVICES  -  ASSESSMENT NOTE      Recommendations Ordered by Registered Dietitian (RD):   Begin TF via OG with Peptamen 1.5 at 15 mL/hr and hold at this rate for tolerance. This will provide 540 kcal, 25 gm protein (0.3 gm/kg), 68 gm CHO, no fiber, and 277 mL H2O.  Total (TF + Propofol) = 1030 kcal (13 kcal/kg)  Water flushes 60 mL q 4     Future/Additional Recommendations:   Will monitor labs and tolerance    May consider placing post-pyloric FT if OG feedings not well tolerated with pancreatitis      Malnutrition:   % Weight Loss:  None noted  % Intake: <75% for > 7 days (non-severe malnutrition)  Subcutaneous Fat Loss:  None observed  Muscle Loss:  None observed  Fluid Retention:  Mild 2+ LE (likely not nutrition related)    Malnutrition Diagnosis: Patient does not meet two of the above criteria diagnosing for malnutrition         REASON FOR ASSESSMENT  Db Watson is a 52 year old male seen by Registered Dietitian for Provider Order - Registered Dietitian to Assess and Order TF per Medical Nutrition protocol      NUTRITION HISTORY  Information obtained from chart:  - ETOH, 3-4 drinks 3 times weekly. When \"bored\", drinking escalates (1-2 drinks per night). Pt stated that he has been trying to cut down to 1-2 drinks per week while he looks for a job.  - H&P notes that pt admitted with 2 weeks of nausea and vomiting with PO intake, along with dull abdominal pain. No constipation or diarrhea noted.    Unable to obtain intake history as pt intubated and no family at bedside      CURRENT NUTRITION ORDERS  Diet Order:     NPO (6/25 CLD, 6/26 FLD-->Regular, 6/27 NPO)  - Intubated 6/27      Current Intake/Tolerance:  - Was tolerating a regular diet yesterday (6/26)  - Per flow sheets, 100% meals TID consumed yesterday     - 6/27 at 0300 = code 21 called, escalating pt behaviors of aggression, hallucinating (-->lead to intubation for airway protection)    - 200 mL OG output thus far    PHYSICAL " "FINDINGS  Observed  No nutrition-related physical findings observed  Obtained from Chart/Interdisciplinary Team  Edema 2+ mild    ANTHROPOMETRICS  Height: 5' 8\"  Weight: 225 lbs (102.5 kg)  BMI: 34.36 kg/(m^2).  Weight Status:  Obesity Grade I BMI 30-34.9  IBW: 70 kg  % IBW: 146%  Weight History: Pt has maintained weight over the past 7 months  Wt Readings from Last 10 Encounters:   06/26/18 102.5 kg (225 lb 15.5 oz)   02/26/18 107.8 kg (237 lb 11.2 oz)   02/16/18 99.8 kg (220 lb)   02/10/18 99.8 kg (220 lb)   11/28/17 99.8 kg (220 lb)   11/27/17 99.8 kg (220 lb)   11/27/17 100.9 kg (222 lb 6.4 oz)   11/07/17 101.6 kg (224 lb)   11/06/17 102.7 kg (226 lb 6.4 oz)   07/17/17 110.2 kg (243 lb)       LABS  Labs reviewed  K 3.4 (NL), Mg 2.3 (NL, yesterday), No phos    MEDICATIONS  Medications reviewed  LR IVF at 125 mL/hr  Propofol at 18.5 ml/hr = 488 kcal    ASSESSED NUTRITION NEEDS PER APPROVED PRACTICE GUIDELINES:    Dosing Weight (78 kg adjusted)   Estimated Energy Needs: 7897-4965 kcals (25-30 Kcal/Kg)  Justification: obese and vented  Estimated Protein Needs:  grams protein (1.2-1.5 g pro/Kg)  Justification: maintenance  Estimated Fluid Needs: 9541-1916 mL (1 mL/Kcal)  Justification: maintenance and per provider pending fluid status    MALNUTRITION:  % Weight Loss:  None noted  % Intake: <75% for > 7 days (non-severe malnutrition)  Subcutaneous Fat Loss:  None observed  Muscle Loss:  None observed  Fluid Retention:  Mild 2+ LE (likely not nutrition related)    Malnutrition Diagnosis: Patient does not meet two of the above criteria diagnosing for malnutrition       NUTRITION DIAGNOSIS:  Inadequate protein-energy intake related to recently NPO and vented as evidenced by propofol meeting 25% energy needs and 0% protein needs today.       NUTRITION INTERVENTIONS  Recommendations / Nutrition Prescription  Begin TF via OG with Peptamen 1.5 at 15 mL/hr and hold at this rate for tolerance. This will provide 540 kcal, " 25 gm protein (0.3 gm/kg), 68 gm CHO, no fiber, and 277 mL H2O.  Total (TF + Propofol) = 1030 kcal (13 kcal/kg)  Water flushes 60 mL q 4      Implementation  Nutrition education: Not appropriate at this time due to patient condition  EN Composition, EN Schedule, and Feeding Tube Flush - ordered as above  Collaboration and Referral of Nutrition care: Discussed pt POC during ICU rounds. Discussed FT placement with MD this morning (OG vs Post-pyloric)       Nutrition Goals  EN to meet % of estimated needs within 48 hrs.       MONITORING AND EVALUATION:  Progress towards goals will be monitored and evaluated per protocol and Practice Guidelines              Stephanie Thompson, Nutrition Services

## 2018-06-27 NOTE — CODE/RAPID RESPONSE
[facility]  RRT Note:  CODE 21/Restraint check  6/27/2018   Time Called: 0112  Code Status: Full Code    Assessment & Plan   IMPRESSION & PLAN:  I was paged to the bedside to evaluate Mr. Db Watson for an acute episode of agitation and delirium in the setting of acute alcohol withdrawal and alcoholic pancreatitis. Bedside nursing staff contacted covering hospitalist in regards to increasing CIWA-Ar scores. When I arrived to the bedside, patient was being escorted back to room by hospital security. Patient is alert and agitated, did not follow direction well. Patient is oriented to self, disoriented to time, situation, or location. Patient has illogical thoughts and flight of ideas. Patient was initially redirectable and allowed the ICU nurses to put in an IV. After approximately 15 mins, he became combative and attempted to run down the hallway to the elevators. Security was called and put patient back in bed. We restrained him and gave more ativan. I ordered a total of 14 mg of ativan. Patient became increasingly combative, more hypertensive, and more tachycardic. I ordered and initiated a dexmedetomidine infusion. We up titrated the precedex to 1mcg/kg/min, which the patient hasn't responded to. On transfer, patient had a HR of 150 bpm, and was extremely agitated. I notified the intensivist of this transfer in the chance the patient needs an advanced airway and further sedation.    Danger to Self (self-injurious) or Others (violent/combative):   -- Accepted verbal de-escalation, distraction, re-direction to mostly cooperative.  -- no injury to patient or staff  -- sitter at bedside  -- restraints applied without issue by security and psychiatric techs    INTERVENTIONS:  -- verbal deescalation   -- 2 mg IV ativan x 5 + 4 mg IV ativan = 14 mg IV ativan   -- new IV insertion  -- 5 point restraints  -- dexmedetomidine started at 1mcg/kg/min   -- transfer to ICU    Interval History   From H&P   Db  Walter is a markedly pleasant 52 year old gentleman with chronic active alcohol abuse and suspected dependence as well as chronic anxiety and insomnia, prior SBO due to ventral hernia, hypertension, dyslipidemia and Meniere's Disease who presents with nausea, vomiting, abdominal pain and tremor and is found to have suspected alcoholic hepatitis and pancreatitis.    Medical history significant for:   Past Medical History:   Diagnosis Date     Abnormal MRI of head 1/11/2013     Atypical chest pain 9/4/2012     BMI 33.0-33.9,adult 5/24/2013     Hyperlipidemia LDL goal <160 5/4/2013     Hypertension      Ménière's disease      Past Surgical History:   Procedure Laterality Date     C NONSPECIFIC PROCEDURE      shoulder surgery for dislocation      C NONSPECIFIC PROCEDURE      eye surgery, lens implant right     EYE SURGERY       FRACTURE TX, WRIST RT/LT      Fracture TX Wrist RT/LT     ORTHOPEDIC SURGERY         Allergies   Allergies   Allergen Reactions     Zoloft [Sertraline] Nausea and Vomiting       Physical Exam   Vital Signs with Ranges:  Temp:  [97.9  F (36.6  C)-99  F (37.2  C)] 97.9  F (36.6  C)  Heart Rate:  [] 102  Resp:  [16-18] 18  BP: (147-161)/() 147/96  SpO2:  [94 %-95 %] 94 %  I/O last 3 completed shifts:  In: 3782 [P.O.:2170; I.V.:1612]  Out: -     Physical Exam   Constitutional: He is well-developed, well-nourished, and in no distress. No distress.   HENT:   Head: Normocephalic and atraumatic.   Eyes: Pupils are equal, round, and reactive to light.   Neck: Normal range of motion.   Cardiovascular: Normal rate and regular rhythm.  Exam reveals no friction rub.    No murmur heard.  Pulmonary/Chest: Effort normal. No respiratory distress.   Abdominal: Soft. He exhibits no distension.   Musculoskeletal: Normal range of motion.   Neurological: He is alert. He has normal motor skills. He is agitated and disoriented. He displays tremor. A sensory deficit is present.   Skin: Skin is warm and dry. He  is not diaphoretic.   Psychiatric: His mood appears anxious. He is agitated.     Data     IMAGING: (X-ray/CT/MRI)   No results found for this or any previous visit (from the past 24 hour(s)).    CBC with Diff:  Recent Labs   Lab Test  06/26/18   0755   WBC  4.8   HGB  14.1   MCV  101*   PLT  76*      No results found for: RETICABSCT  No results found for: RETP    Lactic Acid:    Lab Results   Component Value Date    LACT 0.9 02/17/2018           Comprehensive Metabolic Panel:    Recent Labs  Lab 06/26/18  0755      POTASSIUM 3.5   CHLORIDE 101   CO2 26   ANIONGAP 10   *   BUN 6*   CR 0.68   GFRESTIMATED >90   GFRESTBLACK >90   DEMETRIA 8.6   MAG 2.3   PROTTOTAL 8.4   ALBUMIN 3.8   BILITOTAL 2.1*   ALKPHOS 113   *   ALT 91*       UA:  No results for input(s): COLOR, APPEARANCE, URINEGLC, URINEBILI, URINEKETONE, SG, UBLD, URINEPH, PROTEIN, UROBILINOGEN, NITRITE, LEUKEST, RBCU, WBCU in the last 168 hours.      Time Spent on this Encounter   I spent 120 minutes (3549 - 0312) of critical care time on the unit/floor managing the care of Db Watson. Upon evaluation, this patient had a high probability of imminent or life-threatening deterioration due to acute alcohol withdrawal, which required my direct attention, intervention, and personal management. 100% of my time was spent at the bedside counseling the patient and/or coordinating care regarding services listed in this note.    LAURY Reddy, CNP  Hospitalist - [facility]  Text Page  [phone])

## 2018-06-27 NOTE — PROGRESS NOTES
[facility] ICU RESPIRATORY NOTE  Date of Admission:6/25  Date of Intubation (most recent):6/27/2018  Reason for Mechanical Ventilation:Airway protection  Number of Days on Mechanical Ventilation: 1  Met Criteria for Pressure Support Trial: no  Length of Pressure Support Trial:  Reason for Stopping Pressure Support Trial:  Reason for No Pressure Support Trial:per MD  Ventilation Mode: CMV/AC  (Continuous Mandatory Ventilation/ Assist Control)  Rate Set (breaths/minute): 14 breaths/min  Tidal Volume Set (mL): 550 mL  PEEP (cm H2O): 5 cmH2O  Oxygen Concentration (%): 40 %  Resp: 14      ABG Results:  No lab results found in last 7 days.    ETT appearance on chest x-ray: good position    Plan:  Continue full vent support  Tonight, assess for weaning readiness daily

## 2018-06-27 NOTE — PROGRESS NOTES
.Current condition (current mood & behavior): Sedated on ventilator  Sitter present: no  Every 15 minute documentation by NA/RN completed for Shift: no  Room safety Suicide Checklist completed in Epic: no  Patient's color of severity (suicide scale): YELLOW completed on admission  Order for psych consult placed (if appropriate): no  Suicide care plan added: no      Demetrius Ibarra

## 2018-06-27 NOTE — PROGRESS NOTES
Intensivist Daily Note  06/27/2018      Brief History:  52 yom admitted with acute EtOH pancreatitis and EtOH withdrawal, intubated for airway protection in setting of acute withdrawal.    Interval Events:  -6/27: intubated, sedated and hemodynamically stable.     PMH:  Past Medical History:   Diagnosis Date     Abnormal MRI of head 1/11/2013     Atypical chest pain 9/4/2012     BMI 33.0-33.9,adult 5/24/2013     Hyperlipidemia LDL goal <160 5/4/2013     Hypertension      Ménière's disease        PSurgHx:  Past Surgical History:   Procedure Laterality Date     C NONSPECIFIC PROCEDURE      shoulder surgery for dislocation      C NONSPECIFIC PROCEDURE      eye surgery, lens implant right     EYE SURGERY       FRACTURE TX, WRIST RT/LT      Fracture TX Wrist RT/LT     ORTHOPEDIC SURGERY         Family History:  Family History     Problem (# of Occurrences) Relation (Name,Age of Onset)    C.A.D. (1) Father: bypass surgery    Diabetes (1) Father    Hypertension (1) Mother          Social History:  Social History     Social History     Marital status: Single     Spouse name: N/A     Number of children: N/A     Years of education: N/A     Occupational History     Not on file.     Social History Main Topics     Smoking status: Never Smoker     Smokeless tobacco: Never Used      Comment: rarely, once per year maybe      Alcohol use Yes      Comment: 10 drinks / week      Drug use: No     Sexual activity: Yes     Partners: Female     Other Topics Concern     Parent/Sibling W/ Cabg, Mi Or Angioplasty Before 65f 55m? Yes     Social History Narrative       Allergy:  Allergies   Allergen Reactions     Zoloft [Sertraline] Nausea and Vomiting        Medications:  Current Facility-Administered Medications   Medication     acetaminophen (TYLENOL) tablet 325 mg     amLODIPine (NORVASC) tablet 2.5 mg     bisacodyl (DULCOLAX) Suppository 10 mg     chlorhexidine (PERIDEX) 0.12 % solution 15 mL     diazepam (VALIUM) tablet 20 mg      docusate sodium (COLACE) capsule 100 mg     enoxaparin (LOVENOX) injection 40 mg     folic acid (FOLVITE) tablet 1 mg     gabapentin (NEURONTIN) capsule 300 mg     HYDROmorphone (PF) (DILAUDID) injection 0.3-0.5 mg     lactated ringers infusion     lidocaine (LMX4) cream     lidocaine 1 % 1 mL     LORazepam (ATIVAN) tablet 1-2 mg    Or     LORazepam (ATIVAN) injection 1-2 mg     LORazepam (ATIVAN) injection 2 mg     LORazepam (ATIVAN) tablet 1 mg     magnesium sulfate 2 g in water intermittent infusion     magnesium sulfate 4 g in 100 mL sterile water (premade)     melatonin tablet 3 mg     metoprolol succinate (TOPROL-XL) 24 hr tablet 50 mg     multivitamin, therapeutic with minerals (THERA-VIT-M) tablet 1 tablet     naloxone (NARCAN) injection 0.1-0.4 mg     ondansetron (ZOFRAN-ODT) ODT tab 4 mg    Or     ondansetron (ZOFRAN) injection 4 mg     oxyCODONE IR (ROXICODONE) tablet 5-10 mg     pantoprazole (PROTONIX) 40 mg IV push injection     polyethylene glycol (MIRALAX/GLYCOLAX) Packet 17 g     potassium chloride (KLOR-CON) Packet 20-40 mEq     potassium chloride 10 mEq in 100 mL intermittent infusion with 10 mg lidocaine     potassium chloride 10 mEq in 100 mL sterile water intermittent infusion (premix)     potassium chloride 20 mEq in 50 mL intermittent infusion     potassium chloride SA (K-DUR/KLOR-CON M) CR tablet 20-40 mEq     propofol (DIPRIVAN) infusion     QUEtiapine (SEROquel) tablet 25 mg     senna-docusate (SENOKOT-S;PERICOLACE) 8.6-50 MG per tablet 1 tablet    Or     senna-docusate (SENOKOT-S;PERICOLACE) 8.6-50 MG per tablet 2 tablet     sodium chloride (PF) 0.9% PF flush 3 mL     sodium chloride (PF) 0.9% PF flush 3 mL     thiamine tablet 100 mg     valproate (DEPACON) 500 mg in sodium chloride 0.9 % 50 mL intermittent infusion         Physical examination:  Vital signs:  Temp: 97.3  F (36.3  C) Temp src: Oral BP: 128/85   Heart Rate: 79 Resp: 14 SpO2: 98 % O2 Device: Mechanical Ventilator       General: intubated, sedated.  NAD  HEENT: neck supple, symmetrical  Lungs: Clear to auscultation, no wheezing  CVS: Normal S1 & S2, no murmur  Abdomen: Bowel sounds present, soft, non tender  Extremities/musculoskeletal: no edema  Skin: no rash  Exam of Line sites: No erythema or discharge.    Data:    ROUTINE ICU LABS (Last four results)  CMP  Recent Labs  Lab 06/26/18  0755 06/26/18  0450 06/25/18  1930     --  141   POTASSIUM 3.5  --  3.2*   CHLORIDE 101  --  102   CO2 26  --  31   ANIONGAP 10  --  8   *  --  111*   BUN 6*  --  6*   CR 0.68  --  0.71   GFRESTIMATED >90  --  >90   GFRESTBLACK >90  --  >90   DEMETRIA 8.6  --  9.2   MAG 2.3 2.5* 1.5*   PROTTOTAL 8.4  --  8.0   ALBUMIN 3.8  --  3.7   BILITOTAL 2.1*  --  1.3   ALKPHOS 113  --  110   *  --  171*   ALT 91*  --  97*     CBC  Recent Labs  Lab 06/26/18  0755 06/25/18  1930   WBC 4.8 4.0   RBC 4.07* 4.04*   HGB 14.1 14.1   HCT 40.9 40.0   * 99   MCH 34.6* 34.9*   MCHC 34.5 35.3   RDW 13.4 13.3   PLT 76* 72*     INRNo lab results found in last 7 days.  Arterial Blood GasNo lab results found in last 7 days.    Recent Results (from the past 24 hour(s))   XR Chest Port 1 View    Narrative    XR CHEST PORT 1 VW  6/27/2018 4:35 AM     INDICATION: ETT placement.    COMPARISON: 8/28/2012.      Impression    IMPRESSION: ETT tip at the level of the aortic arch. Enteric tube tip  projects off of the lower aspect of the film. No focal air-space  disease. Heart size within normal limits.    HUSEYIN LOZOYA MD         Assessment and Plan:    Neuro  ### Acute EtOH Withdrawal Syndrome  ### Sedation/Analgesia  ### Meniere's disease  -Propofol gtt  -Valproate + Gabapentin  -APAP and Dilaudid prns available  -Restart PTA meclizine once extubated and able to take PO    Respiratory  ### Acute Hypoxic Respiratory failure  -intubated for airway protection  -CXR this morning: ETT in good position, no other acute abnormality    CV:  -tachycardic,  hypertensive related to withdrawal, treat as above  -monitor hemodynamics    GI/Liver:  ### EtOH Pancreatitis  -Lipase 903, mildly elevated AST/ALT and bili  -Cont LR @ 125 cc/hr  -Abdominal US 6/25 showing fatty liver    Heme:  ### Thrombocytopenia -- stable  -monitor    Endocrine:  -monitor glucose, insulin management per unit protocol    FEN:  -thiamine, folate, and multi-vitamin  -nutrition consult for feeds today  -continue LR @ 125 cc/hr  -monitor lytes, replete per unit protocol    Prophy:  GI: PPI  DVT: LMWH      ICU Cares:  Restrain needed: Yes   Lines needed: Yes    Code: Full  Dispo: ICU status      Billing: Critical care time 30 min excluding procedure time in addition to time already spent today by Dr. Teran.        Kadie Ramirez MD  Pulmonary and Critical Care Medicine

## 2018-06-27 NOTE — PROGRESS NOTES
Modified Herbert's test performed prior to right radial ABG draw. Collateral circulation confirmed.

## 2018-06-27 NOTE — PROGRESS NOTES
Chart check:  Events noted, patient transferred to the ICU for severe alcohol withdrawal, now intubated on the vent.  Discussed with ICU staff.  We will sign off for now.  Please call us when patient ready to transfer out of the ICU.

## 2018-06-28 LAB
ALBUMIN SERPL-MCNC: 2.7 G/DL (ref 3.4–5)
ALP SERPL-CCNC: 82 U/L (ref 40–150)
ALT SERPL W P-5'-P-CCNC: 61 U/L (ref 0–70)
AST SERPL W P-5'-P-CCNC: 82 U/L (ref 0–45)
BILIRUB DIRECT SERPL-MCNC: 0.7 MG/DL (ref 0–0.2)
BILIRUB SERPL-MCNC: 1.6 MG/DL (ref 0.2–1.3)
BUN SERPL-MCNC: 7 MG/DL (ref 7–30)
CALCIUM SERPL-MCNC: 7.7 MG/DL (ref 8.5–10.1)
CHLORIDE SERPL-SCNC: 112 MMOL/L (ref 94–109)
CO2 SERPL-SCNC: 24 MMOL/L (ref 20–32)
CREAT SERPL-MCNC: 0.61 MG/DL (ref 0.66–1.25)
GFR SERPL CREATININE-BSD FRML MDRD: >90 ML/MIN/1.7M2
GLUCOSE BLDC GLUCOMTR-MCNC: 115 MG/DL (ref 70–99)
GLUCOSE BLDC GLUCOMTR-MCNC: 128 MG/DL (ref 70–99)
GLUCOSE BLDC GLUCOMTR-MCNC: 95 MG/DL (ref 70–99)
GLUCOSE BLDC GLUCOMTR-MCNC: 98 MG/DL (ref 70–99)
GLUCOSE SERPL-MCNC: 81 MG/DL (ref 70–99)
LIPASE SERPL-CCNC: 439 U/L (ref 73–393)
MAGNESIUM SERPL-MCNC: 2.1 MG/DL (ref 1.6–2.3)
PHOSPHATE SERPL-MCNC: 2.5 MG/DL (ref 2.5–4.5)
PLATELET # BLD AUTO: 70 10E9/L (ref 150–450)
POTASSIUM SERPL-SCNC: 3.5 MMOL/L (ref 3.4–5.3)
PROT SERPL-MCNC: 6.5 G/DL (ref 6.8–8.8)
SODIUM SERPL-SCNC: 145 MMOL/L (ref 133–144)

## 2018-06-28 PROCEDURE — 36415 COLL VENOUS BLD VENIPUNCTURE: CPT | Performed by: SURGERY

## 2018-06-28 PROCEDURE — 84100 ASSAY OF PHOSPHORUS: CPT | Performed by: SURGERY

## 2018-06-28 PROCEDURE — 36415 COLL VENOUS BLD VENIPUNCTURE: CPT | Performed by: HOSPITALIST

## 2018-06-28 PROCEDURE — 25000128 H RX IP 250 OP 636: Performed by: HOSPITALIST

## 2018-06-28 PROCEDURE — 80048 BASIC METABOLIC PNL TOTAL CA: CPT | Performed by: SURGERY

## 2018-06-28 PROCEDURE — 83690 ASSAY OF LIPASE: CPT | Performed by: SURGERY

## 2018-06-28 PROCEDURE — 25000125 ZZHC RX 250: Performed by: SURGERY

## 2018-06-28 PROCEDURE — 20000003 ZZH R&B ICU

## 2018-06-28 PROCEDURE — 00000146 ZZHCL STATISTIC GLUCOSE BY METER IP

## 2018-06-28 PROCEDURE — 25000132 ZZH RX MED GY IP 250 OP 250 PS 637: Performed by: INTERNAL MEDICINE

## 2018-06-28 PROCEDURE — 25000125 ZZHC RX 250: Performed by: INTERNAL MEDICINE

## 2018-06-28 PROCEDURE — 83735 ASSAY OF MAGNESIUM: CPT | Performed by: SURGERY

## 2018-06-28 PROCEDURE — 99291 CRITICAL CARE FIRST HOUR: CPT | Performed by: INTERNAL MEDICINE

## 2018-06-28 PROCEDURE — 25000132 ZZH RX MED GY IP 250 OP 250 PS 637: Performed by: HOSPITALIST

## 2018-06-28 PROCEDURE — 40000008 ZZH STATISTIC AIRWAY CARE

## 2018-06-28 PROCEDURE — 25000128 H RX IP 250 OP 636: Performed by: SURGERY

## 2018-06-28 PROCEDURE — 40000275 ZZH STATISTIC RCP TIME EA 10 MIN

## 2018-06-28 PROCEDURE — 80076 HEPATIC FUNCTION PANEL: CPT | Performed by: SURGERY

## 2018-06-28 PROCEDURE — 85049 AUTOMATED PLATELET COUNT: CPT | Performed by: HOSPITALIST

## 2018-06-28 PROCEDURE — 94003 VENT MGMT INPAT SUBQ DAY: CPT

## 2018-06-28 RX ADMIN — PROPOFOL 35 MCG/KG/MIN: 10 INJECTION, EMULSION INTRAVENOUS at 10:26

## 2018-06-28 RX ADMIN — Medication 0.5 MG: at 15:10

## 2018-06-28 RX ADMIN — Medication 0.5 MG: at 00:02

## 2018-06-28 RX ADMIN — METOPROLOL TARTRATE 50 MG: 100 TABLET ORAL at 21:43

## 2018-06-28 RX ADMIN — AMLODIPINE BESYLATE 2.5 MG: 2.5 TABLET ORAL at 08:04

## 2018-06-28 RX ADMIN — GABAPENTIN 300 MG: 250 SUSPENSION ORAL at 21:43

## 2018-06-28 RX ADMIN — PROPOFOL 50 MCG/KG/MIN: 10 INJECTION, EMULSION INTRAVENOUS at 17:46

## 2018-06-28 RX ADMIN — PROPOFOL 30 MCG/KG/MIN: 10 INJECTION, EMULSION INTRAVENOUS at 06:00

## 2018-06-28 RX ADMIN — SODIUM CHLORIDE, POTASSIUM CHLORIDE, SODIUM LACTATE AND CALCIUM CHLORIDE: 600; 310; 30; 20 INJECTION, SOLUTION INTRAVENOUS at 07:48

## 2018-06-28 RX ADMIN — PROPOFOL 50 MCG/KG/MIN: 10 INJECTION, EMULSION INTRAVENOUS at 15:06

## 2018-06-28 RX ADMIN — PANTOPRAZOLE SODIUM 40 MG: 40 INJECTION, POWDER, FOR SOLUTION INTRAVENOUS at 21:42

## 2018-06-28 RX ADMIN — Medication 100 MG: at 08:04

## 2018-06-28 RX ADMIN — Medication 0.5 MG: at 20:56

## 2018-06-28 RX ADMIN — Medication 0.5 MG: at 23:47

## 2018-06-28 RX ADMIN — VALPROATE SODIUM 500 MG: 100 INJECTION, SOLUTION INTRAVENOUS at 15:02

## 2018-06-28 RX ADMIN — PROPOFOL 30 MCG/KG/MIN: 10 INJECTION, EMULSION INTRAVENOUS at 02:00

## 2018-06-28 RX ADMIN — GABAPENTIN 300 MG: 250 SUSPENSION ORAL at 08:04

## 2018-06-28 RX ADMIN — VALPROATE SODIUM 500 MG: 100 INJECTION, SOLUTION INTRAVENOUS at 04:33

## 2018-06-28 RX ADMIN — SODIUM CHLORIDE, POTASSIUM CHLORIDE, SODIUM LACTATE AND CALCIUM CHLORIDE 1000 ML: 600; 310; 30; 20 INJECTION, SOLUTION INTRAVENOUS at 02:18

## 2018-06-28 RX ADMIN — PANTOPRAZOLE SODIUM 40 MG: 40 INJECTION, POWDER, FOR SOLUTION INTRAVENOUS at 08:03

## 2018-06-28 RX ADMIN — CHLORHEXIDINE GLUCONATE 15 ML: 1.2 RINSE ORAL at 20:51

## 2018-06-28 RX ADMIN — GABAPENTIN 300 MG: 250 SUSPENSION ORAL at 15:03

## 2018-06-28 RX ADMIN — PROPOFOL 40 MCG/KG/MIN: 10 INJECTION, EMULSION INTRAVENOUS at 21:42

## 2018-06-28 RX ADMIN — Medication 15 ML: at 08:04

## 2018-06-28 RX ADMIN — DOCUSATE SODIUM 100 MG: 50 LIQUID ORAL at 21:42

## 2018-06-28 RX ADMIN — FOLIC ACID 1 MG: 1 TABLET ORAL at 08:04

## 2018-06-28 RX ADMIN — Medication 0.5 MG: at 04:13

## 2018-06-28 RX ADMIN — DOCUSATE SODIUM 100 MG: 50 LIQUID ORAL at 08:03

## 2018-06-28 RX ADMIN — CHLORHEXIDINE GLUCONATE 15 ML: 1.2 RINSE ORAL at 08:05

## 2018-06-28 RX ADMIN — METOPROLOL TARTRATE 50 MG: 100 TABLET ORAL at 08:04

## 2018-06-28 NOTE — PROGRESS NOTES
[facility] ICU RESPIRATORY NOTE  Date of Admission: 6/25/2018  Date of Intubation (most recent):6/27/18  Reason for Mechanical Ventilation: Airway protection  Number of Days on Mechanical Ventilation: 2  Reason for No Pressure Support Trial: resting for the night  Ventilation Mode: CMV/AC  (Continuous Mandatory Ventilation/ Assist Control)  FiO2 (%): 35 %  Rate Set (breaths/minute): 14 breaths/min  Tidal Volume Set (mL): 550 mL  PEEP (cm H2O): 5 cmH2O  Oxygen Concentration (%): 35 %  Resp: 14      Significant Events Today: none    ABG Results:    Recent Labs  Lab 06/27/18  0725   PH 7.42   PCO2 41   PO2 165*   HCO3 26   O2PER 40% VENT         ETT appearance on chest x-ray: same position    Plan:  Pt remains full vent support. RT will assess for daily PS trial in the morning.  6/28/2018  Varinder Foster

## 2018-06-28 NOTE — PROGRESS NOTES
TELE ICU    Diminished UO over the course of the last 2-3 hours with tara colored urine in the setting of hospitalization for acute pancreatitis.  Fluid challenge with 1L LR and reassess.      Natasha Teran  2:15 AM

## 2018-06-28 NOTE — PLAN OF CARE
Problem: Patient Care Overview  Goal: Plan of Care/Patient Progress Review  Outcome: No Change  Patient sedated, unable to assess orientation. RASS goal of -3 to -4. PERLLA. Responds to painful stimuli. Does not follow commands. Tele: SR with HR 70s-80s. Lung sounds clear/diminished on CMV. Inline suction with small amount of creamy secretions. Hypoactive bowel sounds. OG with tube feeding set at 15 mL/hr with 60 mL water flushes Q4 hours. No BM this shift. Urine output low, spoke to Dr. Hernandez, 1 L LR bolus given with improvement. Propofol gtt at 35 mcg/kg/min. IV dilaudid given x2 for pain. Rash noted on inner thighs, right buttock, and lower abdomen, Dr. Hernandez aware. Left great toe CDI. Continue to monitor.     Spoke with Blu, patient's brother, on the phone. He would like to be updated if there are any changes.

## 2018-06-28 NOTE — PROGRESS NOTES
Sedation decreased, pt able to follow commands. Did get restless, trying to sit up in bed and thrashing head side to side. SBT for 1hr (5/5). HR increased from 70s-100s, two occurences high RR, and pt became hypertensive. Based on the previous statement pt was not ready for extubation and flipped back to full support. TF increased from 15-30. UOP increasing throughout day, now dark green in color. Brother Blu updated over the phone, he plans on being here tomorrow afternoon. Continue to monitor liver labs and SBT again tomorrow.

## 2018-06-28 NOTE — PROGRESS NOTES
Intensivist Daily Note  06/28/2018      Brief History:  52 yom admitted with acute EtOH pancreatitis and EtOH withdrawal, intubated for airway protection in setting of acute withdrawal.    Interval Events:  -6/27: intubated, sedated and hemodynamically stable.   -6/28: started tube feeds yesterday, tolerating well.  1L IVF overnight for low UOP.    PMH:  Past Medical History:   Diagnosis Date     Abnormal MRI of head 1/11/2013     Atypical chest pain 9/4/2012     BMI 33.0-33.9,adult 5/24/2013     Hyperlipidemia LDL goal <160 5/4/2013     Hypertension      Ménière's disease        PSurgHx:  Past Surgical History:   Procedure Laterality Date     C NONSPECIFIC PROCEDURE      shoulder surgery for dislocation      C NONSPECIFIC PROCEDURE      eye surgery, lens implant right     EYE SURGERY       FRACTURE TX, WRIST RT/LT      Fracture TX Wrist RT/LT     ORTHOPEDIC SURGERY         Family History:  Family History     Problem (# of Occurrences) Relation (Name,Age of Onset)    C.A.D. (1) Father: bypass surgery    Diabetes (1) Father    Hypertension (1) Mother          Social History:  Social History     Social History     Marital status: Single     Spouse name: N/A     Number of children: N/A     Years of education: N/A     Occupational History     Not on file.     Social History Main Topics     Smoking status: Never Smoker     Smokeless tobacco: Never Used      Comment: rarely, once per year maybe      Alcohol use Yes      Comment: 10 drinks / week      Drug use: No     Sexual activity: Yes     Partners: Female     Other Topics Concern     Parent/Sibling W/ Cabg, Mi Or Angioplasty Before 65f 55m? Yes     Social History Narrative       Allergy:  Allergies   Allergen Reactions     Zoloft [Sertraline] Nausea and Vomiting        Medications:  Current Facility-Administered Medications   Medication     acetaminophen (TYLENOL) tablet 325 mg     amLODIPine (NORVASC) tablet 2.5 mg     bisacodyl (DULCOLAX) Suppository 10 mg      chlorhexidine (PERIDEX) 0.12 % solution 15 mL     dextrose 10 % 1,000 mL infusion     diazepam (VALIUM) tablet 20 mg     docusate (COLACE) 50 MG/5ML liquid 100 mg     folic acid (FOLVITE) tablet 1 mg     gabapentin (NEURONTIN) solution 300 mg     HYDROmorphone (PF) (DILAUDID) injection 0.3-0.5 mg     lactated ringers infusion     lidocaine (LMX4) cream     lidocaine 1 % 1 mL     LORazepam (ATIVAN) tablet 1-2 mg    Or     LORazepam (ATIVAN) injection 1-2 mg     LORazepam (ATIVAN) injection 2 mg     LORazepam (ATIVAN) tablet 1 mg     magnesium sulfate 2 g in water intermittent infusion     magnesium sulfate 4 g in 100 mL sterile water (premade)     melatonin tablet 3 mg     metoprolol (LOPRESSOR) suspension 50 mg     multivitamins with minerals (CERTAVITE/CEROVITE) liquid 15 mL     naloxone (NARCAN) injection 0.1-0.4 mg     ondansetron (ZOFRAN-ODT) ODT tab 4 mg    Or     ondansetron (ZOFRAN) injection 4 mg     oxyCODONE IR (ROXICODONE) tablet 5-10 mg     pantoprazole (PROTONIX) 40 mg IV push injection     polyethylene glycol (MIRALAX/GLYCOLAX) Packet 17 g     potassium chloride (KLOR-CON) Packet 20-40 mEq     potassium chloride 10 mEq in 100 mL intermittent infusion with 10 mg lidocaine     potassium chloride 10 mEq in 100 mL sterile water intermittent infusion (premix)     potassium chloride 20 mEq in 50 mL intermittent infusion     potassium chloride SA (K-DUR/KLOR-CON M) CR tablet 20-40 mEq     propofol (DIPRIVAN) infusion     QUEtiapine (SEROquel) tablet 25 mg     senna-docusate (SENOKOT-S;PERICOLACE) 8.6-50 MG per tablet 1 tablet    Or     senna-docusate (SENOKOT-S;PERICOLACE) 8.6-50 MG per tablet 2 tablet     sodium chloride (PF) 0.9% PF flush 3 mL     sodium chloride (PF) 0.9% PF flush 3 mL     thiamine tablet 100 mg     valproate (DEPACON) 500 mg in sodium chloride 0.9 % 50 mL intermittent infusion         Physical examination:  Vital signs:  Temp: 98.6  F (37  C) Temp src: Esophageal BP: 129/79   Heart Rate:  76 Resp: 11 SpO2: 97 % O2 Device: Mechanical Ventilator      General: intubated, sedated.  NAD  HEENT: neck supple, symmetrical  Lungs: Clear to auscultation, no wheezing  CVS: Normal S1 & S2, no murmur  Abdomen: Bowel sounds present, soft, non tender  Extremities/musculoskeletal: no edema  Skin: no rash  Exam of Line sites: No erythema or discharge.    Data:    ROUTINE ICU LABS (Last four results)  CMP    Recent Labs  Lab 06/28/18  0515 06/27/18  0850 06/26/18  0755 06/26/18  0450 06/25/18 1930   * 141 137  --  141   POTASSIUM 3.5 3.4 3.5  --  3.2*   CHLORIDE 112* 109 101  --  102   CO2 24 23 26  --  31   ANIONGAP  --  9 10  --  8   GLC 81 87 104*  --  111*   BUN 7 8 6*  --  6*   CR 0.61* 0.60* 0.68  --  0.71   GFRESTIMATED >90 >90 >90  --  >90   GFRESTBLACK >90 >90 >90  --  >90   DEMETRIA 7.7* 8.2* 8.6  --  9.2   MAG 2.1  --  2.3 2.5* 1.5*   PHOS 2.5  --   --   --   --    PROTTOTAL  --  7.4 8.4  --  8.0   ALBUMIN  --  3.2* 3.8  --  3.7   BILITOTAL  --  2.3* 2.1*  --  1.3   ALKPHOS  --  100 113  --  110   AST  --  102* 146*  --  171*   ALT  --  71* 91*  --  97*     CBC    Recent Labs  Lab 06/28/18  0906 06/27/18  0850 06/26/18  0755 06/25/18 1930   WBC  --  4.8 4.8 4.0   RBC  --  3.95* 4.07* 4.04*   HGB  --  13.6 14.1 14.1   HCT  --  40.1 40.9 40.0   MCV  --  102* 101* 99   MCH  --  34.4* 34.6* 34.9*   MCHC  --  33.9 34.5 35.3   RDW  --  13.2 13.4 13.3   PLT 70* 68* 76* 72*     INRNo lab results found in last 7 days.  Arterial Blood Gas    Recent Labs  Lab 06/27/18  0725   PH 7.42   PCO2 41   PO2 165*   HCO3 26   O2PER 40% VENT       No results found for this or any previous visit (from the past 24 hour(s)).      Assessment and Plan:    Neuro  ### Acute EtOH Withdrawal Syndrome  ### Sedation/Analgesia  ### Meniere's disease  -Propofol gtt -- will attempt to lighten sedation today   -Valproate + Gabapentin  -APAP and Dilaudid prns available  -Restart PTA meclizine once extubated and able to take  PO    Respiratory  ### Acute Hypoxic Respiratory failure  -intubated for airway protection  -CXR this morning: ETT in good position, no other acute abnormality  -Consider SBT if tolerates daily awakening trial    CV:  - no acute issues, monitor hemodynamics    GI/Liver:  ### EtOH Pancreatitis  -Lipase 903, now down-trending to 300-400  -Cont LR @ 125 cc/hr  -Abdominal US 6/25 showing fatty liver    Heme:  ### Thrombocytopenia -- stable  -monitor    Endocrine:  -monitor glucose, insulin management per unit protocol    FEN:  -thiamine, folate, and multi-vitamin  -nutrition consult for feeds today  -continue LR @ 125 cc/hr  -monitor lytes, replete per unit protocol    Prophy:  GI: PPI  DVT: SCDs     ICU Cares:  Restrain needed: Yes   Lines needed: Yes    Code: Full  Dispo: ICU status      Billing: Critical care time 35 min excluding procedure time         Kadie Ramirez MD  Pulmonary and Critical Care Medicine

## 2018-06-28 NOTE — PROGRESS NOTES
BRIEF NUTRITION NOTE:    Was asked by Dr. Ramirez later pm to increase TF rate today.  A full Nutrition Assessment was completed 6/27.  See note for details.    NEW FINDINGS:  TF was started Peptamen 1.5 at 15 mL/hr yesterday at 2000.  Propofol higher at 30.8 mL/hr = 813 kcals (lipid).  Selected labs:  K 3.5 (NL)  Mg 2.1 (NL)  Phos 2.5 (NL) - acceptable  No BM yet.  Wt:  98.7 kg (down 3.8 kg from yesterday) - ? accurate.    INTERVENTIONS:  Enteral Nutrition --> Modify rate --> Entered order in EPIC to increase TF Peptamen 1.5 to 30 mL/hr = 1080 kcals, 49 gm pro (0.6 gm/kg0, 135 gm CHO, 554 mL H20, no fiber.  Total (TF + Propofol):  1893 kcals (24 kcal/kg).    Montserrat Solo, ERICK, LD, CNSC

## 2018-06-28 NOTE — PROGRESS NOTES
[facility] ICU RESPIRATORY NOTE  Date of Admission: 6/25/2018  Date of Intubation (most recent): 6/27/2018  Reason for Mechanical Ventilation: Airway protection  Number of Days on Mechanical Ventilation: 2  Met Criteria for Pressure Support Trial: yes  Length of Pressure Support Trial: one hour and 15 minutes   Reason for Stopping Pressure Support Trial:  Reason for No Pressure Support Trial: Per MD    Ventilation Mode: CMV/AC  (Continuous Mandatory Ventilation/ Assist Control)  FiO2 (%): 35 %  Rate Set (breaths/minute): 14 breaths/min  Tidal Volume Set (mL): 550 mL  PEEP (cm H2O): 5 cmH2O  Pressure Support (cm H2O): 5 cmH2O  Oxygen Concentration (%): 35 %  Resp: 13       Recent Labs  Lab 06/27/18  0725   PH 7.42   PCO2 41   PO2 165*   HCO3 26   O2PER 40% VENT       ABG Results:        Plan: patient remains on full vent support. Will continue to follow

## 2018-06-28 NOTE — PLAN OF CARE
Problem: Alcohol Withdrawal Acute, Risk/Actual (Adult)  Goal: Signs and Symptoms of Listed Potential Problems Will be Absent, Minimized or Managed (Alcohol Withdrawal Acute, Risk/Actual)  Signs and symptoms of listed potential problems will be absent, minimized or managed by discharge/transition of care (reference Alcohol Withdrawal Acute, Risk/Actual (Adult) CPG).   Outcome: No Change  Stable on ventilator.  Withdraws to painful stimuli.  No sedation vacation today.  Reevaluate tomorrow.  Good UOP.  Brother Blu and SO updated.  Per brother patient recently had a job loss from the mortgage industry which he suspects may have increased his drinking.

## 2018-06-29 ENCOUNTER — APPOINTMENT (OUTPATIENT)
Dept: GENERAL RADIOLOGY | Facility: CLINIC | Age: 53
DRG: 166 | End: 2018-06-29
Attending: HOSPITALIST
Payer: COMMERCIAL

## 2018-06-29 LAB
ALBUMIN SERPL-MCNC: 2.7 G/DL (ref 3.4–5)
ALP SERPL-CCNC: 88 U/L (ref 40–150)
ALT SERPL W P-5'-P-CCNC: 60 U/L (ref 0–70)
ANION GAP SERPL CALCULATED.3IONS-SCNC: 6 MMOL/L (ref 3–14)
AST SERPL W P-5'-P-CCNC: 76 U/L (ref 0–45)
BILIRUB DIRECT SERPL-MCNC: 0.8 MG/DL (ref 0–0.2)
BILIRUB SERPL-MCNC: 1.6 MG/DL (ref 0.2–1.3)
BUN SERPL-MCNC: 6 MG/DL (ref 7–30)
CALCIUM SERPL-MCNC: 8.1 MG/DL (ref 8.5–10.1)
CHLORIDE SERPL-SCNC: 111 MMOL/L (ref 94–109)
CO2 SERPL-SCNC: 28 MMOL/L (ref 20–32)
CREAT SERPL-MCNC: 0.68 MG/DL (ref 0.66–1.25)
GFR SERPL CREATININE-BSD FRML MDRD: >90 ML/MIN/1.7M2
GLUCOSE BLDC GLUCOMTR-MCNC: 100 MG/DL (ref 70–99)
GLUCOSE BLDC GLUCOMTR-MCNC: 103 MG/DL (ref 70–99)
GLUCOSE BLDC GLUCOMTR-MCNC: 93 MG/DL (ref 70–99)
GLUCOSE SERPL-MCNC: 110 MG/DL (ref 70–99)
LIPASE SERPL-CCNC: 354 U/L (ref 73–393)
MAGNESIUM SERPL-MCNC: 2.1 MG/DL (ref 1.6–2.3)
PHOSPHATE SERPL-MCNC: 2.7 MG/DL (ref 2.5–4.5)
POTASSIUM SERPL-SCNC: 3.4 MMOL/L (ref 3.4–5.3)
PROT SERPL-MCNC: 6.9 G/DL (ref 6.8–8.8)
SODIUM SERPL-SCNC: 145 MMOL/L (ref 133–144)

## 2018-06-29 PROCEDURE — 25000125 ZZHC RX 250: Performed by: INTERNAL MEDICINE

## 2018-06-29 PROCEDURE — 25000128 H RX IP 250 OP 636: Performed by: NURSE PRACTITIONER

## 2018-06-29 PROCEDURE — 83690 ASSAY OF LIPASE: CPT | Performed by: INTERNAL MEDICINE

## 2018-06-29 PROCEDURE — 83735 ASSAY OF MAGNESIUM: CPT | Performed by: INTERNAL MEDICINE

## 2018-06-29 PROCEDURE — 25000128 H RX IP 250 OP 636: Performed by: HOSPITALIST

## 2018-06-29 PROCEDURE — 40000275 ZZH STATISTIC RCP TIME EA 10 MIN

## 2018-06-29 PROCEDURE — 94003 VENT MGMT INPAT SUBQ DAY: CPT

## 2018-06-29 PROCEDURE — 25000125 ZZHC RX 250: Performed by: SURGERY

## 2018-06-29 PROCEDURE — 40000008 ZZH STATISTIC AIRWAY CARE

## 2018-06-29 PROCEDURE — 25000132 ZZH RX MED GY IP 250 OP 250 PS 637: Performed by: HOSPITALIST

## 2018-06-29 PROCEDURE — 25000128 H RX IP 250 OP 636: Performed by: SURGERY

## 2018-06-29 PROCEDURE — 25000128 H RX IP 250 OP 636

## 2018-06-29 PROCEDURE — 25000132 ZZH RX MED GY IP 250 OP 250 PS 637: Performed by: INTERNAL MEDICINE

## 2018-06-29 PROCEDURE — 80048 BASIC METABOLIC PNL TOTAL CA: CPT | Performed by: INTERNAL MEDICINE

## 2018-06-29 PROCEDURE — 36415 COLL VENOUS BLD VENIPUNCTURE: CPT | Performed by: INTERNAL MEDICINE

## 2018-06-29 PROCEDURE — 25000125 ZZHC RX 250: Performed by: NURSE PRACTITIONER

## 2018-06-29 PROCEDURE — 80076 HEPATIC FUNCTION PANEL: CPT | Performed by: INTERNAL MEDICINE

## 2018-06-29 PROCEDURE — 20000003 ZZH R&B ICU

## 2018-06-29 PROCEDURE — 25000128 H RX IP 250 OP 636: Performed by: INTERNAL MEDICINE

## 2018-06-29 PROCEDURE — 00000146 ZZHCL STATISTIC GLUCOSE BY METER IP

## 2018-06-29 PROCEDURE — 99291 CRITICAL CARE FIRST HOUR: CPT | Performed by: INTERNAL MEDICINE

## 2018-06-29 PROCEDURE — 27210437 ZZH NUTRITION PRODUCT SEMIELEM INTERMED LITER

## 2018-06-29 PROCEDURE — 25000132 ZZH RX MED GY IP 250 OP 250 PS 637: Performed by: PSYCHIATRY & NEUROLOGY

## 2018-06-29 PROCEDURE — 40000986 XR ABDOMEN PORT 1 VW

## 2018-06-29 PROCEDURE — 84100 ASSAY OF PHOSPHORUS: CPT | Performed by: INTERNAL MEDICINE

## 2018-06-29 RX ORDER — LORAZEPAM 2 MG/ML
4 INJECTION INTRAMUSCULAR ONCE
Status: COMPLETED | OUTPATIENT
Start: 2018-06-29 | End: 2018-06-29

## 2018-06-29 RX ORDER — LORAZEPAM 2 MG/ML
INJECTION INTRAMUSCULAR
Status: COMPLETED
Start: 2018-06-29 | End: 2018-06-29

## 2018-06-29 RX ORDER — METOCLOPRAMIDE HYDROCHLORIDE 5 MG/ML
10 INJECTION INTRAMUSCULAR; INTRAVENOUS ONCE
Status: COMPLETED | OUTPATIENT
Start: 2018-06-29 | End: 2018-06-29

## 2018-06-29 RX ORDER — MICONAZOLE NITRATE 20 MG/G
CREAM TOPICAL 2 TIMES DAILY
Status: DISCONTINUED | OUTPATIENT
Start: 2018-06-29 | End: 2018-07-21 | Stop reason: HOSPADM

## 2018-06-29 RX ADMIN — PROPOFOL 40 MCG/KG/MIN: 10 INJECTION, EMULSION INTRAVENOUS at 05:12

## 2018-06-29 RX ADMIN — DEXMEDETOMIDINE HYDROCHLORIDE 1.2 MCG/KG/HR: 100 INJECTION, SOLUTION INTRAVENOUS at 16:45

## 2018-06-29 RX ADMIN — MICONAZOLE NITRATE: 2 POWDER TOPICAL at 08:41

## 2018-06-29 RX ADMIN — DEXMEDETOMIDINE HYDROCHLORIDE 1.2 MCG/KG/HR: 100 INJECTION, SOLUTION INTRAVENOUS at 23:27

## 2018-06-29 RX ADMIN — METOPROLOL TARTRATE 50 MG: 100 TABLET ORAL at 21:10

## 2018-06-29 RX ADMIN — MICONAZOLE NITRATE: 2 POWDER TOPICAL at 02:38

## 2018-06-29 RX ADMIN — Medication 0.5 MG: at 05:51

## 2018-06-29 RX ADMIN — CHLORHEXIDINE GLUCONATE 15 ML: 1.2 RINSE ORAL at 08:41

## 2018-06-29 RX ADMIN — GABAPENTIN 300 MG: 250 SUSPENSION ORAL at 09:03

## 2018-06-29 RX ADMIN — LORAZEPAM 4 MG: 2 INJECTION INTRAMUSCULAR at 18:17

## 2018-06-29 RX ADMIN — Medication 0.5 MG: at 09:13

## 2018-06-29 RX ADMIN — METOPROLOL TARTRATE 50 MG: 100 TABLET ORAL at 09:03

## 2018-06-29 RX ADMIN — DEXMEDETOMIDINE HYDROCHLORIDE 1.2 MCG/KG/HR: 100 INJECTION, SOLUTION INTRAVENOUS at 20:12

## 2018-06-29 RX ADMIN — Medication 0.5 MG: at 11:35

## 2018-06-29 RX ADMIN — QUETIAPINE FUMARATE 25 MG: 25 TABLET ORAL at 23:03

## 2018-06-29 RX ADMIN — GABAPENTIN 300 MG: 250 SUSPENSION ORAL at 18:01

## 2018-06-29 RX ADMIN — FOLIC ACID 1 MG: 1 TABLET ORAL at 08:41

## 2018-06-29 RX ADMIN — GABAPENTIN 300 MG: 250 SUSPENSION ORAL at 21:11

## 2018-06-29 RX ADMIN — LORAZEPAM 1 MG: 1 TABLET ORAL at 21:09

## 2018-06-29 RX ADMIN — DOCUSATE SODIUM 100 MG: 50 LIQUID ORAL at 21:10

## 2018-06-29 RX ADMIN — DEXTRAN 70 AND HYPROMELLOSE 2910 2 DROP: 1; 3 SOLUTION/ DROPS OPHTHALMIC at 02:39

## 2018-06-29 RX ADMIN — Medication 3 MG: at 23:03

## 2018-06-29 RX ADMIN — VALPROATE SODIUM 500 MG: 100 INJECTION, SOLUTION INTRAVENOUS at 16:46

## 2018-06-29 RX ADMIN — LIDOCAINE HYDROCHLORIDE: 20 JELLY TOPICAL at 11:35

## 2018-06-29 RX ADMIN — CHLORHEXIDINE GLUCONATE 15 ML: 1.2 RINSE ORAL at 23:07

## 2018-06-29 RX ADMIN — LORAZEPAM 4 MG: 2 INJECTION INTRAMUSCULAR; INTRAVENOUS at 18:17

## 2018-06-29 RX ADMIN — LORAZEPAM 2 MG: 2 INJECTION INTRAMUSCULAR; INTRAVENOUS at 19:37

## 2018-06-29 RX ADMIN — LORAZEPAM 2 MG: 2 INJECTION INTRAMUSCULAR; INTRAVENOUS at 18:01

## 2018-06-29 RX ADMIN — Medication 0.5 MG: at 15:04

## 2018-06-29 RX ADMIN — PROPOFOL 40 MCG/KG/MIN: 10 INJECTION, EMULSION INTRAVENOUS at 01:35

## 2018-06-29 RX ADMIN — AMLODIPINE BESYLATE 2.5 MG: 2.5 TABLET ORAL at 08:41

## 2018-06-29 RX ADMIN — PANTOPRAZOLE SODIUM 40 MG: 40 INJECTION, POWDER, FOR SOLUTION INTRAVENOUS at 08:41

## 2018-06-29 RX ADMIN — Medication 100 MG: at 08:41

## 2018-06-29 RX ADMIN — Medication 15 ML: at 09:03

## 2018-06-29 RX ADMIN — METOCLOPRAMIDE 10 MG: 5 INJECTION, SOLUTION INTRAMUSCULAR; INTRAVENOUS at 11:35

## 2018-06-29 RX ADMIN — DEXMEDETOMIDINE HYDROCHLORIDE 0.2 MCG/KG/HR: 100 INJECTION, SOLUTION INTRAVENOUS at 11:59

## 2018-06-29 RX ADMIN — MICONAZOLE NITRATE: 20 CREAM TOPICAL at 10:52

## 2018-06-29 RX ADMIN — MICONAZOLE NITRATE: 20 CREAM TOPICAL at 23:07

## 2018-06-29 RX ADMIN — PROPOFOL 30 MCG/KG/MIN: 10 INJECTION, EMULSION INTRAVENOUS at 09:03

## 2018-06-29 RX ADMIN — VALPROATE SODIUM 500 MG: 100 INJECTION, SOLUTION INTRAVENOUS at 05:23

## 2018-06-29 RX ADMIN — BISACODYL 10 MG: 10 SUPPOSITORY RECTAL at 12:21

## 2018-06-29 RX ADMIN — SODIUM CHLORIDE, POTASSIUM CHLORIDE, SODIUM LACTATE AND CALCIUM CHLORIDE: 600; 310; 30; 20 INJECTION, SOLUTION INTRAVENOUS at 08:40

## 2018-06-29 RX ADMIN — Medication 40 MG: at 21:10

## 2018-06-29 RX ADMIN — SODIUM CHLORIDE, POTASSIUM CHLORIDE, SODIUM LACTATE AND CALCIUM CHLORIDE: 600; 310; 30; 20 INJECTION, SOLUTION INTRAVENOUS at 00:34

## 2018-06-29 NOTE — PROGRESS NOTES
CLINICAL NUTRITION SERVICES - REASSESSMENT NOTE      Recommendations Ordered by Registered Dietitian (RD):   Increase TF Peptamen 1.5 to 40 mL/hr;  Increase by 10 mL every 12 hrs to goal 60 mL/hr = 2160 kcals (28 kcal/kg), 98 gm pro (1.3 gm/kg), 1109 mL H20, 270 gm CHO, no fiber.   Future/Additional Recommendations:   Would D/C the Certavite when goal TF achieved (RD to check on Monday)   Malnutrition:  (6/27)  % Weight Loss:  None noted  % Intake: <75% for > 7 days (non-severe malnutrition)  Subcutaneous Fat Loss:  None observed  Muscle Loss:  None observed  Fluid Retention:  Mild 2+ LE (likely not nutrition related)     Malnutrition Diagnosis: Patient does not meet two of the above criteria diagnosing   for malnutrition        EVALUATION OF PROGRESS TOWARD GOALS   Diet:  NPO on vent    Nutrition Support:  TF via OG was started on 6/27 at 15 mL/hr and increased on 6/28 to 30 mL/hr as below:    Nutrition Support Enteral:  Type of Feeding Tube:  OG  Enteral Frequency:  Continuous  Enteral Regimen:  Peptamen 1.5 at 30 mL/hr  Total Enteral Provisions:  1080 kcals, 49 gm pro (0.6 gm/kg and 52% pro needs), 135 gm CHO, 554 mL H20, no fiber.  Free Water Flush:  100 mL every 4 hrs (increased today)    Propofol is currently 12.3 mL/hr = 325 kcals (lipid).  Total (TF + Propofol):  1405 kcals (18 kcal/kg and 72% energy needs).    Intake/Tolerance:    BM x1 today (small) - Colace for bowel program.  Mg 2.1 (NL)  Phos 2.7 (NL)  K 3.4 (NL) - acceptable  Na 145 (H)  BGM:   - acceptable.  Wt:  100.2 kg (down 2.3 kg since admit).  Pt still with 2+ mild edema in extremities.    ASSESSED NUTRITION NEEDS PER APPROVED PRACTICE GUIDELINES:     Dosing Weight (78 kg adjusted for overwt)     Estimated Energy Needs: 5871-7846 kcals (25-30 Kcal/Kg)  Justification: obese and vented  Estimated Protein Needs:  grams protein (1.2-1.5 g pro/Kg)  Justification: stress    NEW FINDINGS:   RN has placed a keofeed at bedside and is awaiting  confirmation of placement.  Plan to wean off Propofol and switch to Precedex for sedation.  LR at 125 mL/hr - for fluid delivery  Certavite, Folic acid, Thaimine - for supplementaion.    Previous Goals (6/27):   EN to meet % of estimated needs within 48 hrs  Evaluation:  Not met, but in progress     Previous Nutrition Diagnosis (6/27):   Inadequate protein-energy intake related to recently NPO and vented as evidenced by propofol meeting 25% energy needs and 0% protein needs today  Evaluation: Improving      CURRENT NUTRITION DIAGNOSIS  Inadequate enteral nutrition infusion related to low TF rate as evidenced by TF + Propofol meeting 72% energy and 52% protein needs.    INTERVENTIONS  Recommendations / Nutrition Prescription  Increase TF Peptamen 1.5 to 40 mL/hr;  Increase by 10 mL every 12 hrs to goal 60 mL/hr = 2160 kcals (28 kcal/kg), 98 gm pro (1.3 gm/kg), 1109 mL H20, 270 gm CHO, no fiber.    - Would D/C the Certavite when goal TF achieved (RD to check on Monday)    Implementation  Collaboration and Referral of Nutrition care - Discussed pt during ICU interdisciplinary rounds this morning and received approval from Dr. Ramirez to increase TF rate towards goal.  EN Schedule - Entered order in EPIC as above    Goals  TF goal Peptamen 1.5 at 60 mL/hr will meet % estimated needs    MONITORING AND EVALUATION:  Progress towards goals will be monitored and evaluated per protocol and Practice Guidelines    Montserrat Solo, RD, LD, CNSC

## 2018-06-29 NOTE — PROGRESS NOTES
ICU  Update    Patient agitated this afternoon despite measure to re-orient. Propofol lightened earlier in day.  Will order ativan 4 mg x 1..        Meng Jones

## 2018-06-29 NOTE — PROGRESS NOTES
6/29/2018    CD consult acknowledged. Pt is currently intubated and not appropriate to be seen. Chem dep will continue to monitor through the weekend.    Mellissa Mijares, [facility]  [phone]

## 2018-06-29 NOTE — PROGRESS NOTES
[facility] ICU RESPIRATORY NOTE  Date of Admission: 6/25/2018  Date of Intubation (most recent): 6/27/18  Reason for Mechanical Ventilation: Airway protection  Number of Days on Mechanical Ventilation: 3  Reason for No Pressure Support Trial: resting for the night.  Ventilation Mode: CMV/AC  (Continuous Mandatory Ventilation/ Assist Control)  FiO2 (%): 35 %  Rate Set (breaths/minute): 14 breaths/min  Tidal Volume Set (mL): 550 mL  PEEP (cm H2O): 5 cmH2O  Pressure Support (cm H2O): 5 cmH2O  Oxygen Concentration (%): 35 %  Resp: 14      Significant Events Today: none    ABG Results:    Recent Labs  Lab 06/27/18  0725   PH 7.42   PCO2 41   PO2 165*   HCO3 26   O2PER 40% VENT         ETT appearance on chest x-ray: same position    Plan:  Pt remains full vent support. RT unable to wean because of condition change when off of sedation  6/29/2018

## 2018-06-29 NOTE — PROGRESS NOTES
Intensivist Daily Note  06/29/2018      Brief History:  52 yom admitted with acute EtOH pancreatitis and EtOH withdrawal, intubated for airway protection in setting of acute withdrawal.    Interval Events:  -6/27: intubated, sedated and hemodynamically stable.   -6/28: started tube feeds yesterday, tolerating well.  1L IVF overnight for low UOP.  -6/29: failed SBT yesterday 2/2 tachycardia and agitation.  No acute events overnight.    PMH:  Past Medical History:   Diagnosis Date     Abnormal MRI of head 1/11/2013     Atypical chest pain 9/4/2012     BMI 33.0-33.9,adult 5/24/2013     Hyperlipidemia LDL goal <160 5/4/2013     Hypertension      Ménière's disease        PSurgHx:  Past Surgical History:   Procedure Laterality Date     C NONSPECIFIC PROCEDURE      shoulder surgery for dislocation      C NONSPECIFIC PROCEDURE      eye surgery, lens implant right     EYE SURGERY       FRACTURE TX, WRIST RT/LT      Fracture TX Wrist RT/LT     ORTHOPEDIC SURGERY         Family History:  Family History     Problem (# of Occurrences) Relation (Name,Age of Onset)    C.A.D. (1) Father: bypass surgery    Diabetes (1) Father    Hypertension (1) Mother          Social History:  Social History     Social History     Marital status: Single     Spouse name: N/A     Number of children: N/A     Years of education: N/A     Occupational History     Not on file.     Social History Main Topics     Smoking status: Never Smoker     Smokeless tobacco: Never Used      Comment: rarely, once per year maybe      Alcohol use Yes      Comment: 10 drinks / week      Drug use: No     Sexual activity: Yes     Partners: Female     Other Topics Concern     Parent/Sibling W/ Cabg, Mi Or Angioplasty Before 65f 55m? Yes     Social History Narrative       Allergy:  Allergies   Allergen Reactions     Zoloft [Sertraline] Nausea and Vomiting        Medications:  Current Facility-Administered Medications   Medication     acetaminophen (TYLENOL) tablet 325 mg      amLODIPine (NORVASC) tablet 2.5 mg     bisacodyl (DULCOLAX) Suppository 10 mg     chlorhexidine (PERIDEX) 0.12 % solution 15 mL     dextrose 10 % 1,000 mL infusion     diazepam (VALIUM) tablet 20 mg     docusate (COLACE) 50 MG/5ML liquid 100 mg     folic acid (FOLVITE) tablet 1 mg     gabapentin (NEURONTIN) solution 300 mg     HYDROmorphone (PF) (DILAUDID) injection 0.3-0.5 mg     hypromellose-dextran (ARTIFICAL TEARS) 0.1-0.3 % ophthalmic solution 2-3 drop     lactated ringers infusion     lidocaine (LMX4) cream     lidocaine 1 % 1 mL     LORazepam (ATIVAN) tablet 1-2 mg    Or     LORazepam (ATIVAN) injection 1-2 mg     LORazepam (ATIVAN) injection 2 mg     LORazepam (ATIVAN) tablet 1 mg     magnesium sulfate 2 g in water intermittent infusion     magnesium sulfate 4 g in 100 mL sterile water (premade)     melatonin tablet 3 mg     metoprolol (LOPRESSOR) suspension 50 mg     miconazole (MICATIN; MICRO GUARD) 2 % powder     multivitamins with minerals (CERTAVITE/CEROVITE) liquid 15 mL     naloxone (NARCAN) injection 0.1-0.4 mg     ondansetron (ZOFRAN-ODT) ODT tab 4 mg    Or     ondansetron (ZOFRAN) injection 4 mg     oxyCODONE IR (ROXICODONE) tablet 5-10 mg     pantoprazole (PROTONIX) 40 mg IV push injection     polyethylene glycol (MIRALAX/GLYCOLAX) Packet 17 g     potassium chloride (KLOR-CON) Packet 20-40 mEq     potassium chloride 10 mEq in 100 mL intermittent infusion with 10 mg lidocaine     potassium chloride 10 mEq in 100 mL sterile water intermittent infusion (premix)     potassium chloride 20 mEq in 50 mL intermittent infusion     potassium chloride SA (K-DUR/KLOR-CON M) CR tablet 20-40 mEq     propofol (DIPRIVAN) infusion     QUEtiapine (SEROquel) tablet 25 mg     senna-docusate (SENOKOT-S;PERICOLACE) 8.6-50 MG per tablet 1 tablet    Or     senna-docusate (SENOKOT-S;PERICOLACE) 8.6-50 MG per tablet 2 tablet     sodium chloride (PF) 0.9% PF flush 3 mL     sodium chloride (PF) 0.9% PF flush 3 mL      thiamine tablet 100 mg     valproate (DEPACON) 500 mg in sodium chloride 0.9 % 50 mL intermittent infusion         Physical examination:  Vital signs:  Temp: 98.8  F (37.1  C) Temp src: Esophageal BP: 137/83   Heart Rate: 81 Resp: 14 SpO2: 100 % O2 Device: Mechanical Ventilator      General: intubated, sedated.  NAD  HEENT: neck supple, symmetrical  Lungs: Clear to auscultation, no wheezing  CVS: Normal S1 & S2, no murmur  Abdomen: Bowel sounds present, soft, non tender  Extremities/musculoskeletal: no edema  Skin: no rash  Exam of Line sites: No erythema or discharge.    Data:    ROUTINE ICU LABS (Last four results)  CMP    Recent Labs  Lab 06/29/18  0605 06/28/18  0515 06/27/18  0850 06/26/18  0755 06/26/18  0450 06/25/18  1930   * 145* 141 137  --  141   POTASSIUM 3.4 3.5 3.4 3.5  --  3.2*   CHLORIDE 111* 112* 109 101  --  102   CO2 28 24 23 26  --  31   ANIONGAP 6  --  9 10  --  8   * 81 87 104*  --  111*   BUN 6* 7 8 6*  --  6*   CR 0.68 0.61* 0.60* 0.68  --  0.71   GFRESTIMATED >90 >90 >90 >90  --  >90   GFRESTBLACK >90 >90 >90 >90  --  >90   DEMETRIA 8.1* 7.7* 8.2* 8.6  --  9.2   MAG 2.1 2.1  --  2.3 2.5* 1.5*   PHOS 2.7 2.5  --   --   --   --    PROTTOTAL 6.9 6.5* 7.4 8.4  --  8.0   ALBUMIN 2.7* 2.7* 3.2* 3.8  --  3.7   BILITOTAL 1.6* 1.6* 2.3* 2.1*  --  1.3   ALKPHOS 88 82 100 113  --  110   AST 76* 82* 102* 146*  --  171*   ALT 60 61 71* 91*  --  97*     CBC    Recent Labs  Lab 06/28/18  0906 06/27/18  0850 06/26/18  0755 06/25/18  1930   WBC  --  4.8 4.8 4.0   RBC  --  3.95* 4.07* 4.04*   HGB  --  13.6 14.1 14.1   HCT  --  40.1 40.9 40.0   MCV  --  102* 101* 99   MCH  --  34.4* 34.6* 34.9*   MCHC  --  33.9 34.5 35.3   RDW  --  13.2 13.4 13.3   PLT 70* 68* 76* 72*     INRNo lab results found in last 7 days.  Arterial Blood Gas    Recent Labs  Lab 06/27/18  0725   PH 7.42   PCO2 41   PO2 165*   HCO3 26   O2PER 40% VENT       No results found for this or any previous visit (from the past 24  hour(s)).      Assessment and Plan:    Neuro  ### Acute EtOH Withdrawal Syndrome  ### Sedation/Analgesia  ### Meniere's disease  -Propofol gtt -- will attempt to lighten sedation again today   -Valproate + Gabapentin  -APAP and Dilaudid prns available  -Restart PTA meclizine once extubated and able to take PO    Respiratory  ### Acute Hypoxic Respiratory failure  -intubated for airway protection  -Consider SBT if tolerates daily awakening trial    CV:  - no acute issues, monitor hemodynamics    GI/Liver:  ### EtOH Pancreatitis  -Lipase 903, now down-trending to 300-400  -Cont LR @ 125 cc/hr  -Abdominal US 6/25 showing fatty liver    Heme:  ### Thrombocytopenia -- stable  -monitor    Endocrine:  -monitor glucose, insulin management per unit protocol    FEN:  -thiamine, folate, and multi-vitamin  -continue tube feeds -- increase free water to 100 cc q4h  -continue LR @ 125 cc/hr  -monitor lytes, replete per unit protocol    Prophy:  GI: PPI  DVT: SCDs     ICU Cares:  Restrain needed: Yes   Lines needed: Yes    Code: Full  Dispo: ICU status      Billing: Critical care time 35 min excluding procedure time         Kadie Ramirez MD  Pulmonary and Critical Care Medicine

## 2018-06-29 NOTE — PLAN OF CARE
Problem: Patient Care Overview  Goal: Plan of Care/Patient Progress Review  Outcome: No Change  Patient sedated. PERRL. Not following commands overnight. BP stable, tele: NSR. Lung sounds clear. Active bowel sounds. Tube feeding at 30 mL/hr with 60 mL flushes Q4 hours. 1 BM this shift. [place] patent. Propofol gtt at 40 mcg/kg/hr. Plan for pressure support today.   IV dilaudid given x2    Fungal powder and eye drops ordered

## 2018-06-29 NOTE — PROGRESS NOTES
"Care Transition Initial Assessment - RN        Met with: Patient and Family.  DATA   Active Problems:    Pancreatitis    Alcohol withdrawal hallucinosis (H)       Cognitive Status: disoriented, intubated and sedated.        Contact information and PCP information verified: No  Lives With: alone      Insurance concerns: Underinsured/limits on insurance  ASSESSMENT  Patient currently receives the following services:  ICU cares        Identified issues/concerns regarding health management: [employer] Insurance will end possibly 6/30, needs coverage for further care, possible MA application. Needs treatment and options.    PLAN  Financial costs for the patient include unsure .  Patient given options and choices for discharge pending outcome of stay, patient will be given options. .  Patient/family is agreeable to the plan?  N/A, patient currently intubated, unable to participate in discussions.  Patient anticipates discharging to pending.  Placed call to family, brother, Blu. He stated that patients insurance might be ending. Called [employer] and they stated, \"policy ends on 6/30\". Notified  Donna in the Financial Office patient will need a possible application for MA if insurance is ending.   Care Transitions Team will continue to follow patient for needs and discharge planning.        Patient anticipates needs for home equipment: No  Plan/Disposition: Other   Appointments: pending progress.      Care  (CTS) will continue to follow as needed.            "

## 2018-06-29 NOTE — PROGRESS NOTES
Pt intermittently agitated on propofol, did follow commands with sedation vacation. Unable to tolerate propofol wean/vent wean.  Plan to start precedex and slowly wean propofol post placement of keofeed, removal of OG. Labs and VS reviewed. Pt remains in restraints for safety. Pt UO adequate. MD ok with using narcotics and titrating other medcations as needed for agitation, ativan as 2nd or 3rd agent for agitation.   1500-pt suddenly awoke, up in chair, grabbing in at ETT. Pt sedated with staff assistance at bedside. RT assessed ETT, stable at 23 cm at lip, no change, no leak noted. PT precedex increased.

## 2018-06-30 ENCOUNTER — APPOINTMENT (OUTPATIENT)
Dept: CT IMAGING | Facility: CLINIC | Age: 53
DRG: 166 | End: 2018-06-30
Attending: NURSE PRACTITIONER
Payer: COMMERCIAL

## 2018-06-30 LAB
ANION GAP SERPL CALCULATED.3IONS-SCNC: 7 MMOL/L (ref 3–14)
BASOPHILS # BLD AUTO: 0 10E9/L (ref 0–0.2)
BASOPHILS NFR BLD AUTO: 0 %
BUN SERPL-MCNC: 6 MG/DL (ref 7–30)
CALCIUM SERPL-MCNC: 8.4 MG/DL (ref 8.5–10.1)
CHLORIDE SERPL-SCNC: 109 MMOL/L (ref 94–109)
CO2 SERPL-SCNC: 27 MMOL/L (ref 20–32)
CREAT SERPL-MCNC: 0.57 MG/DL (ref 0.66–1.25)
DIFFERENTIAL METHOD BLD: ABNORMAL
EOSINOPHIL # BLD AUTO: 0.2 10E9/L (ref 0–0.7)
EOSINOPHIL NFR BLD AUTO: 2 %
ERYTHROCYTE [DISTWIDTH] IN BLOOD BY AUTOMATED COUNT: 13.1 % (ref 10–15)
GFR SERPL CREATININE-BSD FRML MDRD: >90 ML/MIN/1.7M2
GLUCOSE BLDC GLUCOMTR-MCNC: 181 MG/DL (ref 70–99)
GLUCOSE BLDC GLUCOMTR-MCNC: 194 MG/DL (ref 70–99)
GLUCOSE BLDC GLUCOMTR-MCNC: 199 MG/DL (ref 70–99)
GLUCOSE SERPL-MCNC: 174 MG/DL (ref 70–99)
HBA1C MFR BLD: 6.2 % (ref 0–5.6)
HCT VFR BLD AUTO: 42.7 % (ref 40–53)
HGB BLD-MCNC: 14.8 G/DL (ref 13.3–17.7)
LYMPHOCYTES # BLD AUTO: 0.9 10E9/L (ref 0.8–5.3)
LYMPHOCYTES NFR BLD AUTO: 11 %
MAGNESIUM SERPL-MCNC: 1.9 MG/DL (ref 1.6–2.3)
MCH RBC QN AUTO: 35.2 PG (ref 26.5–33)
MCHC RBC AUTO-ENTMCNC: 34.7 G/DL (ref 31.5–36.5)
MCV RBC AUTO: 102 FL (ref 78–100)
MONOCYTES # BLD AUTO: 1.1 10E9/L (ref 0–1.3)
MONOCYTES NFR BLD AUTO: 13 %
NEUTROPHILS # BLD AUTO: 6.1 10E9/L (ref 1.6–8.3)
NEUTROPHILS NFR BLD AUTO: 74 %
PHOSPHATE SERPL-MCNC: 2.3 MG/DL (ref 2.5–4.5)
PLATELET # BLD AUTO: 104 10E9/L (ref 150–450)
PLATELET # BLD EST: ABNORMAL 10*3/UL
POTASSIUM SERPL-SCNC: 3.7 MMOL/L (ref 3.4–5.3)
RBC # BLD AUTO: 4.2 10E12/L (ref 4.4–5.9)
RBC MORPH BLD: ABNORMAL
SODIUM SERPL-SCNC: 143 MMOL/L (ref 133–144)
WBC # BLD AUTO: 8.3 10E9/L (ref 4–11)

## 2018-06-30 PROCEDURE — 25000128 H RX IP 250 OP 636: Performed by: SURGERY

## 2018-06-30 PROCEDURE — 40000008 ZZH STATISTIC AIRWAY CARE

## 2018-06-30 PROCEDURE — 25000132 ZZH RX MED GY IP 250 OP 250 PS 637: Performed by: HOSPITALIST

## 2018-06-30 PROCEDURE — 20000003 ZZH R&B ICU

## 2018-06-30 PROCEDURE — 25000128 H RX IP 250 OP 636: Performed by: INTERNAL MEDICINE

## 2018-06-30 PROCEDURE — 80048 BASIC METABOLIC PNL TOTAL CA: CPT | Performed by: INTERNAL MEDICINE

## 2018-06-30 PROCEDURE — 25000128 H RX IP 250 OP 636: Performed by: NURSE PRACTITIONER

## 2018-06-30 PROCEDURE — 83036 HEMOGLOBIN GLYCOSYLATED A1C: CPT | Performed by: INTERNAL MEDICINE

## 2018-06-30 PROCEDURE — 25000128 H RX IP 250 OP 636: Performed by: HOSPITALIST

## 2018-06-30 PROCEDURE — 00000146 ZZHCL STATISTIC GLUCOSE BY METER IP

## 2018-06-30 PROCEDURE — 94003 VENT MGMT INPAT SUBQ DAY: CPT

## 2018-06-30 PROCEDURE — 25000132 ZZH RX MED GY IP 250 OP 250 PS 637: Performed by: INTERNAL MEDICINE

## 2018-06-30 PROCEDURE — 36415 COLL VENOUS BLD VENIPUNCTURE: CPT | Performed by: INTERNAL MEDICINE

## 2018-06-30 PROCEDURE — 84100 ASSAY OF PHOSPHORUS: CPT | Performed by: INTERNAL MEDICINE

## 2018-06-30 PROCEDURE — 70450 CT HEAD/BRAIN W/O DYE: CPT

## 2018-06-30 PROCEDURE — 25000125 ZZHC RX 250: Performed by: NURSE PRACTITIONER

## 2018-06-30 PROCEDURE — 25000131 ZZH RX MED GY IP 250 OP 636 PS 637: Performed by: NURSE PRACTITIONER

## 2018-06-30 PROCEDURE — 25000125 ZZHC RX 250: Performed by: SURGERY

## 2018-06-30 PROCEDURE — 99291 CRITICAL CARE FIRST HOUR: CPT | Performed by: INTERNAL MEDICINE

## 2018-06-30 PROCEDURE — 85025 COMPLETE CBC W/AUTO DIFF WBC: CPT | Performed by: INTERNAL MEDICINE

## 2018-06-30 PROCEDURE — 83735 ASSAY OF MAGNESIUM: CPT | Performed by: INTERNAL MEDICINE

## 2018-06-30 PROCEDURE — 40000275 ZZH STATISTIC RCP TIME EA 10 MIN

## 2018-06-30 PROCEDURE — 25000125 ZZHC RX 250: Performed by: INTERNAL MEDICINE

## 2018-06-30 PROCEDURE — 27210437 ZZH NUTRITION PRODUCT SEMIELEM INTERMED LITER

## 2018-06-30 RX ORDER — HYDROCHLOROTHIAZIDE 25 MG/1
25 TABLET ORAL DAILY
Status: DISCONTINUED | OUTPATIENT
Start: 2018-06-30 | End: 2018-07-17

## 2018-06-30 RX ORDER — PROPOFOL 10 MG/ML
5-75 INJECTION, EMULSION INTRAVENOUS CONTINUOUS
Status: DISCONTINUED | OUTPATIENT
Start: 2018-06-30 | End: 2018-07-05

## 2018-06-30 RX ORDER — HYDRALAZINE HYDROCHLORIDE 20 MG/ML
10 INJECTION INTRAMUSCULAR; INTRAVENOUS EVERY 6 HOURS PRN
Status: DISCONTINUED | OUTPATIENT
Start: 2018-06-30 | End: 2018-07-06

## 2018-06-30 RX ORDER — DEXTROSE MONOHYDRATE 25 G/50ML
25-50 INJECTION, SOLUTION INTRAVENOUS
Status: DISCONTINUED | OUTPATIENT
Start: 2018-06-30 | End: 2018-07-05

## 2018-06-30 RX ORDER — NICOTINE POLACRILEX 4 MG
15-30 LOZENGE BUCCAL
Status: DISCONTINUED | OUTPATIENT
Start: 2018-06-30 | End: 2018-07-05

## 2018-06-30 RX ADMIN — INSULIN ASPART 1 UNITS: 100 INJECTION, SOLUTION INTRAVENOUS; SUBCUTANEOUS at 16:32

## 2018-06-30 RX ADMIN — HYDROCHLOROTHIAZIDE 25 MG: 25 TABLET ORAL at 08:23

## 2018-06-30 RX ADMIN — VALPROATE SODIUM 500 MG: 100 INJECTION, SOLUTION INTRAVENOUS at 18:00

## 2018-06-30 RX ADMIN — METOPROLOL TARTRATE 50 MG: 100 TABLET ORAL at 07:49

## 2018-06-30 RX ADMIN — INSULIN ASPART 2 UNITS: 100 INJECTION, SOLUTION INTRAVENOUS; SUBCUTANEOUS at 20:43

## 2018-06-30 RX ADMIN — Medication 0.5 MG: at 05:20

## 2018-06-30 RX ADMIN — SODIUM CHLORIDE, POTASSIUM CHLORIDE, SODIUM LACTATE AND CALCIUM CHLORIDE: 600; 310; 30; 20 INJECTION, SOLUTION INTRAVENOUS at 22:41

## 2018-06-30 RX ADMIN — Medication 40 MG: at 22:50

## 2018-06-30 RX ADMIN — MICONAZOLE NITRATE: 2 POWDER TOPICAL at 08:17

## 2018-06-30 RX ADMIN — LORAZEPAM 2 MG: 2 INJECTION INTRAMUSCULAR; INTRAVENOUS at 02:04

## 2018-06-30 RX ADMIN — DEXMEDETOMIDINE HYDROCHLORIDE 1.2 MCG/KG/HR: 100 INJECTION, SOLUTION INTRAVENOUS at 09:45

## 2018-06-30 RX ADMIN — HYDRALAZINE HYDROCHLORIDE 10 MG: 20 INJECTION INTRAMUSCULAR; INTRAVENOUS at 06:41

## 2018-06-30 RX ADMIN — SODIUM CHLORIDE, POTASSIUM CHLORIDE, SODIUM LACTATE AND CALCIUM CHLORIDE: 600; 310; 30; 20 INJECTION, SOLUTION INTRAVENOUS at 14:09

## 2018-06-30 RX ADMIN — LORAZEPAM 1 MG: 1 TABLET ORAL at 22:49

## 2018-06-30 RX ADMIN — CHLORHEXIDINE GLUCONATE 15 ML: 1.2 RINSE ORAL at 07:50

## 2018-06-30 RX ADMIN — Medication 100 MG: at 07:49

## 2018-06-30 RX ADMIN — MICONAZOLE NITRATE: 20 CREAM TOPICAL at 22:54

## 2018-06-30 RX ADMIN — Medication 15 ML: at 07:48

## 2018-06-30 RX ADMIN — DEXMEDETOMIDINE HYDROCHLORIDE 0.8 MCG/KG/HR: 100 INJECTION, SOLUTION INTRAVENOUS at 13:31

## 2018-06-30 RX ADMIN — GABAPENTIN 300 MG: 250 SUSPENSION ORAL at 07:49

## 2018-06-30 RX ADMIN — DEXMEDETOMIDINE HYDROCHLORIDE 1.2 MCG/KG/HR: 100 INJECTION, SOLUTION INTRAVENOUS at 06:12

## 2018-06-30 RX ADMIN — METOPROLOL TARTRATE 50 MG: 100 TABLET ORAL at 22:49

## 2018-06-30 RX ADMIN — GABAPENTIN 300 MG: 250 SUSPENSION ORAL at 22:49

## 2018-06-30 RX ADMIN — FOLIC ACID 1 MG: 1 TABLET ORAL at 07:49

## 2018-06-30 RX ADMIN — Medication 0.5 MG: at 01:07

## 2018-06-30 RX ADMIN — DEXMEDETOMIDINE HYDROCHLORIDE 1.2 MCG/KG/HR: 100 INJECTION, SOLUTION INTRAVENOUS at 19:09

## 2018-06-30 RX ADMIN — DOCUSATE SODIUM 100 MG: 50 LIQUID ORAL at 22:49

## 2018-06-30 RX ADMIN — PROPOFOL 10 MCG/KG/MIN: 10 INJECTION, EMULSION INTRAVENOUS at 12:05

## 2018-06-30 RX ADMIN — CHLORHEXIDINE GLUCONATE 15 ML: 1.2 RINSE ORAL at 22:50

## 2018-06-30 RX ADMIN — LORAZEPAM 2 MG: 2 INJECTION INTRAMUSCULAR; INTRAVENOUS at 05:20

## 2018-06-30 RX ADMIN — PROPOFOL 20 MCG/KG/MIN: 10 INJECTION, EMULSION INTRAVENOUS at 17:28

## 2018-06-30 RX ADMIN — VALPROATE SODIUM 500 MG: 100 INJECTION, SOLUTION INTRAVENOUS at 05:19

## 2018-06-30 RX ADMIN — DEXMEDETOMIDINE HYDROCHLORIDE 1.2 MCG/KG/HR: 100 INJECTION, SOLUTION INTRAVENOUS at 02:49

## 2018-06-30 RX ADMIN — LORAZEPAM 2 MG: 2 INJECTION INTRAMUSCULAR; INTRAVENOUS at 10:07

## 2018-06-30 RX ADMIN — LORAZEPAM 2 MG: 2 INJECTION INTRAMUSCULAR; INTRAVENOUS at 08:32

## 2018-06-30 RX ADMIN — INSULIN ASPART 2 UNITS: 100 INJECTION, SOLUTION INTRAVENOUS; SUBCUTANEOUS at 12:21

## 2018-06-30 RX ADMIN — Medication 40 MG: at 07:49

## 2018-06-30 RX ADMIN — DOCUSATE SODIUM 100 MG: 50 LIQUID ORAL at 07:48

## 2018-06-30 RX ADMIN — AMLODIPINE BESYLATE 2.5 MG: 2.5 TABLET ORAL at 07:49

## 2018-06-30 RX ADMIN — MAGNESIUM SULFATE HEPTAHYDRATE 2 G: 40 INJECTION, SOLUTION INTRAVENOUS at 06:35

## 2018-06-30 RX ADMIN — GABAPENTIN 300 MG: 250 SUSPENSION ORAL at 16:24

## 2018-06-30 NOTE — PLAN OF CARE
Problem: Patient Care Overview  Goal: Plan of Care/Patient Progress Review  Outcome: No Change  Pt remains sedated on 1.2mcg/h dex gtt.  Remains on vent, settings unchanged.  Periodic episodes of agitation and panic, managed with prn ativan, seroquel and dilaudid.  SANAZ orientation.  Skin care protocol in place.  Lung sounds diminished.  Small amt of secretions from ETT.  Continue to monitor.

## 2018-06-30 NOTE — PROGRESS NOTES
[facility] ICU RESPIRATORY NOTE  Date of Admission: 6/25/2018  Date of Intubation (most recent): 6/27/2018  Reason for Mechanical Ventilation: Airway protection  Number of Days on Mechanical Ventilation: 4  Met Criteria for Pressure Support Trial: No  Length of Pressure Support Trial: None  Reason for No Pressure Support Trial:     Significant Events Today: None  Ventilation Mode: CMV/AC  (Continuous Mandatory Ventilation/ Assist Control)  FiO2 (%): 35 %  Rate Set (breaths/minute): 14 breaths/min  Tidal Volume Set (mL): 550 mL  PEEP (cm H2O): 5 cmH2O  Oxygen Concentration (%): 35 %  Resp: 7    ABG Results:  No ABG result for today    ETT appearance on chest x-ray: ET tube in good position    Plan:  Continue full mechanical ventilatory support.

## 2018-06-30 NOTE — PLAN OF CARE
Problem: Patient Care Overview  Goal: Plan of Care/Patient Progress Review  Outcome: No Change  Pt vented on full support; propofol & precedex for sedation--pt agitated/restless with cares. VSS. Pt's family updated on POC.

## 2018-06-30 NOTE — CONSULTS
6/30/2018    CD consult acknowledged. Pt has been intubated for the past few days and remains intubated today. Per chart review, pt's insurance is scheduled to lapse today. CD will close consult at this time. May reorder when pt is appropriate to be seen and his insurance has been verified for month of July. Social work can provide resources.     Mellissa Mijares, [facility]  [phone]

## 2018-06-30 NOTE — PROGRESS NOTES
[facility] ICU RESPIRATORY NOTE  Date of Admission: 6/25/2018  Date of Intubation (most recent):6/27/2018  Reason for Mechanical Ventilation:Airway protection  Number of Days on Mechanical Ventilation:4  Met Criteria for Pressure Support Trial:No  Reason for No Pressure Support Trial:Per MD    Ventilation Mode: CMV/AC  (Continuous Mandatory Ventilation/ Assist Control)  FiO2 (%): 35 %  Rate Set (breaths/minute): 14 breaths/min  Tidal Volume Set (mL): 550 mL  PEEP (cm H2O): 5 cmH2O  Oxygen Concentration (%): 35 %  Resp: 17      Significant Events Today: None    ABG Results:    Recent Labs  Lab 06/27/18  0725   PH 7.42   PCO2 41   PO2 165*   HCO3 26   O2PER 40% VENT       Plan:  Will cont full vent support for now and assess for weaning readiness daily.  6/30/2018  Jaylin Harper RRT

## 2018-06-30 NOTE — PROGRESS NOTES
Intensivist Daily Note  06/30/2018      Brief History:  52 yom admitted with acute EtOH pancreatitis and EtOH withdrawal, intubated for airway protection in setting of acute withdrawal.    Agitated delirium when propofol decreased, switched to precedex.    Interval Events:  -6/27: intubated, sedated and hemodynamically stable.   -6/28: started tube feeds yesterday, tolerating well.  1L IVF overnight for low UOP.  -6/29: failed SBT yesterday 2/2 tachycardia and agitation.  No acute events overnight.    PMH:  Past Medical History:   Diagnosis Date     Abnormal MRI of head 1/11/2013     Atypical chest pain 9/4/2012     BMI 33.0-33.9,adult 5/24/2013     Hyperlipidemia LDL goal <160 5/4/2013     Hypertension      Ménière's disease        PSurgHx:  Past Surgical History:   Procedure Laterality Date     C NONSPECIFIC PROCEDURE      shoulder surgery for dislocation      C NONSPECIFIC PROCEDURE      eye surgery, lens implant right     EYE SURGERY       FRACTURE TX, WRIST RT/LT      Fracture TX Wrist RT/LT     ORTHOPEDIC SURGERY         Family History:  Family History     Problem (# of Occurrences) Relation (Name,Age of Onset)    C.A.D. (1) Father: bypass surgery    Diabetes (1) Father    Hypertension (1) Mother          Social History:  Social History     Social History     Marital status: Single     Spouse name: N/A     Number of children: N/A     Years of education: N/A     Occupational History     Not on file.     Social History Main Topics     Smoking status: Never Smoker     Smokeless tobacco: Never Used      Comment: rarely, once per year maybe      Alcohol use Yes      Comment: 10 drinks / week      Drug use: No     Sexual activity: Yes     Partners: Female     Other Topics Concern     Parent/Sibling W/ Cabg, Mi Or Angioplasty Before 65f 55m? Yes     Social History Narrative       Allergy:  Allergies   Allergen Reactions     Zoloft [Sertraline] Nausea and Vomiting        Medications:  Current Facility-Administered  Medications   Medication     acetaminophen (TYLENOL) tablet 325 mg     amLODIPine (NORVASC) tablet 2.5 mg     bisacodyl (DULCOLAX) Suppository 10 mg     chlorhexidine (PERIDEX) 0.12 % solution 15 mL     dexmedetomidine (PRECEDEX) 400 mcg in sodium chloride 0.9 % 100 mL infusion     dextrose 10 % 1,000 mL infusion     docusate (COLACE) 50 MG/5ML liquid 100 mg     folic acid (FOLVITE) tablet 1 mg     gabapentin (NEURONTIN) solution 300 mg     hydrALAZINE (APRESOLINE) injection 10 mg     hydrochlorothiazide (HYDRODIURIL) tablet 25 mg     HYDROmorphone (PF) (DILAUDID) injection 0.3-0.5 mg     hypromellose-dextran (ARTIFICAL TEARS) 0.1-0.3 % ophthalmic solution 2-3 drop     lactated ringers infusion     lidocaine (LMX4) cream     lidocaine 1 % 1 mL     LORazepam (ATIVAN) tablet 1-2 mg    Or     LORazepam (ATIVAN) injection 1-2 mg     LORazepam (ATIVAN) tablet 1 mg     magnesium sulfate 2 g in water intermittent infusion     magnesium sulfate 4 g in 100 mL sterile water (premade)     melatonin tablet 3 mg     metoprolol (LOPRESSOR) suspension 50 mg     miconazole (MICATIN) 2 % cream     miconazole (MICATIN; MICRO GUARD) 2 % powder     multivitamins with minerals (CERTAVITE/CEROVITE) liquid 15 mL     naloxone (NARCAN) injection 0.1-0.4 mg     ondansetron (ZOFRAN-ODT) ODT tab 4 mg    Or     ondansetron (ZOFRAN) injection 4 mg     oxyCODONE IR (ROXICODONE) tablet 5-10 mg     pantoprazole (PROTONIX) 2 mg/mL suspension 40 mg     polyethylene glycol (MIRALAX/GLYCOLAX) Packet 17 g     potassium chloride (KLOR-CON) Packet 20-40 mEq     potassium chloride 10 mEq in 100 mL intermittent infusion with 10 mg lidocaine     potassium chloride 10 mEq in 100 mL sterile water intermittent infusion (premix)     potassium chloride 20 mEq in 50 mL intermittent infusion     potassium chloride SA (K-DUR/KLOR-CON M) CR tablet 20-40 mEq     QUEtiapine (SEROquel) tablet 25 mg     senna-docusate (SENOKOT-S;PERICOLACE) 8.6-50 MG per tablet 1 tablet     Or     senna-docusate (SENOKOT-S;PERICOLACE) 8.6-50 MG per tablet 2 tablet     sodium chloride (PF) 0.9% PF flush 3 mL     sodium chloride (PF) 0.9% PF flush 3 mL     thiamine tablet 100 mg     valproate (DEPACON) 500 mg in sodium chloride 0.9 % 50 mL intermittent infusion         Physical examination:  Vital signs:  Temp: 100.4  F (38  C) Temp src: Esophageal BP: 130/73   Heart Rate: 84 Resp: 19 SpO2: 93 % O2 Device: Mechanical Ventilator      General: intubated, sedated.  NAD  HEENT: neck supple, symmetrical  Lungs: Clear to auscultation, no wheezing  CVS: Normal S1 & S2, no murmur  Abdomen: Bowel sounds present, soft, non tender  Extremities/musculoskeletal: no edema  Skin: no rash  Exam of Line sites: No erythema or discharge.    Data:    ROUTINE ICU LABS (Last four results)  CMP    Recent Labs  Lab 06/30/18  0600 06/29/18  0605 06/28/18  0515 06/27/18  0850 06/26/18  0755    145* 145* 141 137   POTASSIUM 3.7 3.4 3.5 3.4 3.5   CHLORIDE 109 111* 112* 109 101   CO2 27 28 24 23 26   ANIONGAP 7 6  --  9 10   * 110* 81 87 104*   BUN 6* 6* 7 8 6*   CR 0.57* 0.68 0.61* 0.60* 0.68   GFRESTIMATED >90 >90 >90 >90 >90   GFRESTBLACK >90 >90 >90 >90 >90   DEMETRIA 8.4* 8.1* 7.7* 8.2* 8.6   MAG 1.9 2.1 2.1  --  2.3   PHOS 2.3* 2.7 2.5  --   --    PROTTOTAL  --  6.9 6.5* 7.4 8.4   ALBUMIN  --  2.7* 2.7* 3.2* 3.8   BILITOTAL  --  1.6* 1.6* 2.3* 2.1*   ALKPHOS  --  88 82 100 113   AST  --  76* 82* 102* 146*   ALT  --  60 61 71* 91*     CBC    Recent Labs  Lab 06/28/18  0906 06/27/18  0850 06/26/18  0755 06/25/18  1930   WBC  --  4.8 4.8 4.0   RBC  --  3.95* 4.07* 4.04*   HGB  --  13.6 14.1 14.1   HCT  --  40.1 40.9 40.0   MCV  --  102* 101* 99   MCH  --  34.4* 34.6* 34.9*   MCHC  --  33.9 34.5 35.3   RDW  --  13.2 13.4 13.3   PLT 70* 68* 76* 72*     INRNo lab results found in last 7 days.  Arterial Blood Gas    Recent Labs  Lab 06/27/18  0725   PH 7.42   PCO2 41   PO2 165*   HCO3 26   O2PER 40% VENT       Recent  Results (from the past 24 hour(s))   XR Abdomen Port 1 View    Narrative    ABDOMEN ONE VIEW PORTABLE  6/29/2018 12:20 PM     HISTORY: TF placement.     COMPARISON: None.      Impression    IMPRESSION: Tip of the feeding tube is projected over the proximal  third portion of the duodenum. Bowel gas pattern is nonobstructive.    MARA DAVILA MD         Assessment and Plan:    Neuro  ### Acute EtOH Withdrawal Syndrome  ### Sedation/Analgesia  ### Meniere's disease  -Propofol gtt -- agitated when sedation lightened on 6/29. Switched to precedex.   -Valproate + Gabapentin  -APAP and Dilaudid prns available  -Restart PTA meclizine once extubated and able to take PO    Respiratory  ### Acute Hypoxic Respiratory failure  -intubated for airway protection  -Consider SBT if tolerates daily awakening trial.     CV:  - no acute issues, monitor hemodynamics    GI/Liver:  ### EtOH Pancreatitis  -Lipase 903, now down-trending to 300-400  -Cont LR @ 125 cc/hr  -Abdominal US 6/25 showing fatty liver    Heme:  ### Thrombocytopenia -  -last CBC 6/28. Repeat today    Endocrine:  -monitor glucose, insulin management per unit protocol    FEN:  -thiamine, folate, and multi-vitamin  -continue tube feeds -- increase free water to 100 cc q4h, Na 143  -continue LR @ 125 cc/hr  -monitor lytes, replete per unit protocol    Prophy:  GI: PPI  DVT: SCDs     ICU Cares:  Restrain needed: Yes   Lines needed: Yes    Code: Full  Dispo: ICU status      Billing: Critical care time 35 min excluding procedure time         Stuart Pro

## 2018-07-01 ENCOUNTER — APPOINTMENT (OUTPATIENT)
Dept: GENERAL RADIOLOGY | Facility: CLINIC | Age: 53
DRG: 166 | End: 2018-07-01
Attending: INTERNAL MEDICINE
Payer: COMMERCIAL

## 2018-07-01 LAB
ALBUMIN SERPL-MCNC: 2.5 G/DL (ref 3.4–5)
ALP SERPL-CCNC: 81 U/L (ref 40–150)
ALT SERPL W P-5'-P-CCNC: 47 U/L (ref 0–70)
ANION GAP SERPL CALCULATED.3IONS-SCNC: 8 MMOL/L (ref 3–14)
AST SERPL W P-5'-P-CCNC: 61 U/L (ref 0–45)
BILIRUB DIRECT SERPL-MCNC: 0.7 MG/DL (ref 0–0.2)
BILIRUB SERPL-MCNC: 1.7 MG/DL (ref 0.2–1.3)
BUN SERPL-MCNC: 8 MG/DL (ref 7–30)
CALCIUM SERPL-MCNC: 8.5 MG/DL (ref 8.5–10.1)
CHLORIDE SERPL-SCNC: 109 MMOL/L (ref 94–109)
CO2 SERPL-SCNC: 27 MMOL/L (ref 20–32)
CREAT SERPL-MCNC: 0.65 MG/DL (ref 0.66–1.25)
GFR SERPL CREATININE-BSD FRML MDRD: >90 ML/MIN/1.7M2
GLUCOSE BLDC GLUCOMTR-MCNC: 184 MG/DL (ref 70–99)
GLUCOSE BLDC GLUCOMTR-MCNC: 190 MG/DL (ref 70–99)
GLUCOSE BLDC GLUCOMTR-MCNC: 223 MG/DL (ref 70–99)
GLUCOSE BLDC GLUCOMTR-MCNC: 235 MG/DL (ref 70–99)
GLUCOSE BLDC GLUCOMTR-MCNC: 246 MG/DL (ref 70–99)
GLUCOSE BLDC GLUCOMTR-MCNC: 261 MG/DL (ref 70–99)
GLUCOSE BLDC GLUCOMTR-MCNC: 278 MG/DL (ref 70–99)
GLUCOSE SERPL-MCNC: 201 MG/DL (ref 70–99)
MAGNESIUM SERPL-MCNC: 1.9 MG/DL (ref 1.6–2.3)
PHOSPHATE SERPL-MCNC: 2.2 MG/DL (ref 2.5–4.5)
PLATELET # BLD AUTO: 127 10E9/L (ref 150–450)
POTASSIUM SERPL-SCNC: 3.6 MMOL/L (ref 3.4–5.3)
PROT SERPL-MCNC: 7 G/DL (ref 6.8–8.8)
SODIUM SERPL-SCNC: 144 MMOL/L (ref 133–144)

## 2018-07-01 PROCEDURE — 20000003 ZZH R&B ICU

## 2018-07-01 PROCEDURE — 83735 ASSAY OF MAGNESIUM: CPT | Performed by: INTERNAL MEDICINE

## 2018-07-01 PROCEDURE — 27210437 ZZH NUTRITION PRODUCT SEMIELEM INTERMED LITER

## 2018-07-01 PROCEDURE — 00000146 ZZHCL STATISTIC GLUCOSE BY METER IP

## 2018-07-01 PROCEDURE — 25000132 ZZH RX MED GY IP 250 OP 250 PS 637: Performed by: SURGERY

## 2018-07-01 PROCEDURE — 94003 VENT MGMT INPAT SUBQ DAY: CPT

## 2018-07-01 PROCEDURE — 25000128 H RX IP 250 OP 636: Performed by: SURGERY

## 2018-07-01 PROCEDURE — 40000986 XR CHEST PORT 1 VW

## 2018-07-01 PROCEDURE — 80048 BASIC METABOLIC PNL TOTAL CA: CPT | Performed by: INTERNAL MEDICINE

## 2018-07-01 PROCEDURE — 36415 COLL VENOUS BLD VENIPUNCTURE: CPT | Performed by: INTERNAL MEDICINE

## 2018-07-01 PROCEDURE — 25000132 ZZH RX MED GY IP 250 OP 250 PS 637: Performed by: INTERNAL MEDICINE

## 2018-07-01 PROCEDURE — 40000275 ZZH STATISTIC RCP TIME EA 10 MIN

## 2018-07-01 PROCEDURE — 25000125 ZZHC RX 250: Performed by: NURSE PRACTITIONER

## 2018-07-01 PROCEDURE — 25000132 ZZH RX MED GY IP 250 OP 250 PS 637: Performed by: HOSPITALIST

## 2018-07-01 PROCEDURE — 25000128 H RX IP 250 OP 636: Performed by: NURSE PRACTITIONER

## 2018-07-01 PROCEDURE — 84100 ASSAY OF PHOSPHORUS: CPT | Performed by: INTERNAL MEDICINE

## 2018-07-01 PROCEDURE — 40000008 ZZH STATISTIC AIRWAY CARE

## 2018-07-01 PROCEDURE — 25000125 ZZHC RX 250: Performed by: SURGERY

## 2018-07-01 PROCEDURE — 85049 AUTOMATED PLATELET COUNT: CPT | Performed by: HOSPITALIST

## 2018-07-01 PROCEDURE — 25000125 ZZHC RX 250: Performed by: INTERNAL MEDICINE

## 2018-07-01 PROCEDURE — 80076 HEPATIC FUNCTION PANEL: CPT | Performed by: INTERNAL MEDICINE

## 2018-07-01 RX ADMIN — PROPOFOL 30 MCG/KG/MIN: 10 INJECTION, EMULSION INTRAVENOUS at 11:34

## 2018-07-01 RX ADMIN — PROPOFOL 30 MCG/KG/MIN: 10 INJECTION, EMULSION INTRAVENOUS at 17:02

## 2018-07-01 RX ADMIN — GABAPENTIN 300 MG: 250 SUSPENSION ORAL at 16:53

## 2018-07-01 RX ADMIN — MICONAZOLE NITRATE: 20 CREAM TOPICAL at 23:06

## 2018-07-01 RX ADMIN — INSULIN ASPART 2 UNITS: 100 INJECTION, SOLUTION INTRAVENOUS; SUBCUTANEOUS at 01:24

## 2018-07-01 RX ADMIN — AMLODIPINE BESYLATE 2.5 MG: 2.5 TABLET ORAL at 08:36

## 2018-07-01 RX ADMIN — MICONAZOLE NITRATE 1 APPLICATOR: 2 POWDER TOPICAL at 08:30

## 2018-07-01 RX ADMIN — INSULIN ASPART 2 UNITS: 100 INJECTION, SOLUTION INTRAVENOUS; SUBCUTANEOUS at 05:10

## 2018-07-01 RX ADMIN — INSULIN ASPART 1 UNITS: 100 INJECTION, SOLUTION INTRAVENOUS; SUBCUTANEOUS at 12:25

## 2018-07-01 RX ADMIN — VALPROATE SODIUM 500 MG: 100 INJECTION, SOLUTION INTRAVENOUS at 17:30

## 2018-07-01 RX ADMIN — Medication 40 MG: at 20:37

## 2018-07-01 RX ADMIN — METOPROLOL TARTRATE 50 MG: 100 TABLET ORAL at 08:42

## 2018-07-01 RX ADMIN — PROPOFOL 20 MCG/KG/MIN: 10 INJECTION, EMULSION INTRAVENOUS at 01:25

## 2018-07-01 RX ADMIN — CHLORHEXIDINE GLUCONATE 15 ML: 1.2 RINSE ORAL at 20:38

## 2018-07-01 RX ADMIN — SODIUM CHLORIDE, POTASSIUM CHLORIDE, SODIUM LACTATE AND CALCIUM CHLORIDE: 600; 310; 30; 20 INJECTION, SOLUTION INTRAVENOUS at 08:01

## 2018-07-01 RX ADMIN — DEXMEDETOMIDINE HYDROCHLORIDE 0.7 MCG/KG/HR: 100 INJECTION, SOLUTION INTRAVENOUS at 08:46

## 2018-07-01 RX ADMIN — Medication 40 MG: at 08:36

## 2018-07-01 RX ADMIN — METOPROLOL TARTRATE 50 MG: 100 TABLET ORAL at 20:38

## 2018-07-01 RX ADMIN — FOLIC ACID 1 MG: 1 TABLET ORAL at 08:36

## 2018-07-01 RX ADMIN — PROPOFOL 30 MCG/KG/MIN: 10 INJECTION, EMULSION INTRAVENOUS at 21:20

## 2018-07-01 RX ADMIN — HYDROCHLOROTHIAZIDE 25 MG: 25 TABLET ORAL at 08:36

## 2018-07-01 RX ADMIN — DEXMEDETOMIDINE HYDROCHLORIDE 1.2 MCG/KG/HR: 100 INJECTION, SOLUTION INTRAVENOUS at 01:20

## 2018-07-01 RX ADMIN — GABAPENTIN 300 MG: 250 SUSPENSION ORAL at 08:42

## 2018-07-01 RX ADMIN — VALPROATE SODIUM 500 MG: 100 INJECTION, SOLUTION INTRAVENOUS at 06:30

## 2018-07-01 RX ADMIN — INSULIN ASPART 2 UNITS: 100 INJECTION, SOLUTION INTRAVENOUS; SUBCUTANEOUS at 08:10

## 2018-07-01 RX ADMIN — Medication 15 ML: at 08:42

## 2018-07-01 RX ADMIN — DOCUSATE SODIUM 100 MG: 50 LIQUID ORAL at 08:36

## 2018-07-01 RX ADMIN — CHLORHEXIDINE GLUCONATE 15 ML: 1.2 RINSE ORAL at 08:36

## 2018-07-01 RX ADMIN — INSULIN ASPART 3 UNITS: 100 INJECTION, SOLUTION INTRAVENOUS; SUBCUTANEOUS at 23:14

## 2018-07-01 RX ADMIN — INSULIN ASPART 3 UNITS: 100 INJECTION, SOLUTION INTRAVENOUS; SUBCUTANEOUS at 20:39

## 2018-07-01 RX ADMIN — INSULIN ASPART 3 UNITS: 100 INJECTION, SOLUTION INTRAVENOUS; SUBCUTANEOUS at 16:53

## 2018-07-01 RX ADMIN — GABAPENTIN 300 MG: 250 SUSPENSION ORAL at 21:20

## 2018-07-01 RX ADMIN — DOCUSATE SODIUM 100 MG: 50 LIQUID ORAL at 20:37

## 2018-07-01 RX ADMIN — ACETAMINOPHEN 325 MG: 325 TABLET, FILM COATED ORAL at 03:11

## 2018-07-01 NOTE — PLAN OF CARE
Problem: Patient Care Overview  Goal: Plan of Care/Patient Progress Review  Outcome: No Change  Pt remains sedated and vented.  Lungs coarse.  Bowel sounds hypoactive.  No BM this shift.  Skin care protocol in place.  TF continues at 60cc/h.  Withdraws from pain in all extremities.  BG covered q4h with SSI.  Continue to monitor.

## 2018-07-01 NOTE — PROGRESS NOTES
Patient unable to wean down sedation. Patient restless,desat 80's,fighting the vent. Patient remained vented and sedated. Vital signs stable. Move all extremities,not following commands. Urine output adequate.Palpable pulses. Profopol gtt for sedation. Updated Blu( patient brother) Cont to monitor.Kathi James, RN, BSN, BC, CCRN

## 2018-07-01 NOTE — PROGRESS NOTES
[facility] ICU RESPIRATORY NOTE  Date of Admission: 6/25/2018  Date of Intubation (most recent):6/27/2018  Reason for Mechanical Ventilation:Airway protection  Number of Days on Mechanical Ventilation:5  Met Criteria for Pressure Support Trial:No  Reason for No Pressure Support Trial:Per MD     Ventilation Mode: CMV/AC  (Continuous Mandatory Ventilation/ Assist Control)  FiO2 (%): 45 %  Rate Set (breaths/minute): 14 breaths/min  Tidal Volume Set (mL): 550 mL  PEEP (cm H2O): 5 cmH2O  Oxygen Concentration (%): 45 %  Resp: 18         Significant Events Today: None     ABG Results:       Recent Labs  Lab 06/27/18  0725   PH 7.42   PCO2 41   PO2 165*   HCO3 26   O2PER 40% VENT         Plan:  Will cont full vent support for now and assess for weaning readiness daily.  7/1/2018  Andrew Lord

## 2018-07-01 NOTE — PROGRESS NOTES
[facility] ICU RESPIRATORY NOTE  Date of Admission: 6/25/2018  Date of Intubation (most recent):6/27/2018  Reason for Mechanical Ventilation:Airway protection   Number of Days on Mechanical Ventilation:5  Met Criteria for Pressure Support Trial:No  Reason for No Pressure Support Trial:Per MD    Ventilation Mode: CMV/AC  (Continuous Mandatory Ventilation/ Assist Control)  FiO2 (%): 50 %  Rate Set (breaths/minute): 14 breaths/min  Tidal Volume Set (mL): 550 mL  PEEP (cm H2O): 5 cmH2O  Oxygen Concentration (%): 50 %  Resp: 0      Significant Events Today:ETT was advanced 4 cm per physician order.     ABG Results:    Recent Labs  Lab 06/27/18  0725   PH 7.42   PCO2 41   PO2 165*   HCO3 26   O2PER 40% VENT           ETT appearance on chest x-ray:No new results since ETT was advanced    Plan:  Will cont full vent support for now and assess for weaning readiness daily.  7/1/2018  Jaylin Harper RRT

## 2018-07-01 NOTE — PROGRESS NOTES
Intensivist Daily Note  07/01/2018      Brief History:    52 yom admitted with acute EtOH pancreatitis and EtOH withdrawal, intubated for airway protection in setting of acute withdrawal.    Interval Events:  -6/27: intubated, sedated and hemodynamically stable.   -6/28: started tube feeds yesterday, tolerating well.  1L IVF overnight for low UOP.  -6/29: failed SBT yesterday 2/2 tachycardia and agitation.  No acute events overnight.  -6/30: Continued with agitation. Head CT without acute chagnes. Volume loss noted.       PMH:  Past Medical History:   Diagnosis Date     Abnormal MRI of head 1/11/2013     Atypical chest pain 9/4/2012     BMI 33.0-33.9,adult 5/24/2013     Hyperlipidemia LDL goal <160 5/4/2013     Hypertension      Ménière's disease        PSurgHx:  Past Surgical History:   Procedure Laterality Date     C NONSPECIFIC PROCEDURE      shoulder surgery for dislocation      C NONSPECIFIC PROCEDURE      eye surgery, lens implant right     EYE SURGERY       FRACTURE TX, WRIST RT/LT      Fracture TX Wrist RT/LT     ORTHOPEDIC SURGERY         Family History:  Family History     Problem (# of Occurrences) Relation (Name,Age of Onset)    C.A.D. (1) Father: bypass surgery    Diabetes (1) Father    Hypertension (1) Mother          Social History:  Social History     Social History     Marital status: Single     Spouse name: N/A     Number of children: N/A     Years of education: N/A     Occupational History     Not on file.     Social History Main Topics     Smoking status: Never Smoker     Smokeless tobacco: Never Used      Comment: rarely, once per year maybe      Alcohol use Yes      Comment: 10 drinks / week      Drug use: No     Sexual activity: Yes     Partners: Female     Other Topics Concern     Parent/Sibling W/ Cabg, Mi Or Angioplasty Before 65f 55m? Yes     Social History Narrative       Allergy:  Allergies   Allergen Reactions     Zoloft [Sertraline] Nausea and Vomiting        Medications:  Current  Facility-Administered Medications   Medication     acetaminophen (TYLENOL) tablet 325 mg     amLODIPine (NORVASC) tablet 2.5 mg     bisacodyl (DULCOLAX) Suppository 10 mg     chlorhexidine (PERIDEX) 0.12 % solution 15 mL     dexmedetomidine (PRECEDEX) 400 mcg in sodium chloride 0.9 % 100 mL infusion     dextrose 10 % 1,000 mL infusion     glucose gel 15-30 g    Or     dextrose 50 % injection 25-50 mL    Or     glucagon injection 1 mg     docusate (COLACE) 50 MG/5ML liquid 100 mg     folic acid (FOLVITE) tablet 1 mg     gabapentin (NEURONTIN) solution 300 mg     hydrALAZINE (APRESOLINE) injection 10 mg     hydrochlorothiazide (HYDRODIURIL) tablet 25 mg     HYDROmorphone (PF) (DILAUDID) injection 0.3-0.5 mg     hypromellose-dextran (ARTIFICAL TEARS) 0.1-0.3 % ophthalmic solution 2-3 drop     insulin aspart (NovoLOG) inj (RAPID ACTING)     lactated ringers infusion     lidocaine (LMX4) cream     lidocaine 1 % 1 mL     LORazepam (ATIVAN) tablet 1-2 mg    Or     LORazepam (ATIVAN) injection 1-2 mg     LORazepam (ATIVAN) tablet 1 mg     magnesium sulfate 2 g in water intermittent infusion     magnesium sulfate 4 g in 100 mL sterile water (premade)     melatonin tablet 3 mg     metoprolol (LOPRESSOR) suspension 50 mg     miconazole (MICATIN) 2 % cream     miconazole (MICATIN; MICRO GUARD) 2 % powder     multivitamins with minerals (CERTAVITE/CEROVITE) liquid 15 mL     naloxone (NARCAN) injection 0.1-0.4 mg     ondansetron (ZOFRAN-ODT) ODT tab 4 mg    Or     ondansetron (ZOFRAN) injection 4 mg     oxyCODONE IR (ROXICODONE) tablet 5-10 mg     pantoprazole (PROTONIX) 2 mg/mL suspension 40 mg     polyethylene glycol (MIRALAX/GLYCOLAX) Packet 17 g     potassium chloride (KLOR-CON) Packet 20-40 mEq     potassium chloride 10 mEq in 100 mL intermittent infusion with 10 mg lidocaine     potassium chloride 10 mEq in 100 mL sterile water intermittent infusion (premix)     potassium chloride 20 mEq in 50 mL intermittent infusion      potassium chloride SA (K-DUR/KLOR-CON M) CR tablet 20-40 mEq     propofol (DIPRIVAN) infusion     QUEtiapine (SEROquel) tablet 25 mg     senna-docusate (SENOKOT-S;PERICOLACE) 8.6-50 MG per tablet 1 tablet    Or     senna-docusate (SENOKOT-S;PERICOLACE) 8.6-50 MG per tablet 2 tablet     sodium chloride (PF) 0.9% PF flush 3 mL     sodium chloride (PF) 0.9% PF flush 3 mL     valproate (DEPACON) 500 mg in sodium chloride 0.9 % 50 mL intermittent infusion         Physical examination:  Vital signs:  Temp: 100.6  F (38.1  C) Temp src: Axillary BP: 149/81   Heart Rate: 82 Resp: 19 SpO2: 95 % O2 Device: Mechanical Ventilator      General: intubated, sedated.  NAD  HEENT: neck supple, symmetrical  Lungs: Clear to auscultation, no wheezing  CVS: Normal S1 & S2, no murmur  Abdomen: Bowel sounds present, soft, non tender  Extremities/musculoskeletal: no edema  Skin: no rash  Exam of Line sites: No erythema or discharge.      Intake/Output Summary (Last 24 hours) at 07/01/18 1159  Last data filed at 07/01/18 1000   Gross per 24 hour   Intake           4078.7 ml   Output             4585 ml   Net           -506.3 ml       Data:    ROUTINE ICU LABS (Last four results)  CMP    Recent Labs  Lab 07/01/18  0615 06/30/18  0600 06/29/18  0605 06/28/18  0515 06/27/18  0850 06/26/18  0755    143 145* 145* 141 137   POTASSIUM 3.6 3.7 3.4 3.5 3.4 3.5   CHLORIDE 109 109 111* 112* 109 101   CO2 27 27 28 24 23 26   ANIONGAP 8 7 6  --  9 10   * 174* 110* 81 87 104*   BUN 8 6* 6* 7 8 6*   CR 0.65* 0.57* 0.68 0.61* 0.60* 0.68   GFRESTIMATED >90 >90 >90 >90 >90 >90   GFRESTBLACK >90 >90 >90 >90 >90 >90   DEMETRIA 8.5 8.4* 8.1* 7.7* 8.2* 8.6   MAG 1.9 1.9 2.1 2.1  --  2.3   PHOS 2.2* 2.3* 2.7 2.5  --   --    PROTTOTAL  --   --  6.9 6.5* 7.4 8.4   ALBUMIN  --   --  2.7* 2.7* 3.2* 3.8   BILITOTAL  --   --  1.6* 1.6* 2.3* 2.1*   ALKPHOS  --   --  88 82 100 113   AST  --   --  76* 82* 102* 146*   ALT  --   --  60 61 71* 91*     CBC    Recent  Labs  Lab 07/01/18  0615 06/30/18  1010 06/28/18  0906 06/27/18  0850 06/26/18  0755 06/25/18  1930   WBC  --  8.3  --  4.8 4.8 4.0   RBC  --  4.20*  --  3.95* 4.07* 4.04*   HGB  --  14.8  --  13.6 14.1 14.1   HCT  --  42.7  --  40.1 40.9 40.0   MCV  --  102*  --  102* 101* 99   MCH  --  35.2*  --  34.4* 34.6* 34.9*   MCHC  --  34.7  --  33.9 34.5 35.3   RDW  --  13.1  --  13.2 13.4 13.3   * 104* 70* 68* 76* 72*     INRNo lab results found in last 7 days.  Arterial Blood Gas    Recent Labs  Lab 06/27/18  0725   PH 7.42   PCO2 41   PO2 165*   HCO3 26   O2PER 40% VENT       Recent Results (from the past 24 hour(s))   CT Head w/o Contrast    Narrative    CT SCAN OF THE HEAD WITHOUT CONTRAST   6/30/2018 12:35 PM     HISTORY: Unequal pupils, AMS.  Assess for acute intracranial  pathology.     TECHNIQUE:  Axial images of the head and coronal reformations without  IV contrast material.  Radiation dose for this scan was reduced using  automated exposure control, adjustment of the mA and/or kV according  to patient size, or iterative reconstruction technique.    COMPARISON: None.    FINDINGS: There is some mild cerebral atrophy. The brain parenchyma is  otherwise normal. There is no evidence for intracranial hemorrhage,  mass effect, acute infarct, or skull fracture.      Impression    IMPRESSION: Moderate cerebral atrophy. No evidence for intracranial  hemorrhage or any acute process.    LINDA SZYMANSKI MD         Assessment and Plan:    Neuro  ### Acute EtOH withdrawal Syndrome  ### Sedation/Analgesia  ### Meniere's disease  -Propofol gtt -- agitated when sedation lightened  -Valproate + Gabapentin  -APAP and Dilaudid prns available  -Restart PTA meclizine once extubated and able to take PO    Respiratory  ### Acute Hypoxic Respiratory failure  -intubated for airway protection  -Consider SBT if tolerates daily awakening trial.     CV:  - no acute issues, monitor hemodynamics    GI/Liver:  ### EtOH Pancreatitis  -Lipase  903, now down-trending to 300-400  - d/c lactated ringers  -Abdominal US 6/25 showing fatty liver    Heme:  ### Thrombocytopenia - improving    Endocrine:  -monitor glucose, insulin management per unit protocol    FEN:  -thiamine, folate, and multi-vitamin  -continue tube feeds -- increase free water to 100 cc q4h, Na 143  -monitor lytes, replete per unit protocol  - Feeding tube placement and nutrition consult    Prophy:  GI: PPI  DVT: SCDs     ICU Cares:  Restrain needed: Yes   Lines needed: Yes    Code: Full  Dispo: ICU status    Family: Met brother Blu on 7/1. They would want to talk to social work to change medical POA from parents to him.    Billing: Critical care time 35 min excluding procedure time         Stuart Pro

## 2018-07-02 ENCOUNTER — APPOINTMENT (OUTPATIENT)
Dept: GENERAL RADIOLOGY | Facility: CLINIC | Age: 53
DRG: 166 | End: 2018-07-02
Attending: INTERNAL MEDICINE
Payer: COMMERCIAL

## 2018-07-02 LAB
ANION GAP SERPL CALCULATED.3IONS-SCNC: 6 MMOL/L (ref 3–14)
BUN SERPL-MCNC: 15 MG/DL (ref 7–30)
CALCIUM SERPL-MCNC: 8.6 MG/DL (ref 8.5–10.1)
CHLORIDE SERPL-SCNC: 107 MMOL/L (ref 94–109)
CO2 SERPL-SCNC: 29 MMOL/L (ref 20–32)
CREAT SERPL-MCNC: 0.66 MG/DL (ref 0.66–1.25)
GFR SERPL CREATININE-BSD FRML MDRD: >90 ML/MIN/1.7M2
GLUCOSE BLDC GLUCOMTR-MCNC: 227 MG/DL (ref 70–99)
GLUCOSE BLDC GLUCOMTR-MCNC: 249 MG/DL (ref 70–99)
GLUCOSE BLDC GLUCOMTR-MCNC: 270 MG/DL (ref 70–99)
GLUCOSE BLDC GLUCOMTR-MCNC: 274 MG/DL (ref 70–99)
GLUCOSE BLDC GLUCOMTR-MCNC: 290 MG/DL (ref 70–99)
GLUCOSE BLDC GLUCOMTR-MCNC: 330 MG/DL (ref 70–99)
GLUCOSE SERPL-MCNC: 251 MG/DL (ref 70–99)
LIPASE SERPL-CCNC: 286 U/L (ref 73–393)
MAGNESIUM SERPL-MCNC: 1.9 MG/DL (ref 1.6–2.3)
PHOSPHATE SERPL-MCNC: 1.9 MG/DL (ref 2.5–4.5)
POTASSIUM SERPL-SCNC: 3.8 MMOL/L (ref 3.4–5.3)
PROCALCITONIN SERPL-MCNC: 0.57 NG/ML
SODIUM SERPL-SCNC: 142 MMOL/L (ref 133–144)

## 2018-07-02 PROCEDURE — 87186 SC STD MICRODIL/AGAR DIL: CPT | Performed by: INTERNAL MEDICINE

## 2018-07-02 PROCEDURE — 25000128 H RX IP 250 OP 636: Performed by: HOSPITALIST

## 2018-07-02 PROCEDURE — 00000146 ZZHCL STATISTIC GLUCOSE BY METER IP

## 2018-07-02 PROCEDURE — 40000008 ZZH STATISTIC AIRWAY CARE

## 2018-07-02 PROCEDURE — 87070 CULTURE OTHR SPECIMN AEROBIC: CPT | Performed by: INTERNAL MEDICINE

## 2018-07-02 PROCEDURE — 83735 ASSAY OF MAGNESIUM: CPT | Performed by: INTERNAL MEDICINE

## 2018-07-02 PROCEDURE — 25000132 ZZH RX MED GY IP 250 OP 250 PS 637: Performed by: INTERNAL MEDICINE

## 2018-07-02 PROCEDURE — 25000132 ZZH RX MED GY IP 250 OP 250 PS 637: Performed by: HOSPITALIST

## 2018-07-02 PROCEDURE — 71045 X-RAY EXAM CHEST 1 VIEW: CPT

## 2018-07-02 PROCEDURE — 84100 ASSAY OF PHOSPHORUS: CPT | Performed by: INTERNAL MEDICINE

## 2018-07-02 PROCEDURE — 94003 VENT MGMT INPAT SUBQ DAY: CPT

## 2018-07-02 PROCEDURE — 25000128 H RX IP 250 OP 636: Performed by: INTERNAL MEDICINE

## 2018-07-02 PROCEDURE — 25000128 H RX IP 250 OP 636: Performed by: NURSE PRACTITIONER

## 2018-07-02 PROCEDURE — 27210437 ZZH NUTRITION PRODUCT SEMIELEM INTERMED LITER

## 2018-07-02 PROCEDURE — 93010 ELECTROCARDIOGRAM REPORT: CPT | Performed by: INTERNAL MEDICINE

## 2018-07-02 PROCEDURE — 83690 ASSAY OF LIPASE: CPT | Performed by: INTERNAL MEDICINE

## 2018-07-02 PROCEDURE — 25000131 ZZH RX MED GY IP 250 OP 636 PS 637: Performed by: NURSE PRACTITIONER

## 2018-07-02 PROCEDURE — 84145 PROCALCITONIN (PCT): CPT | Performed by: INTERNAL MEDICINE

## 2018-07-02 PROCEDURE — 40000275 ZZH STATISTIC RCP TIME EA 10 MIN

## 2018-07-02 PROCEDURE — 20000003 ZZH R&B ICU

## 2018-07-02 PROCEDURE — 80048 BASIC METABOLIC PNL TOTAL CA: CPT | Performed by: INTERNAL MEDICINE

## 2018-07-02 PROCEDURE — 25000125 ZZHC RX 250: Performed by: SURGERY

## 2018-07-02 PROCEDURE — 93005 ELECTROCARDIOGRAM TRACING: CPT

## 2018-07-02 PROCEDURE — 25000128 H RX IP 250 OP 636: Performed by: SURGERY

## 2018-07-02 PROCEDURE — 36415 COLL VENOUS BLD VENIPUNCTURE: CPT | Performed by: INTERNAL MEDICINE

## 2018-07-02 PROCEDURE — 25000125 ZZHC RX 250: Performed by: INTERNAL MEDICINE

## 2018-07-02 PROCEDURE — 87205 SMEAR GRAM STAIN: CPT | Performed by: INTERNAL MEDICINE

## 2018-07-02 PROCEDURE — 87077 CULTURE AEROBIC IDENTIFY: CPT | Performed by: INTERNAL MEDICINE

## 2018-07-02 RX ORDER — QUETIAPINE FUMARATE 25 MG/1
25 TABLET, FILM COATED ORAL 2 TIMES DAILY
Status: DISCONTINUED | OUTPATIENT
Start: 2018-07-02 | End: 2018-07-03

## 2018-07-02 RX ORDER — AMINO ACIDS/PROTEIN HYDROLYS 11G-40/45
1 LIQUID IN PACKET (ML) ORAL 2 TIMES DAILY
Status: DISCONTINUED | OUTPATIENT
Start: 2018-07-02 | End: 2018-07-15

## 2018-07-02 RX ADMIN — GABAPENTIN 300 MG: 250 SUSPENSION ORAL at 08:02

## 2018-07-02 RX ADMIN — PROPOFOL 30 MCG/KG/MIN: 10 INJECTION, EMULSION INTRAVENOUS at 08:06

## 2018-07-02 RX ADMIN — DOCUSATE SODIUM 100 MG: 50 LIQUID ORAL at 20:22

## 2018-07-02 RX ADMIN — ENOXAPARIN SODIUM 40 MG: 40 INJECTION SUBCUTANEOUS at 12:38

## 2018-07-02 RX ADMIN — INSULIN GLARGINE 12 UNITS: 100 INJECTION, SOLUTION SUBCUTANEOUS at 12:23

## 2018-07-02 RX ADMIN — Medication 1 PACKET: at 13:32

## 2018-07-02 RX ADMIN — MICONAZOLE NITRATE: 20 CREAM TOPICAL at 22:03

## 2018-07-02 RX ADMIN — HYDROCHLOROTHIAZIDE 25 MG: 25 TABLET ORAL at 08:01

## 2018-07-02 RX ADMIN — PROPOFOL 15 MCG/KG/MIN: 10 INJECTION, EMULSION INTRAVENOUS at 12:44

## 2018-07-02 RX ADMIN — DOCUSATE SODIUM 100 MG: 50 LIQUID ORAL at 08:01

## 2018-07-02 RX ADMIN — CHLORHEXIDINE GLUCONATE 15 ML: 1.2 RINSE ORAL at 08:09

## 2018-07-02 RX ADMIN — METOPROLOL TARTRATE 50 MG: 100 TABLET ORAL at 20:25

## 2018-07-02 RX ADMIN — PROPOFOL 30 MCG/KG/MIN: 10 INJECTION, EMULSION INTRAVENOUS at 03:33

## 2018-07-02 RX ADMIN — INSULIN ASPART 3 UNITS: 100 INJECTION, SOLUTION INTRAVENOUS; SUBCUTANEOUS at 12:01

## 2018-07-02 RX ADMIN — Medication 40 MG: at 08:08

## 2018-07-02 RX ADMIN — INSULIN ASPART 4 UNITS: 100 INJECTION, SOLUTION INTRAVENOUS; SUBCUTANEOUS at 15:08

## 2018-07-02 RX ADMIN — GABAPENTIN 300 MG: 250 SUSPENSION ORAL at 15:35

## 2018-07-02 RX ADMIN — GABAPENTIN 300 MG: 250 SUSPENSION ORAL at 22:03

## 2018-07-02 RX ADMIN — VALPROATE SODIUM 500 MG: 100 INJECTION, SOLUTION INTRAVENOUS at 19:22

## 2018-07-02 RX ADMIN — INSULIN ASPART 4 UNITS: 100 INJECTION, SOLUTION INTRAVENOUS; SUBCUTANEOUS at 20:38

## 2018-07-02 RX ADMIN — VALPROATE SODIUM 500 MG: 100 INJECTION, SOLUTION INTRAVENOUS at 07:06

## 2018-07-02 RX ADMIN — MICONAZOLE NITRATE: 20 CREAM TOPICAL at 08:09

## 2018-07-02 RX ADMIN — INSULIN ASPART 2 UNITS: 100 INJECTION, SOLUTION INTRAVENOUS; SUBCUTANEOUS at 03:49

## 2018-07-02 RX ADMIN — METOPROLOL TARTRATE 50 MG: 100 TABLET ORAL at 08:02

## 2018-07-02 RX ADMIN — PROPOFOL 25 MCG/KG/MIN: 10 INJECTION, EMULSION INTRAVENOUS at 19:19

## 2018-07-02 RX ADMIN — Medication 15 ML: at 08:01

## 2018-07-02 RX ADMIN — FOLIC ACID 1 MG: 1 TABLET ORAL at 08:01

## 2018-07-02 RX ADMIN — Medication 1 PACKET: at 22:02

## 2018-07-02 RX ADMIN — QUETIAPINE FUMARATE 25 MG: 25 TABLET ORAL at 20:22

## 2018-07-02 RX ADMIN — Medication 0.5 MG: at 20:22

## 2018-07-02 RX ADMIN — LORAZEPAM 2 MG: 2 INJECTION INTRAMUSCULAR; INTRAVENOUS at 14:14

## 2018-07-02 RX ADMIN — CHLORHEXIDINE GLUCONATE 15 ML: 1.2 RINSE ORAL at 20:23

## 2018-07-02 RX ADMIN — AMLODIPINE BESYLATE 2.5 MG: 2.5 TABLET ORAL at 08:01

## 2018-07-02 RX ADMIN — INSULIN ASPART 3 UNITS: 100 INJECTION, SOLUTION INTRAVENOUS; SUBCUTANEOUS at 08:11

## 2018-07-02 RX ADMIN — QUETIAPINE FUMARATE 25 MG: 25 TABLET ORAL at 16:01

## 2018-07-02 RX ADMIN — Medication 40 MG: at 20:22

## 2018-07-02 RX ADMIN — Medication 0.5 MG: at 10:22

## 2018-07-02 NOTE — PROGRESS NOTES
CLINICAL NUTRITION SERVICES - REASSESSMENT NOTE      RECOMMENDATIONS FOR MD/PROVIDER TO ORDER: Consider more aggressive bowel regimen    Recommendations Ordered by Registered Dietitian (RD):   Reduce TF to Peptamen 1.5 at 50 mL/hr to provide:  1800 kcal, 82 g protein (1 g/kg and 87% needs), 226 g CHO, no fiber, 924 mL H2O  Add ProSource 1 pkt BID= 80 kcal and 22 g protein  Total (TF + ProSource + Propofol)= 2371 kcal (30 kcal/kg and 103% needs), 104 g protein (1.3 g/kg)   Malnutrition:   (6/27)  % Weight Loss:  None noted  % Intake: <75% for > 7 days (non-severe malnutrition)  Subcutaneous Fat Loss:  None observed  Muscle Loss:  None observed  Fluid Retention:  Mild 2+ LE (likely not nutrition related)      Malnutrition Diagnosis: Patient does not meet two of the above criteria diagnosing   for malnutrition        EVALUATION OF PROGRESS TOWARD GOALS   Diet:  NPO on vent   Nutrition Support:  Patient continues on goal TF regimen as follows ~    Nutrition Support Enteral:  Type of Feeding Tube: Nasoduodenal FT    Enteral Frequency:  Continuous  Enteral Regimen: Peptamen 1.5 at 60 mL/hr   Total Enteral Provisions: 2160 kcal (28 kcal/kg), 98 g protein (1.3 g/kg), 1109 mL H2O, 270 g CHO, no fiber   Free Water Flush: 100 mL every 4 hours   Propofol started on 6/30 and currently running at 18.6 mL/hr= 491 kcal   Total = 2651 kcal (34 kcal/kg and 115% needs)    Intake/Tolerance:    Phos 1.9 (L)  -278 range - Receiving Med SSI + started on Lantus today (12 units in am)  BM x 1 6/29 - Receiving Colace       ASSESSED NUTRITION NEEDS:  Dosing Weight (78 kg adjusted for overwt)      Estimated Energy Needs: 4814-2370 kcals (25-30 Kcal/Kg)  Justification: obese and vented  Estimated Protein Needs:  grams protein (1.2-1.5 g pro/Kg)  Justification: stress      NEW FINDINGS:   Receiving MVI with minerals daily + Folic Acid - Continue for now due to H/O ETOH    Weight 103.6 kg (6/30)     Previous Goals (6/29):   TF goal  Peptamen 1.5 at 60 mL/hr will meet % estimated needs  Evaluation: Not met - patient being overfed with current Propofol dose     Previous Nutrition Diagnosis (6/29):   Inadequate enteral nutrition infusion related to low TF rate as evidenced by TF + Propofol meeting 72% energy and 52% protein needs  Evaluation: Resolved       CURRENT NUTRITION DIAGNOSIS  Excessive energy intake related to TF at 60 mL/hr, now on Propofol as evidenced by meeting 115% energy needs    INTERVENTIONS  Recommendations / Nutrition Prescription  Reduce TF to Peptamen 1.5 at 50 mL/hr to provide:  1800 kcal, 82 g protein (1 g/kg and 87% needs), 226 g CHO, no fiber, 924 mL H2O  Add ProSource 1 pkt BID= 80 kcal and 22 g protein  Total (TF + ProSource + Propofol)= 2371 kcal (30 kcal/kg and 103% needs), 104 g protein (1.3 g/kg)    Consider more aggressive bowel regimen     Implementation  EN Composition and Medical Food Supplement:  Orders placed to decrease TF rate + add ProSource as above  Collaboration and Referral of Nutrition care:  Patient discussed today during interdisciplinary bedside rounds.  Also discussed above TF changes with Dr. Ramirez who was in agreement.     Goals  TF + ProSource + Propofol will meet % needs  Patient will stool once every 24-48 hours     MONITORING AND EVALUATION:  Progress towards goals will be monitored and evaluated per protocol and Practice Guidelines    Roxy Green RD, LD, CNSC   Clinical Dietitian - [facility]

## 2018-07-02 NOTE — PROGRESS NOTES
Intensivist Daily Note  07/02/2018      Brief History:    52 yom admitted with acute EtOH pancreatitis and EtOH withdrawal, intubated for airway protection in setting of acute withdrawal.    Interval Events:  -6/27: intubated, sedated and hemodynamically stable.   -6/28: started tube feeds yesterday, tolerating well.  1L IVF overnight for low UOP.  -6/29: failed SBT yesterday 2/2 tachycardia and agitation.  No acute events overnight.  -6/30: Continued with agitation. Head CT without acute chagnes. Volume loss noted.   -7/2: continues with agitation with lightening sedation; no other acute events.  Blood glucose levels elevated > 200.      PMH:  Past Medical History:   Diagnosis Date     Abnormal MRI of head 1/11/2013     Atypical chest pain 9/4/2012     BMI 33.0-33.9,adult 5/24/2013     Hyperlipidemia LDL goal <160 5/4/2013     Hypertension      Ménière's disease        PSurgHx:  Past Surgical History:   Procedure Laterality Date     C NONSPECIFIC PROCEDURE      shoulder surgery for dislocation      C NONSPECIFIC PROCEDURE      eye surgery, lens implant right     EYE SURGERY       FRACTURE TX, WRIST RT/LT      Fracture TX Wrist RT/LT     ORTHOPEDIC SURGERY         Family History:  Family History     Problem (# of Occurrences) Relation (Name,Age of Onset)    C.A.D. (1) Father: bypass surgery    Diabetes (1) Father    Hypertension (1) Mother          Social History:  Social History     Social History     Marital status: Single     Spouse name: N/A     Number of children: N/A     Years of education: N/A     Occupational History     Not on file.     Social History Main Topics     Smoking status: Never Smoker     Smokeless tobacco: Never Used      Comment: rarely, once per year maybe      Alcohol use Yes      Comment: 10 drinks / week      Drug use: No     Sexual activity: Yes     Partners: Female     Other Topics Concern     Parent/Sibling W/ Cabg, Mi Or Angioplasty Before 65f 55m? Yes     Social History Narrative        Allergy:  Allergies   Allergen Reactions     Zoloft [Sertraline] Nausea and Vomiting        Medications:  Current Facility-Administered Medications   Medication     acetaminophen (TYLENOL) tablet 325 mg     amLODIPine (NORVASC) tablet 2.5 mg     bisacodyl (DULCOLAX) Suppository 10 mg     chlorhexidine (PERIDEX) 0.12 % solution 15 mL     dextrose 10 % 1,000 mL infusion     glucose gel 15-30 g    Or     dextrose 50 % injection 25-50 mL    Or     glucagon injection 1 mg     docusate (COLACE) 50 MG/5ML liquid 100 mg     folic acid (FOLVITE) tablet 1 mg     gabapentin (NEURONTIN) solution 300 mg     hydrALAZINE (APRESOLINE) injection 10 mg     hydrochlorothiazide (HYDRODIURIL) tablet 25 mg     HYDROmorphone (PF) (DILAUDID) injection 0.3-0.5 mg     hypromellose-dextran (ARTIFICAL TEARS) 0.1-0.3 % ophthalmic solution 2-3 drop     insulin aspart (NovoLOG) inj (RAPID ACTING)     insulin glargine (LANTUS) injection 12 Units     lidocaine (LMX4) cream     lidocaine 1 % 1 mL     LORazepam (ATIVAN) tablet 1-2 mg    Or     LORazepam (ATIVAN) injection 1-2 mg     magnesium sulfate 2 g in water intermittent infusion     magnesium sulfate 4 g in 100 mL sterile water (premade)     melatonin tablet 3 mg     metoprolol (LOPRESSOR) suspension 50 mg     miconazole (MICATIN) 2 % cream     miconazole (MICATIN; MICRO GUARD) 2 % powder     multivitamins with minerals (CERTAVITE/CEROVITE) liquid 15 mL     naloxone (NARCAN) injection 0.1-0.4 mg     ondansetron (ZOFRAN-ODT) ODT tab 4 mg    Or     ondansetron (ZOFRAN) injection 4 mg     oxyCODONE IR (ROXICODONE) tablet 5-10 mg     pantoprazole (PROTONIX) 2 mg/mL suspension 40 mg     polyethylene glycol (MIRALAX/GLYCOLAX) Packet 17 g     potassium chloride (KLOR-CON) Packet 20-40 mEq     potassium chloride 10 mEq in 100 mL intermittent infusion with 10 mg lidocaine     potassium chloride 10 mEq in 100 mL sterile water intermittent infusion (premix)     potassium chloride 20 mEq in 50 mL  intermittent infusion     potassium chloride SA (K-DUR/KLOR-CON M) CR tablet 20-40 mEq     propofol (DIPRIVAN) infusion     protein modular (PROSource TF) 1 packet     QUEtiapine (SEROquel) tablet 25 mg     senna-docusate (SENOKOT-S;PERICOLACE) 8.6-50 MG per tablet 1 tablet    Or     senna-docusate (SENOKOT-S;PERICOLACE) 8.6-50 MG per tablet 2 tablet     sodium chloride (PF) 0.9% PF flush 3 mL     sodium chloride (PF) 0.9% PF flush 3 mL     valproate (DEPACON) 500 mg in sodium chloride 0.9 % 50 mL intermittent infusion         Physical examination:  Vital signs:  Temp: 98.8  F (37.1  C) Temp src: Oral BP: 116/71   Heart Rate: 97 Resp: 17 SpO2: 98 % (Simultaneous filing. User may not have seen previous data.) O2 Device: Mechanical Ventilator (Simultaneous filing. User may not have seen previous data.)      General: intubated, sedated.  NAD  HEENT: neck supple, symmetrical  Lungs: Clear to auscultation, no wheezing  CVS: Normal S1 & S2, no murmur  Abdomen: Bowel sounds present, soft, non tender  Extremities/musculoskeletal: no edema  Skin: no rash  Exam of Line sites: No erythema or discharge.      Intake/Output Summary (Last 24 hours) at 07/01/18 1159  Last data filed at 07/01/18 1000   Gross per 24 hour   Intake           4078.7 ml   Output             4585 ml   Net           -506.3 ml       Data:    ROUTINE ICU LABS (Last four results)  CMP    Recent Labs  Lab 07/02/18  0615 07/01/18  0615 06/30/18  0600 06/29/18  0605 06/28/18  0515  06/27/18  0850    144 143 145* 145*  --  141   POTASSIUM 3.8 3.6 3.7 3.4 3.5  --  3.4   CHLORIDE 107 109 109 111* 112*  --  109   CO2 29 27 27 28 24  --  23   ANIONGAP 6 8 7 6  --   --  9   * 201* 174* 110* 81  --  87   BUN 15 8 6* 6* 7  --  8   CR 0.66 0.65* 0.57* 0.68 0.61*  --  0.60*   GFRESTIMATED >90 >90 >90 >90 >90  --  >90   GFRESTBLACK >90 >90 >90 >90 >90  --  >90   DEMETRIA 8.6 8.5 8.4* 8.1* 7.7*  --  8.2*   MAG 1.9 1.9 1.9 2.1 2.1  --   --    PHOS 1.9* 2.2* 2.3* 2.7  2.5  < >  --    PROTTOTAL  --  7.0  --  6.9 6.5*  --  7.4   ALBUMIN  --  2.5*  --  2.7* 2.7*  --  3.2*   BILITOTAL  --  1.7*  --  1.6* 1.6*  --  2.3*   ALKPHOS  --  81  --  88 82  --  100   AST  --  61*  --  76* 82*  --  102*   ALT  --  47  --  60 61  --  71*   < > = values in this interval not displayed.  CBC    Recent Labs  Lab 07/01/18  0615 06/30/18  1010 06/28/18  0906 06/27/18  0850 06/26/18  0755 06/25/18  1930   WBC  --  8.3  --  4.8 4.8 4.0   RBC  --  4.20*  --  3.95* 4.07* 4.04*   HGB  --  14.8  --  13.6 14.1 14.1   HCT  --  42.7  --  40.1 40.9 40.0   MCV  --  102*  --  102* 101* 99   MCH  --  35.2*  --  34.4* 34.6* 34.9*   MCHC  --  34.7  --  33.9 34.5 35.3   RDW  --  13.1  --  13.2 13.4 13.3   * 104* 70* 68* 76* 72*     INRNo lab results found in last 7 days.  Arterial Blood Gas    Recent Labs  Lab 06/27/18  0725   PH 7.42   PCO2 41   PO2 165*   HCO3 26   O2PER 40% VENT       Recent Results (from the past 24 hour(s))   XR Chest Port 1 View    Narrative    CHEST ONE VIEW PORTABLE   7/1/2018 2:48 PM     HISTORY: ET tube position.    COMPARISON: 6/27/2018.      Impression    IMPRESSION: ET tube is in good position approximally 6 cm above the  tom. Nasal gastric tube or feeding tube is present in at least the  stomach. There is a shallow inspiratory effort causing some crowding  of central pulmonary vascularity. There is also some increased density  in the right paratracheal region which is probably due to overlapping  shadows but a new infiltrate in this area cannot be excluded.    LINDA SZYMANSKI MD   XR Chest Port 1 View    Narrative    CHEST ONE VIEW PORTABLE July 2, 2018 6:21 AM     HISTORY: Follow infiltrate.     COMPARISON: 7/1/2018.      Impression    IMPRESSION: There is a consolidative infiltrate in the left lower lobe  most likely due to pneumonia. ET tube and feeding tube remain in  place. There is also some mild perihilar vascular congestion or edema.  Heart size is mildly prominent but  this is portable technique and is  felt to be within normal limits. No definite pleural effusions.    LINDA SZYMANSKI MD         Assessment and Plan:    Neuro  ### Acute EtOH withdrawal Syndrome  ### Sedation/Analgesia  ### Meniere's disease  -Propofol gtt -- agitated when sedation lightened  -Valproate + Gabapentin  -APAP and Dilaudid prns available  -Restart PTA meclizine once extubated and able to take PO  -Head CT 6/30 w/ moderate cerebral atrophy but no other acute processes  -EKG for Qtc today; if ok will schedule seroquel    Respiratory  ### Acute Hypoxic Respiratory failure  ### ? Pneumonia -- CXR w/ LLL infiltrate  -intubated for airway protection  -Consider SBT if tolerates daily awakening trial     CV:  ### HTN  - cont. HCTZ 25 mg daily, Norvasc 2.5 mg daily, and Metoprolol 50 mg BID  - no acute issues, monitor hemodynamics    GI/Liver:  ### EtOH Pancreatitis  ### Fatty Liver Disease, likely 2/2 EtOH  -Lipase peaked at 903, is now normal; stop trending  -continuous IVF stopped  -Abdominal US 6/25 showing fatty liver    Heme:  ### Thrombocytopenia - improving  -monitor    Endocrine:  ### Hyperglycemia -- glucose > 200  -Cont SSI; start Lantus 12U q am    FEN:  -Cont. multi-vitamin; d/c folate, has completed > 5d  -continue tube feeds -- cont free water @ 100 cc q4h, Na 142  -monitor lytes, replete per unit protocol    Prophy:  GI: PPI  DVT: SCDs; start LMWH today; plt > 100    ICU Cares:  Restrain needed: Yes   Lines needed: Yes    Code: Full  Dispo: ICU status    Family: Met brother Blu on 7/1. They would want to talk to social work to change medical POA from parents to him.    Billing: Critical care time 35 min excluding procedure time     Kadie Ramirez MD  Pulmonary and Critical Care Medicine

## 2018-07-02 NOTE — PROGRESS NOTES
Sedation weaned, pt did not follow commands, but did track voice from side to side of bed and had eyes open. Frequently fighting and biting ETT. Up in chair for almost 4 hrs. Tele ST. AFebrile but diaphoretic throughout day. Creamy/yellow secretions in ETT. Adequate UOP. Niece at bedside, talked with social work and MD; questions answered. Seroquel started. Plan for continued daily SBT and sedation minimization.

## 2018-07-02 NOTE — PLAN OF CARE
Problem: Restraint for Non-Violent/Non-Self-Destructive Behavior  Goal: Prevent/Manage Potential Problems  Maintain safety of patient and others during period of restraint.  Promote psychological and physical wellbeing.  Prevent injury to skin and involved body parts.  Promote nutrition, hydration, and elimination.   Patient remain sedated and vented. Restless when lightened from the sedation. Kathi James RN, BSN, BC, CCRN

## 2018-07-02 NOTE — PROGRESS NOTES
[facility] ICU RESPIRATORY NOTE  Date of Admission: 6/25/2018  Date of Intubation (most recent):6/27/2018  Reason for Mechanical Ventilation:Airway protection  Number of Days on Mechanical Ventilation:6  Met Criteria for Pressure Support Trial:No  Reason for No Pressure Support Trial:Per MD      Ventilation Mode: CMV/AC  (Continuous Mandatory Ventilation/ Assist Control)  FiO2 (%): 50 %  Rate Set (breaths/minute): 14 breaths/min  Tidal Volume Set (mL): 550 mL  PEEP (cm H2O): 5 cmH2O  Oxygen Concentration (%): 50 %  Resp: 18           Significant Events Today: None      ABG Results:           Recent Labs  Lab 06/27/18  0725   PH 7.42   PCO2 41   PO2 165*   HCO3 26   O2PER 40% VENT          Plan:  Will cont full vent support for now and assess for weaning readiness daily.  7/2/2018  Db Godoy

## 2018-07-03 ENCOUNTER — APPOINTMENT (OUTPATIENT)
Dept: GENERAL RADIOLOGY | Facility: CLINIC | Age: 53
DRG: 166 | End: 2018-07-03
Attending: INTERNAL MEDICINE
Payer: COMMERCIAL

## 2018-07-03 LAB
ALBUMIN SERPL-MCNC: 2.2 G/DL (ref 3.4–5)
ALBUMIN UR-MCNC: 10 MG/DL
ALP SERPL-CCNC: 68 U/L (ref 40–150)
ALT SERPL W P-5'-P-CCNC: 49 U/L (ref 0–70)
ANION GAP SERPL CALCULATED.3IONS-SCNC: 9 MMOL/L (ref 3–14)
APPEARANCE UR: CLEAR
AST SERPL W P-5'-P-CCNC: 98 U/L (ref 0–45)
BACTERIA #/AREA URNS HPF: ABNORMAL /HPF
BASOPHILS # BLD AUTO: 0.1 10E9/L (ref 0–0.2)
BASOPHILS NFR BLD AUTO: 0.7 %
BILIRUB SERPL-MCNC: 0.9 MG/DL (ref 0.2–1.3)
BILIRUB UR QL STRIP: NEGATIVE
BUN SERPL-MCNC: 20 MG/DL (ref 7–30)
CALCIUM SERPL-MCNC: 8.1 MG/DL (ref 8.5–10.1)
CHLORIDE SERPL-SCNC: 105 MMOL/L (ref 94–109)
CO2 SERPL-SCNC: 28 MMOL/L (ref 20–32)
COLOR UR AUTO: YELLOW
CREAT SERPL-MCNC: 0.66 MG/DL (ref 0.66–1.25)
DIFFERENTIAL METHOD BLD: ABNORMAL
EOSINOPHIL # BLD AUTO: 0.2 10E9/L (ref 0–0.7)
EOSINOPHIL NFR BLD AUTO: 2.5 %
ERYTHROCYTE [DISTWIDTH] IN BLOOD BY AUTOMATED COUNT: 14 % (ref 10–15)
GFR SERPL CREATININE-BSD FRML MDRD: >90 ML/MIN/1.7M2
GLUCOSE BLDC GLUCOMTR-MCNC: 238 MG/DL (ref 70–99)
GLUCOSE BLDC GLUCOMTR-MCNC: 239 MG/DL (ref 70–99)
GLUCOSE BLDC GLUCOMTR-MCNC: 250 MG/DL (ref 70–99)
GLUCOSE BLDC GLUCOMTR-MCNC: 269 MG/DL (ref 70–99)
GLUCOSE BLDC GLUCOMTR-MCNC: 273 MG/DL (ref 70–99)
GLUCOSE SERPL-MCNC: 264 MG/DL (ref 70–99)
GLUCOSE UR STRIP-MCNC: >1000 MG/DL
GRAM STN SPEC: ABNORMAL
GRAM STN SPEC: ABNORMAL
HCT VFR BLD AUTO: 38.4 % (ref 40–53)
HGB BLD-MCNC: 12.9 G/DL (ref 13.3–17.7)
HGB UR QL STRIP: ABNORMAL
IMM GRANULOCYTES # BLD: 0 10E9/L (ref 0–0.4)
IMM GRANULOCYTES NFR BLD: 0.5 %
KETONES UR STRIP-MCNC: 10 MG/DL
LEUKOCYTE ESTERASE UR QL STRIP: ABNORMAL
LYMPHOCYTES # BLD AUTO: 1.9 10E9/L (ref 0.8–5.3)
LYMPHOCYTES NFR BLD AUTO: 23.3 %
Lab: ABNORMAL
MAGNESIUM SERPL-MCNC: 1.9 MG/DL (ref 1.6–2.3)
MCH RBC QN AUTO: 35.3 PG (ref 26.5–33)
MCHC RBC AUTO-ENTMCNC: 33.6 G/DL (ref 31.5–36.5)
MCV RBC AUTO: 105 FL (ref 78–100)
MONOCYTES # BLD AUTO: 0.9 10E9/L (ref 0–1.3)
MONOCYTES NFR BLD AUTO: 11.2 %
NEUTROPHILS # BLD AUTO: 5 10E9/L (ref 1.6–8.3)
NEUTROPHILS NFR BLD AUTO: 61.8 %
NITRATE UR QL: NEGATIVE
NRBC # BLD AUTO: 0 10*3/UL
NRBC BLD AUTO-RTO: 0 /100
PH UR STRIP: 6 PH (ref 5–7)
PHOSPHATE SERPL-MCNC: 3.4 MG/DL (ref 2.5–4.5)
PLATELET # BLD AUTO: 181 10E9/L (ref 150–450)
PLATELET # BLD EST: ABNORMAL 10*3/UL
POTASSIUM SERPL-SCNC: 3.8 MMOL/L (ref 3.4–5.3)
PROT SERPL-MCNC: 6.7 G/DL (ref 6.8–8.8)
RBC # BLD AUTO: 3.65 10E12/L (ref 4.4–5.9)
RBC #/AREA URNS AUTO: 55 /HPF (ref 0–2)
RBC MORPH BLD: ABNORMAL
SODIUM SERPL-SCNC: 142 MMOL/L (ref 133–144)
SOURCE: ABNORMAL
SP GR UR STRIP: 1.02 (ref 1–1.03)
SPECIMEN SOURCE: ABNORMAL
UROBILINOGEN UR STRIP-MCNC: 4 MG/DL (ref 0–2)
WBC # BLD AUTO: 8.1 10E9/L (ref 4–11)
WBC #/AREA URNS AUTO: 28 /HPF (ref 0–5)

## 2018-07-03 PROCEDURE — 27210437 ZZH NUTRITION PRODUCT SEMIELEM INTERMED LITER

## 2018-07-03 PROCEDURE — 71045 X-RAY EXAM CHEST 1 VIEW: CPT

## 2018-07-03 PROCEDURE — 25000125 ZZHC RX 250: Performed by: INTERNAL MEDICINE

## 2018-07-03 PROCEDURE — 25000132 ZZH RX MED GY IP 250 OP 250 PS 637: Performed by: INTERNAL MEDICINE

## 2018-07-03 PROCEDURE — 25000125 ZZHC RX 250: Performed by: SURGERY

## 2018-07-03 PROCEDURE — 87181 SC STD AGAR DILUTION PER AGT: CPT | Performed by: INTERNAL MEDICINE

## 2018-07-03 PROCEDURE — 25000132 ZZH RX MED GY IP 250 OP 250 PS 637: Performed by: HOSPITALIST

## 2018-07-03 PROCEDURE — 85025 COMPLETE CBC W/AUTO DIFF WBC: CPT | Performed by: INTERNAL MEDICINE

## 2018-07-03 PROCEDURE — 99291 CRITICAL CARE FIRST HOUR: CPT | Performed by: INTERNAL MEDICINE

## 2018-07-03 PROCEDURE — 25000131 ZZH RX MED GY IP 250 OP 636 PS 637: Performed by: NURSE PRACTITIONER

## 2018-07-03 PROCEDURE — 80053 COMPREHEN METABOLIC PANEL: CPT | Performed by: INTERNAL MEDICINE

## 2018-07-03 PROCEDURE — 36415 COLL VENOUS BLD VENIPUNCTURE: CPT | Performed by: INTERNAL MEDICINE

## 2018-07-03 PROCEDURE — 25000128 H RX IP 250 OP 636: Performed by: INTERNAL MEDICINE

## 2018-07-03 PROCEDURE — 25000128 H RX IP 250 OP 636: Performed by: HOSPITALIST

## 2018-07-03 PROCEDURE — 25000128 H RX IP 250 OP 636: Performed by: NURSE PRACTITIONER

## 2018-07-03 PROCEDURE — 25000132 ZZH RX MED GY IP 250 OP 250 PS 637: Performed by: NURSE PRACTITIONER

## 2018-07-03 PROCEDURE — 25000125 ZZHC RX 250: Performed by: NURSE PRACTITIONER

## 2018-07-03 PROCEDURE — 94003 VENT MGMT INPAT SUBQ DAY: CPT

## 2018-07-03 PROCEDURE — 84100 ASSAY OF PHOSPHORUS: CPT | Performed by: INTERNAL MEDICINE

## 2018-07-03 PROCEDURE — 87040 BLOOD CULTURE FOR BACTERIA: CPT | Performed by: INTERNAL MEDICINE

## 2018-07-03 PROCEDURE — 83735 ASSAY OF MAGNESIUM: CPT | Performed by: INTERNAL MEDICINE

## 2018-07-03 PROCEDURE — 40000275 ZZH STATISTIC RCP TIME EA 10 MIN

## 2018-07-03 PROCEDURE — 25000128 H RX IP 250 OP 636: Performed by: SURGERY

## 2018-07-03 PROCEDURE — 81001 URINALYSIS AUTO W/SCOPE: CPT | Performed by: INTERNAL MEDICINE

## 2018-07-03 PROCEDURE — 00000146 ZZHCL STATISTIC GLUCOSE BY METER IP

## 2018-07-03 PROCEDURE — 87086 URINE CULTURE/COLONY COUNT: CPT | Performed by: HOSPITALIST

## 2018-07-03 PROCEDURE — 87106 FUNGI IDENTIFICATION YEAST: CPT | Performed by: INTERNAL MEDICINE

## 2018-07-03 PROCEDURE — 20000003 ZZH R&B ICU

## 2018-07-03 RX ORDER — PIPERACILLIN SODIUM, TAZOBACTAM SODIUM 4; .5 G/20ML; G/20ML
4.5 INJECTION, POWDER, LYOPHILIZED, FOR SOLUTION INTRAVENOUS EVERY 6 HOURS
Status: DISCONTINUED | OUTPATIENT
Start: 2018-07-03 | End: 2018-07-07

## 2018-07-03 RX ORDER — POLYETHYLENE GLYCOL 3350 17 G/17G
17 POWDER, FOR SOLUTION ORAL 2 TIMES DAILY
Status: DISCONTINUED | OUTPATIENT
Start: 2018-07-03 | End: 2018-07-21 | Stop reason: HOSPADM

## 2018-07-03 RX ORDER — BISACODYL 10 MG
10 SUPPOSITORY, RECTAL RECTAL ONCE
Status: COMPLETED | OUTPATIENT
Start: 2018-07-03 | End: 2018-07-03

## 2018-07-03 RX ORDER — CEFAZOLIN SODIUM 1 G/50ML
1250 SOLUTION INTRAVENOUS EVERY 8 HOURS
Status: DISCONTINUED | OUTPATIENT
Start: 2018-07-03 | End: 2018-07-04

## 2018-07-03 RX ORDER — SODIUM CHLORIDE 9 MG/ML
INJECTION, SOLUTION INTRAVENOUS CONTINUOUS
Status: DISCONTINUED | OUTPATIENT
Start: 2018-07-03 | End: 2018-07-12

## 2018-07-03 RX ORDER — QUETIAPINE FUMARATE 50 MG/1
50 TABLET, FILM COATED ORAL 2 TIMES DAILY
Status: DISCONTINUED | OUTPATIENT
Start: 2018-07-03 | End: 2018-07-04

## 2018-07-03 RX ADMIN — PIPERACILLIN SODIUM,TAZOBACTAM SODIUM 4.5 G: 4; .5 INJECTION, POWDER, FOR SOLUTION INTRAVENOUS at 13:43

## 2018-07-03 RX ADMIN — DEXMEDETOMIDINE HYDROCHLORIDE 0.7 MCG/KG/HR: 100 INJECTION, SOLUTION INTRAVENOUS at 23:42

## 2018-07-03 RX ADMIN — VANCOMYCIN HYDROCHLORIDE 1250 MG: 5 INJECTION, POWDER, LYOPHILIZED, FOR SOLUTION INTRAVENOUS at 06:57

## 2018-07-03 RX ADMIN — INSULIN ASPART 3 UNITS: 100 INJECTION, SOLUTION INTRAVENOUS; SUBCUTANEOUS at 00:00

## 2018-07-03 RX ADMIN — VANCOMYCIN HYDROCHLORIDE 1250 MG: 5 INJECTION, POWDER, LYOPHILIZED, FOR SOLUTION INTRAVENOUS at 14:56

## 2018-07-03 RX ADMIN — Medication 0.5 MG: at 02:21

## 2018-07-03 RX ADMIN — GABAPENTIN 300 MG: 250 SUSPENSION ORAL at 17:37

## 2018-07-03 RX ADMIN — Medication 1 PACKET: at 11:30

## 2018-07-03 RX ADMIN — DEXTRAN 70 AND HYPROMELLOSE 2910 2 DROP: 1; 3 SOLUTION/ DROPS OPHTHALMIC at 04:15

## 2018-07-03 RX ADMIN — CHLORHEXIDINE GLUCONATE 15 ML: 1.2 RINSE ORAL at 08:48

## 2018-07-03 RX ADMIN — VALPROATE SODIUM 500 MG: 100 INJECTION, SOLUTION INTRAVENOUS at 05:43

## 2018-07-03 RX ADMIN — QUETIAPINE FUMARATE 50 MG: 50 TABLET ORAL at 20:23

## 2018-07-03 RX ADMIN — DEXMEDETOMIDINE HYDROCHLORIDE 0.2 MCG/KG/HR: 100 INJECTION, SOLUTION INTRAVENOUS at 11:44

## 2018-07-03 RX ADMIN — Medication 0.5 MG: at 12:17

## 2018-07-03 RX ADMIN — QUETIAPINE FUMARATE 25 MG: 25 TABLET ORAL at 08:42

## 2018-07-03 RX ADMIN — INSULIN ASPART 3 UNITS: 100 INJECTION, SOLUTION INTRAVENOUS; SUBCUTANEOUS at 04:13

## 2018-07-03 RX ADMIN — CHLORHEXIDINE GLUCONATE 15 ML: 1.2 RINSE ORAL at 20:22

## 2018-07-03 RX ADMIN — Medication 40 MG: at 08:42

## 2018-07-03 RX ADMIN — Medication 0.5 MG: at 20:20

## 2018-07-03 RX ADMIN — Medication 0.5 MG: at 22:35

## 2018-07-03 RX ADMIN — LORAZEPAM 2 MG: 2 INJECTION INTRAMUSCULAR; INTRAVENOUS at 04:22

## 2018-07-03 RX ADMIN — DEXMEDETOMIDINE HYDROCHLORIDE 0.7 MCG/KG/HR: 100 INJECTION, SOLUTION INTRAVENOUS at 17:29

## 2018-07-03 RX ADMIN — VALPROATE SODIUM 500 MG: 100 INJECTION, SOLUTION INTRAVENOUS at 17:37

## 2018-07-03 RX ADMIN — AMLODIPINE BESYLATE 2.5 MG: 2.5 TABLET ORAL at 08:42

## 2018-07-03 RX ADMIN — Medication 0.5 MG: at 17:35

## 2018-07-03 RX ADMIN — INSULIN ASPART 2 UNITS: 100 INJECTION, SOLUTION INTRAVENOUS; SUBCUTANEOUS at 08:32

## 2018-07-03 RX ADMIN — BISACODYL 10 MG: 10 SUPPOSITORY RECTAL at 13:42

## 2018-07-03 RX ADMIN — METOPROLOL TARTRATE 50 MG: 100 TABLET ORAL at 20:24

## 2018-07-03 RX ADMIN — MICONAZOLE NITRATE: 2 POWDER TOPICAL at 08:49

## 2018-07-03 RX ADMIN — Medication 15 ML: at 08:55

## 2018-07-03 RX ADMIN — METOPROLOL TARTRATE 50 MG: 100 TABLET ORAL at 08:55

## 2018-07-03 RX ADMIN — Medication 40 MG: at 20:23

## 2018-07-03 RX ADMIN — INSULIN GLARGINE 12 UNITS: 100 INJECTION, SOLUTION SUBCUTANEOUS at 08:54

## 2018-07-03 RX ADMIN — SENNOSIDES AND DOCUSATE SODIUM 1 TABLET: 8.6; 5 TABLET ORAL at 13:42

## 2018-07-03 RX ADMIN — DOCUSATE SODIUM 100 MG: 50 LIQUID ORAL at 20:22

## 2018-07-03 RX ADMIN — MAGNESIUM SULFATE HEPTAHYDRATE 2 G: 40 INJECTION, SOLUTION INTRAVENOUS at 14:13

## 2018-07-03 RX ADMIN — Medication 1 PACKET: at 20:25

## 2018-07-03 RX ADMIN — SENNOSIDES A AND B 10 ML: 415.36 LIQUID ORAL at 20:23

## 2018-07-03 RX ADMIN — MICONAZOLE NITRATE: 20 CREAM TOPICAL at 20:24

## 2018-07-03 RX ADMIN — MICONAZOLE NITRATE: 20 CREAM TOPICAL at 08:51

## 2018-07-03 RX ADMIN — PIPERACILLIN SODIUM,TAZOBACTAM SODIUM 4.5 G: 4; .5 INJECTION, POWDER, FOR SOLUTION INTRAVENOUS at 08:31

## 2018-07-03 RX ADMIN — VANCOMYCIN HYDROCHLORIDE 1250 MG: 5 INJECTION, POWDER, LYOPHILIZED, FOR SOLUTION INTRAVENOUS at 22:08

## 2018-07-03 RX ADMIN — PROPOFOL 25 MCG/KG/MIN: 10 INJECTION, EMULSION INTRAVENOUS at 07:21

## 2018-07-03 RX ADMIN — PROPOFOL 25 MCG/KG/MIN: 10 INJECTION, EMULSION INTRAVENOUS at 02:11

## 2018-07-03 RX ADMIN — ENOXAPARIN SODIUM 40 MG: 40 INJECTION SUBCUTANEOUS at 13:42

## 2018-07-03 RX ADMIN — SENNOSIDES A AND B 5 ML: 415.36 LIQUID ORAL at 13:55

## 2018-07-03 RX ADMIN — Medication 0.5 MG: at 04:30

## 2018-07-03 RX ADMIN — SENNOSIDES A AND B 10 ML: 415.36 LIQUID ORAL at 13:56

## 2018-07-03 RX ADMIN — Medication 0.5 MG: at 15:21

## 2018-07-03 RX ADMIN — HYDROCHLOROTHIAZIDE 25 MG: 25 TABLET ORAL at 08:43

## 2018-07-03 RX ADMIN — GABAPENTIN 300 MG: 250 SUSPENSION ORAL at 08:55

## 2018-07-03 RX ADMIN — ACETAMINOPHEN 325 MG: 325 TABLET, FILM COATED ORAL at 10:16

## 2018-07-03 RX ADMIN — DOCUSATE SODIUM 100 MG: 50 LIQUID ORAL at 08:42

## 2018-07-03 RX ADMIN — GABAPENTIN 300 MG: 250 SUSPENSION ORAL at 22:08

## 2018-07-03 RX ADMIN — PIPERACILLIN SODIUM,TAZOBACTAM SODIUM 4.5 G: 4; .5 INJECTION, POWDER, FOR SOLUTION INTRAVENOUS at 20:22

## 2018-07-03 RX ADMIN — SODIUM CHLORIDE, PRESERVATIVE FREE: 5 INJECTION INTRAVENOUS at 16:30

## 2018-07-03 NOTE — PROGRESS NOTES
Intensivist Daily Note  07/03/2018      Brief History:    52 yom admitted with acute EtOH pancreatitis and EtOH withdrawal, intubated for airway protection in setting of acute withdrawal.    Interval Events:  -6/27: intubated, sedated and hemodynamically stable.   -6/28: started tube feeds yesterday, tolerating well.  1L IVF overnight for low UOP.  -6/29: failed SBT yesterday 2/2 tachycardia and agitation.  No acute events overnight.  -6/30: Continued with agitation. Head CT without acute chagnes. Volume loss noted.   -7/2: continues with agitation with lightening sedation; no other acute events.  Blood glucose levels elevated > 200.  -7/3: purulent ETT secretions overnight, sent for culture, growing GNR and GPC.  This morning febrile to 103.      PMH:  Past Medical History:   Diagnosis Date     Abnormal MRI of head 1/11/2013     Atypical chest pain 9/4/2012     BMI 33.0-33.9,adult 5/24/2013     Hyperlipidemia LDL goal <160 5/4/2013     Hypertension      Ménière's disease        PSurgHx:  Past Surgical History:   Procedure Laterality Date     C NONSPECIFIC PROCEDURE      shoulder surgery for dislocation      C NONSPECIFIC PROCEDURE      eye surgery, lens implant right     EYE SURGERY       FRACTURE TX, WRIST RT/LT      Fracture TX Wrist RT/LT     ORTHOPEDIC SURGERY         Family History:  Family History     Problem (# of Occurrences) Relation (Name,Age of Onset)    C.A.D. (1) Father: bypass surgery    Diabetes (1) Father    Hypertension (1) Mother          Social History:  Social History     Social History     Marital status: Single     Spouse name: N/A     Number of children: N/A     Years of education: N/A     Occupational History     Not on file.     Social History Main Topics     Smoking status: Never Smoker     Smokeless tobacco: Never Used      Comment: rarely, once per year maybe      Alcohol use Yes      Comment: 10 drinks / week      Drug use: No     Sexual activity: Yes     Partners: Female     Other  Topics Concern     Parent/Sibling W/ Cabg, Mi Or Angioplasty Before 65f 55m? Yes     Social History Narrative       Allergy:  Allergies   Allergen Reactions     Zoloft [Sertraline] Nausea and Vomiting        Medications:  Current Facility-Administered Medications   Medication     acetaminophen (TYLENOL) tablet 325 mg     amLODIPine (NORVASC) tablet 2.5 mg     bisacodyl (DULCOLAX) Suppository 10 mg     chlorhexidine (PERIDEX) 0.12 % solution 15 mL     dextrose 10 % 1,000 mL infusion     glucose gel 15-30 g    Or     dextrose 50 % injection 25-50 mL    Or     glucagon injection 1 mg     docusate (COLACE) 50 MG/5ML liquid 100 mg     enoxaparin (LOVENOX) injection 40 mg     gabapentin (NEURONTIN) solution 300 mg     hydrALAZINE (APRESOLINE) injection 10 mg     hydrochlorothiazide (HYDRODIURIL) tablet 25 mg     HYDROmorphone (PF) (DILAUDID) injection 0.3-0.5 mg     hypromellose-dextran (ARTIFICAL TEARS) 0.1-0.3 % ophthalmic solution 2-3 drop     insulin aspart (NovoLOG) inj (RAPID ACTING)     insulin glargine (LANTUS) injection 12 Units     lidocaine (LMX4) cream     lidocaine 1 % 1 mL     LORazepam (ATIVAN) tablet 1-2 mg    Or     LORazepam (ATIVAN) injection 1-2 mg     magnesium sulfate 2 g in water intermittent infusion     magnesium sulfate 4 g in 100 mL sterile water (premade)     melatonin tablet 3 mg     metoprolol (LOPRESSOR) suspension 50 mg     miconazole (MICATIN) 2 % cream     miconazole (MICATIN; MICRO GUARD) 2 % powder     multivitamins with minerals (CERTAVITE/CEROVITE) liquid 15 mL     naloxone (NARCAN) injection 0.1-0.4 mg     ondansetron (ZOFRAN-ODT) ODT tab 4 mg    Or     ondansetron (ZOFRAN) injection 4 mg     oxyCODONE IR (ROXICODONE) tablet 5-10 mg     pantoprazole (PROTONIX) 2 mg/mL suspension 40 mg     piperacillin-tazobactam (ZOSYN) 4.5 g vial to attach to  mL bag     polyethylene glycol (MIRALAX/GLYCOLAX) Packet 17 g     potassium chloride (KLOR-CON) Packet 20-40 mEq     potassium chloride  10 mEq in 100 mL intermittent infusion with 10 mg lidocaine     potassium chloride 10 mEq in 100 mL sterile water intermittent infusion (premix)     potassium chloride 20 mEq in 50 mL intermittent infusion     potassium chloride SA (K-DUR/KLOR-CON M) CR tablet 20-40 mEq     propofol (DIPRIVAN) infusion     protein modular (PROSource TF) 1 packet     QUEtiapine (SEROquel) tablet 25 mg     senna-docusate (SENOKOT-S;PERICOLACE) 8.6-50 MG per tablet 1 tablet    Or     senna-docusate (SENOKOT-S;PERICOLACE) 8.6-50 MG per tablet 2 tablet     sodium chloride (PF) 0.9% PF flush 3 mL     sodium chloride (PF) 0.9% PF flush 3 mL     valproate (DEPACON) 500 mg in sodium chloride 0.9 % 50 mL intermittent infusion     vancomycin (VANCOCIN) 1,250 mg in sodium chloride 0.9 % 250 mL intermittent infusion         Physical examination:  Vital signs:  Temp: 103.3  F (39.6  C) Temp src: Oral BP: 146/80   Heart Rate: 105 Resp: 17 SpO2: 94 % O2 Device: Mechanical Ventilator      General: intubated, sedated.  NAD  HEENT: neck supple, symmetrical  Lungs: Clear to auscultation, low pitched wheezes throughout.  CVS: Normal S1 & S2, no murmur  Abdomen: Bowel sounds present, soft, non tender  Extremities/musculoskeletal: no edema  Skin: no rash  Exam of Line sites: No erythema or discharge.      Intake/Output Summary (Last 24 hours) at 07/01/18 1159  Last data filed at 07/01/18 1000   Gross per 24 hour   Intake           4078.7 ml   Output             4585 ml   Net           -506.3 ml       Data:    ROUTINE ICU LABS (Last four results)  CMP    Recent Labs  Lab 07/03/18  0545 07/02/18  0615 07/01/18  0615 06/30/18  0600 06/29/18  0605 06/28/18  0515    142 144 143 145* 145*   POTASSIUM 3.8 3.8 3.6 3.7 3.4 3.5   CHLORIDE 105 107 109 109 111* 112*   CO2 28 29 27 27 28 24   ANIONGAP 9 6 8 7 6  --    * 251* 201* 174* 110* 81   BUN 20 15 8 6* 6* 7   CR 0.66 0.66 0.65* 0.57* 0.68 0.61*   GFRESTIMATED >90 >90 >90 >90 >90 >90   GFRESTBLACK  >90 >90 >90 >90 >90 >90   DEMETRIA 8.1* 8.6 8.5 8.4* 8.1* 7.7*   MAG 1.9 1.9 1.9 1.9 2.1 2.1   PHOS 3.4 1.9* 2.2* 2.3* 2.7 2.5   PROTTOTAL 6.7*  --  7.0  --  6.9 6.5*   ALBUMIN 2.2*  --  2.5*  --  2.7* 2.7*   BILITOTAL 0.9  --  1.7*  --  1.6* 1.6*   ALKPHOS 68  --  81  --  88 82   AST 98*  --  61*  --  76* 82*   ALT 49  --  47  --  60 61     CBC    Recent Labs  Lab 07/03/18  0545 07/01/18  0615 06/30/18  1010 06/28/18  0906 06/27/18  0850   WBC 8.1  --  8.3  --  4.8   RBC 3.65*  --  4.20*  --  3.95*   HGB 12.9*  --  14.8  --  13.6   HCT 38.4*  --  42.7  --  40.1   *  --  102*  --  102*   MCH 35.3*  --  35.2*  --  34.4*   MCHC 33.6  --  34.7  --  33.9   RDW 14.0  --  13.1  --  13.2    127* 104* 70* 68*     INRNo lab results found in last 7 days.  Arterial Blood Gas    Recent Labs  Lab 06/27/18  0725   PH 7.42   PCO2 41   PO2 165*   HCO3 26   O2PER 40% VENT       Recent Results (from the past 24 hour(s))   XR Chest Port 1 View    Narrative    CHEST PORTABLE ONE VIEW July 3, 2018 6:15 AM     HISTORY: Follow infiltrate.    COMPARISON: 7/2/2018.       Impression    IMPRESSION: No significant change since prior. Small bilateral pleural  effusions and hazy bibasilar opacities left greater than right again  seen. Support devices remain in place.    RL ROBERTSON MD         Assessment and Plan:    Neuro  ### Acute EtOH withdrawal Syndrome  ### Sedation/Analgesia  ### Meniere's disease  -Propofol gtt -- agitated when sedation lightened  -Valproate + Gabapentin  -APAP and Dilaudid prns available  -Restart PTA meclizine once extubated and able to take PO  -Head CT 6/30 w/ moderate cerebral atrophy but no other acute processes  -Started seroquel 25 mg BID 7/2 for ? Delirium -- consider increasing    Respiratory  ### Acute Hypoxic Respiratory failure  ### Ventilator Associated Pneumonia -- CXR w/ LLL infiltrate, + sputum cultures  -intubated for airway protection  -Consider SBT if tolerates daily awakening trial   -Broad  Spectrum antibiotics as below    CV:  ### HTN  - cont. HCTZ 25 mg daily, Norvasc 2.5 mg daily, and Metoprolol 50 mg BID  - no acute issues, monitor hemodynamics    GI/Liver:  ### EtOH Pancreatitis -- resolved  ### Fatty Liver Disease, likely 2/2 EtOH  -Lipase peaked at 903, is now normal; stop trending  -continuous IVF stopped  -Abdominal US 6/25 showing fatty liver    ID:  ### VAP -- LLL Infiltrate on CXR 7/2, + sputum cultures  ### Fever w/ tmax 103  -f/u sputum culture  -obtain blood and urine cultures    Heme:  ### Thrombocytopenia - resolved  -monitor    Endocrine:  ### Hyperglycemia -- glucose > 200  -Cont SSI, increase to high intensity; increase Lantus 18U q am    FEN:  -Cont. multi-vitamin; d/c folate, has completed > 5d  -continue tube feeds -- cont free water @ 100 cc q4h, Na 142  -monitor lytes, replete per unit protocol    Prophy:  GI: PPI  DVT: SCDs; LMWH    ICU Cares:  Restrain needed: Yes   Lines needed: Yes    Code: Full  Dispo: ICU status    Family: Updated niece and SO on 7/2.    Billing: Critical care time 35 min excluding procedure time     Kadie Ramirez MD  Pulmonary and Critical Care Medicine

## 2018-07-03 NOTE — PROGRESS NOTES
[facility] ICU RESPIRATORY NOTE  Date of Admission: 6/25/2018  Date of Intubation (most recent):6/27/2018  Reason for Mechanical Ventilation:Airway protection  Number of Days on Mechanical Ventilation: 7  Met Criteria for Pressure Support Trial:No  Reason for No Pressure Support Trial:Per MD      Recent Labs  Lab 06/27/18  0725   PH 7.42   PCO2 41   PO2 165*   HCO3 26   O2PER 40% VENT     Ventilation Mode: CMV/AC  (Continuous Mandatory Ventilation/ Assist Control)  FiO2 (%): 50 %  Rate Set (breaths/minute): 14 breaths/min  Tidal Volume Set (mL): 550 mL  PEEP (cm H2O): 5 cmH2O  Oxygen Concentration (%): 50 %  Resp: 15    Will continue to follow.     Giovanni Berger RT

## 2018-07-03 NOTE — PROGRESS NOTES
[facility] ICU RESPIRATORY NOTE  Date of Admission: 6/25/2018  Date of Intubation (most recent): 6/27/2018  Reason for Mechanical Ventilation: Airway Protection  Number of Days on Mechanical Ventilation: 7  Met Criteria for Pressure Support Trial: Yes  Length of Pressure Support Trial: 5 Minutes  Reason for Stopping Pressure Support Trial: patient become tachypneic and agitated.   Reason for No Pressure Support Trial:    Significant Events Today: patient on pressure support 10/5 50%.  Patient did 5 minute on pressure support and put back to full vent support.   Ventilation Mode: CMV/AC  (Continuous Mandatory Ventilation/ Assist Control)  FiO2 (%): 50 %  Rate Set (breaths/minute): 14 breaths/min  Tidal Volume Set (mL): 550 mL  PEEP (cm H2O): 5 cmH2O  Oxygen Concentration (%): 50 %  Resp: 17  ABG Results:    Recent Labs  Lab 06/27/18  0725   PH 7.42   PCO2 41   PO2 165*   HCO3 26   O2PER 40% VENT       ETT appearance on chest x-ray:    Plan:  Will continue to follow. Patient remain on full vent support.

## 2018-07-03 NOTE — PROGRESS NOTES
Patient had multiple episodes of shaking lasting approximately 15 seconds each during bed bath/linen change. IV ativan and dilaudid given. Will continue to monitor.

## 2018-07-03 NOTE — PLAN OF CARE
Problem: Patient Care Overview  Goal: Plan of Care/Patient Progress Review  Outcome: No Change  Patient sedated and ventilated. PERRL. Does not follow commands. Lung sounds coarse with thick tan, red-streaked secretions. Sputum sample sent, mixed gram positive and negative growth, predominantly gram positive cocci. Spoke to MD, will start vancomycin. Tele: NSR, tachycardic at times. Tube feedings per NG tube. Hypo active bowel sounds. No BM this shift. Rash in groin area and buttocks. Fungal cream and powder available. Temperature max 100.8 orally. During bed bath patient had multiple episodes of shaking/tremors lasting about 15 seconds each. IV ativan and dilaudid given for agitation. Will continue to monitor.

## 2018-07-03 NOTE — PLAN OF CARE
Problem: Patient Care Overview  Goal: Plan of Care/Patient Progress Review  Outcome: Therapy, unable to show any progress toward functional goals  Remains vented and restless at times.  Sedation changed to dexmeditomidine. Tried vent wean during sedation holiday but failed due to RR 40s and small volumes.  Febrile and zosyn started; also UC & BC done.  S.O.at bedside and understands POC.

## 2018-07-03 NOTE — PHARMACY-VANCOMYCIN DOSING SERVICE
Pharmacy Vancomycin Initial Note  Date of Service July 3, 2018  Patient's  1965  52 year old, male    Indication: Ventilator-Associated Pneumonia    Current estimated CrCl = Estimated Creatinine Clearance: 156.3 mL/min (based on Cr of 0.66).    Creatinine for last 3 days  2018:  6:00 AM Creatinine 0.57 mg/dL  2018:  6:15 AM Creatinine 0.65 mg/dL  2018:  6:15 AM Creatinine 0.66 mg/dL    Recent Vancomycin Level(s) for last 3 days  No results found for requested labs within last 72 hours.      Vancomycin IV Administrations (past 72 hours)      No vancomycin orders with administrations in past 72 hours.                Nephrotoxins and other renal medications (Future)    Start     Dose/Rate Route Frequency Ordered Stop    18 0545  vancomycin (VANCOCIN) 1,250 mg in sodium chloride 0.9 % 250 mL intermittent infusion      1,250 mg  over 90 Minutes Intravenous EVERY 8 HOURS 18 0528            Contrast Orders - past 72 hours     None                Plan:  1.  Start vancomycin  1250 mg IV q8h.   2.  Goal Trough Level: 15-20 mg/L   3.  Pharmacy will check trough levels as appropriate in 1-3 Days.    4. Serum creatinine levels will be ordered daily for the first week of therapy and at least twice weekly for subsequent weeks.    5. [place] method utilized to dose vancomycin therapy: Method 1    Garry Downing

## 2018-07-04 ENCOUNTER — ANESTHESIA EVENT (OUTPATIENT)
Dept: INTENSIVE CARE | Facility: CLINIC | Age: 53
DRG: 166 | End: 2018-07-04
Payer: COMMERCIAL

## 2018-07-04 ENCOUNTER — ANESTHESIA (OUTPATIENT)
Dept: INTENSIVE CARE | Facility: CLINIC | Age: 53
DRG: 166 | End: 2018-07-04
Payer: COMMERCIAL

## 2018-07-04 ENCOUNTER — APPOINTMENT (OUTPATIENT)
Dept: GENERAL RADIOLOGY | Facility: CLINIC | Age: 53
DRG: 166 | End: 2018-07-04
Attending: INTERNAL MEDICINE
Payer: COMMERCIAL

## 2018-07-04 LAB
ALBUMIN SERPL-MCNC: 2.2 G/DL (ref 3.4–5)
ALP SERPL-CCNC: 66 U/L (ref 40–150)
ALT SERPL W P-5'-P-CCNC: 53 U/L (ref 0–70)
ANION GAP SERPL CALCULATED.3IONS-SCNC: 9 MMOL/L (ref 3–14)
AST SERPL W P-5'-P-CCNC: 79 U/L (ref 0–45)
BACTERIA SPEC CULT: NO GROWTH
BASE EXCESS BLDV CALC-SCNC: 5.4 MMOL/L
BASOPHILS # BLD AUTO: 0.1 10E9/L (ref 0–0.2)
BASOPHILS NFR BLD AUTO: 1.2 %
BILIRUB SERPL-MCNC: 1 MG/DL (ref 0.2–1.3)
BUN SERPL-MCNC: 17 MG/DL (ref 7–30)
CALCIUM SERPL-MCNC: 8 MG/DL (ref 8.5–10.1)
CHLORIDE SERPL-SCNC: 106 MMOL/L (ref 94–109)
CO2 SERPL-SCNC: 28 MMOL/L (ref 20–32)
CREAT SERPL-MCNC: 0.6 MG/DL (ref 0.66–1.25)
DIFFERENTIAL METHOD BLD: ABNORMAL
EOSINOPHIL # BLD AUTO: 0.4 10E9/L (ref 0–0.7)
EOSINOPHIL NFR BLD AUTO: 4.1 %
ERYTHROCYTE [DISTWIDTH] IN BLOOD BY AUTOMATED COUNT: 13.3 % (ref 10–15)
GFR SERPL CREATININE-BSD FRML MDRD: >90 ML/MIN/1.7M2
GLUCOSE BLDC GLUCOMTR-MCNC: 203 MG/DL (ref 70–99)
GLUCOSE BLDC GLUCOMTR-MCNC: 222 MG/DL (ref 70–99)
GLUCOSE BLDC GLUCOMTR-MCNC: 240 MG/DL (ref 70–99)
GLUCOSE BLDC GLUCOMTR-MCNC: 246 MG/DL (ref 70–99)
GLUCOSE BLDC GLUCOMTR-MCNC: 253 MG/DL (ref 70–99)
GLUCOSE BLDC GLUCOMTR-MCNC: 279 MG/DL (ref 70–99)
GLUCOSE SERPL-MCNC: 233 MG/DL (ref 70–99)
GRAM STN SPEC: ABNORMAL
GRAM STN SPEC: ABNORMAL
HCO3 BLDV-SCNC: 30 MMOL/L (ref 21–28)
HCT VFR BLD AUTO: 38.7 % (ref 40–53)
HGB BLD-MCNC: 12.8 G/DL (ref 13.3–17.7)
IMM GRANULOCYTES # BLD: 0.1 10E9/L (ref 0–0.4)
IMM GRANULOCYTES NFR BLD: 1.4 %
INTERPRETATION ECG - MUSE: NORMAL
LYMPHOCYTES # BLD AUTO: 0.9 10E9/L (ref 0.8–5.3)
LYMPHOCYTES NFR BLD AUTO: 11.1 %
MAGNESIUM SERPL-MCNC: 2 MG/DL (ref 1.6–2.3)
MCH RBC QN AUTO: 34.6 PG (ref 26.5–33)
MCHC RBC AUTO-ENTMCNC: 33.1 G/DL (ref 31.5–36.5)
MCV RBC AUTO: 105 FL (ref 78–100)
MONOCYTES # BLD AUTO: 2 10E9/L (ref 0–1.3)
MONOCYTES NFR BLD AUTO: 23.2 %
NEUTROPHILS # BLD AUTO: 5 10E9/L (ref 1.6–8.3)
NEUTROPHILS NFR BLD AUTO: 59 %
NRBC # BLD AUTO: 0 10*3/UL
NRBC BLD AUTO-RTO: 0 /100
OXYHGB MFR BLDV: 96 %
PCO2 BLDV: 44 MM HG (ref 40–50)
PH BLDV: 7.44 PH (ref 7.32–7.43)
PHOSPHATE SERPL-MCNC: 2.3 MG/DL (ref 2.5–4.5)
PLATELET # BLD AUTO: 205 10E9/L (ref 150–450)
PLATELET # BLD EST: ABNORMAL 10*3/UL
PO2 BLDV: 91 MM HG (ref 25–47)
POTASSIUM SERPL-SCNC: 3.5 MMOL/L (ref 3.4–5.3)
PROT SERPL-MCNC: 7 G/DL (ref 6.8–8.8)
RBC # BLD AUTO: 3.7 10E12/L (ref 4.4–5.9)
RBC MORPH BLD: ABNORMAL
SODIUM SERPL-SCNC: 143 MMOL/L (ref 133–144)
SPECIMEN SOURCE: ABNORMAL
SPECIMEN SOURCE: NORMAL
WBC # BLD AUTO: 8.5 10E9/L (ref 4–11)

## 2018-07-04 PROCEDURE — 87070 CULTURE OTHR SPECIMN AEROBIC: CPT | Performed by: INTERNAL MEDICINE

## 2018-07-04 PROCEDURE — 25000128 H RX IP 250 OP 636: Performed by: HOSPITALIST

## 2018-07-04 PROCEDURE — 93005 ELECTROCARDIOGRAM TRACING: CPT

## 2018-07-04 PROCEDURE — 94003 VENT MGMT INPAT SUBQ DAY: CPT

## 2018-07-04 PROCEDURE — 25000132 ZZH RX MED GY IP 250 OP 250 PS 637: Performed by: NURSE PRACTITIONER

## 2018-07-04 PROCEDURE — 80053 COMPREHEN METABOLIC PANEL: CPT | Performed by: INTERNAL MEDICINE

## 2018-07-04 PROCEDURE — 00000146 ZZHCL STATISTIC GLUCOSE BY METER IP

## 2018-07-04 PROCEDURE — 25000132 ZZH RX MED GY IP 250 OP 250 PS 637: Performed by: HOSPITALIST

## 2018-07-04 PROCEDURE — 27210437 ZZH NUTRITION PRODUCT SEMIELEM INTERMED LITER

## 2018-07-04 PROCEDURE — 87186 SC STD MICRODIL/AGAR DIL: CPT | Performed by: INTERNAL MEDICINE

## 2018-07-04 PROCEDURE — 25000128 H RX IP 250 OP 636: Performed by: INTERNAL MEDICINE

## 2018-07-04 PROCEDURE — 25000131 ZZH RX MED GY IP 250 OP 636 PS 637: Performed by: INTERNAL MEDICINE

## 2018-07-04 PROCEDURE — 25000132 ZZH RX MED GY IP 250 OP 250 PS 637: Performed by: INTERNAL MEDICINE

## 2018-07-04 PROCEDURE — 36415 COLL VENOUS BLD VENIPUNCTURE: CPT | Performed by: INTERNAL MEDICINE

## 2018-07-04 PROCEDURE — 0WCQ8ZZ EXTIRPATION OF MATTER FROM RESPIRATORY TRACT, VIA NATURAL OR ARTIFICIAL OPENING ENDOSCOPIC: ICD-10-PCS | Performed by: SURGERY

## 2018-07-04 PROCEDURE — 25000125 ZZHC RX 250: Performed by: INTERNAL MEDICINE

## 2018-07-04 PROCEDURE — 83735 ASSAY OF MAGNESIUM: CPT | Performed by: INTERNAL MEDICINE

## 2018-07-04 PROCEDURE — 20000003 ZZH R&B ICU

## 2018-07-04 PROCEDURE — 85025 COMPLETE CBC W/AUTO DIFF WBC: CPT | Performed by: INTERNAL MEDICINE

## 2018-07-04 PROCEDURE — 25000125 ZZHC RX 250: Performed by: SURGERY

## 2018-07-04 PROCEDURE — 40000671 ZZH STATISTIC ANESTHESIA CASE

## 2018-07-04 PROCEDURE — 71045 X-RAY EXAM CHEST 1 VIEW: CPT

## 2018-07-04 PROCEDURE — 87077 CULTURE AEROBIC IDENTIFY: CPT | Performed by: INTERNAL MEDICINE

## 2018-07-04 PROCEDURE — 99291 CRITICAL CARE FIRST HOUR: CPT | Performed by: INTERNAL MEDICINE

## 2018-07-04 PROCEDURE — 27210220 ZZH KIT SHRLOCK 5FR DUAL LUM PICC

## 2018-07-04 PROCEDURE — 82805 BLOOD GASES W/O2 SATURATION: CPT | Performed by: SURGERY

## 2018-07-04 PROCEDURE — 84100 ASSAY OF PHOSPHORUS: CPT | Performed by: INTERNAL MEDICINE

## 2018-07-04 PROCEDURE — 40000275 ZZH STATISTIC RCP TIME EA 10 MIN

## 2018-07-04 PROCEDURE — 36569 INSJ PICC 5 YR+ W/O IMAGING: CPT

## 2018-07-04 PROCEDURE — 25000128 H RX IP 250 OP 636: Performed by: SURGERY

## 2018-07-04 PROCEDURE — 87205 SMEAR GRAM STAIN: CPT | Performed by: INTERNAL MEDICINE

## 2018-07-04 PROCEDURE — 31500 INSERT EMERGENCY AIRWAY: CPT

## 2018-07-04 PROCEDURE — 40000008 ZZH STATISTIC AIRWAY CARE

## 2018-07-04 PROCEDURE — 25000128 H RX IP 250 OP 636: Performed by: NURSE ANESTHETIST, CERTIFIED REGISTERED

## 2018-07-04 PROCEDURE — 31500 INSERT EMERGENCY AIRWAY: CPT | Performed by: NURSE ANESTHETIST, CERTIFIED REGISTERED

## 2018-07-04 PROCEDURE — 93010 ELECTROCARDIOGRAM REPORT: CPT | Performed by: INTERNAL MEDICINE

## 2018-07-04 RX ORDER — CEFAZOLIN SODIUM 1 G/50ML
1250 SOLUTION INTRAVENOUS
Status: DISCONTINUED | OUTPATIENT
Start: 2018-07-04 | End: 2018-07-05

## 2018-07-04 RX ORDER — PROPOFOL 10 MG/ML
INJECTION, EMULSION INTRAVENOUS PRN
Status: DISCONTINUED | OUTPATIENT
Start: 2018-07-04 | End: 2018-07-04

## 2018-07-04 RX ORDER — PROPOFOL 10 MG/ML
5-75 INJECTION, EMULSION INTRAVENOUS CONTINUOUS
Status: DISCONTINUED | OUTPATIENT
Start: 2018-07-04 | End: 2018-07-09

## 2018-07-04 RX ORDER — QUETIAPINE FUMARATE 50 MG/1
100 TABLET, FILM COATED ORAL 2 TIMES DAILY
Status: DISCONTINUED | OUTPATIENT
Start: 2018-07-04 | End: 2018-07-07

## 2018-07-04 RX ADMIN — MICONAZOLE NITRATE: 2 POWDER TOPICAL at 10:22

## 2018-07-04 RX ADMIN — Medication 40 MG: at 20:15

## 2018-07-04 RX ADMIN — POLYETHYLENE GLYCOL 3350 17 G: 17 POWDER, FOR SOLUTION ORAL at 20:16

## 2018-07-04 RX ADMIN — VANCOMYCIN HYDROCHLORIDE 1250 MG: 5 INJECTION, POWDER, LYOPHILIZED, FOR SOLUTION INTRAVENOUS at 06:14

## 2018-07-04 RX ADMIN — PROPOFOL 40 MCG/KG/MIN: 10 INJECTION, EMULSION INTRAVENOUS at 08:42

## 2018-07-04 RX ADMIN — VALPROATE SODIUM 500 MG: 100 INJECTION, SOLUTION INTRAVENOUS at 17:51

## 2018-07-04 RX ADMIN — VANCOMYCIN HYDROCHLORIDE 1250 MG: 5 INJECTION, POWDER, LYOPHILIZED, FOR SOLUTION INTRAVENOUS at 14:00

## 2018-07-04 RX ADMIN — ENOXAPARIN SODIUM 40 MG: 40 INJECTION SUBCUTANEOUS at 12:09

## 2018-07-04 RX ADMIN — HYDRALAZINE HYDROCHLORIDE 10 MG: 20 INJECTION INTRAMUSCULAR; INTRAVENOUS at 04:11

## 2018-07-04 RX ADMIN — INSULIN GLARGINE 18 UNITS: 100 INJECTION, SOLUTION SUBCUTANEOUS at 07:32

## 2018-07-04 RX ADMIN — METOPROLOL TARTRATE 50 MG: 100 TABLET ORAL at 20:15

## 2018-07-04 RX ADMIN — POLYETHYLENE GLYCOL 3350 17 G: 17 POWDER, FOR SOLUTION ORAL at 09:00

## 2018-07-04 RX ADMIN — Medication 15 ML: at 09:03

## 2018-07-04 RX ADMIN — Medication 0.5 MG: at 04:21

## 2018-07-04 RX ADMIN — PIPERACILLIN SODIUM,TAZOBACTAM SODIUM 4.5 G: 4; .5 INJECTION, POWDER, FOR SOLUTION INTRAVENOUS at 02:02

## 2018-07-04 RX ADMIN — PROPOFOL 40 MCG/KG/MIN: 10 INJECTION, EMULSION INTRAVENOUS at 13:29

## 2018-07-04 RX ADMIN — PROPOFOL 40 MCG/KG/MIN: 10 INJECTION, EMULSION INTRAVENOUS at 16:42

## 2018-07-04 RX ADMIN — DOCUSATE SODIUM 100 MG: 50 LIQUID ORAL at 09:00

## 2018-07-04 RX ADMIN — Medication 40 MG: at 09:00

## 2018-07-04 RX ADMIN — GABAPENTIN 300 MG: 250 SUSPENSION ORAL at 09:03

## 2018-07-04 RX ADMIN — VANCOMYCIN HYDROCHLORIDE 1250 MG: 5 INJECTION, POWDER, LYOPHILIZED, FOR SOLUTION INTRAVENOUS at 22:27

## 2018-07-04 RX ADMIN — PIPERACILLIN SODIUM,TAZOBACTAM SODIUM 4.5 G: 4; .5 INJECTION, POWDER, FOR SOLUTION INTRAVENOUS at 13:29

## 2018-07-04 RX ADMIN — MICONAZOLE NITRATE: 20 CREAM TOPICAL at 20:17

## 2018-07-04 RX ADMIN — Medication 0.5 MG: at 00:39

## 2018-07-04 RX ADMIN — MICONAZOLE NITRATE: 2 POWDER TOPICAL at 20:17

## 2018-07-04 RX ADMIN — HYDROCHLOROTHIAZIDE 25 MG: 25 TABLET ORAL at 09:00

## 2018-07-04 RX ADMIN — PIPERACILLIN SODIUM,TAZOBACTAM SODIUM 4.5 G: 4; .5 INJECTION, POWDER, FOR SOLUTION INTRAVENOUS at 08:57

## 2018-07-04 RX ADMIN — PROPOFOL 40 MCG/KG/MIN: 10 INJECTION, EMULSION INTRAVENOUS at 06:43

## 2018-07-04 RX ADMIN — METOPROLOL TARTRATE 50 MG: 100 TABLET ORAL at 09:03

## 2018-07-04 RX ADMIN — PIPERACILLIN SODIUM,TAZOBACTAM SODIUM 4.5 G: 4; .5 INJECTION, POWDER, FOR SOLUTION INTRAVENOUS at 20:16

## 2018-07-04 RX ADMIN — INSULIN GLARGINE 20 UNITS: 100 INJECTION, SOLUTION SUBCUTANEOUS at 20:15

## 2018-07-04 RX ADMIN — GABAPENTIN 300 MG: 250 SUSPENSION ORAL at 22:27

## 2018-07-04 RX ADMIN — QUETIAPINE FUMARATE 100 MG: 50 TABLET ORAL at 20:15

## 2018-07-04 RX ADMIN — PROPOFOL 70 MG: 10 INJECTION, EMULSION INTRAVENOUS at 05:40

## 2018-07-04 RX ADMIN — QUETIAPINE FUMARATE 50 MG: 50 TABLET ORAL at 08:59

## 2018-07-04 RX ADMIN — CHLORHEXIDINE GLUCONATE 15 ML: 1.2 RINSE ORAL at 09:03

## 2018-07-04 RX ADMIN — AMLODIPINE BESYLATE 2.5 MG: 2.5 TABLET ORAL at 09:00

## 2018-07-04 RX ADMIN — DOCUSATE SODIUM 100 MG: 50 LIQUID ORAL at 20:15

## 2018-07-04 RX ADMIN — SENNOSIDES A AND B 10 ML: 415.36 LIQUID ORAL at 20:15

## 2018-07-04 RX ADMIN — CHLORHEXIDINE GLUCONATE 15 ML: 1.2 RINSE ORAL at 20:17

## 2018-07-04 RX ADMIN — MICONAZOLE NITRATE: 20 CREAM TOPICAL at 10:24

## 2018-07-04 RX ADMIN — Medication 1 PACKET: at 20:15

## 2018-07-04 RX ADMIN — VALPROATE SODIUM 500 MG: 100 INJECTION, SOLUTION INTRAVENOUS at 07:47

## 2018-07-04 RX ADMIN — PROPOFOL 40 MCG/KG/MIN: 10 INJECTION, EMULSION INTRAVENOUS at 20:16

## 2018-07-04 RX ADMIN — Medication 1 PACKET: at 10:21

## 2018-07-04 RX ADMIN — GABAPENTIN 300 MG: 250 SUSPENSION ORAL at 15:32

## 2018-07-04 RX ADMIN — SENNOSIDES A AND B 10 ML: 415.36 LIQUID ORAL at 09:00

## 2018-07-04 RX ADMIN — LIDOCAINE HYDROCHLORIDE 3 ML: 10 INJECTION, SOLUTION INFILTRATION; PERINEURAL at 08:28

## 2018-07-04 RX ADMIN — MICONAZOLE NITRATE: 2 POWDER TOPICAL at 13:52

## 2018-07-04 NOTE — PROGRESS NOTES
[facility] ICU RESPIRATORY NOTE    Date of Admission: 6/25/2018  Date of Intubation (most recent): 6/27/2018  Reason for Mechanical Ventilation: AW protection   Number of Days on Mechanical Ventilation: 8  Met Criteria for Pressure Support Trial: No  Reason for No Pressure Support Trial: Rest overnight         ABG Results:  No lab results found in last 7 days.    Ventilation Mode: CMV/AC  (Continuous Mandatory Ventilation/ Assist Control)  FiO2 (%): 50 %  Rate Set (breaths/minute): 14 breaths/min  Tidal Volume Set (mL): 550 mL  PEEP (cm H2O): 5 cmH2O  Oxygen Concentration (%): 50 %  Resp: 18      ETT appearance on chest x-ray: ETT is in good position.    Plan:  Continue full support assess pt in the morning for daily weaning trial.  7/4/2018  Kaykay Bansal

## 2018-07-04 NOTE — PLAN OF CARE
Problem: Patient Care Overview  Goal: Plan of Care/Patient Progress Review  Outcome: Declining  Pt agitated at times however following commands appropriately on 0.7 of dex. LS coarse with thick tan secretions. Broch and verified ETT place this morning following mucous plugging; now sedated on propofol. Significant other Isabel updated via phone.

## 2018-07-04 NOTE — PROGRESS NOTES
[facility] ICU RESPIRATORY NOTE  Date of Admission: 6/25/2018  Date of Intubation (most recent): 6/27/2018  Reason for Mechanical Ventilation: AW protection   Number of Days on Mechanical Ventilation: 8  Met Criteria for Pressure Support Trial: No  Reason for No Pressure Support Trial: Rest overnight     Significant Events Today: None     Ventilation Mode: CMV/AC  (Continuous Mandatory Ventilation/ Assist Control)  FiO2 (%): 50 %  Rate Set (breaths/minute): 14 breaths/min  Tidal Volume Set (mL): 550 mL  PEEP (cm H2O): 5 cmH2O  Oxygen Concentration (%): 50 %  Resp: 39    ABG Results:    Recent Labs  Lab 06/27/18  0725   PH 7.42   PCO2 41   PO2 165*   HCO3 26   O2PER 40% VENT     ETT appearance on chest x-ray: ETT in good position     Plan:  Pt to remains on full vent support     Kaykay Cavanaugh RT  7/4/2018

## 2018-07-04 NOTE — PROCEDURES
This was an emergent procedure. Patient was agitated pulled on ETT now with new cuff leak and high peak pressures. Large mucus plug noted. Anesthesia confirmed ETT in good position. Patient was sedated on propofol. The bronchoscope was placed thru the ETT. No obvious erythema, edema of excessive secretions from either lung. Saline flush with aspiration for culture sample. Suctioned out lungs again-no obvious secretions. Bronchoscope removed. Patient tolerated procedure well

## 2018-07-04 NOTE — PLAN OF CARE
Problem: Patient Care Overview  Goal: Plan of Care/Patient Progress Review  Outcome: No Change  Patient remains vented and sedated.  No vent wean.  On Propofol at 40.  VSS.  Remains SR.  [place] in place with good UO.  TF at goal.

## 2018-07-04 NOTE — PROGRESS NOTES
Intensivist Daily Note  07/04/2018      Brief History:    52 yom admitted with acute EtOH pancreatitis and EtOH withdrawal, intubated for airway protection in setting of acute withdrawal.    Interval Events:  -6/27: intubated, sedated and hemodynamically stable.   -6/28: started tube feeds yesterday, tolerating well.  1L IVF overnight for low UOP.  -6/29: failed SBT yesterday 2/2 tachycardia and agitation.  No acute events overnight.  -6/30: Continued with agitation. Head CT without acute chagnes. Volume loss noted.   -7/2: continues with agitation with lightening sedation; no other acute events.  Blood glucose levels elevated > 200.  -7/3: purulent ETT secretions overnight, sent for culture, growing GNR and GPC.  This morning febrile to 103.  7/4: bronched overnight after pulled on ETT creating cuff leak and high peak pressures; large mucous plug extracted.  Cultures sent. Back on propofol.      PMH:  Past Medical History:   Diagnosis Date     Abnormal MRI of head 1/11/2013     Atypical chest pain 9/4/2012     BMI 33.0-33.9,adult 5/24/2013     Hyperlipidemia LDL goal <160 5/4/2013     Hypertension      Ménière's disease        PSurgHx:  Past Surgical History:   Procedure Laterality Date     C NONSPECIFIC PROCEDURE      shoulder surgery for dislocation      C NONSPECIFIC PROCEDURE      eye surgery, lens implant right     EYE SURGERY       FRACTURE TX, WRIST RT/LT      Fracture TX Wrist RT/LT     ORTHOPEDIC SURGERY         Family History:  Family History     Problem (# of Occurrences) Relation (Name,Age of Onset)    C.A.D. (1) Father: bypass surgery    Diabetes (1) Father    Hypertension (1) Mother          Social History:  Social History     Social History     Marital status: Single     Spouse name: N/A     Number of children: N/A     Years of education: N/A     Occupational History     Not on file.     Social History Main Topics     Smoking status: Never Smoker     Smokeless tobacco: Never Used      Comment:  rarely, once per year maybe      Alcohol use Yes      Comment: 10 drinks / week      Drug use: No     Sexual activity: Yes     Partners: Female     Other Topics Concern     Parent/Sibling W/ Cabg, Mi Or Angioplasty Before 65f 55m? Yes     Social History Narrative       Allergy:  Allergies   Allergen Reactions     Zoloft [Sertraline] Nausea and Vomiting        Medications:  Current Facility-Administered Medications   Medication     acetaminophen (TYLENOL) tablet 325 mg     amLODIPine (NORVASC) tablet 2.5 mg     bisacodyl (DULCOLAX) Suppository 10 mg     chlorhexidine (PERIDEX) 0.12 % solution 15 mL     dexmedetomidine (PRECEDEX) 400 mcg in sodium chloride 0.9 % 100 mL infusion     dextrose 10 % 1,000 mL infusion     glucose gel 15-30 g    Or     dextrose 50 % injection 25-50 mL    Or     glucagon injection 1 mg     docusate (COLACE) 50 MG/5ML liquid 100 mg     enoxaparin (LOVENOX) injection 40 mg     gabapentin (NEURONTIN) solution 300 mg     hydrALAZINE (APRESOLINE) injection 10 mg     hydrochlorothiazide (HYDRODIURIL) tablet 25 mg     HYDROmorphone (PF) (DILAUDID) injection 0.3-0.5 mg     hypromellose-dextran (ARTIFICAL TEARS) 0.1-0.3 % ophthalmic solution 2-3 drop     insulin aspart (NovoLOG) inj (RAPID ACTING)     insulin glargine (LANTUS) injection 18 Units     lidocaine (LMX4) cream     lidocaine 1 % 1 mL     LORazepam (ATIVAN) tablet 1-2 mg    Or     LORazepam (ATIVAN) injection 1-2 mg     magnesium sulfate 2 g in water intermittent infusion     magnesium sulfate 4 g in 100 mL sterile water (premade)     melatonin tablet 3 mg     metoprolol (LOPRESSOR) suspension 50 mg     miconazole (MICATIN) 2 % cream     miconazole (MICATIN; MICRO GUARD) 2 % powder     multivitamins with minerals (CERTAVITE/CEROVITE) liquid 15 mL     naloxone (NARCAN) injection 0.1-0.4 mg     ondansetron (ZOFRAN-ODT) ODT tab 4 mg    Or     ondansetron (ZOFRAN) injection 4 mg     oxyCODONE IR (ROXICODONE) tablet 5-10 mg     pantoprazole  (PROTONIX) 2 mg/mL suspension 40 mg     piperacillin-tazobactam (ZOSYN) 4.5 g vial to attach to  mL bag     polyethylene glycol (MIRALAX/GLYCOLAX) Packet 17 g     polyethylene glycol (MIRALAX/GLYCOLAX) Packet 17 g     potassium chloride (KLOR-CON) Packet 20-40 mEq     potassium chloride 10 mEq in 100 mL intermittent infusion with 10 mg lidocaine     potassium chloride 10 mEq in 100 mL sterile water intermittent infusion (premix)     potassium chloride 20 mEq in 50 mL intermittent infusion     potassium chloride SA (K-DUR/KLOR-CON M) CR tablet 20-40 mEq     propofol (DIPRIVAN) infusion     propofol (DIPRIVAN) infusion     protein modular (PROSource TF) 1 packet     QUEtiapine (SEROquel) tablet 50 mg     senna-docusate (SENOKOT-S;PERICOLACE) 8.6-50 MG per tablet 1 tablet    Or     senna-docusate (SENOKOT-S;PERICOLACE) 8.6-50 MG per tablet 2 tablet     sennosides (SENOKOT) syrup 10 mL     sodium chloride (PF) 0.9% PF flush 10 mL     sodium chloride (PF) 0.9% PF flush 10-20 mL     sodium chloride (PF) 0.9% PF flush 3 mL     sodium chloride (PF) 0.9% PF flush 3 mL     sodium chloride 0.9% infusion     valproate (DEPACON) 500 mg in sodium chloride 0.9 % 50 mL intermittent infusion     vancomycin (VANCOCIN) 1,250 mg in sodium chloride 0.9 % 250 mL intermittent infusion         Physical examination:  Vital signs:  Temp: 99.1  F (37.3  C) Temp src: Axillary BP: 122/73   Heart Rate: 74 Resp: 14 SpO2: 97 % O2 Device: Mechanical Ventilator      General: intubated, sedated.  NAD  HEENT: neck supple, symmetrical  Lungs: Clear to auscultation, low pitched wheezes throughout.  CVS: Normal S1 & S2, no murmur  Abdomen: Bowel sounds present, soft, non tender  Extremities/musculoskeletal: no edema  Skin: no rash  Exam of Line sites: No erythema or discharge.  Neuro: moves all 4 extremities      Intake/Output Summary (Last 24 hours) at 07/01/18 1159  Last data filed at 07/01/18 1000   Gross per 24 hour   Intake           4078.7 ml    Output             4585 ml   Net           -506.3 ml       Data:    ROUTINE ICU LABS (Last four results)  CMP    Recent Labs  Lab 07/04/18  0608 07/03/18  0545 07/02/18  0615 07/01/18  0615  06/29/18  0605    142 142 144  < > 145*   POTASSIUM 3.5 3.8 3.8 3.6  < > 3.4   CHLORIDE 106 105 107 109  < > 111*   CO2 28 28 29 27  < > 28   ANIONGAP 9 9 6 8  < > 6   * 264* 251* 201*  < > 110*   BUN 17 20 15 8  < > 6*   CR 0.60* 0.66 0.66 0.65*  < > 0.68   GFRESTIMATED >90 >90 >90 >90  < > >90   GFRESTBLACK >90 >90 >90 >90  < > >90   DEMETRIA 8.0* 8.1* 8.6 8.5  < > 8.1*   MAG 2.0 1.9 1.9 1.9  < > 2.1   PHOS 2.3* 3.4 1.9* 2.2*  < > 2.7   PROTTOTAL 7.0 6.7*  --  7.0  --  6.9   ALBUMIN 2.2* 2.2*  --  2.5*  --  2.7*   BILITOTAL 1.0 0.9  --  1.7*  --  1.6*   ALKPHOS 66 68  --  81  --  88   AST 79* 98*  --  61*  --  76*   ALT 53 49  --  47  --  60   < > = values in this interval not displayed.  CBC    Recent Labs  Lab 07/04/18  0608 07/03/18  0545 07/01/18  0615 06/30/18  1010   WBC 8.5 8.1  --  8.3   RBC 3.70* 3.65*  --  4.20*   HGB 12.8* 12.9*  --  14.8   HCT 38.7* 38.4*  --  42.7   * 105*  --  102*   MCH 34.6* 35.3*  --  35.2*   MCHC 33.1 33.6  --  34.7   RDW 13.3 14.0  --  13.1    181 127* 104*     INRNo lab results found in last 7 days.  Arterial Blood Gas  No lab results found in last 7 days.    Recent Results (from the past 24 hour(s))   XR Chest Port 1 View    Narrative    CHEST ONE VIEW PORTABLE    7/4/2018 6:20 AM     HISTORY: Follow infiltrate.     COMPARISON: 7/3/2018.      Impression    IMPRESSION: Worsening infiltrate in the right perihilar region as well  as the retrocardiac region. Findings are concerning for pneumonia.  Asymmetric pulmonary edema could also have this appearance. Likely  small left pleural effusion is new since prior. No pneumothorax  visualized. Support devices remain in place.    RL ROBERTSON MD         Assessment and Plan:    Neuro  ### Acute EtOH withdrawal Syndrome  ###  Sedation/Analgesia  ### Meniere's disease  -Propofol gtt -- agitated when sedation lightened  -Start Fentanyl gtt to augment propofol  -Valproate + Gabapentin  -APAP and Dilaudid prns available  -Restart PTA meclizine once extubated and able to take PO  -Head CT 6/30 w/ moderate cerebral atrophy but no other acute processes  -Seroquel increased to 100 mg BID today; EKG repeated, QTc ok    Respiratory  ### Acute Hypoxic Respiratory failure  ### Ventilator Associated Pneumonia -- CXR w/ LLL infiltrate, + sputum cultures => staph aureus  -intubated for airway protection  -Daily SBTs  -Broad Spectrum antibiotics as below  -Bronched 7/3 w/ mucous plug removal    CV:  ### HTN  - cont. HCTZ 25 mg daily, Norvasc 2.5 mg daily, and Metoprolol 50 mg BID  - no acute issues, monitor hemodynamics    GI/Liver:  ### EtOH Pancreatitis -- resolved  ### Fatty Liver Disease, likely 2/2 EtOH  -Lipase peaked at 903, is now normal; stop trending  -continuous IVF stopped  -Abdominal US 6/25 showing fatty liver    ID:  ### VAP -- LLL Infiltrate on CXR 7/2, + sputum cultures => staph aureus  ### Fever w/ tmax 103  -f/u sputum culture, currently growing staph aureus  -Blood and sputum from 7/3 NGTD; follow  -Procal 0.57 on 7/2    Heme:  ### Thrombocytopenia - resolved  ### Anemia -- mild; likely iatrogenic from blood draws  -monitor; transfuse for hgb < 7    Endocrine:  ### Hyperglycemia -- glucose > 200  -Cont high intensity SSI  -Increase Lantus to 20U BID    FEN:  -Cont. multi-vitamin; d/c folate, has completed > 5d  -continue tube feeds -- cont free water @ 100 cc q4h, Na 142  -monitor lytes, replete per unit protocol    Prophy:  GI: PPI  DVT: SCDs; LMWH    ICU Cares:  Restrain needed: Yes   Lines needed: Yes    Code: Full  Dispo: ICU status    Family: Updated brother and ROMÁN 7/4    Billing: Critical care time 40 min excluding procedure time     Kadie Ramirez MD  Pulmonary and Critical Care Medicine

## 2018-07-05 ENCOUNTER — APPOINTMENT (OUTPATIENT)
Dept: GENERAL RADIOLOGY | Facility: CLINIC | Age: 53
DRG: 166 | End: 2018-07-05
Attending: INTERNAL MEDICINE
Payer: COMMERCIAL

## 2018-07-05 LAB
ALBUMIN SERPL-MCNC: 2.1 G/DL (ref 3.4–5)
ALP SERPL-CCNC: 65 U/L (ref 40–150)
ALT SERPL W P-5'-P-CCNC: 51 U/L (ref 0–70)
ANION GAP SERPL CALCULATED.3IONS-SCNC: 7 MMOL/L (ref 3–14)
AST SERPL W P-5'-P-CCNC: 73 U/L (ref 0–45)
BILIRUB SERPL-MCNC: 0.7 MG/DL (ref 0.2–1.3)
BUN SERPL-MCNC: 16 MG/DL (ref 7–30)
CALCIUM SERPL-MCNC: 8 MG/DL (ref 8.5–10.1)
CHLORIDE SERPL-SCNC: 105 MMOL/L (ref 94–109)
CO2 SERPL-SCNC: 30 MMOL/L (ref 20–32)
CREAT SERPL-MCNC: 0.6 MG/DL (ref 0.66–1.25)
DIFFERENTIAL METHOD BLD: NORMAL
ERYTHROCYTE [DISTWIDTH] IN BLOOD BY AUTOMATED COUNT: 13.2 % (ref 10–15)
ERYTHROCYTE [DISTWIDTH] IN BLOOD BY AUTOMATED COUNT: NORMAL % (ref 10–15)
ERYTHROCYTE [DISTWIDTH] IN BLOOD BY AUTOMATED COUNT: NORMAL % (ref 10–15)
GFR SERPL CREATININE-BSD FRML MDRD: >90 ML/MIN/1.7M2
GLUCOSE BLDC GLUCOMTR-MCNC: 123 MG/DL (ref 70–99)
GLUCOSE BLDC GLUCOMTR-MCNC: 127 MG/DL (ref 70–99)
GLUCOSE BLDC GLUCOMTR-MCNC: 142 MG/DL (ref 70–99)
GLUCOSE BLDC GLUCOMTR-MCNC: 152 MG/DL (ref 70–99)
GLUCOSE BLDC GLUCOMTR-MCNC: 153 MG/DL (ref 70–99)
GLUCOSE BLDC GLUCOMTR-MCNC: 154 MG/DL (ref 70–99)
GLUCOSE BLDC GLUCOMTR-MCNC: 159 MG/DL (ref 70–99)
GLUCOSE BLDC GLUCOMTR-MCNC: 160 MG/DL (ref 70–99)
GLUCOSE BLDC GLUCOMTR-MCNC: 164 MG/DL (ref 70–99)
GLUCOSE BLDC GLUCOMTR-MCNC: 170 MG/DL (ref 70–99)
GLUCOSE BLDC GLUCOMTR-MCNC: 172 MG/DL (ref 70–99)
GLUCOSE BLDC GLUCOMTR-MCNC: 186 MG/DL (ref 70–99)
GLUCOSE BLDC GLUCOMTR-MCNC: 189 MG/DL (ref 70–99)
GLUCOSE BLDC GLUCOMTR-MCNC: 194 MG/DL (ref 70–99)
GLUCOSE BLDC GLUCOMTR-MCNC: 239 MG/DL (ref 70–99)
GLUCOSE SERPL-MCNC: 201 MG/DL (ref 70–99)
HCT VFR BLD AUTO: 37.5 % (ref 40–53)
HCT VFR BLD AUTO: NORMAL % (ref 40–53)
HCT VFR BLD AUTO: NORMAL % (ref 40–53)
HGB BLD-MCNC: 12.6 G/DL (ref 13.3–17.7)
HGB BLD-MCNC: NORMAL G/DL (ref 13.3–17.7)
HGB BLD-MCNC: NORMAL G/DL (ref 13.3–17.7)
MAGNESIUM SERPL-MCNC: 1.9 MG/DL (ref 1.6–2.3)
MCH RBC QN AUTO: 34.8 PG (ref 26.5–33)
MCH RBC QN AUTO: NORMAL PG (ref 26.5–33)
MCH RBC QN AUTO: NORMAL PG (ref 26.5–33)
MCHC RBC AUTO-ENTMCNC: 33.6 G/DL (ref 31.5–36.5)
MCHC RBC AUTO-ENTMCNC: NORMAL G/DL (ref 31.5–36.5)
MCHC RBC AUTO-ENTMCNC: NORMAL G/DL (ref 31.5–36.5)
MCV RBC AUTO: 104 FL (ref 78–100)
MCV RBC AUTO: NORMAL FL (ref 78–100)
MCV RBC AUTO: NORMAL FL (ref 78–100)
PHOSPHATE SERPL-MCNC: 2.7 MG/DL (ref 2.5–4.5)
PLATELET # BLD AUTO: 250 10E9/L (ref 150–450)
PLATELET # BLD AUTO: NORMAL 10E9/L (ref 150–450)
PLATELET # BLD AUTO: NORMAL 10E9/L (ref 150–450)
POTASSIUM SERPL-SCNC: 3.5 MMOL/L (ref 3.4–5.3)
PROT SERPL-MCNC: 6.3 G/DL (ref 6.8–8.8)
RBC # BLD AUTO: 3.62 10E12/L (ref 4.4–5.9)
RBC # BLD AUTO: NORMAL 10E12/L (ref 4.4–5.9)
RBC # BLD AUTO: NORMAL 10E12/L (ref 4.4–5.9)
SODIUM SERPL-SCNC: 142 MMOL/L (ref 133–144)
VANCOMYCIN SERPL-MCNC: 12.7 MG/L
WBC # BLD AUTO: 8.4 10E9/L (ref 4–11)
WBC # BLD AUTO: NORMAL 10E9/L (ref 4–11)
WBC # BLD AUTO: NORMAL 10E9/L (ref 4–11)

## 2018-07-05 PROCEDURE — 85027 COMPLETE CBC AUTOMATED: CPT | Performed by: HOSPITALIST

## 2018-07-05 PROCEDURE — 25000132 ZZH RX MED GY IP 250 OP 250 PS 637: Performed by: INTERNAL MEDICINE

## 2018-07-05 PROCEDURE — 25000125 ZZHC RX 250: Performed by: INTERNAL MEDICINE

## 2018-07-05 PROCEDURE — 25000128 H RX IP 250 OP 636: Performed by: SURGERY

## 2018-07-05 PROCEDURE — 25000128 H RX IP 250 OP 636: Performed by: INTERNAL MEDICINE

## 2018-07-05 PROCEDURE — 40000239 ZZH STATISTIC VAT ROUNDS

## 2018-07-05 PROCEDURE — 20000003 ZZH R&B ICU

## 2018-07-05 PROCEDURE — 84100 ASSAY OF PHOSPHORUS: CPT | Performed by: HOSPITALIST

## 2018-07-05 PROCEDURE — 40000008 ZZH STATISTIC AIRWAY CARE

## 2018-07-05 PROCEDURE — 85025 COMPLETE CBC W/AUTO DIFF WBC: CPT | Performed by: HOSPITALIST

## 2018-07-05 PROCEDURE — 25000128 H RX IP 250 OP 636: Performed by: HOSPITALIST

## 2018-07-05 PROCEDURE — 40000275 ZZH STATISTIC RCP TIME EA 10 MIN

## 2018-07-05 PROCEDURE — 25000131 ZZH RX MED GY IP 250 OP 636 PS 637: Performed by: INTERNAL MEDICINE

## 2018-07-05 PROCEDURE — 80202 ASSAY OF VANCOMYCIN: CPT | Performed by: HOSPITALIST

## 2018-07-05 PROCEDURE — 71045 X-RAY EXAM CHEST 1 VIEW: CPT

## 2018-07-05 PROCEDURE — 27210437 ZZH NUTRITION PRODUCT SEMIELEM INTERMED LITER

## 2018-07-05 PROCEDURE — 85025 COMPLETE CBC W/AUTO DIFF WBC: CPT | Performed by: INTERNAL MEDICINE

## 2018-07-05 PROCEDURE — 25000132 ZZH RX MED GY IP 250 OP 250 PS 637: Performed by: HOSPITALIST

## 2018-07-05 PROCEDURE — 99291 CRITICAL CARE FIRST HOUR: CPT | Performed by: INTERNAL MEDICINE

## 2018-07-05 PROCEDURE — 36415 COLL VENOUS BLD VENIPUNCTURE: CPT | Performed by: HOSPITALIST

## 2018-07-05 PROCEDURE — 83735 ASSAY OF MAGNESIUM: CPT | Performed by: HOSPITALIST

## 2018-07-05 PROCEDURE — 80053 COMPREHEN METABOLIC PANEL: CPT | Performed by: HOSPITALIST

## 2018-07-05 PROCEDURE — 25000125 ZZHC RX 250: Performed by: SURGERY

## 2018-07-05 PROCEDURE — 94003 VENT MGMT INPAT SUBQ DAY: CPT

## 2018-07-05 PROCEDURE — 00000146 ZZHCL STATISTIC GLUCOSE BY METER IP

## 2018-07-05 RX ORDER — DEXTROSE MONOHYDRATE 25 G/50ML
25-50 INJECTION, SOLUTION INTRAVENOUS
Status: DISCONTINUED | OUTPATIENT
Start: 2018-07-05 | End: 2018-07-11

## 2018-07-05 RX ORDER — NICOTINE POLACRILEX 4 MG
15-30 LOZENGE BUCCAL
Status: DISCONTINUED | OUTPATIENT
Start: 2018-07-05 | End: 2018-07-11

## 2018-07-05 RX ORDER — FUROSEMIDE 10 MG/ML
20 INJECTION INTRAMUSCULAR; INTRAVENOUS ONCE
Status: COMPLETED | OUTPATIENT
Start: 2018-07-05 | End: 2018-07-05

## 2018-07-05 RX ADMIN — ENOXAPARIN SODIUM 40 MG: 40 INJECTION SUBCUTANEOUS at 11:53

## 2018-07-05 RX ADMIN — Medication 40 MG: at 08:55

## 2018-07-05 RX ADMIN — VANCOMYCIN HYDROCHLORIDE 1500 MG: 5 INJECTION, POWDER, LYOPHILIZED, FOR SOLUTION INTRAVENOUS at 05:47

## 2018-07-05 RX ADMIN — QUETIAPINE FUMARATE 100 MG: 50 TABLET ORAL at 08:54

## 2018-07-05 RX ADMIN — MIDAZOLAM HYDROCHLORIDE 2 MG: 1 INJECTION, SOLUTION INTRAMUSCULAR; INTRAVENOUS at 23:57

## 2018-07-05 RX ADMIN — MICONAZOLE NITRATE: 2 POWDER TOPICAL at 09:12

## 2018-07-05 RX ADMIN — DEXMEDETOMIDINE HYDROCHLORIDE 1.2 MCG/KG/HR: 100 INJECTION, SOLUTION INTRAVENOUS at 18:49

## 2018-07-05 RX ADMIN — Medication 0.5 MG: at 21:17

## 2018-07-05 RX ADMIN — METOPROLOL TARTRATE 50 MG: 100 TABLET ORAL at 09:05

## 2018-07-05 RX ADMIN — VALPROATE SODIUM 500 MG: 100 INJECTION, SOLUTION INTRAVENOUS at 07:02

## 2018-07-05 RX ADMIN — Medication 40 MG: at 21:05

## 2018-07-05 RX ADMIN — Medication 0.5 MG: at 04:46

## 2018-07-05 RX ADMIN — DEXMEDETOMIDINE HYDROCHLORIDE 1.2 MCG/KG/HR: 100 INJECTION, SOLUTION INTRAVENOUS at 22:05

## 2018-07-05 RX ADMIN — Medication 1 PACKET: at 21:06

## 2018-07-05 RX ADMIN — HYDROCHLOROTHIAZIDE 25 MG: 25 TABLET ORAL at 08:55

## 2018-07-05 RX ADMIN — VANCOMYCIN HYDROCHLORIDE 1500 MG: 5 INJECTION, POWDER, LYOPHILIZED, FOR SOLUTION INTRAVENOUS at 22:06

## 2018-07-05 RX ADMIN — HYDRALAZINE HYDROCHLORIDE 10 MG: 20 INJECTION INTRAMUSCULAR; INTRAVENOUS at 23:08

## 2018-07-05 RX ADMIN — DEXMEDETOMIDINE HYDROCHLORIDE 0.2 MCG/KG/HR: 100 INJECTION, SOLUTION INTRAVENOUS at 09:17

## 2018-07-05 RX ADMIN — SODIUM CHLORIDE 1 UNITS/HR: 9 INJECTION, SOLUTION INTRAVENOUS at 10:47

## 2018-07-05 RX ADMIN — Medication 1 PACKET: at 09:08

## 2018-07-05 RX ADMIN — CHLORHEXIDINE GLUCONATE 15 ML: 1.2 RINSE ORAL at 09:12

## 2018-07-05 RX ADMIN — PIPERACILLIN SODIUM,TAZOBACTAM SODIUM 4.5 G: 4; .5 INJECTION, POWDER, FOR SOLUTION INTRAVENOUS at 07:30

## 2018-07-05 RX ADMIN — METOPROLOL TARTRATE 50 MG: 100 TABLET ORAL at 21:05

## 2018-07-05 RX ADMIN — MIDAZOLAM HYDROCHLORIDE 2 MG: 1 INJECTION, SOLUTION INTRAMUSCULAR; INTRAVENOUS at 22:05

## 2018-07-05 RX ADMIN — PIPERACILLIN SODIUM,TAZOBACTAM SODIUM 4.5 G: 4; .5 INJECTION, POWDER, FOR SOLUTION INTRAVENOUS at 18:53

## 2018-07-05 RX ADMIN — INSULIN GLARGINE 20 UNITS: 100 INJECTION, SOLUTION SUBCUTANEOUS at 09:26

## 2018-07-05 RX ADMIN — PIPERACILLIN SODIUM,TAZOBACTAM SODIUM 4.5 G: 4; .5 INJECTION, POWDER, FOR SOLUTION INTRAVENOUS at 01:07

## 2018-07-05 RX ADMIN — Medication 15 ML: at 09:04

## 2018-07-05 RX ADMIN — QUETIAPINE FUMARATE 100 MG: 50 TABLET ORAL at 21:05

## 2018-07-05 RX ADMIN — Medication 0.5 MG: at 16:17

## 2018-07-05 RX ADMIN — GABAPENTIN 300 MG: 250 SUSPENSION ORAL at 09:05

## 2018-07-05 RX ADMIN — MIDAZOLAM HYDROCHLORIDE 2 MG: 1 INJECTION, SOLUTION INTRAMUSCULAR; INTRAVENOUS at 19:55

## 2018-07-05 RX ADMIN — MICONAZOLE NITRATE: 2 POWDER TOPICAL at 21:08

## 2018-07-05 RX ADMIN — VANCOMYCIN HYDROCHLORIDE 1500 MG: 5 INJECTION, POWDER, LYOPHILIZED, FOR SOLUTION INTRAVENOUS at 13:21

## 2018-07-05 RX ADMIN — MIDAZOLAM HYDROCHLORIDE 2 MG: 1 INJECTION, SOLUTION INTRAMUSCULAR; INTRAVENOUS at 18:15

## 2018-07-05 RX ADMIN — Medication 0.5 MG: at 23:08

## 2018-07-05 RX ADMIN — PROPOFOL 30 MCG/KG/MIN: 10 INJECTION, EMULSION INTRAVENOUS at 06:49

## 2018-07-05 RX ADMIN — CHLORHEXIDINE GLUCONATE 15 ML: 1.2 RINSE ORAL at 21:07

## 2018-07-05 RX ADMIN — FUROSEMIDE 20 MG: 10 INJECTION, SOLUTION INTRAVENOUS at 09:56

## 2018-07-05 RX ADMIN — Medication 0.5 MG: at 11:50

## 2018-07-05 RX ADMIN — MICONAZOLE NITRATE: 20 CREAM TOPICAL at 22:00

## 2018-07-05 RX ADMIN — Medication 0.5 MG: at 07:29

## 2018-07-05 RX ADMIN — PROPOFOL 30 MCG/KG/MIN: 10 INJECTION, EMULSION INTRAVENOUS at 01:04

## 2018-07-05 RX ADMIN — MAGNESIUM SULFATE HEPTAHYDRATE 2 G: 40 INJECTION, SOLUTION INTRAVENOUS at 05:47

## 2018-07-05 RX ADMIN — AMLODIPINE BESYLATE 2.5 MG: 2.5 TABLET ORAL at 08:55

## 2018-07-05 RX ADMIN — Medication 0.5 MG: at 13:49

## 2018-07-05 RX ADMIN — PIPERACILLIN SODIUM,TAZOBACTAM SODIUM 4.5 G: 4; .5 INJECTION, POWDER, FOR SOLUTION INTRAVENOUS at 13:13

## 2018-07-05 RX ADMIN — MICONAZOLE NITRATE: 20 CREAM TOPICAL at 09:17

## 2018-07-05 NOTE — PLAN OF CARE
Problem: Patient Care Overview  Goal: Plan of Care/Patient Progress Review  Outcome: No Change  Neuro: Sedated on dex, RASS -2 but agitated at times - easily redirectable.  PRN dilaudid given during day but now PRN versed available.  Follows simple commands,  PERRLA  CV: SR, pulses palpable.  HTN when agitated.   Pulm: Clear, suction tan thick secretions.  CMV all day except SBT for 20 minutes (see note)  GI/: TF per NG at goal of 50, BS hypo.  Large loose BM HS - flexi-seal placed.  Skin: Clammy. Pressure injury on right buttucks, blanchable redness  Plan:  Weaned off propofol and started on dex.  Plan to wean vent when pt tolerates better.  Insulin gtt started today.    Spoke with mother and significant other about plan today.  They with be visiting pt tomorrow.

## 2018-07-05 NOTE — PROGRESS NOTES
ICU    RN notes continued agitation and trying to get out of bed despite max dose of dexmedetomidine (1.2) and  q 1 hr doses of dilaudid 0.5.   Patient currently calm on exam with O2 saturations 96% on 40%.      To avoid restarting propofol or dilaudid drip will try intermittent midazolam.

## 2018-07-05 NOTE — PLAN OF CARE
Problem: Patient Care Overview  Goal: Plan of Care/Patient Progress Review  Outcome: No Change  Pt remains sedated on propofol, CAM positive. Low grade temps 99.6 axillary; abx ongoing. Thick tan secretions down ETT. Right buttock pressure injury, strict repositioning. Loose stools starting this morning, rectal tube placed for skin protection. Mag replaced.

## 2018-07-05 NOTE — PROGRESS NOTES
Intensivist Daily Note  07/05/2018      Brief History:    52 yom admitted with acute EtOH pancreatitis and EtOH withdrawal, intubated for airway protection in setting of acute withdrawal.    Interval Events:  -6/27: intubated, sedated and hemodynamically stable.   -6/28: started tube feeds yesterday, tolerating well.  1L IVF overnight for low UOP.  -6/29: failed SBT yesterday 2/2 tachycardia and agitation.  No acute events overnight.  -6/30: Continued with agitation. Head CT without acute chagnes. Volume loss noted.   -7/2: continues with agitation with lightening sedation; no other acute events.  Blood glucose levels elevated > 200.  -7/3: purulent ETT secretions overnight, sent for culture, growing GNR and GPC.  This morning febrile to 103.  7/4: bronched overnight after pulled on ETT creating cuff leak and high peak pressures; large mucous plug extracted.  Cultures sent. Back on propofol.  -7/5: no acute events overnight.  Sputum showing heavy growth staph aureus.      PMH:  Past Medical History:   Diagnosis Date     Abnormal MRI of head 1/11/2013     Atypical chest pain 9/4/2012     BMI 33.0-33.9,adult 5/24/2013     Hyperlipidemia LDL goal <160 5/4/2013     Hypertension      Ménière's disease        PSurgHx:  Past Surgical History:   Procedure Laterality Date     C NONSPECIFIC PROCEDURE      shoulder surgery for dislocation      C NONSPECIFIC PROCEDURE      eye surgery, lens implant right     EYE SURGERY       FRACTURE TX, WRIST RT/LT      Fracture TX Wrist RT/LT     ORTHOPEDIC SURGERY         Family History:  Family History     Problem (# of Occurrences) Relation (Name,Age of Onset)    C.A.D. (1) Father: bypass surgery    Diabetes (1) Father    Hypertension (1) Mother          Social History:  Social History     Social History     Marital status: Single     Spouse name: N/A     Number of children: N/A     Years of education: N/A     Occupational History     Not on file.     Social History Main Topics      Smoking status: Never Smoker     Smokeless tobacco: Never Used      Comment: rarely, once per year maybe      Alcohol use Yes      Comment: 10 drinks / week      Drug use: No     Sexual activity: Yes     Partners: Female     Other Topics Concern     Parent/Sibling W/ Cabg, Mi Or Angioplasty Before 65f 55m? Yes     Social History Narrative       Allergy:  Allergies   Allergen Reactions     Zoloft [Sertraline] Nausea and Vomiting        Medications:  Current Facility-Administered Medications   Medication     acetaminophen (TYLENOL) tablet 325 mg     amLODIPine (NORVASC) tablet 2.5 mg     bisacodyl (DULCOLAX) Suppository 10 mg     chlorhexidine (PERIDEX) 0.12 % solution 15 mL     dexmedetomidine (PRECEDEX) 400 mcg in sodium chloride 0.9 % 100 mL infusion     dextrose 10 % 1,000 mL infusion     glucose gel 15-30 g    Or     dextrose 50 % injection 25-50 mL    Or     glucagon injection 1 mg     docusate (COLACE) 50 MG/5ML liquid 100 mg     enoxaparin (LOVENOX) injection 40 mg     gabapentin (NEURONTIN) solution 300 mg     hydrALAZINE (APRESOLINE) injection 10 mg     hydrochlorothiazide (HYDRODIURIL) tablet 25 mg     HYDROmorphone (PF) (DILAUDID) injection 0.3-0.5 mg     hypromellose-dextran (ARTIFICAL TEARS) 0.1-0.3 % ophthalmic solution 2-3 drop     insulin aspart (NovoLOG) inj (RAPID ACTING)     insulin glargine (LANTUS) injection 20 Units     lidocaine (LMX4) cream     lidocaine 1 % 1 mL     LORazepam (ATIVAN) tablet 1-2 mg    Or     LORazepam (ATIVAN) injection 1-2 mg     magnesium sulfate 2 g in water intermittent infusion     magnesium sulfate 4 g in 100 mL sterile water (premade)     melatonin tablet 3 mg     metoprolol (LOPRESSOR) suspension 50 mg     miconazole (MICATIN) 2 % cream     miconazole (MICATIN; MICRO GUARD) 2 % powder     multivitamins with minerals (CERTAVITE/CEROVITE) liquid 15 mL     naloxone (NARCAN) injection 0.1-0.4 mg     ondansetron (ZOFRAN-ODT) ODT tab 4 mg    Or     ondansetron (ZOFRAN)  injection 4 mg     oxyCODONE IR (ROXICODONE) tablet 5-10 mg     pantoprazole (PROTONIX) 2 mg/mL suspension 40 mg     piperacillin-tazobactam (ZOSYN) 4.5 g vial to attach to  mL bag     polyethylene glycol (MIRALAX/GLYCOLAX) Packet 17 g     polyethylene glycol (MIRALAX/GLYCOLAX) Packet 17 g     potassium chloride (KLOR-CON) Packet 20-40 mEq     potassium chloride 10 mEq in 100 mL intermittent infusion with 10 mg lidocaine     potassium chloride 10 mEq in 100 mL sterile water intermittent infusion (premix)     potassium chloride 20 mEq in 50 mL intermittent infusion     potassium chloride SA (K-DUR/KLOR-CON M) CR tablet 20-40 mEq     propofol (DIPRIVAN) infusion     propofol (DIPRIVAN) infusion     protein modular (PROSource TF) 1 packet     QUEtiapine (SEROquel) tablet 100 mg     senna-docusate (SENOKOT-S;PERICOLACE) 8.6-50 MG per tablet 1 tablet    Or     senna-docusate (SENOKOT-S;PERICOLACE) 8.6-50 MG per tablet 2 tablet     sennosides (SENOKOT) syrup 10 mL     sodium chloride (PF) 0.9% PF flush 10 mL     sodium chloride (PF) 0.9% PF flush 10-20 mL     sodium chloride (PF) 0.9% PF flush 3 mL     sodium chloride (PF) 0.9% PF flush 3 mL     sodium chloride 0.9% infusion     valproate (DEPACON) 500 mg in sodium chloride 0.9 % 50 mL intermittent infusion     vancomycin (VANCOCIN) 1,500 mg in sodium chloride 0.9 % 250 mL intermittent infusion         Physical examination:  Vital signs:  Temp: 99.7  F (37.6  C) Temp src: Oral BP: 127/69   Heart Rate: 85 Resp: 14 SpO2: 96 % O2 Device: Mechanical Ventilator      General: intubated, sedated.  NAD  HEENT: neck supple, symmetrical  Lungs: Diminished breath sounds bilaterally. Low pitched wheezes.  CVS: Normal S1 & S2, no murmur  Abdomen: Bowel sounds present, soft, non tender  Extremities/musculoskeletal: 2+ LE edema  Skin: no rash  Exam of Line sites: No erythema or discharge.  Neuro: moves all 4 extremities      Intake/Output Summary (Last 24 hours) at 07/01/18  1159  Last data filed at 07/01/18 1000   Gross per 24 hour   Intake           4078.7 ml   Output             4585 ml   Net           -506.3 ml       Data:    ROUTINE ICU LABS (Last four results)  CMP    Recent Labs  Lab 07/05/18  0430 07/04/18  0608 07/03/18  0545 07/02/18  0615 07/01/18  0615    143 142 142 144   POTASSIUM 3.5 3.5 3.8 3.8 3.6   CHLORIDE 105 106 105 107 109   CO2 30 28 28 29 27   ANIONGAP 7 9 9 6 8   * 233* 264* 251* 201*   BUN 16 17 20 15 8   CR 0.60* 0.60* 0.66 0.66 0.65*   GFRESTIMATED >90 >90 >90 >90 >90   GFRESTBLACK >90 >90 >90 >90 >90   DEMETRIA 8.0* 8.0* 8.1* 8.6 8.5   MAG 1.9 2.0 1.9 1.9 1.9   PHOS 2.7 2.3* 3.4 1.9* 2.2*   PROTTOTAL 6.3* 7.0 6.7*  --  7.0   ALBUMIN 2.1* 2.2* 2.2*  --  2.5*   BILITOTAL 0.7 1.0 0.9  --  1.7*   ALKPHOS 65 66 68  --  81   AST 73* 79* 98*  --  61*   ALT 51 53 49  --  47     CBC    Recent Labs  Lab 07/05/18  0715 07/05/18  0610 07/05/18  0430 07/04/18  0608   WBC 8.4 Canceled, Test credited Canceled, Test credited 8.5   RBC 3.62* Canceled, Test credited Canceled, Test credited 3.70*   HGB 12.6* Canceled, Test credited Canceled, Test credited 12.8*   HCT 37.5* Canceled, Test credited Canceled, Test credited 38.7*   * Canceled, Test credited Canceled, Test credited 105*   MCH 34.8* Canceled, Test credited Canceled, Test credited 34.6*   MCHC 33.6 Canceled, Test credited Canceled, Test credited 33.1   RDW 13.2 Canceled, Test credited Canceled, Test credited 13.3    Canceled, Test credited Canceled, Test credited 205     INRNo lab results found in last 7 days.  Arterial Blood Gas  No lab results found in last 7 days.    Recent Results (from the past 24 hour(s))   XR Chest Port 1 View    Narrative    XR CHEST PORT 1 VW  7/5/2018 6:00 AM     HISTORY:  Follow infiltrate;     COMPARISON: Film dated 7/4/2018    FINDINGS:  ET tube and feeding tubes are in place. Right PICC line is  in place. The tip is near the junction of the superior vena cava  and  right atrium. No significant change in the bilateral infiltrates.      Impression    IMPRESSION: No significant change in the bilateral infiltrate. Right  PICC line is now in place.    MANASA FRYE MD         Assessment and Plan:    Neuro  ### Acute EtOH withdrawal Syndrome  ### Sedation/Analgesia  ### Meniere's disease  -Propofol gtt -- agitated when sedation lightened -- will attempt to switch to precedex again  -Start Fentanyl gtt to augment propofol  -Discontinue Valproate + Gabapentin  -APAP and Dilaudid prns available  -Restart PTA meclizine once extubated and able to take PO  -Head CT 6/30 w/ moderate cerebral atrophy but no other acute processes  -Seroquel 100 mg BID today; EKG repeated, QTc ok    Respiratory  ### Acute Hypoxic Respiratory failure  ### Ventilator Associated Pneumonia -- CXR w/ LLL infiltrate, + sputum cultures => staph aureus  -intubated for airway protection  -Daily SBTs  -Broad Spectrum antibiotics as below  -Bronched 7/3 w/ mucous plug removal    CV:  ### HTN  ### Edema -- + 14L since admission  - cont. HCTZ 25 mg daily, Norvasc 2.5 mg daily, and Metoprolol 50 mg BID  -Lasix 20 mg IVP x1; will increase if no response -- goal net negative 1-2L    GI/Liver:  ### EtOH Pancreatitis -- resolved  ### Fatty Liver Disease, likely 2/2 EtOH  -Lipase peaked at 903, is now normal; stop trending  -continuous IVF stopped  -Abdominal US 6/25 showing fatty liver    ID:  ### VAP -- LLL Infiltrate on CXR 7/2, + sputum cultures => staph aureus  ### Low grade fevers (99F)  -f/u sputum culture, currently growing staph aureus  -Blood and sputum from 7/3 NGTD; follow  -Procal 0.57 on 7/2    Heme:  ### Thrombocytopenia - resolved  ### Anemia -- mild; likely iatrogenic from blood draws  -monitor; transfuse for hgb < 7    Endocrine:  ### Hyperglycemia -- glucose 180-200  -START insulin gtt  -DC Lantus and SSI    FEN:  -Cont. multi-vitamin; d/c folate, has completed > 5d  -continue tube feeds -- cont free water  @ 100 cc q4h, Na 142  -monitor lytes, replete per unit protocol    Prophy:  GI: PPI  DVT: SCDs; LMWH    ICU Cares:  Restrain needed: Yes   Lines needed: Yes    Code: Full  Dispo: ICU status    Family: Updated brother and ROMÁN 7/4    Billing: Critical care time 40 min excluding procedure time     Kadie Ramirez MD  Pulmonary and Critical Care Medicine

## 2018-07-05 NOTE — PHARMACY-VANCOMYCIN DOSING SERVICE
Pharmacy Vancomycin Note  Date of Service 2018  Patient's  1965   52 year old, male    Indication: Ventilator-Associated Pneumonia  Goal Trough Level: 15-20 mg/L  Day of Therapy: 3  Current Vancomycin regimen:  1250 mg IV q8h    Current estimated CrCl = Estimated Creatinine Clearance: 171.7 mL/min (based on Cr of 0.6).    Creatinine for last 3 days  2018:  6:15 AM Creatinine 0.66 mg/dL  7/3/2018:  5:45 AM Creatinine 0.66 mg/dL  2018:  6:08 AM Creatinine 0.60 mg/dL  2018:  4:30 AM Creatinine 0.60 mg/dL    Recent Vancomycin Levels (past 3 days)  2018:  4:30 AM Vancomycin Level 12.7 mg/L    Vancomycin IV Administrations (past 72 hours)                   vancomycin (VANCOCIN) 1,250 mg in sodium chloride 0.9 % 250 mL intermittent infusion (mg) 1,250 mg New Bag 18 2227     1,250 mg New Bag  1400     1,250 mg New Bag  0614     1,250 mg New Bag 18 2208     1,250 mg New Bag  1456     1,250 mg New Bag  0657                Nephrotoxins and other renal medications (Future)    Start     Dose/Rate Route Frequency Ordered Stop    18 0530  vancomycin (VANCOCIN) 1,500 mg in sodium chloride 0.9 % 250 mL intermittent infusion      1,500 mg  over 90 Minutes Intravenous EVERY 8 HOURS 18 0523      18 0730  piperacillin-tazobactam (ZOSYN) 4.5 g vial to attach to  mL bag      4.5 g  over 30 Minutes Intravenous EVERY 6 HOURS 18 0719               Contrast Orders - past 72 hours     None          Interpretation of levels and current regimen:  Trough level is  Subtherapeutic    Has serum creatinine changed > 50% in last 72 hours: No    Urine output:      Renal Function: Stable    Plan:  1.  Increase Dose to 1500mg q8h  2.  Pharmacy will check trough levels as appropriate in 1-3 Days.    3. Serum creatinine levels will be ordered daily for the first week of therapy and at least twice weekly for subsequent weeks.      Garry Downing        .

## 2018-07-05 NOTE — PROGRESS NOTES
[facility] ICU RESPIRATORY NOTE  Date of Admission: 6/25/2018  Date of Intubation (most recent): 6/27/2018  Reason for Mechanical Ventilation: AW protection  Number of Days on Mechanical Ventilation: 9  Met Criteria for Pressure Support Trial: Yes  Length of Pressure Support Trial: 20 Min  Reason for Stopping Pressure Support Trial: Increase HR and RR  Reason for No Pressure Support Trial:    Significant Events Today:  Patient was put on pressure support trial 5/5 40% for 20 Minutes. He was put back to full vent support because of increase HR and RR.     ABG Results:  No lab results found in last 7 days.    ETT appearance on chest x-ray:  Ventilation Mode: CMV/AC  (Continuous Mandatory Ventilation/ Assist Control)  FiO2 (%): 40 %  Rate Set (breaths/minute): 14 breaths/min  Tidal Volume Set (mL): 550 mL  PEEP (cm H2O): 5 cmH2O  Pressure Support (cm H2O): 5 cmH2O  Oxygen Concentration (%): 50 %  Resp: 14  Plan:    Pt remains in full vent support.

## 2018-07-06 ENCOUNTER — APPOINTMENT (OUTPATIENT)
Dept: GENERAL RADIOLOGY | Facility: CLINIC | Age: 53
DRG: 166 | End: 2018-07-06
Attending: INTERNAL MEDICINE
Payer: COMMERCIAL

## 2018-07-06 LAB
ALBUMIN SERPL-MCNC: 2.1 G/DL (ref 3.4–5)
ALP SERPL-CCNC: 69 U/L (ref 40–150)
ALT SERPL W P-5'-P-CCNC: 58 U/L (ref 0–70)
ANION GAP SERPL CALCULATED.3IONS-SCNC: 9 MMOL/L (ref 3–14)
AST SERPL W P-5'-P-CCNC: 76 U/L (ref 0–45)
BACTERIA SPEC CULT: ABNORMAL
BACTERIA SPEC CULT: ABNORMAL
BASOPHILS # BLD AUTO: 0.2 10E9/L (ref 0–0.2)
BASOPHILS NFR BLD AUTO: 1.9 %
BILIRUB SERPL-MCNC: 0.7 MG/DL (ref 0.2–1.3)
BUN SERPL-MCNC: 15 MG/DL (ref 7–30)
CALCIUM SERPL-MCNC: 8.1 MG/DL (ref 8.5–10.1)
CHLORIDE SERPL-SCNC: 106 MMOL/L (ref 94–109)
CO2 SERPL-SCNC: 29 MMOL/L (ref 20–32)
CREAT SERPL-MCNC: 0.52 MG/DL (ref 0.66–1.25)
DIFFERENTIAL METHOD BLD: ABNORMAL
EOSINOPHIL # BLD AUTO: 0.4 10E9/L (ref 0–0.7)
EOSINOPHIL NFR BLD AUTO: 4.7 %
ERYTHROCYTE [DISTWIDTH] IN BLOOD BY AUTOMATED COUNT: 12.9 % (ref 10–15)
GFR SERPL CREATININE-BSD FRML MDRD: >90 ML/MIN/1.7M2
GLUCOSE BLDC GLUCOMTR-MCNC: 122 MG/DL (ref 70–99)
GLUCOSE BLDC GLUCOMTR-MCNC: 125 MG/DL (ref 70–99)
GLUCOSE BLDC GLUCOMTR-MCNC: 128 MG/DL (ref 70–99)
GLUCOSE BLDC GLUCOMTR-MCNC: 146 MG/DL (ref 70–99)
GLUCOSE BLDC GLUCOMTR-MCNC: 148 MG/DL (ref 70–99)
GLUCOSE BLDC GLUCOMTR-MCNC: 153 MG/DL (ref 70–99)
GLUCOSE BLDC GLUCOMTR-MCNC: 155 MG/DL (ref 70–99)
GLUCOSE BLDC GLUCOMTR-MCNC: 155 MG/DL (ref 70–99)
GLUCOSE BLDC GLUCOMTR-MCNC: 158 MG/DL (ref 70–99)
GLUCOSE BLDC GLUCOMTR-MCNC: 160 MG/DL (ref 70–99)
GLUCOSE BLDC GLUCOMTR-MCNC: 160 MG/DL (ref 70–99)
GLUCOSE BLDC GLUCOMTR-MCNC: 165 MG/DL (ref 70–99)
GLUCOSE BLDC GLUCOMTR-MCNC: 167 MG/DL (ref 70–99)
GLUCOSE BLDC GLUCOMTR-MCNC: 168 MG/DL (ref 70–99)
GLUCOSE BLDC GLUCOMTR-MCNC: 170 MG/DL (ref 70–99)
GLUCOSE BLDC GLUCOMTR-MCNC: 172 MG/DL (ref 70–99)
GLUCOSE BLDC GLUCOMTR-MCNC: 173 MG/DL (ref 70–99)
GLUCOSE SERPL-MCNC: 166 MG/DL (ref 70–99)
HCT VFR BLD AUTO: 39.6 % (ref 40–53)
HGB BLD-MCNC: 13.5 G/DL (ref 13.3–17.7)
IMM GRANULOCYTES # BLD: 0.3 10E9/L (ref 0–0.4)
IMM GRANULOCYTES NFR BLD: 3.5 %
LYMPHOCYTES # BLD AUTO: 1.9 10E9/L (ref 0.8–5.3)
LYMPHOCYTES NFR BLD AUTO: 22 %
MAGNESIUM SERPL-MCNC: 1.9 MG/DL (ref 1.6–2.3)
MCH RBC QN AUTO: 35 PG (ref 26.5–33)
MCHC RBC AUTO-ENTMCNC: 34.1 G/DL (ref 31.5–36.5)
MCV RBC AUTO: 103 FL (ref 78–100)
MONOCYTES # BLD AUTO: 1.7 10E9/L (ref 0–1.3)
MONOCYTES NFR BLD AUTO: 20.1 %
NEUTROPHILS # BLD AUTO: 4.1 10E9/L (ref 1.6–8.3)
NEUTROPHILS NFR BLD AUTO: 47.8 %
NRBC # BLD AUTO: 0 10*3/UL
NRBC BLD AUTO-RTO: 0 /100
PHOSPHATE SERPL-MCNC: 2.7 MG/DL (ref 2.5–4.5)
PLATELET # BLD AUTO: 262 10E9/L (ref 150–450)
PLATELET # BLD EST: ABNORMAL 10*3/UL
POTASSIUM SERPL-SCNC: 3.4 MMOL/L (ref 3.4–5.3)
PROT SERPL-MCNC: 7 G/DL (ref 6.8–8.8)
RBC # BLD AUTO: 3.86 10E12/L (ref 4.4–5.9)
RBC MORPH BLD: NORMAL
SODIUM SERPL-SCNC: 144 MMOL/L (ref 133–144)
SPECIMEN SOURCE: ABNORMAL
WBC # BLD AUTO: 8.5 10E9/L (ref 4–11)

## 2018-07-06 PROCEDURE — 25000132 ZZH RX MED GY IP 250 OP 250 PS 637: Performed by: INTERNAL MEDICINE

## 2018-07-06 PROCEDURE — 25000131 ZZH RX MED GY IP 250 OP 636 PS 637

## 2018-07-06 PROCEDURE — 99291 CRITICAL CARE FIRST HOUR: CPT | Performed by: INTERNAL MEDICINE

## 2018-07-06 PROCEDURE — 25000128 H RX IP 250 OP 636: Performed by: SURGERY

## 2018-07-06 PROCEDURE — 25000128 H RX IP 250 OP 636: Performed by: HOSPITALIST

## 2018-07-06 PROCEDURE — 25000125 ZZHC RX 250: Performed by: INTERNAL MEDICINE

## 2018-07-06 PROCEDURE — 80053 COMPREHEN METABOLIC PANEL: CPT | Performed by: HOSPITALIST

## 2018-07-06 PROCEDURE — 83735 ASSAY OF MAGNESIUM: CPT | Performed by: HOSPITALIST

## 2018-07-06 PROCEDURE — 25000128 H RX IP 250 OP 636: Performed by: INTERNAL MEDICINE

## 2018-07-06 PROCEDURE — 94003 VENT MGMT INPAT SUBQ DAY: CPT

## 2018-07-06 PROCEDURE — 40000239 ZZH STATISTIC VAT ROUNDS

## 2018-07-06 PROCEDURE — 20000003 ZZH R&B ICU

## 2018-07-06 PROCEDURE — G0463 HOSPITAL OUTPT CLINIC VISIT: HCPCS

## 2018-07-06 PROCEDURE — 00000146 ZZHCL STATISTIC GLUCOSE BY METER IP

## 2018-07-06 PROCEDURE — 85025 COMPLETE CBC W/AUTO DIFF WBC: CPT | Performed by: HOSPITALIST

## 2018-07-06 PROCEDURE — 71045 X-RAY EXAM CHEST 1 VIEW: CPT

## 2018-07-06 PROCEDURE — 25000131 ZZH RX MED GY IP 250 OP 636 PS 637: Performed by: INTERNAL MEDICINE

## 2018-07-06 PROCEDURE — 40000275 ZZH STATISTIC RCP TIME EA 10 MIN

## 2018-07-06 PROCEDURE — 25000132 ZZH RX MED GY IP 250 OP 250 PS 637: Performed by: HOSPITALIST

## 2018-07-06 PROCEDURE — 27210437 ZZH NUTRITION PRODUCT SEMIELEM INTERMED LITER

## 2018-07-06 PROCEDURE — 25000132 ZZH RX MED GY IP 250 OP 250 PS 637: Performed by: NURSE PRACTITIONER

## 2018-07-06 PROCEDURE — 84100 ASSAY OF PHOSPHORUS: CPT | Performed by: HOSPITALIST

## 2018-07-06 RX ORDER — LABETALOL HYDROCHLORIDE 5 MG/ML
INJECTION, SOLUTION INTRAVENOUS
Status: COMPLETED
Start: 2018-07-06 | End: 2018-07-06

## 2018-07-06 RX ORDER — HYDRALAZINE HYDROCHLORIDE 20 MG/ML
10 INJECTION INTRAMUSCULAR; INTRAVENOUS EVERY 6 HOURS PRN
Status: DISCONTINUED | OUTPATIENT
Start: 2018-07-06 | End: 2018-07-21 | Stop reason: HOSPADM

## 2018-07-06 RX ORDER — LABETALOL HYDROCHLORIDE 5 MG/ML
10 INJECTION, SOLUTION INTRAVENOUS ONCE
Status: COMPLETED | OUTPATIENT
Start: 2018-07-06 | End: 2018-07-06

## 2018-07-06 RX ORDER — FUROSEMIDE 10 MG/ML
20 INJECTION INTRAMUSCULAR; INTRAVENOUS ONCE
Status: COMPLETED | OUTPATIENT
Start: 2018-07-06 | End: 2018-07-06

## 2018-07-06 RX ADMIN — Medication 0.5 MG: at 01:45

## 2018-07-06 RX ADMIN — DEXMEDETOMIDINE HYDROCHLORIDE 1.2 MCG/KG/HR: 100 INJECTION, SOLUTION INTRAVENOUS at 08:40

## 2018-07-06 RX ADMIN — CHLORHEXIDINE GLUCONATE 15 ML: 1.2 RINSE ORAL at 08:17

## 2018-07-06 RX ADMIN — Medication 40 MG: at 20:54

## 2018-07-06 RX ADMIN — SODIUM CHLORIDE 6 UNITS/HR: 9 INJECTION, SOLUTION INTRAVENOUS at 11:41

## 2018-07-06 RX ADMIN — QUETIAPINE FUMARATE 100 MG: 50 TABLET ORAL at 20:54

## 2018-07-06 RX ADMIN — OXYCODONE HYDROCHLORIDE 10 MG: 5 TABLET ORAL at 19:44

## 2018-07-06 RX ADMIN — OXYCODONE HYDROCHLORIDE 10 MG: 5 TABLET ORAL at 14:30

## 2018-07-06 RX ADMIN — SODIUM CHLORIDE, PRESERVATIVE FREE: 5 INJECTION INTRAVENOUS at 21:58

## 2018-07-06 RX ADMIN — DEXMEDETOMIDINE HYDROCHLORIDE 1.2 MCG/KG/HR: 100 INJECTION, SOLUTION INTRAVENOUS at 19:49

## 2018-07-06 RX ADMIN — PIPERACILLIN SODIUM,TAZOBACTAM SODIUM 4.5 G: 4; .5 INJECTION, POWDER, FOR SOLUTION INTRAVENOUS at 08:16

## 2018-07-06 RX ADMIN — DEXMEDETOMIDINE HYDROCHLORIDE 1.2 MCG/KG/HR: 100 INJECTION, SOLUTION INTRAVENOUS at 16:26

## 2018-07-06 RX ADMIN — QUETIAPINE FUMARATE 100 MG: 50 TABLET ORAL at 08:16

## 2018-07-06 RX ADMIN — MICONAZOLE NITRATE: 2 POWDER TOPICAL at 08:17

## 2018-07-06 RX ADMIN — LABETALOL HYDROCHLORIDE 10 MG: 5 INJECTION, SOLUTION INTRAVENOUS at 04:17

## 2018-07-06 RX ADMIN — HYDRALAZINE HYDROCHLORIDE 10 MG: 20 INJECTION INTRAMUSCULAR; INTRAVENOUS at 05:34

## 2018-07-06 RX ADMIN — CHLORHEXIDINE GLUCONATE 15 ML: 1.2 RINSE ORAL at 20:48

## 2018-07-06 RX ADMIN — MIDAZOLAM HYDROCHLORIDE 2 MG: 1 INJECTION, SOLUTION INTRAMUSCULAR; INTRAVENOUS at 16:47

## 2018-07-06 RX ADMIN — DEXMEDETOMIDINE HYDROCHLORIDE 1.2 MCG/KG/HR: 100 INJECTION, SOLUTION INTRAVENOUS at 12:17

## 2018-07-06 RX ADMIN — SENNOSIDES A AND B 10 ML: 415.36 LIQUID ORAL at 20:54

## 2018-07-06 RX ADMIN — Medication 1 PACKET: at 08:17

## 2018-07-06 RX ADMIN — VANCOMYCIN HYDROCHLORIDE 1500 MG: 5 INJECTION, POWDER, LYOPHILIZED, FOR SOLUTION INTRAVENOUS at 05:32

## 2018-07-06 RX ADMIN — Medication 15 ML: at 08:16

## 2018-07-06 RX ADMIN — DEXMEDETOMIDINE HYDROCHLORIDE 1.2 MCG/KG/HR: 100 INJECTION, SOLUTION INTRAVENOUS at 23:03

## 2018-07-06 RX ADMIN — PIPERACILLIN SODIUM,TAZOBACTAM SODIUM 4.5 G: 4; .5 INJECTION, POWDER, FOR SOLUTION INTRAVENOUS at 14:30

## 2018-07-06 RX ADMIN — MIDAZOLAM HYDROCHLORIDE 2 MG: 1 INJECTION, SOLUTION INTRAMUSCULAR; INTRAVENOUS at 04:34

## 2018-07-06 RX ADMIN — Medication 1 PACKET: at 20:55

## 2018-07-06 RX ADMIN — PROPOFOL 5 MCG/KG/MIN: 10 INJECTION, EMULSION INTRAVENOUS at 02:03

## 2018-07-06 RX ADMIN — SODIUM CHLORIDE 4 UNITS/HR: 9 INJECTION, SOLUTION INTRAVENOUS at 19:01

## 2018-07-06 RX ADMIN — FUROSEMIDE 20 MG: 10 INJECTION, SOLUTION INTRAVENOUS at 12:17

## 2018-07-06 RX ADMIN — AMLODIPINE BESYLATE 2.5 MG: 2.5 TABLET ORAL at 08:16

## 2018-07-06 RX ADMIN — PIPERACILLIN SODIUM,TAZOBACTAM SODIUM 4.5 G: 4; .5 INJECTION, POWDER, FOR SOLUTION INTRAVENOUS at 20:01

## 2018-07-06 RX ADMIN — MICONAZOLE NITRATE: 20 CREAM TOPICAL at 08:18

## 2018-07-06 RX ADMIN — MICONAZOLE NITRATE: 20 CREAM TOPICAL at 20:55

## 2018-07-06 RX ADMIN — Medication 0.5 MG: at 06:22

## 2018-07-06 RX ADMIN — DEXMEDETOMIDINE HYDROCHLORIDE 1.2 MCG/KG/HR: 100 INJECTION, SOLUTION INTRAVENOUS at 05:31

## 2018-07-06 RX ADMIN — OXYCODONE HYDROCHLORIDE 10 MG: 5 TABLET ORAL at 11:10

## 2018-07-06 RX ADMIN — PROPOFOL 20 MCG/KG/MIN: 10 INJECTION, EMULSION INTRAVENOUS at 23:03

## 2018-07-06 RX ADMIN — DOCUSATE SODIUM 100 MG: 50 LIQUID ORAL at 08:17

## 2018-07-06 RX ADMIN — METOPROLOL TARTRATE 50 MG: 100 TABLET ORAL at 08:32

## 2018-07-06 RX ADMIN — SODIUM CHLORIDE 4 UNITS/HR: 9 INJECTION, SOLUTION INTRAVENOUS at 03:39

## 2018-07-06 RX ADMIN — ENOXAPARIN SODIUM 40 MG: 40 INJECTION SUBCUTANEOUS at 12:17

## 2018-07-06 RX ADMIN — METOPROLOL TARTRATE 50 MG: 100 TABLET ORAL at 21:10

## 2018-07-06 RX ADMIN — DOCUSATE SODIUM 100 MG: 50 LIQUID ORAL at 20:54

## 2018-07-06 RX ADMIN — DEXMEDETOMIDINE HYDROCHLORIDE 1.2 MCG/KG/HR: 100 INJECTION, SOLUTION INTRAVENOUS at 01:57

## 2018-07-06 RX ADMIN — PIPERACILLIN SODIUM,TAZOBACTAM SODIUM 4.5 G: 4; .5 INJECTION, POWDER, FOR SOLUTION INTRAVENOUS at 01:08

## 2018-07-06 RX ADMIN — HYDROCHLOROTHIAZIDE 25 MG: 25 TABLET ORAL at 08:16

## 2018-07-06 RX ADMIN — PROPOFOL 10 MCG/KG/MIN: 10 INJECTION, EMULSION INTRAVENOUS at 13:04

## 2018-07-06 RX ADMIN — Medication 40 MG: at 08:16

## 2018-07-06 RX ADMIN — SENNOSIDES A AND B 10 ML: 415.36 LIQUID ORAL at 08:16

## 2018-07-06 NOTE — PROGRESS NOTES
[facility] ICU RESPIRATORY NOTE  Date of Admission: 6/25/2018  Date of Intubation (most recent): 6/27/2018  Reason for Mechanical Ventilation: Airway protection  Number of Days on Mechanical Ventilation: 10  Met Criteria for Pressure Support Trial: yes  Length of Pressure Support Trial: 5 hours  Reason for Stopping Pressure Support Trial:   Reason for No Pressure Support Trial:    Significant Events Today: SBT for 5 hours.    Ventilation Mode: CMV/AC  (Continuous Mandatory Ventilation/ Assist Control)  FiO2 (%): 40 %  Rate Set (breaths/minute): 14 breaths/min  Tidal Volume Set (mL): 550 mL  PEEP (cm H2O): 5 cmH2O  Pressure Support (cm H2O): 5 cmH2O  Oxygen Concentration (%): 40 %  Resp: 23    ETT appearance on chest x-ray: In good position    Plan:  Continue full ventilatory support overnight. Will continue to follow and wean as tolerated.    Mansoor JONES

## 2018-07-06 NOTE — PLAN OF CARE
Problem: Patient Care Overview  Goal: Plan of Care/Patient Progress Review  Outcome: Improving  RASS +1/-2. Propofol and dex running. Intermittently follows commands. Tolerated PS for 4 hours. Agitated, continually trying to get out of bed. Bilateral wrist restraints remain. Pain meds received. Redmond in place. Rectal tube in place. Continue tube feed @ goal of 50/hr. Plan to extubate tomorrow if able to better follow commands. Family at the bedside, updated on POC. Will continue to monitor.

## 2018-07-06 NOTE — PROVIDER NOTIFICATION
MD Notification    Notified Person: MD    Notified Person Name: Dr. Machuca    Notification Date/Time: 7/6 0345    Notification Interaction: Talked with MD.    Purpose of Notification: Pt remains hypertensive after prn hydralazine.    Orders Received: 10mg labetalol x 1.    Comments:

## 2018-07-06 NOTE — PROGRESS NOTES
CLINICAL NUTRITION SERVICES - REASSESSMENT NOTE      Future/Additional Recommendations:   Will hold on extra micronutrient supplementation (Vit A, Vit C, Zinc) but may consider if wounds not healing and suspect deficiency.    Malnutrition:  (6/27)  % Weight Loss:  None noted  % Intake: <75% for > 7 days (non-severe malnutrition)  Subcutaneous Fat Loss:  None observed  Muscle Loss:  None observed  Fluid Retention:  Mild 2+ LE (likely not nutrition related)      Malnutrition Diagnosis: Patient does not meet two of the above criteria diagnosing for malnutrition        EVALUATION OF PROGRESS TOWARD GOALS   Diet:  NPO on vent    Nutrition Support:  TF continues at 50 mL/hr as below:    Nutrition Support Enteral:  Type of Feeding Tube:  Nasoduodenal  Enteral Frequency:  Continuous  Enteral Regimen:  Peptamen 1.5 at 50 mL/hr  Total Enteral Provisions:  1800 kcal, 82 g protein, 226 g CHO, no fiber, 924 mL H2O, 5280 International Units Vit A, 216 mg Vit C, 26 mg Zinc.  Free Water Flush: 100 mL every 4 hrs    Propofol had been running 9.3 mL/hr earlier today, but off this pm.    ProSource 1 pkt BID= 80 kcal and 22 g protein  Total (TF + ProSource):  1880 kcals (24 kcal/kg and 96% energy needs), 104 gm pro (1.3 gm/kg).    Intake/Tolerance:    Stool:  Pt started stooling on 7/3 after tending towards constipation.  100 mL yesterday.  On Colace for bowel program.  Labs today noted and acceptable.  BGM:  127-189 range - Pt on Insulin drip.  Wt:  104 kg (up 1.5 kg since admit).  PT with 2+ mild dependent edema.    ASSESSED NUTRITION NEEDS: (Modified protein slightly higher d/t PI)  Dosing Weight (78 kg adjusted for overwt)       Estimated Energy Needs: 6277-4701 kcals (25-30 Kcal/Kg)  Justification: obese and vented  Estimated Protein Needs: 101-117 grams protein (1.3-1.5 g pro/Kg)  Justification: stress and wound healing      NEW FINDINGS:   7/4:  Bronched --> large mucous plug extracted    7/6:  WOCN - Pressure Injury (PI)  located on right buttock: scattered Stage 2's, with likely friction component as well, as pt with dense heavy tissue and does not slide well in bed.  Wounds very superficial at this point but pt at risk for further breakdown.    - Pt receiving Certavite daily via FT    Previous Goals (7/2):   TF + ProSource + Propofol will meet % needs  Evaluation: Met    Patient will stool once every 24-48 hours   Evaluation: Met    Previous Nutrition Diagnosis (7/2):   Excessive energy intake related to TF at 60 mL/hr, now on Propofol as evidenced by meeting 115% energy needs  Evaluation:  Completed      CURRENT NUTRITION DIAGNOSIS  No nutrition diagnosis identified at this time     INTERVENTIONS  Recommendations / Nutrition Prescription  Continue TF as above.  Continue Certavite daily    Will hold on extra micronutrient supplementation (Vit A, Vit C, Zinc) but may consider if wounds not healing and suspect deficiency.       Implementation  Collaboration and Referral of Nutrition care - Pt was discussed during ICU interdisciplinary rounds this morning.    Goals  TF + ProSource will continue to meet % estimated needs.      MONITORING AND EVALUATION:  Progress towards goals will be monitored and evaluated per protocol and Practice Guidelines    Montserrat Solo, RD, LD, CNSC

## 2018-07-06 NOTE — PLAN OF CARE
Problem: Patient Care Overview  Goal: Plan of Care/Patient Progress Review  Outcome: No Change  N: Pt intermittently very agitated. Prn versed and dilaudid given throughout the shift. Propofol gtt restarted at 0200 d2 continued agitation after prn doses given; sedation paused for lab draw at 0430 and after 2 mins, pt became very agitated and attempting punch RN; more prn versed given and gtts immediately restarted. PERRL. Intermittently follows commands. Moves all extremities strongly.  CV: Prn hydralazine given x 2 for SBP > 150; 1-time dose labetalol given without any improvement in BP. Tele shows SR. 2+ edema present in extremities.  Pulm: Lungs clear, intermittently coarse. Intermittently coughing/gagging on ETT.  GI/: Tolerating TF at goal rate of 50 mL/hr. Flexiseal in place with minimal output. Redmond patent with good UO.  Endocrine: Insulin gtt titrated up to algorithm 3; still checking hourly BGs.  Skin: Pale; small open area on right buttock, cleased with soap and water, new sacral mepilex placed.    Pt's s/o present last evening, updated on plan of care. Will continue to monitor closely.

## 2018-07-06 NOTE — PROGRESS NOTES
[facility] Nurse Inpatient Adult Pressure Injury (PI) Assessment     Initial Assessment of PI(s) on pt's:   Right buttock    Data:   Patient History:      per MD note(s): 52 yom admitted with acute EtOH pancreatitis and EtOH withdrawal, intubated for airway protection in setting of acute withdrawal.       Elton Assessment and sub scores:   Elton Score  Avg: 15  Min: 11  Max: 22    Positioning: Mepilex dressing and Pillows    Mattress:  Standard , Atmos Air mattress       Moisture Management:  Rectal Tube and Urinary Catheter    Catheter secured? Yes      Current Diet / Nutrition:       None    TF    Labs:   Recent Labs   Lab Test  07/06/18   0445   06/30/18   1010   ALBUMIN  2.1*   < >   --    HGB  13.5   < >  14.8   WBC  8.5   < >  8.3   A1C   --    --   6.2*    < > = values in this interval not displayed.                                                                                                                        Pressure Injury Assessment  (location):   Right buttock    Wound History:   Pt has been vented and sedated but now more active and trying to roll around in bed and get out of bed etc.  Pt obese with dense heavy tissue, difficult to reposition.      Wound Base: mix of scuffed up peely tissue and scattered superficial semi-moist pink and red wounds in two general areas.  Upper mid to inner buttock area is approx. 5 x 3cm, superficial depth, and has some horizontal linear lightening pink areas in additional to the scattered shallow open areas.  Lower inner area of buttock is approx. 3 x 1 x 0.1cm, pink dryish tissue.      Palpation of the wound bed:  normal    Slough appearance:  none    Eschar appearance:  none    Periwound Skin: deep fold between buttocks; coccyx and gluteal cleft appear intact    Color: normal and consistent with surrounding tissue    Temperature  normal     Drainage:  Scant serosang         Odor: none    Pain:  Hard to assess, seems minimal          "Intervention:     Patient's chart evaluated.      Elton Interventions:  Current Etlon Interventions and Care Plan reviewed and updated, appropriate at this time.    Wounds assessed, no-sting barrier and new Mepilex applied    Orders  Written    Supplies  reviewed    Discussed plan of care with Nurse           Assessment:     Pressure Injury (PI) located on right buttock: scattered Stage 2's, with likely friction component as well, as pt with dense heavy tissue and does not slide well in bed.  Pt is also thrashing around a bit more in bed now as he gets closer to having ET tube removed.  Minimal moisture issues at present due to rectal tube and catheter.   Wounds very superficial at this point but pt at risk for further breakdown.      Pt would likely benefit from Pulsate air mattress, to reduce friction and pressure.   However pt likely having ET tube removed soon so nursing will need to determine good timing for mattress order.           Plan:     Nursing to notify the Provider(s) and re-consult the [facility] Nurse if wound(s) deteriorate(s)or if the wound care plan needs reevaluation.    If pt is refusing to turn or reposition they must be educated on the  potential injury from not off loading pressure.  Then this \"educated refusal\" needs to be documented as an \"educated refusal to turn/ reposition\" and document if alert, etc.      Plan for wound care to right buttock: every other day and prn:  1.  Cleanse with mild soap and water or MicroKlenz.  2.  Swab entire area including over shallow wounds with Cavilon no-sting skin barrier, let dry.  3.  Apply Mepilex to wounds on right buttock only, do not let dressings 'bridge' across the buttocks/gluteal cleft.  Time/date dressing.  4.  Rigorous PIP measures.     PIP (pressure injury prevention):   - recommend Pulsate air mattress (please no fitted sheets, no cloth chux on air mattress)  - use ceiling lift to boost pt, to minimize friction  - keep HOB as low as tolerated to " reduce shear  - reposition every 1-2 hrs, side to side  - float heels at all times  - thorough skin checks at least BID, including under all devices    WOC Nurse will return: weekly and prn

## 2018-07-06 NOTE — PROGRESS NOTES
[facility] ICU RESPIRATORY NOTE  Date of Admission: 6/25/2018  Date of Intubation (most recent): 6/27/2018  Reason for Mechanical Ventilation: AW protection   Number of Days on Mechanical Ventilation: 10  Met Criteria for Pressure Support Trial: No  Reason for No Pressure Support Trial: Rest overnight     Significant Events Today: None     Ventilation Mode: CMV/AC  (Continuous Mandatory Ventilation/ Assist Control)  FiO2 (%): 40 %  Rate Set (breaths/minute): 14 breaths/min  Tidal Volume Set (mL): 550 mL  PEEP (cm H2O): 5 cmH2O  Pressure Support (cm H2O): 5 cmH2O  Oxygen Concentration (%): 50 %  Resp: 17    ABG Results:  No lab results found in last 7 days.    ETT appearance on chest x-ray: ETT in good position     Plan:  Pt to remain on full vent support.    Kaykay Cavanaugh RT  7/6/2018

## 2018-07-07 ENCOUNTER — APPOINTMENT (OUTPATIENT)
Dept: GENERAL RADIOLOGY | Facility: CLINIC | Age: 53
DRG: 166 | End: 2018-07-07
Attending: INTERNAL MEDICINE
Payer: COMMERCIAL

## 2018-07-07 LAB
ALBUMIN SERPL-MCNC: 2.1 G/DL (ref 3.4–5)
ALP SERPL-CCNC: 70 U/L (ref 40–150)
ALT SERPL W P-5'-P-CCNC: 59 U/L (ref 0–70)
ANION GAP SERPL CALCULATED.3IONS-SCNC: 9 MMOL/L (ref 3–14)
AST SERPL W P-5'-P-CCNC: 75 U/L (ref 0–45)
BASOPHILS # BLD AUTO: 0.2 10E9/L (ref 0–0.2)
BASOPHILS NFR BLD AUTO: 1.9 %
BILIRUB SERPL-MCNC: 0.6 MG/DL (ref 0.2–1.3)
BUN SERPL-MCNC: 14 MG/DL (ref 7–30)
CALCIUM SERPL-MCNC: 8.4 MG/DL (ref 8.5–10.1)
CHLORIDE SERPL-SCNC: 107 MMOL/L (ref 94–109)
CO2 SERPL-SCNC: 29 MMOL/L (ref 20–32)
CREAT SERPL-MCNC: 0.55 MG/DL (ref 0.66–1.25)
DIFFERENTIAL METHOD BLD: ABNORMAL
EOSINOPHIL # BLD AUTO: 0.4 10E9/L (ref 0–0.7)
EOSINOPHIL NFR BLD AUTO: 4.5 %
ERYTHROCYTE [DISTWIDTH] IN BLOOD BY AUTOMATED COUNT: 12.9 % (ref 10–15)
GFR SERPL CREATININE-BSD FRML MDRD: >90 ML/MIN/1.7M2
GLUCOSE BLDC GLUCOMTR-MCNC: 102 MG/DL (ref 70–99)
GLUCOSE BLDC GLUCOMTR-MCNC: 110 MG/DL (ref 70–99)
GLUCOSE BLDC GLUCOMTR-MCNC: 116 MG/DL (ref 70–99)
GLUCOSE BLDC GLUCOMTR-MCNC: 125 MG/DL (ref 70–99)
GLUCOSE BLDC GLUCOMTR-MCNC: 131 MG/DL (ref 70–99)
GLUCOSE BLDC GLUCOMTR-MCNC: 131 MG/DL (ref 70–99)
GLUCOSE BLDC GLUCOMTR-MCNC: 132 MG/DL (ref 70–99)
GLUCOSE BLDC GLUCOMTR-MCNC: 136 MG/DL (ref 70–99)
GLUCOSE BLDC GLUCOMTR-MCNC: 139 MG/DL (ref 70–99)
GLUCOSE BLDC GLUCOMTR-MCNC: 145 MG/DL (ref 70–99)
GLUCOSE BLDC GLUCOMTR-MCNC: 154 MG/DL (ref 70–99)
GLUCOSE BLDC GLUCOMTR-MCNC: 155 MG/DL (ref 70–99)
GLUCOSE BLDC GLUCOMTR-MCNC: 163 MG/DL (ref 70–99)
GLUCOSE BLDC GLUCOMTR-MCNC: 170 MG/DL (ref 70–99)
GLUCOSE BLDC GLUCOMTR-MCNC: 173 MG/DL (ref 70–99)
GLUCOSE BLDC GLUCOMTR-MCNC: 88 MG/DL (ref 70–99)
GLUCOSE BLDC GLUCOMTR-MCNC: 91 MG/DL (ref 70–99)
GLUCOSE SERPL-MCNC: 138 MG/DL (ref 70–99)
HCT VFR BLD AUTO: 38.7 % (ref 40–53)
HGB BLD-MCNC: 13 G/DL (ref 13.3–17.7)
IMM GRANULOCYTES # BLD: 0.3 10E9/L (ref 0–0.4)
IMM GRANULOCYTES NFR BLD: 3.6 %
LYMPHOCYTES # BLD AUTO: 2.1 10E9/L (ref 0.8–5.3)
LYMPHOCYTES NFR BLD AUTO: 27 %
MAGNESIUM SERPL-MCNC: 1.9 MG/DL (ref 1.6–2.3)
MCH RBC QN AUTO: 34.8 PG (ref 26.5–33)
MCHC RBC AUTO-ENTMCNC: 33.6 G/DL (ref 31.5–36.5)
MCV RBC AUTO: 104 FL (ref 78–100)
MONOCYTES # BLD AUTO: 1.5 10E9/L (ref 0–1.3)
MONOCYTES NFR BLD AUTO: 18.7 %
NEUTROPHILS # BLD AUTO: 3.5 10E9/L (ref 1.6–8.3)
NEUTROPHILS NFR BLD AUTO: 44.3 %
NRBC # BLD AUTO: 0 10*3/UL
NRBC BLD AUTO-RTO: 0 /100
PHOSPHATE SERPL-MCNC: 3.7 MG/DL (ref 2.5–4.5)
PLATELET # BLD AUTO: 356 10E9/L (ref 150–450)
POTASSIUM SERPL-SCNC: 3.5 MMOL/L (ref 3.4–5.3)
PROT SERPL-MCNC: 7.1 G/DL (ref 6.8–8.8)
RBC # BLD AUTO: 3.74 10E12/L (ref 4.4–5.9)
SODIUM SERPL-SCNC: 145 MMOL/L (ref 133–144)
WBC # BLD AUTO: 7.8 10E9/L (ref 4–11)

## 2018-07-07 PROCEDURE — 25000132 ZZH RX MED GY IP 250 OP 250 PS 637: Performed by: INTERNAL MEDICINE

## 2018-07-07 PROCEDURE — 85025 COMPLETE CBC W/AUTO DIFF WBC: CPT | Performed by: HOSPITALIST

## 2018-07-07 PROCEDURE — 25000128 H RX IP 250 OP 636: Performed by: ANESTHESIOLOGY

## 2018-07-07 PROCEDURE — 25000125 ZZHC RX 250: Performed by: INTERNAL MEDICINE

## 2018-07-07 PROCEDURE — 25000132 ZZH RX MED GY IP 250 OP 250 PS 637: Performed by: HOSPITALIST

## 2018-07-07 PROCEDURE — 40000275 ZZH STATISTIC RCP TIME EA 10 MIN

## 2018-07-07 PROCEDURE — 83735 ASSAY OF MAGNESIUM: CPT | Performed by: HOSPITALIST

## 2018-07-07 PROCEDURE — 25000132 ZZH RX MED GY IP 250 OP 250 PS 637: Performed by: NURSE PRACTITIONER

## 2018-07-07 PROCEDURE — 40000239 ZZH STATISTIC VAT ROUNDS

## 2018-07-07 PROCEDURE — 25000131 ZZH RX MED GY IP 250 OP 636 PS 637: Performed by: INTERNAL MEDICINE

## 2018-07-07 PROCEDURE — 27210437 ZZH NUTRITION PRODUCT SEMIELEM INTERMED LITER

## 2018-07-07 PROCEDURE — 84100 ASSAY OF PHOSPHORUS: CPT | Performed by: HOSPITALIST

## 2018-07-07 PROCEDURE — 25000128 H RX IP 250 OP 636: Performed by: HOSPITALIST

## 2018-07-07 PROCEDURE — 94003 VENT MGMT INPAT SUBQ DAY: CPT

## 2018-07-07 PROCEDURE — 71045 X-RAY EXAM CHEST 1 VIEW: CPT

## 2018-07-07 PROCEDURE — 25000128 H RX IP 250 OP 636: Performed by: INTERNAL MEDICINE

## 2018-07-07 PROCEDURE — 80053 COMPREHEN METABOLIC PANEL: CPT | Performed by: HOSPITALIST

## 2018-07-07 PROCEDURE — 93010 ELECTROCARDIOGRAM REPORT: CPT | Performed by: INTERNAL MEDICINE

## 2018-07-07 PROCEDURE — 25000128 H RX IP 250 OP 636

## 2018-07-07 PROCEDURE — 93005 ELECTROCARDIOGRAM TRACING: CPT

## 2018-07-07 PROCEDURE — 99291 CRITICAL CARE FIRST HOUR: CPT | Performed by: INTERNAL MEDICINE

## 2018-07-07 PROCEDURE — 00000146 ZZHCL STATISTIC GLUCOSE BY METER IP

## 2018-07-07 PROCEDURE — 20000003 ZZH R&B ICU

## 2018-07-07 PROCEDURE — 40000008 ZZH STATISTIC AIRWAY CARE

## 2018-07-07 PROCEDURE — 40000915 ZZH STATISTIC SITTER, EVENING HOURS

## 2018-07-07 RX ORDER — QUETIAPINE FUMARATE 200 MG/1
200 TABLET, FILM COATED ORAL 2 TIMES DAILY
Status: COMPLETED | OUTPATIENT
Start: 2018-07-07 | End: 2018-07-12

## 2018-07-07 RX ORDER — HALOPERIDOL 5 MG/ML
5 INJECTION INTRAMUSCULAR EVERY 6 HOURS PRN
Status: DISCONTINUED | OUTPATIENT
Start: 2018-07-07 | End: 2018-07-21 | Stop reason: HOSPADM

## 2018-07-07 RX ORDER — QUETIAPINE FUMARATE 50 MG/1
150 TABLET, FILM COATED ORAL 2 TIMES DAILY
Status: DISCONTINUED | OUTPATIENT
Start: 2018-07-07 | End: 2018-07-07

## 2018-07-07 RX ORDER — HALOPERIDOL 5 MG/ML
INJECTION INTRAMUSCULAR
Status: COMPLETED
Start: 2018-07-07 | End: 2018-07-07

## 2018-07-07 RX ORDER — FUROSEMIDE 10 MG/ML
20 INJECTION INTRAMUSCULAR; INTRAVENOUS ONCE
Status: COMPLETED | OUTPATIENT
Start: 2018-07-07 | End: 2018-07-07

## 2018-07-07 RX ORDER — PIPERACILLIN SODIUM, TAZOBACTAM SODIUM 4; .5 G/20ML; G/20ML
4.5 INJECTION, POWDER, LYOPHILIZED, FOR SOLUTION INTRAVENOUS EVERY 6 HOURS
Status: DISCONTINUED | OUTPATIENT
Start: 2018-07-07 | End: 2018-07-11

## 2018-07-07 RX ORDER — SODIUM CHLORIDE 9 MG/ML
INJECTION, SOLUTION INTRAVENOUS CONTINUOUS
Status: DISCONTINUED | OUTPATIENT
Start: 2018-07-07 | End: 2018-07-07 | Stop reason: CLARIF

## 2018-07-07 RX ADMIN — MICONAZOLE NITRATE: 20 CREAM TOPICAL at 20:48

## 2018-07-07 RX ADMIN — AMLODIPINE BESYLATE 2.5 MG: 2.5 TABLET ORAL at 08:02

## 2018-07-07 RX ADMIN — Medication 1 PACKET: at 08:08

## 2018-07-07 RX ADMIN — Medication 0.5 MG: at 23:10

## 2018-07-07 RX ADMIN — MIDAZOLAM HYDROCHLORIDE 2 MG: 1 INJECTION, SOLUTION INTRAMUSCULAR; INTRAVENOUS at 06:16

## 2018-07-07 RX ADMIN — OXYCODONE HYDROCHLORIDE 10 MG: 5 TABLET ORAL at 11:00

## 2018-07-07 RX ADMIN — Medication 40 MG: at 20:13

## 2018-07-07 RX ADMIN — DOCUSATE SODIUM 100 MG: 50 LIQUID ORAL at 08:01

## 2018-07-07 RX ADMIN — Medication 1 PACKET: at 20:49

## 2018-07-07 RX ADMIN — FUROSEMIDE 20 MG: 10 INJECTION, SOLUTION INTRAVENOUS at 07:59

## 2018-07-07 RX ADMIN — SODIUM CHLORIDE 4 UNITS/HR: 9 INJECTION, SOLUTION INTRAVENOUS at 06:23

## 2018-07-07 RX ADMIN — SODIUM CHLORIDE 6 UNITS/HR: 9 INJECTION, SOLUTION INTRAVENOUS at 20:36

## 2018-07-07 RX ADMIN — SODIUM CHLORIDE 4 UNITS/HR: 9 INJECTION, SOLUTION INTRAVENOUS at 00:30

## 2018-07-07 RX ADMIN — Medication 40 MG: at 08:01

## 2018-07-07 RX ADMIN — CHLORHEXIDINE GLUCONATE 15 ML: 1.2 RINSE ORAL at 08:08

## 2018-07-07 RX ADMIN — DEXMEDETOMIDINE HYDROCHLORIDE 1.2 MCG/KG/HR: 100 INJECTION, SOLUTION INTRAVENOUS at 02:27

## 2018-07-07 RX ADMIN — Medication 15 ML: at 08:20

## 2018-07-07 RX ADMIN — SENNOSIDES A AND B 10 ML: 415.36 LIQUID ORAL at 08:01

## 2018-07-07 RX ADMIN — DOCUSATE SODIUM 100 MG: 50 LIQUID ORAL at 20:10

## 2018-07-07 RX ADMIN — PIPERACILLIN SODIUM,TAZOBACTAM SODIUM 4.5 G: 4; .5 INJECTION, POWDER, FOR SOLUTION INTRAVENOUS at 07:58

## 2018-07-07 RX ADMIN — SENNOSIDES A AND B 10 ML: 415.36 LIQUID ORAL at 20:10

## 2018-07-07 RX ADMIN — OXYCODONE HYDROCHLORIDE 10 MG: 5 TABLET ORAL at 08:02

## 2018-07-07 RX ADMIN — OXYCODONE HYDROCHLORIDE 10 MG: 5 TABLET ORAL at 00:14

## 2018-07-07 RX ADMIN — MAGNESIUM SULFATE HEPTAHYDRATE 2 G: 40 INJECTION, SOLUTION INTRAVENOUS at 05:06

## 2018-07-07 RX ADMIN — MIDAZOLAM HYDROCHLORIDE 2 MG: 1 INJECTION, SOLUTION INTRAMUSCULAR; INTRAVENOUS at 02:25

## 2018-07-07 RX ADMIN — DEXMEDETOMIDINE HYDROCHLORIDE 1.2 MCG/KG/HR: 100 INJECTION, SOLUTION INTRAVENOUS at 06:03

## 2018-07-07 RX ADMIN — HYDRALAZINE HYDROCHLORIDE 10 MG: 20 INJECTION INTRAMUSCULAR; INTRAVENOUS at 22:36

## 2018-07-07 RX ADMIN — HALOPERIDOL LACTATE 5 MG: 5 INJECTION, SOLUTION INTRAMUSCULAR at 15:15

## 2018-07-07 RX ADMIN — QUETIAPINE FUMARATE 150 MG: 50 TABLET ORAL at 08:01

## 2018-07-07 RX ADMIN — MICONAZOLE NITRATE: 2 POWDER TOPICAL at 20:48

## 2018-07-07 RX ADMIN — HYDROCHLOROTHIAZIDE 25 MG: 25 TABLET ORAL at 08:01

## 2018-07-07 RX ADMIN — PIPERACILLIN SODIUM,TAZOBACTAM SODIUM 4.5 G: 4; .5 INJECTION, POWDER, FOR SOLUTION INTRAVENOUS at 01:43

## 2018-07-07 RX ADMIN — SODIUM CHLORIDE 6 UNITS/HR: 9 INJECTION, SOLUTION INTRAVENOUS at 14:51

## 2018-07-07 RX ADMIN — DEXMEDETOMIDINE HYDROCHLORIDE 1.2 MCG/KG/HR: 100 INJECTION, SOLUTION INTRAVENOUS at 13:32

## 2018-07-07 RX ADMIN — DEXMEDETOMIDINE HYDROCHLORIDE 1.2 MCG/KG/HR: 100 INJECTION, SOLUTION INTRAVENOUS at 09:42

## 2018-07-07 RX ADMIN — OXYCODONE HYDROCHLORIDE 10 MG: 5 TABLET ORAL at 14:22

## 2018-07-07 RX ADMIN — METOPROLOL TARTRATE 50 MG: 100 TABLET ORAL at 08:20

## 2018-07-07 RX ADMIN — ENOXAPARIN SODIUM 40 MG: 40 INJECTION SUBCUTANEOUS at 12:31

## 2018-07-07 RX ADMIN — PIPERACILLIN SODIUM,TAZOBACTAM SODIUM 4.5 G: 4; .5 INJECTION, POWDER, FOR SOLUTION INTRAVENOUS at 14:22

## 2018-07-07 RX ADMIN — MICONAZOLE NITRATE: 2 POWDER TOPICAL at 08:08

## 2018-07-07 RX ADMIN — METOPROLOL TARTRATE 50 MG: 100 TABLET ORAL at 20:14

## 2018-07-07 RX ADMIN — QUETIAPINE FUMARATE 200 MG: 200 TABLET ORAL at 21:21

## 2018-07-07 RX ADMIN — LORAZEPAM 1 MG: 1 TABLET ORAL at 00:14

## 2018-07-07 RX ADMIN — PIPERACILLIN SODIUM,TAZOBACTAM SODIUM 4.5 G: 4; .5 INJECTION, POWDER, FOR SOLUTION INTRAVENOUS at 19:52

## 2018-07-07 RX ADMIN — POLYETHYLENE GLYCOL 3350 17 G: 17 POWDER, FOR SOLUTION ORAL at 20:10

## 2018-07-07 RX ADMIN — OXYCODONE HYDROCHLORIDE 10 MG: 5 TABLET ORAL at 04:39

## 2018-07-07 RX ADMIN — MIDAZOLAM HYDROCHLORIDE 2 MG: 1 INJECTION, SOLUTION INTRAMUSCULAR; INTRAVENOUS at 22:13

## 2018-07-07 RX ADMIN — DEXMEDETOMIDINE HYDROCHLORIDE 1.2 MCG/KG/HR: 100 INJECTION, SOLUTION INTRAVENOUS at 16:44

## 2018-07-07 RX ADMIN — DEXMEDETOMIDINE HYDROCHLORIDE 1.2 MCG/KG/HR: 100 INJECTION, SOLUTION INTRAVENOUS at 20:51

## 2018-07-07 RX ADMIN — HALOPERIDOL LACTATE 5 MG: 5 INJECTION, SOLUTION INTRAMUSCULAR at 20:10

## 2018-07-07 NOTE — PLAN OF CARE
Problem: Patient Care Overview  Goal: Plan of Care/Patient Progress Review  Outcome: Improving  Pt on max dex. Pt intermittently follows commands. Pt extubated this shift. Productive cough. 5l NC, sats @ 97%. Sitter in place. Restraints removed. Family at the bedside. Will continue to monitor.

## 2018-07-07 NOTE — PLAN OF CARE
Problem: Patient Care Overview  Goal: Plan of Care/Patient Progress Review  Outcome: No Change  Neuro: While on dex at 1.2 and propofol at 10, pt is restless, pulling against his restraints and trying to sit up in bed. He does not attempt to answer questions or follow commands, although he did nod his head once last night when asked if he had pain. Multiple doses of versed and oxycodone effective.  CV: Somewhat hypertensive this AM but not meeting hydralazine parameters, NSR on tele  Pulm: Lungs mildly coarse, scant amount of secretions this AM  GI/: No measurable output from flexiseal overnight, BS hyperactive  Skin: Repo side to side for buttocks wound  Lines: Remains on insulin gtt  Other: Patient's SO Isabel visited last night, updated at bedside. Mg replaced this AM.

## 2018-07-07 NOTE — PROGRESS NOTES
[facility] ICU RESPIRATORY NOTE  Date of Admission: 6/25/2018  Date of Intubation (most recent): 6/27/2018  Reason for Mechanical Ventilation: Airway protection  Number of Days on Mechanical Ventilation: 11  Met Criteria for Pressure Support Trial: yes  Length of Pressure Support Trial:  Reason for Stopping Pressure Support Trial:   Reason for No Pressure Support Trial: Night rest     Significant Events Today:  None     Ventilation Mode: CMV/AC  (Continuous Mandatory Ventilation/ Assist Control)  FiO2 (%): 40 %  Rate Set (breaths/minute): 14 breaths/min  Tidal Volume Set (mL): 550 mL  PEEP (cm H2O): 5 cmH2O  Pressure Support (cm H2O): 5 cmH2O  Oxygen Concentration (%): 40 %  Resp: 15      ETT appearance on chest x-ray: In good position     Plan:  Continue full ventilatory support overnight. Will continue to follow and wean as tolerated.  7/7/2018  Andrew Lord

## 2018-07-07 NOTE — PROGRESS NOTES
Intensivist Daily Note  07/07/2018      Brief History:    52 yom admitted with acute EtOH pancreatitis and EtOH withdrawal, intubated for airway protection in setting of acute withdrawal.    Interval Events:  -6/27: intubated, sedated and hemodynamically stable.   -6/28: started tube feeds yesterday, tolerating well.  1L IVF overnight for low UOP.  -6/29: failed SBT yesterday 2/2 tachycardia and agitation.  No acute events overnight.  -6/30: Continued with agitation. Head CT without acute chagnes. Volume loss noted.   -7/2: continues with agitation with lightening sedation; no other acute events.  Blood glucose levels elevated > 200.  -7/3: purulent ETT secretions overnight, sent for culture, growing GNR and GPC.  This morning febrile to 103.  7/4: bronched overnight after pulled on ETT creating cuff leak and high peak pressures; large mucous plug extracted.  Cultures sent. Back on propofol.  -7/5: no acute events overnight.  Sputum showing heavy growth staph aureus.  -7/6: sputum showing MSSA.  Agitated on max dose dex yesterday, prn versed ordered.  Follows some commands on low dose propofol this morning.  -7/7: more awake but does not reliably follow commands.  Did well on SBT yesterday.  Mental status/agitation precludes agitation.  Abx narrowed to Zosyn for MSSA.      PMH:  Past Medical History:   Diagnosis Date     Abnormal MRI of head 1/11/2013     Atypical chest pain 9/4/2012     BMI 33.0-33.9,adult 5/24/2013     Hyperlipidemia LDL goal <160 5/4/2013     Hypertension      Ménière's disease        PSurgHx:  Past Surgical History:   Procedure Laterality Date     C NONSPECIFIC PROCEDURE      shoulder surgery for dislocation      C NONSPECIFIC PROCEDURE      eye surgery, lens implant right     EYE SURGERY       FRACTURE TX, WRIST RT/LT      Fracture TX Wrist RT/LT     ORTHOPEDIC SURGERY         Family History:  Family History     Problem (# of Occurrences) Relation (Name,Age of Onset)    C.A.D. (1) Father:  bypass surgery    Diabetes (1) Father    Hypertension (1) Mother          Social History:  Social History     Social History     Marital status: Single     Spouse name: N/A     Number of children: N/A     Years of education: N/A     Occupational History     Not on file.     Social History Main Topics     Smoking status: Never Smoker     Smokeless tobacco: Never Used      Comment: rarely, once per year maybe      Alcohol use Yes      Comment: 10 drinks / week      Drug use: No     Sexual activity: Yes     Partners: Female     Other Topics Concern     Parent/Sibling W/ Cabg, Mi Or Angioplasty Before 65f 55m? Yes     Social History Narrative       Allergy:  Allergies   Allergen Reactions     Zoloft [Sertraline] Nausea and Vomiting        Medications:  Current Facility-Administered Medications   Medication     acetaminophen (TYLENOL) tablet 325 mg     amLODIPine (NORVASC) tablet 2.5 mg     bisacodyl (DULCOLAX) Suppository 10 mg     chlorhexidine (PERIDEX) 0.12 % solution 15 mL     dexmedetomidine (PRECEDEX) 400 mcg in sodium chloride 0.9 % 100 mL infusion     dextrose 10 % 1,000 mL infusion     glucose gel 15-30 g    Or     dextrose 50 % injection 25-50 mL    Or     glucagon injection 1 mg     docusate (COLACE) 50 MG/5ML liquid 100 mg     enoxaparin (LOVENOX) injection 40 mg     hydrALAZINE (APRESOLINE) injection 10 mg     hydrochlorothiazide (HYDRODIURIL) tablet 25 mg     HYDROmorphone (PF) (DILAUDID) injection 0.3-0.5 mg     hypromellose-dextran (ARTIFICAL TEARS) 0.1-0.3 % ophthalmic solution 2-3 drop     insulin 1 unit/mL in saline (NovoLIN, HumuLIN Regular) drip - ADULT IV Infusion     lidocaine (LMX4) cream     lidocaine 1 % 1 mL     LORazepam (ATIVAN) tablet 1-2 mg    Or     LORazepam (ATIVAN) injection 1-2 mg     magnesium sulfate 2 g in water intermittent infusion     magnesium sulfate 4 g in 100 mL sterile water (premade)     melatonin tablet 3 mg     metoprolol (LOPRESSOR) suspension 50 mg     miconazole  (MICATIN) 2 % cream     miconazole (MICATIN; MICRO GUARD) 2 % powder     midazolam (VERSED) injection 2 mg     multivitamins with minerals (CERTAVITE/CEROVITE) liquid 15 mL     naloxone (NARCAN) injection 0.1-0.4 mg     ondansetron (ZOFRAN-ODT) ODT tab 4 mg    Or     ondansetron (ZOFRAN) injection 4 mg     oxyCODONE IR (ROXICODONE) tablet 5-10 mg     pantoprazole (PROTONIX) 2 mg/mL suspension 40 mg     piperacillin-tazobactam (ZOSYN) 4.5 g vial to attach to  mL bag     polyethylene glycol (MIRALAX/GLYCOLAX) Packet 17 g     polyethylene glycol (MIRALAX/GLYCOLAX) Packet 17 g     potassium chloride (KLOR-CON) Packet 20-40 mEq     potassium chloride 10 mEq in 100 mL intermittent infusion with 10 mg lidocaine     potassium chloride 10 mEq in 100 mL sterile water intermittent infusion (premix)     potassium chloride 20 mEq in 50 mL intermittent infusion     potassium chloride SA (K-DUR/KLOR-CON M) CR tablet 20-40 mEq     propofol (DIPRIVAN) infusion     protein modular (PROSource TF) 1 packet     QUEtiapine (SEROquel) tablet 150 mg     senna-docusate (SENOKOT-S;PERICOLACE) 8.6-50 MG per tablet 1 tablet    Or     senna-docusate (SENOKOT-S;PERICOLACE) 8.6-50 MG per tablet 2 tablet     sennosides (SENOKOT) syrup 10 mL     sodium chloride (PF) 0.9% PF flush 10 mL     sodium chloride (PF) 0.9% PF flush 10-20 mL     sodium chloride (PF) 0.9% PF flush 3 mL     sodium chloride (PF) 0.9% PF flush 3 mL     sodium chloride 0.9% infusion         Physical examination:  Vital signs:  Temp: 97.1  F (36.2  C) Temp src: Axillary BP: (!) 150/97   Heart Rate: 73 Resp: 14 SpO2: 96 % O2 Device: Mechanical Ventilator      General: intubated, restless, periodically tries to sit up in bed.  HEENT: neck supple, symmetrical  Lungs: Diminished breath sounds bilaterally..  CVS: Normal S1 & S2, no murmur  Abdomen: Bowel sounds present, soft, non tender  Extremities/musculoskeletal: 2+ LE edema  Skin: no rash  Exam of Line sites: No erythema or  discharge.  Neuro: moves all 4 extremities; restless but does not reliably follow commands.      Intake/Output Summary (Last 24 hours) at 07/01/18 1159  Last data filed at 07/01/18 1000   Gross per 24 hour   Intake           4078.7 ml   Output             4585 ml   Net           -506.3 ml       Data:    ROUTINE ICU LABS (Last four results)  CMP    Recent Labs  Lab 07/07/18  0400 07/06/18  0445 07/05/18  0430 07/04/18  0608   * 144 142 143   POTASSIUM 3.5 3.4 3.5 3.5   CHLORIDE 107 106 105 106   CO2 29 29 30 28   ANIONGAP 9 9 7 9   * 166* 201* 233*   BUN 14 15 16 17   CR 0.55* 0.52* 0.60* 0.60*   GFRESTIMATED >90 >90 >90 >90   GFRESTBLACK >90 >90 >90 >90   DEMETRIA 8.4* 8.1* 8.0* 8.0*   MAG 1.9 1.9 1.9 2.0   PHOS 3.7 2.7 2.7 2.3*   PROTTOTAL 7.1 7.0 6.3* 7.0   ALBUMIN 2.1* 2.1* 2.1* 2.2*   BILITOTAL 0.6 0.7 0.7 1.0   ALKPHOS 70 69 65 66   AST 75* 76* 73* 79*   ALT 59 58 51 53     CBC    Recent Labs  Lab 07/07/18  0400 07/06/18  0445 07/05/18  0715 07/05/18  0610   WBC 7.8 8.5 8.4 Canceled, Test credited   RBC 3.74* 3.86* 3.62* Canceled, Test credited   HGB 13.0* 13.5 12.6* Canceled, Test credited   HCT 38.7* 39.6* 37.5* Canceled, Test credited   * 103* 104* Canceled, Test credited   MCH 34.8* 35.0* 34.8* Canceled, Test credited   MCHC 33.6 34.1 33.6 Canceled, Test credited   RDW 12.9 12.9 13.2 Canceled, Test credited    262 250 Canceled, Test credited           Assessment and Plan:    Neuro  ### Acute EtOH withdrawal Syndrome  ### Sedation/Analgesia  ### Meniere's disease  -Propofol gtt + Dex gtt  -PRN Dilaudid and versed  -APAP and Dilaudid prns available  -Restart PTA meclizine once extubated and able to take PO  -Head CT 6/30 w/ moderate cerebral atrophy but no other acute processes  -Increase Seroquel to 150 mg BID today; EKG repeated, QTc ok    Respiratory  ### Acute Hypoxic Respiratory failure  ### Ventilator Associated Pneumonia -- CXR w/ LLL infiltrate, + sputum cultures =>  MSSA  -intubated for airway protection  -Daily SBTs  -Antibiotics as below  -Bronched 7/3 w/ mucous plug removal    CV:  ### HTN  ### Edema -- + 14L since admission  - cont. HCTZ 25 mg daily, Norvasc 2.5 mg daily, and Metoprolol 50 mg BID  -Lasix 20 mg IVP x1 today  -- good response to this dose; crt stable  -PRN Hydralazine for BP > 175    GI/Liver:  ### EtOH Pancreatitis -- resolved  ### Fatty Liver Disease, likely 2/2 EtOH  -Lipase peaked at 903, is now normal; stop trending  -continuous IVF stopped  -Abdominal US 6/25 showing fatty liver    ID:  ### VAP -- LLL Infiltrate on CXR 7/2, + sputum cultures => MSSA   ### Fevers -- resolved  -sputum 7/2 and 7/4 (+) MSSA  -Blood and sputum from 7/3 NGTD; follow  -Procal 0.57 on 7/2  -Vanco d/c'd; Zosyn to complete 8 day course (7/3-    Heme:  ### Thrombocytopenia - resolved  ### Anemia -- mild; likely iatrogenic from blood draws  -monitor; transfuse for hgb < 7    Endocrine:  ### Hyperglycemia -- glucose now controlled  -Continue insulin gtt    FEN:  -Cont. multi-vitamin  -continue tube feeds -- cont free water @ 100 cc q4h, Na 145  -monitor lytes, replete per unit protocol  -START free water 60 q4h    Prophy:  GI: PPI  DVT: SCDs; LMWH    ICU Cares:  Restrain needed: Yes   Lines needed: Yes    Code: Full  Dispo: ICU status    Family: Family updated today    Billing: Critical care time 40 min excluding procedure time     Kadie Ramirez MD  Pulmonary and Critical Care Medicine

## 2018-07-07 NOTE — PROGRESS NOTES
ICU    Patient extubated but constantly needing re-direction, trying to get out of bed.  On max dexmedetomidine 1.2.    P: add haldol iv and increase seroquel to 200 BID.

## 2018-07-08 ENCOUNTER — APPOINTMENT (OUTPATIENT)
Dept: GENERAL RADIOLOGY | Facility: CLINIC | Age: 53
DRG: 166 | End: 2018-07-08
Attending: INTERNAL MEDICINE
Payer: COMMERCIAL

## 2018-07-08 LAB
ALBUMIN SERPL-MCNC: 2.4 G/DL (ref 3.4–5)
ALP SERPL-CCNC: 81 U/L (ref 40–150)
ALT SERPL W P-5'-P-CCNC: 68 U/L (ref 0–70)
ANION GAP SERPL CALCULATED.3IONS-SCNC: 10 MMOL/L (ref 3–14)
AST SERPL W P-5'-P-CCNC: 87 U/L (ref 0–45)
BACTERIA SPEC CULT: ABNORMAL
BACTERIA SPEC CULT: ABNORMAL
BASOPHILS # BLD AUTO: 0.1 10E9/L (ref 0–0.2)
BASOPHILS NFR BLD AUTO: 1.3 %
BILIRUB SERPL-MCNC: 0.7 MG/DL (ref 0.2–1.3)
BUN SERPL-MCNC: 17 MG/DL (ref 7–30)
CALCIUM SERPL-MCNC: 8.5 MG/DL (ref 8.5–10.1)
CHLORIDE SERPL-SCNC: 106 MMOL/L (ref 94–109)
CO2 SERPL-SCNC: 27 MMOL/L (ref 20–32)
CREAT SERPL-MCNC: 0.57 MG/DL (ref 0.66–1.25)
DIFFERENTIAL METHOD BLD: ABNORMAL
EOSINOPHIL # BLD AUTO: 0.3 10E9/L (ref 0–0.7)
EOSINOPHIL NFR BLD AUTO: 2.6 %
ERYTHROCYTE [DISTWIDTH] IN BLOOD BY AUTOMATED COUNT: 13 % (ref 10–15)
GFR SERPL CREATININE-BSD FRML MDRD: >90 ML/MIN/1.7M2
GLUCOSE BLDC GLUCOMTR-MCNC: 109 MG/DL (ref 70–99)
GLUCOSE BLDC GLUCOMTR-MCNC: 125 MG/DL (ref 70–99)
GLUCOSE BLDC GLUCOMTR-MCNC: 131 MG/DL (ref 70–99)
GLUCOSE BLDC GLUCOMTR-MCNC: 132 MG/DL (ref 70–99)
GLUCOSE BLDC GLUCOMTR-MCNC: 135 MG/DL (ref 70–99)
GLUCOSE BLDC GLUCOMTR-MCNC: 139 MG/DL (ref 70–99)
GLUCOSE BLDC GLUCOMTR-MCNC: 147 MG/DL (ref 70–99)
GLUCOSE BLDC GLUCOMTR-MCNC: 148 MG/DL (ref 70–99)
GLUCOSE BLDC GLUCOMTR-MCNC: 160 MG/DL (ref 70–99)
GLUCOSE BLDC GLUCOMTR-MCNC: 161 MG/DL (ref 70–99)
GLUCOSE BLDC GLUCOMTR-MCNC: 163 MG/DL (ref 70–99)
GLUCOSE BLDC GLUCOMTR-MCNC: 172 MG/DL (ref 70–99)
GLUCOSE BLDC GLUCOMTR-MCNC: 173 MG/DL (ref 70–99)
GLUCOSE SERPL-MCNC: 134 MG/DL (ref 70–99)
HCT VFR BLD AUTO: 39.4 % (ref 40–53)
HGB BLD-MCNC: 13.4 G/DL (ref 13.3–17.7)
IMM GRANULOCYTES # BLD: 0.2 10E9/L (ref 0–0.4)
IMM GRANULOCYTES NFR BLD: 2.2 %
LYMPHOCYTES # BLD AUTO: 1.7 10E9/L (ref 0.8–5.3)
LYMPHOCYTES NFR BLD AUTO: 16.7 %
MAGNESIUM SERPL-MCNC: 1.8 MG/DL (ref 1.6–2.3)
MCH RBC QN AUTO: 34.9 PG (ref 26.5–33)
MCHC RBC AUTO-ENTMCNC: 34 G/DL (ref 31.5–36.5)
MCV RBC AUTO: 103 FL (ref 78–100)
MONOCYTES # BLD AUTO: 1.9 10E9/L (ref 0–1.3)
MONOCYTES NFR BLD AUTO: 18.5 %
NEUTROPHILS # BLD AUTO: 6.1 10E9/L (ref 1.6–8.3)
NEUTROPHILS NFR BLD AUTO: 58.7 %
NRBC # BLD AUTO: 0 10*3/UL
NRBC BLD AUTO-RTO: 0 /100
PHOSPHATE SERPL-MCNC: 2.9 MG/DL (ref 2.5–4.5)
PLATELET # BLD AUTO: 422 10E9/L (ref 150–450)
POTASSIUM SERPL-SCNC: 3.6 MMOL/L (ref 3.4–5.3)
PROT SERPL-MCNC: 7.6 G/DL (ref 6.8–8.8)
RBC # BLD AUTO: 3.84 10E12/L (ref 4.4–5.9)
SODIUM SERPL-SCNC: 143 MMOL/L (ref 133–144)
SPECIMEN SOURCE: ABNORMAL
WBC # BLD AUTO: 10.3 10E9/L (ref 4–11)

## 2018-07-08 PROCEDURE — 85025 COMPLETE CBC W/AUTO DIFF WBC: CPT | Performed by: HOSPITALIST

## 2018-07-08 PROCEDURE — 83735 ASSAY OF MAGNESIUM: CPT | Performed by: HOSPITALIST

## 2018-07-08 PROCEDURE — 25000132 ZZH RX MED GY IP 250 OP 250 PS 637: Performed by: INTERNAL MEDICINE

## 2018-07-08 PROCEDURE — 00000146 ZZHCL STATISTIC GLUCOSE BY METER IP

## 2018-07-08 PROCEDURE — 25000128 H RX IP 250 OP 636: Performed by: INTERNAL MEDICINE

## 2018-07-08 PROCEDURE — 99233 SBSQ HOSP IP/OBS HIGH 50: CPT | Performed by: INTERNAL MEDICINE

## 2018-07-08 PROCEDURE — 27210437 ZZH NUTRITION PRODUCT SEMIELEM INTERMED LITER

## 2018-07-08 PROCEDURE — 80053 COMPREHEN METABOLIC PANEL: CPT | Performed by: HOSPITALIST

## 2018-07-08 PROCEDURE — 25000131 ZZH RX MED GY IP 250 OP 636 PS 637: Performed by: INTERNAL MEDICINE

## 2018-07-08 PROCEDURE — 40000239 ZZH STATISTIC VAT ROUNDS

## 2018-07-08 PROCEDURE — 71045 X-RAY EXAM CHEST 1 VIEW: CPT

## 2018-07-08 PROCEDURE — 40000915 ZZH STATISTIC SITTER, EVENING HOURS

## 2018-07-08 PROCEDURE — 40000914 ZZH STATISTIC SITTER, DAY HOURS

## 2018-07-08 PROCEDURE — 25000128 H RX IP 250 OP 636: Performed by: HOSPITALIST

## 2018-07-08 PROCEDURE — 20000003 ZZH R&B ICU

## 2018-07-08 PROCEDURE — 25000128 H RX IP 250 OP 636: Performed by: ANESTHESIOLOGY

## 2018-07-08 PROCEDURE — 40000916 ZZH STATISTIC SITTER, NIGHT HOURS

## 2018-07-08 PROCEDURE — 25000125 ZZHC RX 250: Performed by: INTERNAL MEDICINE

## 2018-07-08 PROCEDURE — 25000132 ZZH RX MED GY IP 250 OP 250 PS 637: Performed by: HOSPITALIST

## 2018-07-08 PROCEDURE — 84100 ASSAY OF PHOSPHORUS: CPT | Performed by: HOSPITALIST

## 2018-07-08 RX ORDER — FUROSEMIDE 10 MG/ML
20 INJECTION INTRAMUSCULAR; INTRAVENOUS ONCE
Status: COMPLETED | OUTPATIENT
Start: 2018-07-08 | End: 2018-07-08

## 2018-07-08 RX ADMIN — FUROSEMIDE 20 MG: 10 INJECTION, SOLUTION INTRAVENOUS at 13:53

## 2018-07-08 RX ADMIN — Medication 15 ML: at 08:31

## 2018-07-08 RX ADMIN — PIPERACILLIN SODIUM,TAZOBACTAM SODIUM 4.5 G: 4; .5 INJECTION, POWDER, FOR SOLUTION INTRAVENOUS at 14:18

## 2018-07-08 RX ADMIN — MICONAZOLE NITRATE: 20 CREAM TOPICAL at 21:28

## 2018-07-08 RX ADMIN — SODIUM CHLORIDE 4 UNITS/HR: 9 INJECTION, SOLUTION INTRAVENOUS at 06:40

## 2018-07-08 RX ADMIN — DEXMEDETOMIDINE HYDROCHLORIDE 1.2 MCG/KG/HR: 100 INJECTION, SOLUTION INTRAVENOUS at 11:03

## 2018-07-08 RX ADMIN — Medication 0.5 MG: at 23:32

## 2018-07-08 RX ADMIN — PIPERACILLIN SODIUM,TAZOBACTAM SODIUM 4.5 G: 4; .5 INJECTION, POWDER, FOR SOLUTION INTRAVENOUS at 02:03

## 2018-07-08 RX ADMIN — AMLODIPINE BESYLATE 2.5 MG: 2.5 TABLET ORAL at 08:32

## 2018-07-08 RX ADMIN — DEXMEDETOMIDINE HYDROCHLORIDE 1.2 MCG/KG/HR: 100 INJECTION, SOLUTION INTRAVENOUS at 17:49

## 2018-07-08 RX ADMIN — ENOXAPARIN SODIUM 40 MG: 40 INJECTION SUBCUTANEOUS at 14:10

## 2018-07-08 RX ADMIN — QUETIAPINE FUMARATE 200 MG: 200 TABLET ORAL at 10:03

## 2018-07-08 RX ADMIN — HYDRALAZINE HYDROCHLORIDE 10 MG: 20 INJECTION INTRAMUSCULAR; INTRAVENOUS at 05:12

## 2018-07-08 RX ADMIN — DEXMEDETOMIDINE HYDROCHLORIDE 1.2 MCG/KG/HR: 100 INJECTION, SOLUTION INTRAVENOUS at 21:27

## 2018-07-08 RX ADMIN — SODIUM CHLORIDE 3.5 UNITS/HR: 9 INJECTION, SOLUTION INTRAVENOUS at 20:20

## 2018-07-08 RX ADMIN — METOPROLOL TARTRATE 50 MG: 100 TABLET ORAL at 22:41

## 2018-07-08 RX ADMIN — CHLORHEXIDINE GLUCONATE 15 ML: 1.2 RINSE ORAL at 08:46

## 2018-07-08 RX ADMIN — METOPROLOL TARTRATE 50 MG: 100 TABLET ORAL at 06:49

## 2018-07-08 RX ADMIN — PIPERACILLIN SODIUM,TAZOBACTAM SODIUM 4.5 G: 4; .5 INJECTION, POWDER, FOR SOLUTION INTRAVENOUS at 20:14

## 2018-07-08 RX ADMIN — Medication 0.5 MG: at 06:06

## 2018-07-08 RX ADMIN — HALOPERIDOL LACTATE 5 MG: 5 INJECTION, SOLUTION INTRAMUSCULAR at 13:53

## 2018-07-08 RX ADMIN — PIPERACILLIN SODIUM,TAZOBACTAM SODIUM 4.5 G: 4; .5 INJECTION, POWDER, FOR SOLUTION INTRAVENOUS at 08:32

## 2018-07-08 RX ADMIN — HYDROCHLOROTHIAZIDE 25 MG: 25 TABLET ORAL at 08:32

## 2018-07-08 RX ADMIN — SODIUM CHLORIDE 5 UNITS/HR: 9 INJECTION, SOLUTION INTRAVENOUS at 13:37

## 2018-07-08 RX ADMIN — DEXMEDETOMIDINE HYDROCHLORIDE 1.2 MCG/KG/HR: 100 INJECTION, SOLUTION INTRAVENOUS at 14:23

## 2018-07-08 RX ADMIN — Medication 40 MG: at 08:31

## 2018-07-08 RX ADMIN — DEXMEDETOMIDINE HYDROCHLORIDE 1.2 MCG/KG/HR: 100 INJECTION, SOLUTION INTRAVENOUS at 04:01

## 2018-07-08 RX ADMIN — MICONAZOLE NITRATE: 20 CREAM TOPICAL at 08:45

## 2018-07-08 RX ADMIN — Medication 40 MG: at 21:21

## 2018-07-08 RX ADMIN — DEXMEDETOMIDINE HYDROCHLORIDE 1.2 MCG/KG/HR: 100 INJECTION, SOLUTION INTRAVENOUS at 01:00

## 2018-07-08 RX ADMIN — MICAFUNGIN SODIUM 100 MG: 10 INJECTION, POWDER, LYOPHILIZED, FOR SOLUTION INTRAVENOUS at 21:20

## 2018-07-08 RX ADMIN — Medication 1 PACKET: at 21:20

## 2018-07-08 RX ADMIN — Medication 1 PACKET: at 08:43

## 2018-07-08 RX ADMIN — HALOPERIDOL LACTATE 5 MG: 5 INJECTION, SOLUTION INTRAMUSCULAR at 23:44

## 2018-07-08 RX ADMIN — DEXMEDETOMIDINE HYDROCHLORIDE 1.2 MCG/KG/HR: 100 INJECTION, SOLUTION INTRAVENOUS at 07:40

## 2018-07-08 RX ADMIN — QUETIAPINE FUMARATE 200 MG: 200 TABLET ORAL at 21:20

## 2018-07-08 RX ADMIN — HALOPERIDOL LACTATE 5 MG: 5 INJECTION, SOLUTION INTRAMUSCULAR at 08:30

## 2018-07-08 NOTE — PLAN OF CARE
Problem: Alcohol Withdrawal Acute, Risk/Actual (Adult)  Goal: Signs and Symptoms of Listed Potential Problems Will be Absent, Minimized or Managed (Alcohol Withdrawal Acute, Risk/Actual)  Signs and symptoms of listed potential problems will be absent, minimized or managed by discharge/transition of care (reference Alcohol Withdrawal Acute, Risk/Actual (Adult) CPG).   Outcome: Improving  Neuro: Confused, occasional hallucinations (talks to mother who is not present, thought he was driving). MAEx4 equal/strong. Restless/agitated early in shift, but easily redirected throughout day. PRN haldol given x2.  CV: SR, VSS.  Pulm: Remains on room air, sats >92%, strong, loose cough.  GI/: Tolerating tube feedings. Pulled out flexiseal this am. No need to replace, no further loose stools. Brisk u/o after lasix.  Skin: Sacral mepilex in place, CDI.  Restraints: N/A  Up to converter chair twice today, tolerated well.  Family in to visit, update given.    Josephine Park, CCRN-CSC

## 2018-07-08 NOTE — PROVIDER NOTIFICATION
Spoke to Dr. Francois about positive blood cultures for yeast. No new orders at this time.  Okay for Rectal tube, no C.diff culture at this time.

## 2018-07-08 NOTE — PROGRESS NOTES
Intensivist Daily Note  07/08/2018      Brief History:    52 yom admitted with acute EtOH pancreatitis and EtOH withdrawal, intubated for airway protection in setting of acute withdrawal.    Interval Events:  -6/27: intubated, sedated and hemodynamically stable.   -6/28: started tube feeds yesterday, tolerating well.  1L IVF overnight for low UOP.  -6/29: failed SBT yesterday 2/2 tachycardia and agitation.  No acute events overnight.  -6/30: Continued with agitation. Head CT without acute chagnes. Volume loss noted.   -7/2: continues with agitation with lightening sedation; no other acute events.  Blood glucose levels elevated > 200.  -7/3: purulent ETT secretions overnight, sent for culture, growing GNR and GPC.  This morning febrile to 103.  7/4: bronched overnight after pulled on ETT creating cuff leak and high peak pressures; large mucous plug extracted.  Cultures sent. Back on propofol.  -7/5: no acute events overnight.  Sputum showing heavy growth staph aureus.  -7/6: sputum showing MSSA.  Agitated on max dose dex yesterday, prn versed ordered.  Follows some commands on low dose propofol this morning.  -7/7: more awake but does not reliably follow commands.  Did well on SBT yesterday.  Mental status/agitation precludes agitation.  Abx narrowed to Zosyn for MSSA.  -7/8: extubated yesterday early afternoon.  Very delirious with hallucinations.  Restlessness/agitation continues but is somewhat redirectable.        PMH:  Past Medical History:   Diagnosis Date     Abnormal MRI of head 1/11/2013     Atypical chest pain 9/4/2012     BMI 33.0-33.9,adult 5/24/2013     Hyperlipidemia LDL goal <160 5/4/2013     Hypertension      Ménière's disease        PSurgHx:  Past Surgical History:   Procedure Laterality Date     C NONSPECIFIC PROCEDURE      shoulder surgery for dislocation      C NONSPECIFIC PROCEDURE      eye surgery, lens implant right     EYE SURGERY       FRACTURE TX, WRIST RT/LT      Fracture TX Wrist RT/LT      ORTHOPEDIC SURGERY         Family History:  Family History     Problem (# of Occurrences) Relation (Name,Age of Onset)    C.A.D. (1) Father: bypass surgery    Diabetes (1) Father    Hypertension (1) Mother          Social History:  Social History     Social History     Marital status: Single     Spouse name: N/A     Number of children: N/A     Years of education: N/A     Occupational History     Not on file.     Social History Main Topics     Smoking status: Never Smoker     Smokeless tobacco: Never Used      Comment: rarely, once per year maybe      Alcohol use Yes      Comment: 10 drinks / week      Drug use: No     Sexual activity: Yes     Partners: Female     Other Topics Concern     Parent/Sibling W/ Cabg, Mi Or Angioplasty Before 65f 55m? Yes     Social History Narrative       Allergy:  Allergies   Allergen Reactions     Zoloft [Sertraline] Nausea and Vomiting        Medications:  Current Facility-Administered Medications   Medication     acetaminophen (TYLENOL) tablet 325 mg     amLODIPine (NORVASC) tablet 2.5 mg     bisacodyl (DULCOLAX) Suppository 10 mg     dexmedetomidine (PRECEDEX) 400 mcg in sodium chloride 0.9 % 100 mL infusion     dextrose 10 % 1,000 mL infusion     glucose gel 15-30 g    Or     dextrose 50 % injection 25-50 mL    Or     glucagon injection 1 mg     docusate (COLACE) 50 MG/5ML liquid 100 mg     enoxaparin (LOVENOX) injection 40 mg     haloperidol lactate (HALDOL) injection 5 mg     hydrALAZINE (APRESOLINE) injection 10 mg     hydrochlorothiazide (HYDRODIURIL) tablet 25 mg     HYDROmorphone (PF) (DILAUDID) injection 0.3-0.5 mg     hypromellose-dextran (ARTIFICAL TEARS) 0.1-0.3 % ophthalmic solution 2-3 drop     insulin 1 unit/mL in saline (NovoLIN, HumuLIN Regular) drip - ADULT IV Infusion     lidocaine (LMX4) cream     lidocaine 1 % 1 mL     magnesium sulfate 2 g in water intermittent infusion     magnesium sulfate 4 g in 100 mL sterile water (premade)     melatonin tablet 3 mg      metoprolol (LOPRESSOR) suspension 50 mg     miconazole (MICATIN) 2 % cream     miconazole (MICATIN; MICRO GUARD) 2 % powder     midazolam (VERSED) injection 2 mg     multivitamins with minerals (CERTAVITE/CEROVITE) liquid 15 mL     naloxone (NARCAN) injection 0.1-0.4 mg     ondansetron (ZOFRAN-ODT) ODT tab 4 mg    Or     ondansetron (ZOFRAN) injection 4 mg     oxyCODONE IR (ROXICODONE) tablet 5-10 mg     pantoprazole (PROTONIX) 2 mg/mL suspension 40 mg     piperacillin-tazobactam (ZOSYN) 4.5 g vial to attach to  mL bag     polyethylene glycol (MIRALAX/GLYCOLAX) Packet 17 g     polyethylene glycol (MIRALAX/GLYCOLAX) Packet 17 g     potassium chloride (KLOR-CON) Packet 20-40 mEq     potassium chloride 10 mEq in 100 mL intermittent infusion with 10 mg lidocaine     potassium chloride 10 mEq in 100 mL sterile water intermittent infusion (premix)     potassium chloride 20 mEq in 50 mL intermittent infusion     potassium chloride SA (K-DUR/KLOR-CON M) CR tablet 20-40 mEq     propofol (DIPRIVAN) infusion     protein modular (PROSource TF) 1 packet     QUEtiapine (SEROquel) tablet 200 mg     senna-docusate (SENOKOT-S;PERICOLACE) 8.6-50 MG per tablet 1 tablet    Or     senna-docusate (SENOKOT-S;PERICOLACE) 8.6-50 MG per tablet 2 tablet     sennosides (SENOKOT) syrup 10 mL     sodium chloride (PF) 0.9% PF flush 10-20 mL     sodium chloride (PF) 0.9% PF flush 3 mL     sodium chloride (PF) 0.9% PF flush 3 mL     sodium chloride 0.9% infusion         Physical examination:  Vital signs:  Temp: 98.5  F (36.9  C) Temp src: Axillary BP: 160/88   Heart Rate: 97 Resp: (!) 44 SpO2: 94 % O2 Device: None (Room air) Oxygen Delivery: 3 LPM    General: awake, alert; + Delirious  HEENT: neck supple, symmetrical  Lungs: Diminished breath sounds bilaterally..  CVS: Normal S1 & S2, no murmur  Abdomen: Bowel sounds present, soft, non tender  Extremities/musculoskeletal: 2+ LE edema  Skin: no rash  Exam of Line sites: No erythema or  discharge.  Neuro: restless.  Delirious.  Alert but not oriented.      Intake/Output Summary (Last 24 hours) at 07/01/18 1159  Last data filed at 07/01/18 1000   Gross per 24 hour   Intake           4078.7 ml   Output             4585 ml   Net           -506.3 ml       Data:    ROUTINE ICU LABS (Last four results)  CMP    Recent Labs  Lab 07/08/18  0400 07/07/18  0400 07/06/18  0445 07/05/18  0430    145* 144 142   POTASSIUM 3.6 3.5 3.4 3.5   CHLORIDE 106 107 106 105   CO2 27 29 29 30   ANIONGAP 10 9 9 7   * 138* 166* 201*   BUN 17 14 15 16   CR 0.57* 0.55* 0.52* 0.60*   GFRESTIMATED >90 >90 >90 >90   GFRESTBLACK >90 >90 >90 >90   DEMETRIA 8.5 8.4* 8.1* 8.0*   MAG 1.8 1.9 1.9 1.9   PHOS 2.9 3.7 2.7 2.7   PROTTOTAL 7.6 7.1 7.0 6.3*   ALBUMIN 2.4* 2.1* 2.1* 2.1*   BILITOTAL 0.7 0.6 0.7 0.7   ALKPHOS 81 70 69 65   AST 87* 75* 76* 73*   ALT 68 59 58 51     CBC    Recent Labs  Lab 07/08/18  0400 07/07/18  0400 07/06/18  0445 07/05/18  0715   WBC 10.3 7.8 8.5 8.4   RBC 3.84* 3.74* 3.86* 3.62*   HGB 13.4 13.0* 13.5 12.6*   HCT 39.4* 38.7* 39.6* 37.5*   * 104* 103* 104*   MCH 34.9* 34.8* 35.0* 34.8*   MCHC 34.0 33.6 34.1 33.6   RDW 13.0 12.9 12.9 13.2    356 262 250           Assessment and Plan:    Neuro  ### Acute EtOH withdrawal Syndrome  ### Sedation/Analgesia  ### Meniere's disease  ### ICU Delirium  -Continues on dex  -PRN Dilaudid, versed and haldol  -APAP and Dilaudid prns available  -Restart PTA meclizine once extubated and able to take PO  -Head CT 6/30 w/ moderate cerebral atrophy but no other acute processes  -Seroquel increased to 200 mg BID    Respiratory  ### Acute Hypoxic Respiratory failure  ### Ventilator Associated Pneumonia -- CXR w/ LLL infiltrate, + sputum cultures => MSSA  -Extubated 7/7 -- doing well  -Antibiotics as below  -Bronched 7/3 w/ mucous plug removal    CV:  ### HTN  ### Edema -- diuresing; remains + 11L  - cont. HCTZ 25 mg daily, Norvasc 2.5 mg daily, and Metoprolol  50 mg BID  -Lasix 20 mg IVP x1 today  -- good response to this dose; crt stable  -PRN Hydralazine for BP > 175    GI/Liver:  ### EtOH Pancreatitis -- resolved  ### Fatty Liver Disease, likely 2/2 EtOH  -Lipase peaked at 903, is now normal  -Abdominal US 6/25 showing fatty liver    ID:  ### VAP -- LLL Infiltrate on CXR 7/2, + sputum cultures => MSSA   ### Fevers -- resolved  -sputum 7/2 and 7/4 (+) MSSA  -Blood and sputum from 7/3 NGTD; follow  -Procal 0.57 on 7/2  -Vanco d/c'd; Zosyn to complete 8 day course (7/3-    Heme:  ### Thrombocytopenia - resolved  ### Anemia -- mild; likely iatrogenic from blood draws  -monitor; transfuse for hgb < 7    Endocrine:  ### Hyperglycemia -- glucose now controlled  -Continue insulin gtt    FEN:  -Cont. multi-vitamin  -continue tube feeds   -monitor lytes, replete per unit protocol  -Continue free water 60 q4h    Prophy:  GI: PPI  DVT: SCDs; LMWH    ICU Cares:  Restrain needed: Yes   Lines needed: Yes    Code: Full  Dispo: ICU status    Family: Family updated today      Kadie Ramirez MD  Pulmonary and Critical Care Medicine

## 2018-07-08 NOTE — PLAN OF CARE
Problem: Patient Care Overview  Goal: Plan of Care/Patient Progress Review  Outcome: Improving  Patient alert to self and place. BP elevated IV hydralazine given x1, tachycardic, tachypnea on 3 L NC. Morning dose of metoprolol given early per Dr. Francois. IV dilaudid given x2. Precedex at 1.2 mcg/kg/hr. Insulin on algorithm 3 at 5 units/hr. Q 1 hour blood sugar checks. Lung sounds clear. Tube feed at 50 mL/hr with 100 mL water flushes Q 4 hours. Rectal tube in place. [place] patent with adequate urine output. Mepilex on buttocks changed. Attempted to turn patient Q2 hours however patient moves around in bed a lot and does not stay on side for full 2 hours. Attendant at bedside for impulsive behavior and pulling at tubes and lines. Updated patient's brother on phone. Will continue to monitor.

## 2018-07-09 LAB
ANION GAP SERPL CALCULATED.3IONS-SCNC: 8 MMOL/L (ref 3–14)
BUN SERPL-MCNC: 20 MG/DL (ref 7–30)
CALCIUM SERPL-MCNC: 8.3 MG/DL (ref 8.5–10.1)
CHLORIDE SERPL-SCNC: 107 MMOL/L (ref 94–109)
CO2 SERPL-SCNC: 27 MMOL/L (ref 20–32)
CREAT SERPL-MCNC: 0.6 MG/DL (ref 0.66–1.25)
GFR SERPL CREATININE-BSD FRML MDRD: >90 ML/MIN/1.7M2
GLUCOSE BLDC GLUCOMTR-MCNC: 117 MG/DL (ref 70–99)
GLUCOSE BLDC GLUCOMTR-MCNC: 118 MG/DL (ref 70–99)
GLUCOSE BLDC GLUCOMTR-MCNC: 118 MG/DL (ref 70–99)
GLUCOSE BLDC GLUCOMTR-MCNC: 125 MG/DL (ref 70–99)
GLUCOSE BLDC GLUCOMTR-MCNC: 128 MG/DL (ref 70–99)
GLUCOSE BLDC GLUCOMTR-MCNC: 132 MG/DL (ref 70–99)
GLUCOSE BLDC GLUCOMTR-MCNC: 133 MG/DL (ref 70–99)
GLUCOSE BLDC GLUCOMTR-MCNC: 145 MG/DL (ref 70–99)
GLUCOSE BLDC GLUCOMTR-MCNC: 150 MG/DL (ref 70–99)
GLUCOSE BLDC GLUCOMTR-MCNC: 152 MG/DL (ref 70–99)
GLUCOSE BLDC GLUCOMTR-MCNC: 94 MG/DL (ref 70–99)
GLUCOSE SERPL-MCNC: 116 MG/DL (ref 70–99)
MAGNESIUM SERPL-MCNC: 2.1 MG/DL (ref 1.6–2.3)
PHOSPHATE SERPL-MCNC: 3.4 MG/DL (ref 2.5–4.5)
POTASSIUM SERPL-SCNC: 3.3 MMOL/L (ref 3.4–5.3)
POTASSIUM SERPL-SCNC: 3.6 MMOL/L (ref 3.4–5.3)
SODIUM SERPL-SCNC: 142 MMOL/L (ref 133–144)

## 2018-07-09 PROCEDURE — 25000125 ZZHC RX 250: Performed by: INTERNAL MEDICINE

## 2018-07-09 PROCEDURE — 25000132 ZZH RX MED GY IP 250 OP 250 PS 637: Performed by: HOSPITALIST

## 2018-07-09 PROCEDURE — 40000916 ZZH STATISTIC SITTER, NIGHT HOURS

## 2018-07-09 PROCEDURE — 25000132 ZZH RX MED GY IP 250 OP 250 PS 637: Performed by: INTERNAL MEDICINE

## 2018-07-09 PROCEDURE — 84132 ASSAY OF SERUM POTASSIUM: CPT | Performed by: HOSPITALIST

## 2018-07-09 PROCEDURE — 25000132 ZZH RX MED GY IP 250 OP 250 PS 637: Performed by: NURSE PRACTITIONER

## 2018-07-09 PROCEDURE — 25000128 H RX IP 250 OP 636: Performed by: INTERNAL MEDICINE

## 2018-07-09 PROCEDURE — 40000257 ZZH STATISTIC CONSULT NO CHARGE VASC ACCESS

## 2018-07-09 PROCEDURE — 25000131 ZZH RX MED GY IP 250 OP 636 PS 637: Performed by: INTERNAL MEDICINE

## 2018-07-09 PROCEDURE — 80048 BASIC METABOLIC PNL TOTAL CA: CPT | Performed by: HOSPITALIST

## 2018-07-09 PROCEDURE — 27210437 ZZH NUTRITION PRODUCT SEMIELEM INTERMED LITER

## 2018-07-09 PROCEDURE — 25000128 H RX IP 250 OP 636: Performed by: ANESTHESIOLOGY

## 2018-07-09 PROCEDURE — 84100 ASSAY OF PHOSPHORUS: CPT | Performed by: HOSPITALIST

## 2018-07-09 PROCEDURE — 87040 BLOOD CULTURE FOR BACTERIA: CPT | Performed by: INTERNAL MEDICINE

## 2018-07-09 PROCEDURE — 36415 COLL VENOUS BLD VENIPUNCTURE: CPT | Performed by: ANESTHESIOLOGY

## 2018-07-09 PROCEDURE — 00000146 ZZHCL STATISTIC GLUCOSE BY METER IP

## 2018-07-09 PROCEDURE — 87040 BLOOD CULTURE FOR BACTERIA: CPT | Performed by: ANESTHESIOLOGY

## 2018-07-09 PROCEDURE — 20000003 ZZH R&B ICU

## 2018-07-09 PROCEDURE — 83735 ASSAY OF MAGNESIUM: CPT | Performed by: HOSPITALIST

## 2018-07-09 PROCEDURE — 99291 CRITICAL CARE FIRST HOUR: CPT | Performed by: INTERNAL MEDICINE

## 2018-07-09 RX ADMIN — AMLODIPINE BESYLATE 2.5 MG: 2.5 TABLET ORAL at 08:07

## 2018-07-09 RX ADMIN — Medication 40 MG: at 21:42

## 2018-07-09 RX ADMIN — SODIUM CHLORIDE 3 UNITS/HR: 9 INJECTION, SOLUTION INTRAVENOUS at 16:15

## 2018-07-09 RX ADMIN — MIDAZOLAM HYDROCHLORIDE 2 MG: 1 INJECTION, SOLUTION INTRAMUSCULAR; INTRAVENOUS at 03:55

## 2018-07-09 RX ADMIN — SODIUM CHLORIDE 6 UNITS/HR: 9 INJECTION, SOLUTION INTRAVENOUS at 10:11

## 2018-07-09 RX ADMIN — DEXMEDETOMIDINE HYDROCHLORIDE 1.2 MCG/KG/HR: 100 INJECTION, SOLUTION INTRAVENOUS at 21:26

## 2018-07-09 RX ADMIN — MIDAZOLAM HYDROCHLORIDE 2 MG: 1 INJECTION, SOLUTION INTRAMUSCULAR; INTRAVENOUS at 00:45

## 2018-07-09 RX ADMIN — HALOPERIDOL LACTATE 5 MG: 5 INJECTION, SOLUTION INTRAMUSCULAR at 19:43

## 2018-07-09 RX ADMIN — PIPERACILLIN SODIUM,TAZOBACTAM SODIUM 4.5 G: 4; .5 INJECTION, POWDER, FOR SOLUTION INTRAVENOUS at 08:08

## 2018-07-09 RX ADMIN — SODIUM CHLORIDE 0 UNITS/HR: 9 INJECTION, SOLUTION INTRAVENOUS at 12:17

## 2018-07-09 RX ADMIN — SENNOSIDES A AND B 10 ML: 415.36 LIQUID ORAL at 21:45

## 2018-07-09 RX ADMIN — HYDRALAZINE HYDROCHLORIDE 10 MG: 20 INJECTION INTRAMUSCULAR; INTRAVENOUS at 17:06

## 2018-07-09 RX ADMIN — DEXMEDETOMIDINE HYDROCHLORIDE 1.2 MCG/KG/HR: 100 INJECTION, SOLUTION INTRAVENOUS at 17:32

## 2018-07-09 RX ADMIN — QUETIAPINE FUMARATE 200 MG: 200 TABLET ORAL at 08:07

## 2018-07-09 RX ADMIN — MICAFUNGIN SODIUM 100 MG: 10 INJECTION, POWDER, LYOPHILIZED, FOR SOLUTION INTRAVENOUS at 20:05

## 2018-07-09 RX ADMIN — HYDROCHLOROTHIAZIDE 25 MG: 25 TABLET ORAL at 08:07

## 2018-07-09 RX ADMIN — MICONAZOLE NITRATE: 20 CREAM TOPICAL at 09:36

## 2018-07-09 RX ADMIN — HALOPERIDOL LACTATE 5 MG: 5 INJECTION, SOLUTION INTRAMUSCULAR at 06:27

## 2018-07-09 RX ADMIN — MICONAZOLE NITRATE: 20 CREAM TOPICAL at 21:41

## 2018-07-09 RX ADMIN — PIPERACILLIN SODIUM,TAZOBACTAM SODIUM 4.5 G: 4; .5 INJECTION, POWDER, FOR SOLUTION INTRAVENOUS at 03:54

## 2018-07-09 RX ADMIN — DEXMEDETOMIDINE HYDROCHLORIDE 1.2 MCG/KG/HR: 100 INJECTION, SOLUTION INTRAVENOUS at 08:07

## 2018-07-09 RX ADMIN — HALOPERIDOL LACTATE 5 MG: 5 INJECTION, SOLUTION INTRAMUSCULAR at 12:23

## 2018-07-09 RX ADMIN — POTASSIUM CHLORIDE 40 MEQ: 1.5 POWDER, FOR SOLUTION ORAL at 06:04

## 2018-07-09 RX ADMIN — PIPERACILLIN SODIUM,TAZOBACTAM SODIUM 4.5 G: 4; .5 INJECTION, POWDER, FOR SOLUTION INTRAVENOUS at 20:05

## 2018-07-09 RX ADMIN — QUETIAPINE FUMARATE 200 MG: 200 TABLET ORAL at 21:42

## 2018-07-09 RX ADMIN — DEXMEDETOMIDINE HYDROCHLORIDE 1.2 MCG/KG/HR: 100 INJECTION, SOLUTION INTRAVENOUS at 00:51

## 2018-07-09 RX ADMIN — DEXMEDETOMIDINE HYDROCHLORIDE 1.2 MCG/KG/HR: 100 INJECTION, SOLUTION INTRAVENOUS at 14:34

## 2018-07-09 RX ADMIN — ENOXAPARIN SODIUM 40 MG: 40 INJECTION SUBCUTANEOUS at 13:47

## 2018-07-09 RX ADMIN — Medication 40 MG: at 08:07

## 2018-07-09 RX ADMIN — Medication 1 PACKET: at 21:42

## 2018-07-09 RX ADMIN — DEXMEDETOMIDINE HYDROCHLORIDE 1.2 MCG/KG/HR: 100 INJECTION, SOLUTION INTRAVENOUS at 04:36

## 2018-07-09 RX ADMIN — Medication 15 ML: at 09:36

## 2018-07-09 RX ADMIN — PIPERACILLIN SODIUM,TAZOBACTAM SODIUM 4.5 G: 4; .5 INJECTION, POWDER, FOR SOLUTION INTRAVENOUS at 13:47

## 2018-07-09 RX ADMIN — HYDRALAZINE HYDROCHLORIDE 10 MG: 20 INJECTION INTRAMUSCULAR; INTRAVENOUS at 08:09

## 2018-07-09 RX ADMIN — Medication 1 PACKET: at 09:37

## 2018-07-09 RX ADMIN — SODIUM CHLORIDE 2 UNITS/HR: 9 INJECTION, SOLUTION INTRAVENOUS at 08:39

## 2018-07-09 RX ADMIN — MIDAZOLAM HYDROCHLORIDE 2 MG: 1 INJECTION, SOLUTION INTRAMUSCULAR; INTRAVENOUS at 15:13

## 2018-07-09 RX ADMIN — METOPROLOL TARTRATE 50 MG: 100 TABLET ORAL at 21:42

## 2018-07-09 RX ADMIN — METOPROLOL TARTRATE 50 MG: 100 TABLET ORAL at 09:36

## 2018-07-09 RX ADMIN — DEXMEDETOMIDINE HYDROCHLORIDE 1.2 MCG/KG/HR: 100 INJECTION, SOLUTION INTRAVENOUS at 18:04

## 2018-07-09 RX ADMIN — SODIUM CHLORIDE 4 UNITS/HR: 9 INJECTION, SOLUTION INTRAVENOUS at 14:21

## 2018-07-09 RX ADMIN — POTASSIUM CHLORIDE 20 MEQ: 1.5 POWDER, FOR SOLUTION ORAL at 08:09

## 2018-07-09 NOTE — PROGRESS NOTES
"Intensivist note  Currently able to wake up and follow commands, but still quite agitated and requiring Precedex at 1.2.     He is continuing on enteral nutrition.     Assessment and Plan  1. Alcoholism and alcohol withdrawal- he continues to demonstrate withdrawal syndrome and is now at day 14; we will continue to titrate down drips as able. He has functional DT's with a metabolic encephalopathy.   2. Pancreatitis- on admission with peak Lipase about 900; clinically this seems to have resolved.   3. Acute respiratory failure, now resolving; extubated 7/7 and on 2 liters oxygen at present.   4. MSSA, LLL pneumonia - improving and will plan to stop antibiotics in the next day or so.   5. History of HTN- monitor only  6. Dyslipidemia- resume routine meds  7. History of Meniere's disease   8. Blood culture positive for yeast, ID pending; on Micafungin. Will wait for ID and determine how long to treat.   Overall, he remains highly complex with primary issue now is his delirium. I spent 35 minutes of critical care time with him;    Physical exam  Well developed male nad; but at times quite agitated  BP (!) 147/92  Temp 98.5  F (36.9  C) (Oral)  Resp 21  Ht 1.727 m (5' 8\")  Wt 99.4 kg (219 lb 2.2 oz)  SpO2 96%  BMI 33.32 kg/m2  Lungs are clear but limited excursions  Heart is RRR  Abdomen is soft, obese, and non-tender throughout  Extremities are warm with minimal edema  Skin shows no cyanosis or mottling  CNS moves all extremities to commands but still confused    Labs reviewed  Prior imaging studies noted    lilo galeano  July 9, 2018                                "

## 2018-07-09 NOTE — PLAN OF CARE
Problem: Patient Care Overview  Goal: Plan of Care/Patient Progress Review  PT and OT: per nursing request, pt not appropriate for PT or OT today. Will reschedule for tomorrow.

## 2018-07-09 NOTE — PLAN OF CARE
Problem: Alcohol Withdrawal Acute, Risk/Actual (Adult)  Goal: Signs and Symptoms of Listed Potential Problems Will be Absent, Minimized or Managed (Alcohol Withdrawal Acute, Risk/Actual)  Signs and symptoms of listed potential problems will be absent, minimized or managed by discharge/transition of care (reference Alcohol Withdrawal Acute, Risk/Actual (Adult) CPG).   Outcome: No Change      Intensive Care Unit   Nursing Note            Neuro:  PERRL.  Moves all extremities.  Follows commands but very confused and impulsive, frequent attempts to climb rapidly out of bed.  Cardiovascular:  RRR.  Hemodynamically stable without pressors.  Pulmonary:  Tolerating NC @ 2 liters.  Oxygen saturation > 92  GI/:  Brisk UOP, liquid stool.  Endocrine: Glucose well controlled with insulin drip.  Skin:  Intact except open areas on bottom.  Restraints:  Not in use; would likely escalate patient.  AM labs noted; electrolytes replaced as indicated.  Family updated  Continue to monitor closely.      ROUTINE IP LABS (Last four results)  BMP  Recent Labs  Lab 07/09/18  0405 07/08/18  0400 07/07/18  0400 07/06/18  0445    143 145* 144   POTASSIUM 3.3* 3.6 3.5 3.4   CHLORIDE 107 106 107 106   DEMETRIA 8.3* 8.5 8.4* 8.1*   CO2 27 27 29 29   BUN 20 17 14 15   CR 0.60* 0.57* 0.55* 0.52*   * 134* 138* 166*     CBC  Recent Labs  Lab 07/08/18  0400 07/07/18  0400 07/06/18  0445 07/05/18  0715   WBC 10.3 7.8 8.5 8.4   RBC 3.84* 3.74* 3.86* 3.62*   HGB 13.4 13.0* 13.5 12.6*   HCT 39.4* 38.7* 39.6* 37.5*   * 104* 103* 104*   MCH 34.9* 34.8* 35.0* 34.8*   MCHC 34.0 33.6 34.1 33.6   RDW 13.0 12.9 12.9 13.2    356 262 250         Blood Glucose  Glucose (mg/dL)   Date Value   07/09/2018 133 (H)   07/09/2018 117 (H)   07/09/2018 128 (H)   07/09/2018 125 (H)   07/08/2018 148 (H)   07/08/2018 139 (H)       Intake/Output Summary (Last 24 hours) at 07/09/18 0641  Last data filed at 07/09/18 0600   Gross per 24 hour   Intake           3277.41 ml   Output             3200 ml   Net            77.41 ml       Donovan Bradford APRN PHN MSN CCRN FNP-C  [facility]  Intensive Care Unit

## 2018-07-10 LAB
ALBUMIN SERPL-MCNC: 2.4 G/DL (ref 3.4–5)
ALP SERPL-CCNC: 79 U/L (ref 40–150)
ALT SERPL W P-5'-P-CCNC: 61 U/L (ref 0–70)
ANION GAP SERPL CALCULATED.3IONS-SCNC: 8 MMOL/L (ref 3–14)
AST SERPL W P-5'-P-CCNC: 77 U/L (ref 0–45)
BILIRUB DIRECT SERPL-MCNC: 0.3 MG/DL (ref 0–0.2)
BILIRUB SERPL-MCNC: 0.6 MG/DL (ref 0.2–1.3)
BUN SERPL-MCNC: 18 MG/DL (ref 7–30)
CALCIUM SERPL-MCNC: 8.2 MG/DL (ref 8.5–10.1)
CHLORIDE SERPL-SCNC: 109 MMOL/L (ref 94–109)
CO2 SERPL-SCNC: 23 MMOL/L (ref 20–32)
CREAT SERPL-MCNC: 0.62 MG/DL (ref 0.66–1.25)
ERYTHROCYTE [DISTWIDTH] IN BLOOD BY AUTOMATED COUNT: 13.7 % (ref 10–15)
GFR SERPL CREATININE-BSD FRML MDRD: >90 ML/MIN/1.7M2
GLUCOSE BLDC GLUCOMTR-MCNC: 105 MG/DL (ref 70–99)
GLUCOSE BLDC GLUCOMTR-MCNC: 108 MG/DL (ref 70–99)
GLUCOSE BLDC GLUCOMTR-MCNC: 109 MG/DL (ref 70–99)
GLUCOSE BLDC GLUCOMTR-MCNC: 113 MG/DL (ref 70–99)
GLUCOSE BLDC GLUCOMTR-MCNC: 119 MG/DL (ref 70–99)
GLUCOSE BLDC GLUCOMTR-MCNC: 120 MG/DL (ref 70–99)
GLUCOSE BLDC GLUCOMTR-MCNC: 120 MG/DL (ref 70–99)
GLUCOSE BLDC GLUCOMTR-MCNC: 122 MG/DL (ref 70–99)
GLUCOSE BLDC GLUCOMTR-MCNC: 123 MG/DL (ref 70–99)
GLUCOSE BLDC GLUCOMTR-MCNC: 145 MG/DL (ref 70–99)
GLUCOSE BLDC GLUCOMTR-MCNC: 147 MG/DL (ref 70–99)
GLUCOSE BLDC GLUCOMTR-MCNC: 161 MG/DL (ref 70–99)
GLUCOSE BLDC GLUCOMTR-MCNC: 162 MG/DL (ref 70–99)
GLUCOSE BLDC GLUCOMTR-MCNC: 173 MG/DL (ref 70–99)
GLUCOSE BLDC GLUCOMTR-MCNC: 94 MG/DL (ref 70–99)
GLUCOSE SERPL-MCNC: 157 MG/DL (ref 70–99)
HCT VFR BLD AUTO: 38.7 % (ref 40–53)
HGB BLD-MCNC: 12.9 G/DL (ref 13.3–17.7)
INR PPP: 1.38 (ref 0.86–1.14)
MAGNESIUM SERPL-MCNC: 2.4 MG/DL (ref 1.6–2.3)
MCH RBC QN AUTO: 34.7 PG (ref 26.5–33)
MCHC RBC AUTO-ENTMCNC: 33.3 G/DL (ref 31.5–36.5)
MCV RBC AUTO: 104 FL (ref 78–100)
PHOSPHATE SERPL-MCNC: 3.2 MG/DL (ref 2.5–4.5)
PLATELET # BLD AUTO: 495 10E9/L (ref 150–450)
POTASSIUM SERPL-SCNC: 3.7 MMOL/L (ref 3.4–5.3)
POTASSIUM SERPL-SCNC: 4 MMOL/L (ref 3.4–5.3)
PROCALCITONIN SERPL-MCNC: 0.4 NG/ML
PROT SERPL-MCNC: 7.4 G/DL (ref 6.8–8.8)
RBC # BLD AUTO: 3.72 10E12/L (ref 4.4–5.9)
SODIUM SERPL-SCNC: 140 MMOL/L (ref 133–144)
WBC # BLD AUTO: 9.6 10E9/L (ref 4–11)

## 2018-07-10 PROCEDURE — 85027 COMPLETE CBC AUTOMATED: CPT | Performed by: HOSPITALIST

## 2018-07-10 PROCEDURE — 20000003 ZZH R&B ICU

## 2018-07-10 PROCEDURE — 25000132 ZZH RX MED GY IP 250 OP 250 PS 637: Performed by: INTERNAL MEDICINE

## 2018-07-10 PROCEDURE — 00000146 ZZHCL STATISTIC GLUCOSE BY METER IP

## 2018-07-10 PROCEDURE — 83735 ASSAY OF MAGNESIUM: CPT | Performed by: HOSPITALIST

## 2018-07-10 PROCEDURE — 99291 CRITICAL CARE FIRST HOUR: CPT | Performed by: PSYCHIATRY & NEUROLOGY

## 2018-07-10 PROCEDURE — 25000131 ZZH RX MED GY IP 250 OP 636 PS 637: Performed by: INTERNAL MEDICINE

## 2018-07-10 PROCEDURE — 84132 ASSAY OF SERUM POTASSIUM: CPT | Performed by: HOSPITALIST

## 2018-07-10 PROCEDURE — 80048 BASIC METABOLIC PNL TOTAL CA: CPT | Performed by: HOSPITALIST

## 2018-07-10 PROCEDURE — 25000128 H RX IP 250 OP 636: Performed by: INTERNAL MEDICINE

## 2018-07-10 PROCEDURE — 25000125 ZZHC RX 250: Performed by: INTERNAL MEDICINE

## 2018-07-10 PROCEDURE — 25000132 ZZH RX MED GY IP 250 OP 250 PS 637: Performed by: NURSE PRACTITIONER

## 2018-07-10 PROCEDURE — 25000128 H RX IP 250 OP 636: Performed by: ANESTHESIOLOGY

## 2018-07-10 PROCEDURE — 27210437 ZZH NUTRITION PRODUCT SEMIELEM INTERMED LITER

## 2018-07-10 PROCEDURE — 99291 CRITICAL CARE FIRST HOUR: CPT | Performed by: INTERNAL MEDICINE

## 2018-07-10 PROCEDURE — 95816 EEG AWAKE AND DROWSY: CPT

## 2018-07-10 PROCEDURE — 80076 HEPATIC FUNCTION PANEL: CPT | Performed by: HOSPITALIST

## 2018-07-10 PROCEDURE — 40000239 ZZH STATISTIC VAT ROUNDS

## 2018-07-10 PROCEDURE — 84145 PROCALCITONIN (PCT): CPT | Performed by: HOSPITALIST

## 2018-07-10 PROCEDURE — 85610 PROTHROMBIN TIME: CPT | Performed by: HOSPITALIST

## 2018-07-10 PROCEDURE — 25000132 ZZH RX MED GY IP 250 OP 250 PS 637: Performed by: HOSPITALIST

## 2018-07-10 PROCEDURE — 84100 ASSAY OF PHOSPHORUS: CPT | Performed by: HOSPITALIST

## 2018-07-10 RX ADMIN — MICONAZOLE NITRATE: 20 CREAM TOPICAL at 21:39

## 2018-07-10 RX ADMIN — METOPROLOL TARTRATE 50 MG: 100 TABLET ORAL at 20:52

## 2018-07-10 RX ADMIN — Medication 15 ML: at 08:05

## 2018-07-10 RX ADMIN — PIPERACILLIN SODIUM,TAZOBACTAM SODIUM 4.5 G: 4; .5 INJECTION, POWDER, FOR SOLUTION INTRAVENOUS at 02:30

## 2018-07-10 RX ADMIN — Medication 40 MG: at 21:48

## 2018-07-10 RX ADMIN — QUETIAPINE FUMARATE 200 MG: 200 TABLET ORAL at 20:54

## 2018-07-10 RX ADMIN — DOCUSATE SODIUM 100 MG: 50 LIQUID ORAL at 20:52

## 2018-07-10 RX ADMIN — DEXMEDETOMIDINE HYDROCHLORIDE 1.2 MCG/KG/HR: 100 INJECTION, SOLUTION INTRAVENOUS at 03:39

## 2018-07-10 RX ADMIN — DEXMEDETOMIDINE HYDROCHLORIDE 1.2 MCG/KG/HR: 100 INJECTION, SOLUTION INTRAVENOUS at 11:02

## 2018-07-10 RX ADMIN — DEXMEDETOMIDINE HYDROCHLORIDE 1.2 MCG/KG/HR: 100 INJECTION, SOLUTION INTRAVENOUS at 07:54

## 2018-07-10 RX ADMIN — PIPERACILLIN SODIUM,TAZOBACTAM SODIUM 4.5 G: 4; .5 INJECTION, POWDER, FOR SOLUTION INTRAVENOUS at 08:06

## 2018-07-10 RX ADMIN — DEXMEDETOMIDINE HYDROCHLORIDE 1.2 MCG/KG/HR: 100 INJECTION, SOLUTION INTRAVENOUS at 07:15

## 2018-07-10 RX ADMIN — SODIUM CHLORIDE 4 UNITS/HR: 9 INJECTION, SOLUTION INTRAVENOUS at 14:07

## 2018-07-10 RX ADMIN — Medication 1 PACKET: at 08:08

## 2018-07-10 RX ADMIN — DEXMEDETOMIDINE HYDROCHLORIDE 1.2 MCG/KG/HR: 100 INJECTION, SOLUTION INTRAVENOUS at 14:40

## 2018-07-10 RX ADMIN — SENNOSIDES A AND B 10 ML: 415.36 LIQUID ORAL at 20:52

## 2018-07-10 RX ADMIN — PIPERACILLIN SODIUM,TAZOBACTAM SODIUM 4.5 G: 4; .5 INJECTION, POWDER, FOR SOLUTION INTRAVENOUS at 13:03

## 2018-07-10 RX ADMIN — SODIUM CHLORIDE 3 UNITS/HR: 9 INJECTION, SOLUTION INTRAVENOUS at 18:16

## 2018-07-10 RX ADMIN — MICAFUNGIN SODIUM 100 MG: 10 INJECTION, POWDER, LYOPHILIZED, FOR SOLUTION INTRAVENOUS at 20:53

## 2018-07-10 RX ADMIN — QUETIAPINE FUMARATE 200 MG: 200 TABLET ORAL at 08:08

## 2018-07-10 RX ADMIN — DEXMEDETOMIDINE HYDROCHLORIDE 1.2 MCG/KG/HR: 100 INJECTION, SOLUTION INTRAVENOUS at 21:13

## 2018-07-10 RX ADMIN — Medication 40 MG: at 08:05

## 2018-07-10 RX ADMIN — Medication 1 PACKET: at 21:40

## 2018-07-10 RX ADMIN — METOPROLOL TARTRATE 50 MG: 100 TABLET ORAL at 08:05

## 2018-07-10 RX ADMIN — AMLODIPINE BESYLATE 2.5 MG: 2.5 TABLET ORAL at 08:05

## 2018-07-10 RX ADMIN — DEXMEDETOMIDINE HYDROCHLORIDE 1.2 MCG/KG/HR: 100 INJECTION, SOLUTION INTRAVENOUS at 00:54

## 2018-07-10 RX ADMIN — MIDAZOLAM HYDROCHLORIDE 2 MG: 1 INJECTION, SOLUTION INTRAMUSCULAR; INTRAVENOUS at 03:37

## 2018-07-10 RX ADMIN — MICONAZOLE NITRATE: 20 CREAM TOPICAL at 10:00

## 2018-07-10 RX ADMIN — SODIUM CHLORIDE 3 UNITS/HR: 9 INJECTION, SOLUTION INTRAVENOUS at 00:54

## 2018-07-10 RX ADMIN — DEXMEDETOMIDINE HYDROCHLORIDE 1.2 MCG/KG/HR: 100 INJECTION, SOLUTION INTRAVENOUS at 17:52

## 2018-07-10 RX ADMIN — POLYETHYLENE GLYCOL 3350 17 G: 17 POWDER, FOR SOLUTION ORAL at 20:52

## 2018-07-10 RX ADMIN — MIDAZOLAM HYDROCHLORIDE 2 MG: 1 INJECTION, SOLUTION INTRAMUSCULAR; INTRAVENOUS at 00:58

## 2018-07-10 RX ADMIN — ENOXAPARIN SODIUM 40 MG: 40 INJECTION SUBCUTANEOUS at 12:56

## 2018-07-10 RX ADMIN — PIPERACILLIN SODIUM,TAZOBACTAM SODIUM 4.5 G: 4; .5 INJECTION, POWDER, FOR SOLUTION INTRAVENOUS at 20:54

## 2018-07-10 RX ADMIN — SODIUM CHLORIDE 1.5 UNITS/HR: 9 INJECTION, SOLUTION INTRAVENOUS at 15:14

## 2018-07-10 RX ADMIN — SODIUM CHLORIDE 3 UNITS/HR: 9 INJECTION, SOLUTION INTRAVENOUS at 09:59

## 2018-07-10 RX ADMIN — HYDROCHLOROTHIAZIDE 25 MG: 25 TABLET ORAL at 08:05

## 2018-07-10 NOTE — PROGRESS NOTES
"Intensivist note  He was calm today, but still requiring significant amounts of precedex.     He also seems to have a left wrist drop, but does seem to have some extensor motor. I asked Neuro-CCM to see today primarily for persistent encephalopathy. I have spoken with them and am grateful for them for their fine care in advance.     Assessment and Plan  1. Alcoholism and alcohol withdrawal- he continues to demonstrate evidence of withdrawal syndrome and is now at day 15. We will continue to titrate down drips as able, and he has functional DT's with a metabolic encephalopathy.   2. Pancreatitis- on admission with peak Lipase about 900; clinically this seems to have resolved.   3. Acute respiratory failure, now resolving; extubated 7/7 and remains on 2 liters oxygen at present.   4. MSSA, LLL pneumonia - improving and will plan to stop antibiotics soon.   5. History of HTN- monitor only  6. Dyslipidemia- resume routine meds  7. History of Meniere's disease   8. Blood culture positive for yeast, ID pending; on Micafungin. Will wait for ID and determine how long to treat.   Overall, he remains severely encephalopathic and at high risk of decompensation. I spent 30 minutes of critical care time with him.    Physical exam  Well developed male, NAD; agitated on when medications are less  /74  Temp 99.3  F (37.4  C) (Axillary)  Resp 21  Ht 1.727 m (5' 8\")  Wt 98.8 kg (217 lb 13 oz)  SpO2 95%  BMI 33.12 kg/m2  Lungs mainly clear today   Heart is RRR  Abdomen is obese, soft, and non-tender  Extremities are warm with minimal edema  Skin shows no cyanosis or mottling   CNS moves all extremities well to command    Labs reviewed     lilo galeano  July 10, 2018                        "

## 2018-07-10 NOTE — PLAN OF CARE
Problem: Patient Care Overview  Goal: Plan of Care/Patient Progress Review  Outcome: No Change  Intermittently restless and agitated. Would try to get out of bed sometimes. On continuous max dose of precedex. PRN haldol and versed needed to be given to decrease agitation. Sitter at bedside. Alert but disoriented. Hallucination at times. On 2 LPM nasal cannula. Lungs clear. Sinus rhythm. Vitals stable. On NG tube feeding. No BM pm shift. Good urine output

## 2018-07-10 NOTE — PROGRESS NOTES
EEG #  portable, ordered by Haylie Cruz MD.  Pt is awake, drowsy, and can follow simple commands.  No activations

## 2018-07-10 NOTE — PLAN OF CARE
Problem: Patient Care Overview  Goal: Plan of Care/Patient Progress Review  OT/PT: Per discussion with RN, pt not appropriate for therapy mobilization at this time. Remains confused and unable to accurately follow commands.

## 2018-07-10 NOTE — CONSULTS
Neurology Consult    Consult requested by: ICU - Benjamin Hernandez MD   Reason: ongoing altered mental status   7/10/2018  Source of information: Patient and chart review    Chief Complaint:   Persistent delirium and left wrist drop    History of Present Illness:    Db Watson is a 52 year old male with a PMH significant for alcohol abuse who was admitted for acute EtOH pancreatitis and EtOH withdrawal on 6/27. Per chart review, has had off and on agitation requiring Precedex along with PRN haldoperidol and benzodiazepines. Hospital course significant for persistent delirium, alcohol withdrawal, pancreatitis, acute respiratory failure, MSSA pneumonia.    On interview with patient, he reports that he is a daily drinker, consumes 1 drink of beer a day and 3-4 drinks of whiskey. Denies any history of alcohol withdrawal seizures. Denies any history of stroke. Patient also noted that he has recently developed a weak left wrist. Uncertain of when the onset occurred but was noticed over the past 24 hours. Is unable to grasp items with his left hand.      The patient's medical, surgical, social, and family history were personally reviewed with the patient.  Past Medical History:   Diagnosis Date     Abnormal MRI of head 1/11/2013     Atypical chest pain 9/4/2012     BMI 33.0-33.9,adult 5/24/2013     Hyperlipidemia LDL goal <160 5/4/2013     Hypertension      Ménière's disease       Past Surgical History:   Procedure Laterality Date     C NONSPECIFIC PROCEDURE      shoulder surgery for dislocation      C NONSPECIFIC PROCEDURE      eye surgery, lens implant right     EYE SURGERY       FRACTURE TX, WRIST RT/LT      Fracture TX Wrist RT/LT     ORTHOPEDIC SURGERY       Social History     Social History     Marital status: Single     Spouse name: N/A     Number of children: N/A     Years of education: N/A     Occupational History     Not on file.     Social History Main Topics     Smoking status: Never Smoker     Smokeless  tobacco: Never Used      Comment: rarely, once per year maybe      Alcohol use Yes      Comment: 10 drinks / week      Drug use: No     Sexual activity: Yes     Partners: Female     Other Topics Concern     Parent/Sibling W/ Cabg, Mi Or Angioplasty Before 65f 55m? Yes     Social History Narrative     Family History   Problem Relation Age of Onset     Hypertension Mother      Diabetes Father      C.A.D. Father      bypass surgery     Current Facility-Administered Medications   Medication     acetaminophen (TYLENOL) tablet 325 mg     amLODIPine (NORVASC) tablet 2.5 mg     bisacodyl (DULCOLAX) Suppository 10 mg     dexmedetomidine (PRECEDEX) 400 mcg in sodium chloride 0.9 % 100 mL infusion     dextrose 10 % 1,000 mL infusion     glucose gel 15-30 g    Or     dextrose 50 % injection 25-50 mL    Or     glucagon injection 1 mg     docusate (COLACE) 50 MG/5ML liquid 100 mg     enoxaparin (LOVENOX) injection 40 mg     haloperidol lactate (HALDOL) injection 5 mg     hydrALAZINE (APRESOLINE) injection 10 mg     hydrochlorothiazide (HYDRODIURIL) tablet 25 mg     HYDROmorphone (PF) (DILAUDID) injection 0.3-0.5 mg     hypromellose-dextran (ARTIFICAL TEARS) 0.1-0.3 % ophthalmic solution 2-3 drop     insulin 1 unit/mL in saline (NovoLIN, HumuLIN Regular) drip - ADULT IV Infusion     lidocaine (LMX4) cream     lidocaine 1 % 1 mL     magnesium sulfate 2 g in water intermittent infusion     magnesium sulfate 4 g in 100 mL sterile water (premade)     melatonin tablet 3 mg     metoprolol (LOPRESSOR) suspension 50 mg     micafungin (MYCAMINE) 100 mg in sodium chloride 0.9 % 100 mL intermittent infusion     miconazole (MICATIN) 2 % cream     miconazole (MICATIN; MICRO GUARD) 2 % powder     midazolam (VERSED) injection 2 mg     multivitamins with minerals (CERTAVITE/CEROVITE) liquid 15 mL     naloxone (NARCAN) injection 0.1-0.4 mg     ondansetron (ZOFRAN-ODT) ODT tab 4 mg    Or     ondansetron (ZOFRAN) injection 4 mg     oxyCODONE IR  "(ROXICODONE) tablet 5-10 mg     pantoprazole (PROTONIX) 2 mg/mL suspension 40 mg     piperacillin-tazobactam (ZOSYN) 4.5 g vial to attach to  mL bag     polyethylene glycol (MIRALAX/GLYCOLAX) Packet 17 g     polyethylene glycol (MIRALAX/GLYCOLAX) Packet 17 g     potassium chloride (KLOR-CON) Packet 20-40 mEq     potassium chloride 10 mEq in 100 mL intermittent infusion with 10 mg lidocaine     potassium chloride 10 mEq in 100 mL sterile water intermittent infusion (premix)     potassium chloride 20 mEq in 50 mL intermittent infusion     potassium chloride SA (K-DUR/KLOR-CON M) CR tablet 20-40 mEq     protein modular (PROSource TF) 1 packet     QUEtiapine (SEROquel) tablet 200 mg     senna-docusate (SENOKOT-S;PERICOLACE) 8.6-50 MG per tablet 1 tablet    Or     senna-docusate (SENOKOT-S;PERICOLACE) 8.6-50 MG per tablet 2 tablet     sennosides (SENOKOT) syrup 10 mL     sodium chloride (PF) 0.9% PF flush 10-20 mL     sodium chloride (PF) 0.9% PF flush 3 mL     sodium chloride (PF) 0.9% PF flush 3 mL     sodium chloride 0.9% infusion     Allergies   Allergen Reactions     Zoloft [Sertraline] Nausea and Vomiting     Review of Systems:  14-point review of systems was completed. The pertinent positives and negatives are in the HPI, and the remainder was negative unless otherwise mentioned.    Physical Exam:  /79  Temp 98.9  F (37.2  C) (Oral)  Resp 22  Ht 1.727 m (5' 8\")  Wt 98.8 kg (217 lb 13 oz)  SpO2 95%  BMI 33.12 kg/m2   General:  Pleasant. Resting comfortably in bed.   Head:  Normocephalic, atraumatic  Eyes:  No conjunctival injection, no scleral icterus.   Mouth:  No oral lesions  Respiratory: on supplemental O2.  Cardiovascular:  Peripheral pulses intact. No carotid bruits.  Abdomen:  Obese  Extremities:  Warm, dry without peripheral edema  Neurologic:     Mental Status Exam:  Alert, awake and oriented. Provides a thorough history. Speech of normal fluency though requires significant effort. " Responses to questions were significantly delayed   Cranial Nerves:  EOMs intact, no nystagmus, facial movements symmetric, facial sensation intact to light touch, hearing intact to conversation, palate and uvula rise symmetrically, no deviation in uvula or tongue, symmetric shoulder shrug, tongue midline and fully mobile.    Motor:  Normal tone in all four extremities, no atrophy or fasciculations were seen. 5/5 strength bilaterally in right upper extremity, 3/5 strength in left upper extremity extensors but 5/5 strength in left upper extremity flexors. 5/5 strength in LE hip flexors and knee extensors. Unable to open left hand or extend pointer finger. Left triceps 4/5, forearm extensors 4/5 and wrist and finger extensors 2-3/5   Sensory:  Sensation intact to light touch on arms and legs bilaterally. Decreased vibration LUE   Coordination:  Finger-nose-finger without dysmetria bilaterally.   Reflexes:  1+ and symmetric in biceps, brachioradialis, patellar, Achilles, and plantars upgoing bilaterally.    Gait:  Not assess due to patient condition    Laboratory:  CBC RESULTS:   Recent Labs   Lab Test  07/10/18   0520   WBC  9.6   RBC  3.72*   HGB  12.9*   HCT  38.7*   MCV  104*   MCH  34.7*   MCHC  33.3   RDW  13.7   PLT  495*   Last Basic Metabolic Panel:  Lab Results   Component Value Date     07/10/2018      Lab Results   Component Value Date    POTASSIUM 3.7 07/10/2018     Lab Results   Component Value Date    CHLORIDE 109 07/10/2018     Lab Results   Component Value Date    DEMETRIA 8.2 07/10/2018     Lab Results   Component Value Date    CO2 23 07/10/2018     Lab Results   Component Value Date    BUN 18 07/10/2018     Lab Results   Component Value Date    CR 0.62 07/10/2018     Lab Results   Component Value Date     07/10/2018     Imaging: CT from 6/30/18 reviewed    Assessment/Plan:    Db Watson is a 52 year old male with alcohol abuse who presents with ongoing changes in mental status after  withdrawal from alcohol along with new onset wrist drop.     #AMS:   Unclear etiology at this point, but differential at this time includes vascular insult vs encephalopathy. Patient was fully present for the interview and was able to recall events leading up to his hospitalization. Lower on differential includes Wernicke's encephalopathy but per chart review, received thiamine and does not have characteristic findings of ataxia and ophthalmoplegia associated with this condition.   -EEG and ammonia level to assess for encephalopathy   -MRI brain and C spine to evaluate for possibility of stroke   -Frequent neuro checks    #left wrist drop:   Differential includes C spine manipulation leading to possible radiculopathy vs. Spinal cord infarct involving radial nerve given extensor weakness in arm and forearm.   -MRI of C spine to assess for radiculopathy     Please call us with any questions, case discussed with Dr Clay    Vascular neurology pager # [phone]

## 2018-07-10 NOTE — PLAN OF CARE
Problem: Patient Care Overview  Goal: Plan of Care/Patient Progress Review  Pt VSS. Dex continues at 1.2 rate. Redirectable and able to follow. Oriented to self and at times place. Neuro consulted today. Contue to monitor.

## 2018-07-10 NOTE — PROGRESS NOTES
CLINICAL NUTRITION SERVICES - REASSESSMENT NOTE      Malnutrition:   (6/27)  % Weight Loss:  None noted  % Intake: <75% for > 7 days (non-severe malnutrition)  Subcutaneous Fat Loss:  None observed  Muscle Loss:  None observed  Fluid Retention:  Mild 2+ LE (likely not nutrition related)      Malnutrition Diagnosis: Patient does not meet two of the above criteria diagnosing for malnutrition        EVALUATION OF PROGRESS TOWARD GOALS   Diet:  NPO  Nutrition Support:  Patient continues on goal TF regimen as follows ~    Nutrition Support Enteral:  Type of Feeding Tube: Nasoduodenal   Enteral Frequency:  Continuous  Enteral Regimen: Peptamen 1.5 at 50 mL/hr   Total Enteral Provisions: 1800 kcal, 82 g protein (1 g/kg and 87% needs), 226 g CHO, no fiber, 924 mL H2O, 5280 International Units Vit A, 216 mg Vit C, 26 mg Zinc   Free Water Flush: 60 mL every 4 hours   Also receiving ProSource 1 pkt BID = 80 kcal and 22 g protein   Total (TF + ProSource)= 1880 kcal (24 kcal/kg and 96% needs), 104 g protein (1.3 g/kg)    Intake/Tolerance:    Mg 2.4 (H)  -161 on Insulin drip   BM x 2 on 7/9 - Senokot/Miralax/Colace on hold   I/O 3670/2930, weight 98.8 kg (trending downward) - Lasix d/c'd 7/8      ASSESSED NUTRITION NEEDS:  Dosing Weight (78 kg adjusted for overwt)       Estimated Energy Needs: 7762-1480 kcals (25-30 Kcal/Kg)  Justification: obese and vented  Estimated Protein Needs: 101-117 grams protein (1.3-1.5 g pro/Kg)  Justification: stress and wound healing      NEW FINDINGS:   Receiving Certavite daily due to PI and H/O ETOH   Noted patient with scattered stage 2 PI on right buttock    Previous Goals (7/6):   TF + ProSource will continue to meet % estimated needs  Evaluation: Met    Previous Nutrition Diagnosis (7/6):   No nutrition diagnosis identified at this time   Evaluation: No change      CURRENT NUTRITION DIAGNOSIS  No nutrition diagnosis identified at this time     INTERVENTIONS  Recommendations /  Nutrition Prescription  Continue Peptamen 1.5 at 50 mL/hr + ProSource 1 pkt BID as above     Implementation  Collaboration and Referral of Nutrition care:  Patient discussed today during interdisciplinary bedside rounds     Goals  Peptamen 1.5 at 50 mL/hr + ProSource 1p pkt BID will continue to meet % needs    MONITORING AND EVALUATION:  Progress towards goals will be monitored and evaluated per protocol and Practice Guidelines    Roxy Green RD, LD, CNSC   Clinical Dietitian - [facility]

## 2018-07-10 NOTE — PROGRESS NOTES
"SPIRITUAL HEALTH SERVICES  Spiritual Assessment Progress Note  [facility] ICU   had a lengthy conversation with pt's parents.  Parents shared with me pt's long history of addiction some of which they just learned about. Pt has never been to rehab but parents and family members believe it is needed.  Mother stated \"If Db comes home he will either commit suicide or go back to drinking.  He needs new coping skills and new friends.\"   Pt has lost his job and his health insurance.  Family has moved him out of his apartment.    Pt's father is a retired [employer]  and they are very loving and supportive of pt.  Pt attends [employer] in [place] and has been visited by the  there. Pt's brother, wife and niece are very supportive of pt getting help.     Family wants help for pt and they would like to have him evaluated by psychiatrist and chem dep if possible.       listened to pt's story.  Provided education re: Chemical dependency tx, emotional support and prayer.      continues to be available for support.                                                                                                                                             Zee Christensen M.Div., [facility]  Staff    Pager [phone]  "

## 2018-07-11 ENCOUNTER — APPOINTMENT (OUTPATIENT)
Dept: OCCUPATIONAL THERAPY | Facility: CLINIC | Age: 53
DRG: 166 | End: 2018-07-11
Attending: INTERNAL MEDICINE
Payer: COMMERCIAL

## 2018-07-11 ENCOUNTER — APPOINTMENT (OUTPATIENT)
Dept: MRI IMAGING | Facility: CLINIC | Age: 53
DRG: 166 | End: 2018-07-11
Payer: COMMERCIAL

## 2018-07-11 ENCOUNTER — APPOINTMENT (OUTPATIENT)
Dept: PHYSICAL THERAPY | Facility: CLINIC | Age: 53
DRG: 166 | End: 2018-07-11
Payer: COMMERCIAL

## 2018-07-11 LAB
ANION GAP SERPL CALCULATED.3IONS-SCNC: 10 MMOL/L (ref 3–14)
BUN SERPL-MCNC: 19 MG/DL (ref 7–30)
CALCIUM SERPL-MCNC: 8.3 MG/DL (ref 8.5–10.1)
CHLORIDE SERPL-SCNC: 107 MMOL/L (ref 94–109)
CO2 SERPL-SCNC: 24 MMOL/L (ref 20–32)
CREAT SERPL-MCNC: 0.61 MG/DL (ref 0.66–1.25)
ERYTHROCYTE [DISTWIDTH] IN BLOOD BY AUTOMATED COUNT: 13.7 % (ref 10–15)
GFR SERPL CREATININE-BSD FRML MDRD: >90 ML/MIN/1.7M2
GLUCOSE BLDC GLUCOMTR-MCNC: 101 MG/DL (ref 70–99)
GLUCOSE BLDC GLUCOMTR-MCNC: 106 MG/DL (ref 70–99)
GLUCOSE BLDC GLUCOMTR-MCNC: 110 MG/DL (ref 70–99)
GLUCOSE BLDC GLUCOMTR-MCNC: 121 MG/DL (ref 70–99)
GLUCOSE BLDC GLUCOMTR-MCNC: 122 MG/DL (ref 70–99)
GLUCOSE BLDC GLUCOMTR-MCNC: 124 MG/DL (ref 70–99)
GLUCOSE BLDC GLUCOMTR-MCNC: 133 MG/DL (ref 70–99)
GLUCOSE BLDC GLUCOMTR-MCNC: 133 MG/DL (ref 70–99)
GLUCOSE BLDC GLUCOMTR-MCNC: 145 MG/DL (ref 70–99)
GLUCOSE BLDC GLUCOMTR-MCNC: 165 MG/DL (ref 70–99)
GLUCOSE BLDC GLUCOMTR-MCNC: 203 MG/DL (ref 70–99)
GLUCOSE BLDC GLUCOMTR-MCNC: 82 MG/DL (ref 70–99)
GLUCOSE SERPL-MCNC: 118 MG/DL (ref 70–99)
HCT VFR BLD AUTO: 39.6 % (ref 40–53)
HGB BLD-MCNC: 13.3 G/DL (ref 13.3–17.7)
MAGNESIUM SERPL-MCNC: 2.2 MG/DL (ref 1.6–2.3)
MCH RBC QN AUTO: 34.8 PG (ref 26.5–33)
MCHC RBC AUTO-ENTMCNC: 33.6 G/DL (ref 31.5–36.5)
MCV RBC AUTO: 104 FL (ref 78–100)
PHOSPHATE SERPL-MCNC: 3.6 MG/DL (ref 2.5–4.5)
PLATELET # BLD AUTO: 481 10E9/L (ref 150–450)
POTASSIUM SERPL-SCNC: 4.1 MMOL/L (ref 3.4–5.3)
RBC # BLD AUTO: 3.82 10E12/L (ref 4.4–5.9)
SODIUM SERPL-SCNC: 141 MMOL/L (ref 133–144)
WBC # BLD AUTO: 8.5 10E9/L (ref 4–11)

## 2018-07-11 PROCEDURE — 97530 THERAPEUTIC ACTIVITIES: CPT | Mod: GO

## 2018-07-11 PROCEDURE — 97162 PT EVAL MOD COMPLEX 30 MIN: CPT | Mod: GP | Performed by: PHYSICAL THERAPIST

## 2018-07-11 PROCEDURE — 40000133 ZZH STATISTIC OT WARD VISIT

## 2018-07-11 PROCEDURE — 97530 THERAPEUTIC ACTIVITIES: CPT | Mod: GP | Performed by: PHYSICAL THERAPIST

## 2018-07-11 PROCEDURE — 25000132 ZZH RX MED GY IP 250 OP 250 PS 637: Performed by: INTERNAL MEDICINE

## 2018-07-11 PROCEDURE — A9585 GADOBUTROL INJECTION: HCPCS | Performed by: HOSPITALIST

## 2018-07-11 PROCEDURE — 00000146 ZZHCL STATISTIC GLUCOSE BY METER IP

## 2018-07-11 PROCEDURE — 20000003 ZZH R&B ICU

## 2018-07-11 PROCEDURE — 72156 MRI NECK SPINE W/O & W/DYE: CPT

## 2018-07-11 PROCEDURE — 85027 COMPLETE CBC AUTOMATED: CPT | Performed by: HOSPITALIST

## 2018-07-11 PROCEDURE — 25000132 ZZH RX MED GY IP 250 OP 250 PS 637: Performed by: HOSPITALIST

## 2018-07-11 PROCEDURE — 25000128 H RX IP 250 OP 636: Performed by: ANESTHESIOLOGY

## 2018-07-11 PROCEDURE — 97110 THERAPEUTIC EXERCISES: CPT | Mod: GP | Performed by: PHYSICAL THERAPIST

## 2018-07-11 PROCEDURE — 25000125 ZZHC RX 250: Performed by: INTERNAL MEDICINE

## 2018-07-11 PROCEDURE — 25000128 H RX IP 250 OP 636: Performed by: INTERNAL MEDICINE

## 2018-07-11 PROCEDURE — 40000193 ZZH STATISTIC PT WARD VISIT: Performed by: PHYSICAL THERAPIST

## 2018-07-11 PROCEDURE — 99233 SBSQ HOSP IP/OBS HIGH 50: CPT | Performed by: INTERNAL MEDICINE

## 2018-07-11 PROCEDURE — 99232 SBSQ HOSP IP/OBS MODERATE 35: CPT | Performed by: PSYCHIATRY & NEUROLOGY

## 2018-07-11 PROCEDURE — 87102 FUNGUS ISOLATION CULTURE: CPT | Performed by: INTERNAL MEDICINE

## 2018-07-11 PROCEDURE — 40000239 ZZH STATISTIC VAT ROUNDS

## 2018-07-11 PROCEDURE — 97166 OT EVAL MOD COMPLEX 45 MIN: CPT | Mod: GO

## 2018-07-11 PROCEDURE — 97535 SELF CARE MNGMENT TRAINING: CPT | Mod: GO

## 2018-07-11 PROCEDURE — 84100 ASSAY OF PHOSPHORUS: CPT | Performed by: HOSPITALIST

## 2018-07-11 PROCEDURE — 27210437 ZZH NUTRITION PRODUCT SEMIELEM INTERMED LITER

## 2018-07-11 PROCEDURE — 80048 BASIC METABOLIC PNL TOTAL CA: CPT | Performed by: HOSPITALIST

## 2018-07-11 PROCEDURE — 97110 THERAPEUTIC EXERCISES: CPT | Mod: GO

## 2018-07-11 PROCEDURE — 25000132 ZZH RX MED GY IP 250 OP 250 PS 637: Performed by: NURSE PRACTITIONER

## 2018-07-11 PROCEDURE — 3E0336Z INTRODUCTION OF NUTRITIONAL SUBSTANCE INTO PERIPHERAL VEIN, PERCUTANEOUS APPROACH: ICD-10-PCS | Performed by: INTERNAL MEDICINE

## 2018-07-11 PROCEDURE — 70553 MRI BRAIN STEM W/O & W/DYE: CPT

## 2018-07-11 PROCEDURE — 40000141 ZZH STATISTIC PERIPHERAL IV START W/O US GUIDANCE

## 2018-07-11 PROCEDURE — 83735 ASSAY OF MAGNESIUM: CPT | Performed by: HOSPITALIST

## 2018-07-11 PROCEDURE — 25000128 H RX IP 250 OP 636: Performed by: HOSPITALIST

## 2018-07-11 RX ORDER — GADOBUTROL 604.72 MG/ML
10 INJECTION INTRAVENOUS ONCE
Status: COMPLETED | OUTPATIENT
Start: 2018-07-11 | End: 2018-07-11

## 2018-07-11 RX ORDER — ACETAMINOPHEN 325 MG/1
650 TABLET ORAL EVERY 24 HOURS
Status: DISCONTINUED | OUTPATIENT
Start: 2018-07-11 | End: 2018-07-20

## 2018-07-11 RX ORDER — DIPHENHYDRAMINE HYDROCHLORIDE 50 MG/ML
25 INJECTION INTRAMUSCULAR; INTRAVENOUS EVERY 24 HOURS
Status: DISCONTINUED | OUTPATIENT
Start: 2018-07-11 | End: 2018-07-12

## 2018-07-11 RX ORDER — DEXTROSE MONOHYDRATE 25 G/50ML
25-50 INJECTION, SOLUTION INTRAVENOUS
Status: DISCONTINUED | OUTPATIENT
Start: 2018-07-11 | End: 2018-07-15

## 2018-07-11 RX ORDER — DIPHENHYDRAMINE HYDROCHLORIDE 50 MG/ML
25 INJECTION INTRAMUSCULAR; INTRAVENOUS EVERY 6 HOURS PRN
Status: DISCONTINUED | OUTPATIENT
Start: 2018-07-11 | End: 2018-07-12

## 2018-07-11 RX ORDER — DIPHENHYDRAMINE HCL 25 MG
25 CAPSULE ORAL EVERY 24 HOURS
Status: DISCONTINUED | OUTPATIENT
Start: 2018-07-11 | End: 2018-07-12

## 2018-07-11 RX ORDER — NICOTINE POLACRILEX 4 MG
15-30 LOZENGE BUCCAL
Status: DISCONTINUED | OUTPATIENT
Start: 2018-07-11 | End: 2018-07-15

## 2018-07-11 RX ORDER — DIPHENHYDRAMINE HCL 25 MG
25 CAPSULE ORAL EVERY 6 HOURS PRN
Status: DISCONTINUED | OUTPATIENT
Start: 2018-07-11 | End: 2018-07-12

## 2018-07-11 RX ADMIN — AMPHOTERICIN B 300 MG: 50 INJECTION, POWDER, LYOPHILIZED, FOR SOLUTION INTRAVENOUS at 18:32

## 2018-07-11 RX ADMIN — POLYETHYLENE GLYCOL 3350 17 G: 17 POWDER, FOR SOLUTION ORAL at 21:23

## 2018-07-11 RX ADMIN — Medication 1 PACKET: at 09:05

## 2018-07-11 RX ADMIN — SENNOSIDES A AND B 10 ML: 415.36 LIQUID ORAL at 09:05

## 2018-07-11 RX ADMIN — ENOXAPARIN SODIUM 40 MG: 40 INJECTION SUBCUTANEOUS at 13:01

## 2018-07-11 RX ADMIN — Medication 40 MG: at 21:23

## 2018-07-11 RX ADMIN — PIPERACILLIN SODIUM,TAZOBACTAM SODIUM 4.5 G: 4; .5 INJECTION, POWDER, FOR SOLUTION INTRAVENOUS at 05:20

## 2018-07-11 RX ADMIN — DEXMEDETOMIDINE HYDROCHLORIDE 1 MCG/KG/HR: 100 INJECTION, SOLUTION INTRAVENOUS at 10:15

## 2018-07-11 RX ADMIN — GADOBUTROL 10 ML: 604.72 INJECTION INTRAVENOUS at 04:46

## 2018-07-11 RX ADMIN — Medication 1 PACKET: at 21:23

## 2018-07-11 RX ADMIN — DEXMEDETOMIDINE HYDROCHLORIDE 1.2 MCG/KG/HR: 100 INJECTION, SOLUTION INTRAVENOUS at 04:20

## 2018-07-11 RX ADMIN — DEXMEDETOMIDINE HYDROCHLORIDE 0.5 MCG/KG/HR: 100 INJECTION, SOLUTION INTRAVENOUS at 17:43

## 2018-07-11 RX ADMIN — DEXMEDETOMIDINE HYDROCHLORIDE 1.2 MCG/KG/HR: 100 INJECTION, SOLUTION INTRAVENOUS at 07:03

## 2018-07-11 RX ADMIN — METOPROLOL TARTRATE 50 MG: 100 TABLET ORAL at 09:05

## 2018-07-11 RX ADMIN — METOPROLOL TARTRATE 50 MG: 100 TABLET ORAL at 21:32

## 2018-07-11 RX ADMIN — DEXMEDETOMIDINE HYDROCHLORIDE 1.2 MCG/KG/HR: 100 INJECTION, SOLUTION INTRAVENOUS at 00:50

## 2018-07-11 RX ADMIN — ACETAMINOPHEN 650 MG: 325 TABLET, FILM COATED ORAL at 18:00

## 2018-07-11 RX ADMIN — OXYCODONE HYDROCHLORIDE 5 MG: 5 TABLET ORAL at 18:57

## 2018-07-11 RX ADMIN — Medication 15 ML: at 09:06

## 2018-07-11 RX ADMIN — QUETIAPINE FUMARATE 200 MG: 200 TABLET ORAL at 09:06

## 2018-07-11 RX ADMIN — SODIUM CHLORIDE, PRESERVATIVE FREE: 5 INJECTION INTRAVENOUS at 00:04

## 2018-07-11 RX ADMIN — QUETIAPINE FUMARATE 200 MG: 200 TABLET ORAL at 21:32

## 2018-07-11 RX ADMIN — MICONAZOLE NITRATE: 20 CREAM TOPICAL at 09:06

## 2018-07-11 RX ADMIN — PIPERACILLIN SODIUM,TAZOBACTAM SODIUM 4.5 G: 4; .5 INJECTION, POWDER, FOR SOLUTION INTRAVENOUS at 14:04

## 2018-07-11 RX ADMIN — DOCUSATE SODIUM 100 MG: 50 LIQUID ORAL at 21:23

## 2018-07-11 RX ADMIN — SENNOSIDES A AND B 10 ML: 415.36 LIQUID ORAL at 21:23

## 2018-07-11 RX ADMIN — SODIUM CHLORIDE, PRESERVATIVE FREE: 5 INJECTION INTRAVENOUS at 16:16

## 2018-07-11 RX ADMIN — SODIUM CHLORIDE, PRESERVATIVE FREE 500 ML: 5 INJECTION INTRAVENOUS at 17:38

## 2018-07-11 RX ADMIN — MICONAZOLE NITRATE: 20 CREAM TOPICAL at 23:07

## 2018-07-11 RX ADMIN — Medication 40 MG: at 09:05

## 2018-07-11 RX ADMIN — AMLODIPINE BESYLATE 2.5 MG: 2.5 TABLET ORAL at 09:06

## 2018-07-11 RX ADMIN — HYDROCHLOROTHIAZIDE 25 MG: 25 TABLET ORAL at 09:06

## 2018-07-11 RX ADMIN — DIPHENHYDRAMINE HYDROCHLORIDE 25 MG: 50 INJECTION, SOLUTION INTRAMUSCULAR; INTRAVENOUS at 17:59

## 2018-07-11 RX ADMIN — PIPERACILLIN SODIUM,TAZOBACTAM SODIUM 4.5 G: 4; .5 INJECTION, POWDER, FOR SOLUTION INTRAVENOUS at 08:50

## 2018-07-11 NOTE — PROGRESS NOTES
SPIRITUAL HEALTH SERVICES  Spiritual Assessment Progress Note  [facility] ICU   checked in with pt and parents.  Pt was able to talk about getting better.  Parents continue to be supportive.  They are hoping to come and visit with pt 2x a week.       listened and provided support.                                                                                                                                             Zee Christensen M.Div., [facility]  Staff    Pager [phone]

## 2018-07-11 NOTE — PROCEDURES
Procedure Date: 07/10/2018      PORTABLE INPATIENT ELECTROENCEPHALOGRAM       ORDERING PROVIDER:  Haylie Cruz MD      EEG #:        HISTORY:  This is a 52-year-old with chronic alcohol abuse and multiple medical problems.  He is being evaluated for altered mental status.      FINDINGS:  Desynchronized low-amplitude EEG is seen throughout.  The highest amplitude activity is 20-25 uV alpha activity seen in the central regions.  Low-amplitude beta persists at other times together with some theta and a little delta.  There is no clear focal slowing.  There is no organized posterior dominant rhythm.  The patient does appear to be awake and interactive throughout the study.  Vertex waves or sleep spindles were not seen.      ACTIVATION PROCEDURES:  Hyperventilation and photic stimulation were not performed.      OTHER INTERICTAL ABNORMALITIES:  No epileptiform discharges.      ICTAL ABNORMALITIES:  No electrographic seizures.      IMPRESSION:  Abnormal because of some slowing of background activity consistent with a mild diffuse encephalopathy.  A low-voltage EEG is seen, which is not unusual in people with chronic alcohol use.  It can sometimes be seen in settings of alcohol withdrawal, though it is not diagnostic for this entity.  Epileptiform discharges or seizures were not seen.         AHMET MORRIS MD             D: 2018   T: 2018   MT: FLORA      Name:     JOEL CROUCH   MRN:      [id number]        Account:        [id number]   :      1965           Procedure Date: 07/10/2018      Document: [id number]

## 2018-07-11 NOTE — PROGRESS NOTES
07/11/18 1439   Quick Adds   Type of Visit Initial PT Evaluation   Living Environment   Lives With alone   Living Arrangements apartment;other (see comments)   Living Environment Comment Pt reports he was evicted from apt and won't be able to move back in upon dc - anticipates moving in with his parents who live in apt with no stairs/elevator access   Self-Care   Dominant Hand right   Usual Activity Tolerance good   Current Activity Tolerance fair   Regular Exercise yes   Activity/Exercise Type walking   Exercise Amount/Frequency daily   Equipment Currently Used at Home none   Activity/Exercise/Self-Care Comment Reports walking, mostly outside, occasional treadmil ambulation   Functional Level Prior   Ambulation 0-->independent   Transferring 0-->independent   Toileting 0-->independent   Bathing 0-->independent   Dressing 0-->independent   Eating 0-->independent   Communication 0-->understands/communicates without difficulty   Fall history within last six months no   Which of the above functional risks had a recent onset or change? ambulation;transferring;bathing;toileting;dressing;cognition;swallowing;eating   Prior Functional Level Comment Indepdnent at [facility]. Currently unemployed. worked as a banker.   General Information   Onset of Illness/Injury or Date of Surgery - Date 06/25/18   Referring Physician Krystal Ramirez MD   Patient/Family Goals Statement None stated.   Pertinent History of Current Problem (include personal factors and/or comorbidities that impact the POC) 53 yo male adm with nausea, vomiting, abdominal pain and tremor and is found to have suspected alcoholic hepatitis and pancreatitis. Required intubation for airway protection, now extubated   Precautions/Limitations fall precautions   Cognitive Status Examination   Orientation orientation to person, place and time   Level of Consciousness alert   Follows Commands and Answers Questions 100% of the time;able to follow multistep instructions  "  Personal Safety and Judgment impulsive   Integumentary/Edema   Integumentary/Edema Comments B ankles soft pitting edema, minimal contours a tthe ankle bones.   Range of Motion (ROM)   ROM Comment B LEs WFL, R UE WFL, L UE with limited range at times, but does usie it functionally to push up and scoot back inteh chair. However, difficulty with using it to get to the EOB.   Strength   Strength Comments L hand extensors weak, wrist extensors weak 2/5. B LE gross MMTs reveal slight weakness of the L hip in hip flexion and adductoin.   Bed Mobility   Bed Mobility Comments Sup>sit Min A to avoid pushing down on a flexed wrist, unaware the hand was in a poor position to WB.   Transfer Skills   Transfer Comments Sit>stand Min A x 2   Gait   Gait Comments Bedside gait Min A x 2 for balance   Balance   Balance Comments Sitting balance tendency for L and poster positioning. Standing balance L and posterior as well. Able to \"fix\" insittin gwith cues, standing unable to fully stand at midline despite cues.   Sensory Examination   Sensory Perception Comments Deneis n/t   Coordination   Coordination Comments Difficulty with placement and coordination of the L hand, note B toe taps difficulty alternating and maintaining speed slow or fast.    General Therapy Interventions   Planned Therapy Interventions balance training;bed mobility training;gait training;neuromuscular re-education;ROM;strengthening;stretching;transfer training;progressive activity/exercise   Clinical Impression   Criteria for Skilled Therapeutic Intervention yes, treatment indicated   PT Diagnosis Impaired Gait   Influenced by the following impairments Weakness, fatigue, decreased balance, decreased fucntional activity toelrance   Functional limitations due to impairments Decreased functional independence   Clinical Presentation Evolving/Changing   Clinical Presentation Rationale Level of assist, far from baseline independence, medical c omplexity, lives alone " "  Clinical Decision Making (Complexity) Moderate complexity   Therapy Frequency` daily   Predicted Duration of Therapy Intervention (days/wks) 1 week   Anticipated Discharge Disposition Acute Rehabilitation Facility   Risk & Benefits of therapy have been explained Yes   Patient, Family & other staff in agreement with plan of care Yes   [facility] TM \"6 Clicks\"   2016, Trustees of [facility], under license to [employer].  All rights reserved.   6 Clicks Short Forms Basic Mobility Inpatient Short Form   [facility]  \"6 Clicks\" V.2 Basic Mobility Inpatient Short Form   1. Turning from your back to your side while in a flat bed without using bedrails? 3 - A Little   2. Moving from lying on your back to sitting on the side of a flat bed without using bedrails? 3 - A Little   3. Moving to and from a bed to a chair (including a wheelchair)? 2 - A Lot   4. Standing up from a chair using your arms (e.g., wheelchair, or bedside chair)? 2 - A Lot   5. To walk in hospital room? 2 - A Lot   6. Climbing 3-5 steps with a railing? 1 - Total   Basic Mobility Raw Score (Score out of 24.Lower scores equate to lower levels of function) 13   Total Evaluation Time   Total Evaluation Time (Minutes) 10     "

## 2018-07-11 NOTE — PLAN OF CARE
Problem: Patient Care Overview  Goal: Plan of Care/Patient Progress Review  Outcome: Improving  Patient alert, intermittently disoriented to place and situation. PERRL, follows commands. Easily redirectable when needed. Bed alarm on. VSS on 2 L nasal cannula with humidification. Lung sounds clear. Tele: SR. Active bowel sounds, no bowel movement this shift. Tube feed at 50 mL/hr with 60 mL flushes Q4 hours. Likes ice chips for dry mouth. [place] patent. MRI completed this morning. Precedex at 1.2 mcg/kg/hr. Insulin gtt stopped for MRI, restarted on algorithm 1, currently on 0.5 units/hr with Q 1 hour blood sugar checks. Will continue to monitor.     Brother Blu updated by phone.

## 2018-07-11 NOTE — CONSULTS
[facility]    Infectious Disease Consultation     Date of Admission:  6/25/2018  Date of Consult (When I saw the patient): 07/11/18    Assessment & Plan   Db Watson is a 52 year old male who was admitted on 6/25/2018.     Impression:  1. 52 y.o male admitted about 3 weeks ago now.   2. Admission diagnosis was pancreatitis and alcoholic hepatitis.   3. Admission course has been complicated by delirium, resp failure, MSSA pneumonia.   4. Now blood cultures from 7/3 is coming back positive for Rhodotorula species, SUKUMAR pending, only 1 blood culture was drawn that day with repeat blood cultures not done till 7/9 which are still pending.   5. He is currently on IV zosyn day 9 for pneumonia.   6. Also on Micafungin.     Recommendations:   Fungemia with Rhodotorula most likely real given, patient with liver disease, pancreatitis, tpn use, multiple lines in the ICU.   SUKUMAR on the Rhodotorula is pending, and empiric treatment is Amphotericin till the SUKUMAR are back, will start Liposomal Amphotericin.   Get the PICC which has been in place since 7/4 out back.   Follow up on the pending blood cultures.   Stop Micafungin, high degree of resistance in this organisms against echinocandins.   I think enough zosyn already for MSSA pneumonia.   Repeat blood cultures.   Try to avoid any longterm IV lines if possible.         Mitchel Beth MD    Reason for Consult   Reason for consult: I was asked by Dr. Hernandez to evaluate this patient for fungemia.    Primary Care Physician   Jennifer Demarco    Chief Complaint   Alcohol withdrawal     History is obtained from the patient and medical records    History of Present Illness   Db Watson is a 52 year old male with history of alcohol abuse who was admitted for acute EtOH pancreatitis and EtOH withdrawal on 6/27, complicated with persistent delirium, alcohol withdrawal, pancreatitis, acute respiratory failure, MSSA pneumonia. And now blood cultures with Rhodoturula.  Currently on Micafungin.        Past Medical History   I have reviewed this patient's medical history and updated it with pertinent information if needed.   Past Medical History:   Diagnosis Date     Abnormal MRI of head 1/11/2013     Atypical chest pain 9/4/2012     BMI 33.0-33.9,adult 5/24/2013     Hyperlipidemia LDL goal <160 5/4/2013     Hypertension      Ménière's disease        Past Surgical History   I have reviewed this patient's surgical history and updated it with pertinent information if needed.  Past Surgical History:   Procedure Laterality Date     C NONSPECIFIC PROCEDURE      shoulder surgery for dislocation      C NONSPECIFIC PROCEDURE      eye surgery, lens implant right     EYE SURGERY       FRACTURE TX, WRIST RT/LT      Fracture TX Wrist RT/LT     ORTHOPEDIC SURGERY         Prior to Admission Medications   Prior to Admission Medications   Prescriptions Last Dose Informant Patient Reported? Taking?   B Complex Vitamins (VITAMIN B COMPLEX) tablet Past Week at Unknown time Self Yes Yes   Sig: Take 1 tablet by mouth daily.   MELATONIN PO Past Month at PRN Self Yes Yes   Sig: Take 5 mg by mouth nightly as needed   amLODIPine (NORVASC) 2.5 MG tablet 6/25/2018 at AM Self No Yes   Sig: Take 1 tablet (2.5 mg) by mouth daily +++ appointment needed for additional refills +++   aspirin 81 MG tablet 6/25/2018 at AM Self No Yes   Sig: Take 1 tablet by mouth daily.   fish oil-omega-3 fatty acids (FISH OIL) 1000 MG capsule Past Week at Unknown time Self Yes Yes   Sig: Take 1 g by mouth daily    hydrochlorothiazide (HYDRODIURIL) 25 MG tablet  Self No No   Sig: Take 1 tablet (25 mg) by mouth daily   hypromellose (ARTIFICIAL TEARS) 0.5 % SOLN ophthalmic solution  at PRN Self Yes Yes   Sig: Place 1 drop into both eyes every hour as needed for dry eyes   meclizine 25 MG CHEW Past Month at PRN Self Yes Yes   Sig: Take 25 mg by mouth every 6 hours as needed.    Indications: Sensation of Spinning or Whirling   metoprolol  succinate (TOPROL-XL) 50 MG 24 hr tablet 2018 at AM Self No Yes   Sig: Take 1 tablet (50 mg) by mouth 2 times daily    traZODone (DESYREL) 50 MG tablet Past Week at PRN Self No Yes   Si-2 at bedtime as needed for sleep      Facility-Administered Medications: None     Allergies   Allergies   Allergen Reactions     Zoloft [Sertraline] Nausea and Vomiting       Immunization History   Immunization History   Administered Date(s) Administered     TD (ADULT, 7+) 2006     TDAP Vaccine (Adacel) 2015       Social History   I have reviewed this patient's social history and updated it with pertinent information if needed. Db Watson  reports that he has never smoked. He has never used smokeless tobacco. He reports that he drinks alcohol. He reports that he does not use illicit drugs.    Family History   I have reviewed this patient's family history and updated it with pertinent information if needed.   Family History   Problem Relation Age of Onset     Hypertension Mother      Diabetes Father      C.A.D. Father      bypass surgery       Review of Systems   The 10 point Review of Systems is negative other than noted in the HPI or here.     Physical Exam   Temp: 98.4  F (36.9  C) Temp src: Oral BP: 120/85   Heart Rate: 80 Resp: 26 SpO2: 92 % O2 Device: None (Room air) Oxygen Delivery: 2 LPM  Vital Signs with Ranges  Temp:  [98.4  F (36.9  C)-99.4  F (37.4  C)] 98.4  F (36.9  C)  Heart Rate:  [66-88] 80  Resp:  [17-44] 26  BP: (100-155)/() 120/85  SpO2:  [91 %-97 %] 92 %  215 lbs 6.23 oz  Body mass index is 32.75 kg/(m^2).    GENERAL APPEARANCE: tremulous, sweating    EYES: Eyes grossly normal to inspection, PERRL and conjunctivae and sclerae normal  HENT: ear canals and TM's normal and nose and mouth without ulcers or lesions  NECK: no adenopathy, no asymmetry, masses, or scars and thyroid normal to palpation  RESP: lungs clear to auscultation - no rales, rhonchi or wheezes  CV: regular rates and  rhythm, normal S1 S2, no S3 or S4 and no murmur, click or rub  LYMPHATICS: normal ant/post cervical and supraclavicular nodes  ABDOMEN: distended, non tender  MS: extremities normal- no gross deformities noted  SKIN: no suspicious lesions or rashes      Data   Lab Results   Component Value Date    WBC 8.5 07/11/2018    HGB 13.3 07/11/2018    HCT 39.6 (L) 07/11/2018     (H) 07/11/2018     07/11/2018    POTASSIUM 4.1 07/11/2018    CHLORIDE 107 07/11/2018    CO2 24 07/11/2018    BUN 19 07/11/2018    CR 0.61 (L) 07/11/2018     (H) 07/11/2018    TROPI <0.015 11/07/2017    AST 77 (H) 07/10/2018    ALT 61 07/10/2018    ALKPHOS 79 07/10/2018    BILITOTAL 0.6 07/10/2018    INR 1.38 (H) 07/10/2018       Recent Labs  Lab 07/09/18  0720 07/09/18  0405   CULT No growth after 2 days No growth after 2 days     Recent Labs   Lab Test  07/09/18   0720  07/09/18   0405  07/04/18   0642  07/03/18   1440  07/03/18   1127  07/02/18   2350   CULT  No growth after 2 days  No growth after 2 days  Moderate growth  Staphylococcus aureus  *  Plus  Light growth  Normal david    Cultured on the 4th day of incubation:  Rhodotorula species  *  Critical Value/Significant Value, preliminary result only, called to and read back by  Yessi Singh RN, @3201 07/07/18..    Susceptibility testing in progress  No growth  Heavy growth  Staphylococcus aureus  *  Plus  Light growth  Normal david

## 2018-07-11 NOTE — PROGRESS NOTES
07/11/18 0859   Living Environment   Lives With alone   Living Arrangements apartment;other (see comments)   Living Environment Comment Pt reports he was evicted from apt and won't be able to move back in upon dc - anticipates moving in with his parents who live in apt with no stairs/elevator access.    Self-Care   Dominant Hand right   Usual Activity Tolerance good   Current Activity Tolerance fair   Regular Exercise other (see comments)   Equipment Currently Used at Home none   Activity/Exercise/Self-Care Comment Pt reports he does alot of walking but not structured program   Functional Level Prior   Ambulation 0-->independent   Transferring 0-->independent   Toileting 0-->independent   Bathing 0-->independent   Dressing 0-->independent   Eating 0-->independent   Communication 0-->understands/communicates without difficulty   Swallowing 0-->swallows foods/liquids without difficulty   Cognition 0 - no cognition issues reported   Fall history within last six months no   Which of the above functional risks had a recent onset or change? none   Prior Functional Level Comment I all IADLs, recently laid off from job as    General Information   Onset of Illness/Injury or Date of Surgery - Date 06/27/18   Referring Physician Dr. Krystal Ramirez   Patient/Family Goals Statement not sure yet, home w/parents or to CD treatment possibly   Additional Occupational Profile Info/Pertinent History of Current Problem 51yo male admitted with abdominal pain, vomiting, tremor and diagnosed with alcoholic hepatitis and pancreatitis. Was intubated on 2/2 acute withdrawl, extubated 7/8 with continued delerium and hallucinations and persistent encephalopathy. Pt has L wrist drop - MRI brain negative for stroke, neck MRI pending.    Precautions/Limitations fall precautions;oxygen therapy device and L/min;swallowing precautions  (tube feeds)   General Info Comments Per chart review, pt's parents report long-standing alcohol  problem and they hope patient will go to inpatient CD program.    Cognitive Status Examination   Orientation orientation to person, place and time   Level of Consciousness alert   Able to Follow Commands success, 2-step commands   Personal Safety (Cognitive) good awareness, safety precautions  (per nursing, today much better)   Cognitive Comment Needs further assessment   Visual Perception   Visual Perception (reading glasses only)   Visual Acuity Reports some blurriness   Visual Field Intact x 4   Visual Attention Intact   Occulomotor Intact   Sensory Examination   Sensory Quick Adds No deficits were identified   Pain Assessment   Patient Currently in Pain No   Range of Motion (ROM)   ROM Comment RUE WNL grossly; L peter, elb WNL, L wrist extension impaired   Strength   Strength Comments R peter, elb 4 to 4+/5, distally 4-/5; L peter 4/5, elb 5/5, wrist flex/ext impaired  (L wrist drop)   Hand Strength   Hand Strength Comments R hand  (dom hand) weak but functional, L hand- pt can squeeze therapist's hand weakly--unable to use functionally due to the wrist weakness.Can close hand over cup as flexors intact.   Coordination   Coordination Comments R hand appears WFL when manipulating objects although anticipate pinch below average for age and gender (not formally assessed), L hand unable to manipulate objects requiring bilateral coordination.   Mobility   Bed Mobility Comments Supine to sit with HOB @ 30o SBA   Transfer Skill: Bed to Chair/Chair to Bed   Level of [place]: Bed to Chair moderate assist (50% patients effort)   Physical Assist/Nonphysical Assist: Bed to Chair 1 person assist;other (see comments)  (Min of another person as well)   Assistive Device - Transfer Skill Bed to Chair Chair to Bed Rehab Eval (none avail as yet -ordered walker)   Transfer Skill: Sit to Stand   Level of [place]: Sit/Stand minimum assist (75% patients effort)   Physical Assist/Nonphysical Assist: Sit/Stand 1 person assist  "  Balance   Balance Comments SBA static sitting @ midline, Mod A static standing   Grooming   Level of [place]: Grooming minimum assist (75% patients effort)   Physical Assist/Nonphysical Assist: Grooming 1 person assist;set-up required   Eating/Self Feeding   Level of [place]: Eating minimum assist (75% patients effort)   Physical Assist/Nonphysical Assist: Eating 1 person assist;set-up required   Activities of Daily Living Analysis   Impairments Contributing to Impaired Activities of Daily Living balance impaired;cognition impaired;ROM decreased;strength decreased   General Therapy Interventions   Planned Therapy Interventions ADL retraining;IADL retraining;cognition;manual therapy;neuromuscular re-education;ROM;strengthening;transfer training;visual perception;home program guidelines;progressive activity/exercise;fine motor coordination training   Clinical Impression   Criteria for Skilled Therapeutic Interventions Met yes, treatment indicated   OT Diagnosis decreased ADL/IADL performance   Influenced by the following impairments cognition, balance, weakness, L wrist drop   Assessment of Occupational Performance 5 or more Performance Deficits   Identified Performance Deficits functional mobility, dressing, toileting, grooming, self-feeding, bathing, household mgmt, social skills, work skills, community mobility   Clinical Decision Making (Complexity) Moderate complexity   Therapy Frequency other (see comments)  (increase to 2x/day upon t/f to floor)   Predicted Duration of Therapy Intervention (days/wks) 7   Anticipated Discharge Disposition Acute Rehabilitation Facility;Other (see comments)  (followed by inpatient CD treatment)   Risks and Benefits of Treatment have been explained. Yes   Patient, Family & other staff in agreement with plan of care Yes   [facility] AM-PAC TM \"6 Clicks\"   2016, Trustees of [facility], under license to [employer].  All rights reserved.   6 Clicks Short " "Forms Daily Activity Inpatient Short Form   [facility] AM-PAC  \"6 Clicks\" Daily Activity Inpatient Short Form   1. Putting on and taking off regular lower body clothing? 2 - A Lot   2. Bathing (including washing, rinsing, drying)? 2 - A Lot   3. Toileting, which includes using toilet, bedpan or urinal? 2 - A Lot   4. Putting on and taking off regular upper body clothing? 3 - A Little   5. Taking care of personal grooming such as brushing teeth? 3 - A Little   6. Eating meals? 3 - A Little   Daily Activity Raw Score (Score out of 24.Lower scores equate to lower levels of function) 15   Total Evaluation Time   Total Evaluation Time (Minutes) 20     "

## 2018-07-11 NOTE — PLAN OF CARE
Problem: OT General Care Plan  Goal: OT Goal 1  OT Goal 1   Discharge Planner OT   Patient plan for discharge: go to parents home, or CD treatment or whatever is needed  Current status: OT richard completed and treatment initiated this AM I ICU. Pt cooperative, alert and oriented to self, , month, year, appears to have given baseline info correctly, followed directives throughout. He reports he has been living alone but has been evicted as unable to pay rent due to having been laid off his job as a  last month. He anticipates moving in with his parents who live in an apartment (with elevator access) while continuing to look for work.  He reports having had visual hallucinations last evening, none currently, and some mild blurriness to his vision. Pt tracked x 4Q, visual fields appear intact x 4. Performed supine to sit with increased effort but no assist (SBA), able to maintain static midline sitting balance indep, sit-stand Min A of 1, unsteady on feet due to core weakness - leaning posteriorly, Mod A of 1 for static standing balance, Min-Mod A of 2 for bed to chair. Tolerated standing x 3min, able to  feet from floor but unsteady. RUE AROM WNL peter through hand and R peter, elb, triceps, 4 to 4+/5. L peter, elb AROM WNL. However, pt has a L wrist drop (radial palsy type) with 2/5 extensor strength, can open/close left hand and do thumb to finger adduction first 3 digits with effort. OT will address through ROM and possibly splinting if needed. Pt and nursing educated in positioning and pt in exercises to do on own. OT to to see daily when in ICU, may increase frequency to twice daily once transferred to floor to advance ADLs, mobilities, L wrist function and further assess cognitive function.  Barriers to return to prior living situation:  Current level of assist, weakness, fall risk  Recommendations for discharge: ARU followed by inpatient CD treatment program  Rationale for recommendations:  anticipate pt will continue to progress, given his baseline level of independence and motivation to participate in therapy pt good candidate for ARU as will require intensive therapies to advance strength and mobilities prior to entry into CD program and/or return home.       Entered by: Mychal Calvillo 07/11/2018 9:43 AM

## 2018-07-11 NOTE — PLAN OF CARE
"Problem: Patient Care Overview  Goal: Plan of Care/Patient Progress Review  Discharge Planner PT   Patient plan for discharge: \"I'm going to stay with my parents, I guess.\"  Current status: 51 yo male adm with nausea, vomiting, abdominal pain and tremor and is found to have suspected alcoholic hepatitis and pancreatitis. Required intubation for airway protection, now extubated. Following commands, oriented x 3. Participates in sup>sit with CGA. Seated scoot with safety cues to attend to the L hand-holds in a flexed slightly IR position and demonstrates weak finger and wrist extensors. During EOB dangle note pt with slight L sided lean and slight L hip abduction-able to correct with verbal and tactile cues. MMTs reveal a slightly weaker hip on the L with hip flexion and adduction. Pt with slight L eye droop, states this is baseline. Sit<>stand CGA, stand pivot Min A x 2 secondary to impaired balance. Posterior lean preference. Able to lift feet for stepping, but labored and intentional. Up in chair upon PT exit. VSS.   Barriers to return to prior living situation: Level of assist, fall risk, decreased balance and strength  Recommendations for discharge: Acute Rehab  Rationale for recommendations:  Pt will benefit from continued skilled rehab services during LOS and at discharge to continue to progress independence and safety with functional mobility.       Entered by: Bri Roberts 07/11/2018 2:34 PM           "

## 2018-07-11 NOTE — PROGRESS NOTES
"Intensivist note  He seems much more calm today. We are now actively titrating down the precedex. Per family request we will request psychiatry consult tomorrow (when alert enough to interact) for possible depression.     Assessment and Plan  1. Alcoholism and alcohol withdrawal- he continues to demonstrate evidence of withdrawal syndrome and is now at day 16; however, he is beginning to finally improve. We will continue to titrate down support as able; metabolic encephalopathy. EEG without seizure focus; MRI of brain and c/spine noted; and appreciate assistance from neuro.   2. Pancreatitis- on admission with peak Lipase about 900; clinically this seems to have resolved.   3. Acute respiratory failure, now resolved; extubated 7/7; remains on 2 liters oxygen.   4. MSSA, LLL pneumonia - improving and will plan to stop antibiotics soon tomorrow 7/12.   5. History of HTN- monitor only  6. Dyslipidemia- resume routine meds  7. History of Meniere's disease   8. Blood culture positive for yeast, ID Rhodotorula species; likely to be sensitive to Micafungin. Will ask ID to assess given unusual nature of pathogen in a patient that does not seem to be immunosuppressed.   Overall, highly complex but now slowly improving.     Physical exam  Well developed male NAD  /69  Temp 98.4  F (36.9  C) (Oral)  Resp 20  Ht 1.727 m (5' 8\")  Wt 97.7 kg (215 lb 6.2 oz)  SpO2 95%  BMI 32.75 kg/m2  Lungs are clear bilaterally  Heart is RRR  Abdomen is soft and non-tender  Extremities are warm with minimal edema;  Skin shows no cyanosis or mottling  CNS still mild confusion but better; moves 4 extremities well to command    Labs reviewed    lilo galeano  July 11, 2018          "

## 2018-07-11 NOTE — PROGRESS NOTES
"Neurology Progress Note    Subjective:     No acute events overnight.     Patient feels better this am, noting that his memory feels clearer and feels \"less foggy.\"     Did report that as he was falling asleep, noted some visual distortions, stating that the clock and tv, which he knows are on the wall, were suddenly on the floor.     Regarding his wrist drop, notes slight improvement in strength but notes that it is still weak.     MRI brain and C spine completed which were unremarkable for ischemic stroke and radiculopathy, respectively.     Vitals: Temp: 98.4  F (36.9  C) Temp  Min: 98.4  F (36.9  C)  Max: 99.4  F (37.4  C)  Resp: 20 Resp  Min: 17  Max: 44  SpO2: 95 % SpO2  Min: 91 %  Max: 97 %    No Data Recorded  Heart Rate: 71 Heart Rate  Min: 66  Max: 88  BP: 107/69 Systolic (24hrs), Av , Min:100 , Max:155   Diastolic (24hrs), Av, Min:61, Max:114    Medications:  Current Facility-Administered Medications:      acetaminophen (TYLENOL) tablet 325 mg, 325 mg, Oral, Q8H PRN, Aristides Bustos MD, 325 mg at 18 1016     amLODIPine (NORVASC) tablet 2.5 mg, 2.5 mg, Oral, Daily, Aristides Bustos MD, 2.5 mg at 18 0906     bisacodyl (DULCOLAX) Suppository 10 mg, 10 mg, Rectal, Daily PRN, Aristides Bustos MD, 10 mg at 18 1221     dexmedetomidine (PRECEDEX) 400 mcg in sodium chloride 0.9 % 100 mL infusion, 0.2-1.2 mcg/kg/hr, Intravenous, Continuous, Krystal Ramirez MD, Last Rate: 13.3 mL/hr at 18 1211, 0.5 mcg/kg/hr at 18 1211     dextrose 10 % 1,000 mL infusion, , Intravenous, Continuous PRN, Krystal Ramirez MD     glucose gel 15-30 g, 15-30 g, Oral, Q15 Min PRN **OR** dextrose 50 % injection 25-50 mL, 25-50 mL, Intravenous, Q15 Min PRN **OR** glucagon injection 1 mg, 1 mg, Subcutaneous, Q15 Min PRN, Jeffry Hernandez MD     docusate (COLACE) 50 MG/5ML liquid 100 mg, 100 mg, Oral or Feeding Tube, BID, Aristides Bustos MD, 100 mg at 07/10/18 " 2052     enoxaparin (LOVENOX) injection 40 mg, 40 mg, Subcutaneous, Q24H, Krystal Ramirez MD, 40 mg at 07/11/18 1301     haloperidol lactate (HALDOL) injection 5 mg, 5 mg, Intravenous, Q6H PRN, Simone العلي MD, 5 mg at 07/09/18 1943     hydrALAZINE (APRESOLINE) injection 10 mg, 10 mg, Intravenous, Q6H PRN, Krystal Ramirez MD, 10 mg at 07/09/18 1706     hydrochlorothiazide (HYDRODIURIL) tablet 25 mg, 25 mg, Oral, Daily, Luisito Mir MD, 25 mg at 07/11/18 0906     HYDROmorphone (PF) (DILAUDID) injection 0.3-0.5 mg, 0.3-0.5 mg, Intravenous, Q2H PRN, Aristides Bustos MD, 0.5 mg at 07/08/18 2332     hypromellose-dextran (ARTIFICAL TEARS) 0.1-0.3 % ophthalmic solution 2-3 drop, 2-3 drop, Ophthalmic, Q1H PRN, Aristides Bustos MD, 2 drop at 07/03/18 0415     insulin aspart (NovoLOG) inj (RAPID ACTING), 1-12 Units, Subcutaneous, Q4H, Jeffry Hernandez MD     lidocaine (LMX4) cream, , Topical, Q1H PRN, Aristides Bustos MD     lidocaine 1 % 1 mL, 1 mL, Other, Q1H PRN, Aristides Bustso MD     magnesium sulfate 2 g in water intermittent infusion, 2 g, Intravenous, Daily PRN, Aristides Bustos MD, 2 g at 07/07/18 0506     magnesium sulfate 4 g in 100 mL sterile water (premade), 4 g, Intravenous, Q4H PRN, Aristides Bustos MD, 4 g at 06/26/18 0040     melatonin tablet 3 mg, 3 mg, Oral, At Bedtime PRN, Aristides Bustos MD, 3 mg at 06/29/18 2303     metoprolol (LOPRESSOR) suspension 50 mg, 50 mg, Oral, BID, Krystal Ramirez MD, 50 mg at 07/11/18 0905     micafungin (MYCAMINE) 100 mg in sodium chloride 0.9 % 100 mL intermittent infusion, 100 mg, Intravenous, Q24H, Krystal Ramirez MD, Last Rate: 100 mL/hr at 07/10/18 2053, 100 mg at 07/10/18 2053     miconazole (MICATIN) 2 % cream, , Topical, BID, Aristides Bustos MD     miconazole (MICATIN; MICRO GUARD) 2 % powder, , Topical, Q1H PRN, Aristides Bustos MD     midazolam (VERSED) injection 2 mg, 2  mg, Intravenous, Q2H PRN, Simone العلي MD, 2 mg at 07/10/18 0337     multivitamins with minerals (CERTAVITE/CEROVITE) liquid 15 mL, 15 mL, Oral, Daily, ReMeng savage MD, 15 mL at 07/11/18 0906     naloxone (NARCAN) injection 0.1-0.4 mg, 0.1-0.4 mg, Intravenous, Q2 Min PRN, Aristides Bustos MD     ondansetron (ZOFRAN-ODT) ODT tab 4 mg, 4 mg, Oral, Q6H PRN **OR** ondansetron (ZOFRAN) injection 4 mg, 4 mg, Intravenous, Q6H PRN, Aristides Bustos MD     oxyCODONE IR (ROXICODONE) tablet 5-10 mg, 5-10 mg, Oral, Q3H PRN, Aristides Bustos MD, 10 mg at 07/07/18 1422     pantoprazole (PROTONIX) 2 mg/mL suspension 40 mg, 40 mg, Oral, BID, Aristides Bustos MD, 40 mg at 07/11/18 0905     piperacillin-tazobactam (ZOSYN) 4.5 g vial to attach to  mL bag, 4.5 g, Intravenous, Q6H, Aristides Francois MD, Last Rate: 200 mL/hr at 07/08/18 0203, 4.5 g at 07/11/18 1404     polyethylene glycol (MIRALAX/GLYCOLAX) Packet 17 g, 17 g, Oral, BID, Camille Garcia, APRN CNP, 17 g at 07/10/18 2052     polyethylene glycol (MIRALAX/GLYCOLAX) Packet 17 g, 17 g, Oral, Daily PRN, Aristides Bustos MD     potassium chloride (KLOR-CON) Packet 20-40 mEq, 20-40 mEq, Oral or Feeding Tube, Q2H PRN, Aristides Bustos MD, 20 mEq at 07/09/18 0809     potassium chloride 10 mEq in 100 mL intermittent infusion with 10 mg lidocaine, 10 mEq, Intravenous, Q1H PRN, Aristides Bustos MD     potassium chloride 10 mEq in 100 mL sterile water intermittent infusion (premix), 10 mEq, Intravenous, Q1H PRN, Aristides Bustos MD     potassium chloride 20 mEq in 50 mL intermittent infusion, 20 mEq, Intravenous, Q1H PRN, Aristides Bustos MD     potassium chloride SA (K-DUR/KLOR-CON M) CR tablet 20-40 mEq, 20-40 mEq, Oral, Q2H PRN, Aristides Bustos MD, 20 mEq at 06/26/18 0408     protein modular (PROSource TF) 1 packet, 1 packet, Per Feeding Tube, BID, Krystal Ramirez MD, 1 packet at 07/11/18  0905     QUEtiapine (SEROquel) tablet 200 mg, 200 mg, Oral, BID, Simone العلي MD, 200 mg at 07/11/18 0906     senna-docusate (SENOKOT-S;PERICOLACE) 8.6-50 MG per tablet 1 tablet, 1 tablet, Oral, BID PRN, 1 tablet at 07/03/18 1342 **OR** senna-docusate (SENOKOT-S;PERICOLACE) 8.6-50 MG per tablet 2 tablet, 2 tablet, Oral, BID PRN, Aristides Bustos MD     sennosides (SENOKOT) syrup 10 mL, 10 mL, Oral, BID, Camille Garcia, LAURY CNP, 10 mL at 07/11/18 0905     sodium chloride (PF) 0.9% PF flush 10-20 mL, 10-20 mL, Intracatheter, Q1H PRN, Fannie Brown MD     sodium chloride (PF) 0.9% PF flush 3 mL, 3 mL, Intracatheter, Q1H PRN, Aristides Bustos MD     sodium chloride (PF) 0.9% PF flush 3 mL, 3 mL, Intracatheter, Q8H, Aristides Bustos MD, 3 mL at 07/11/18 1620     sodium chloride 0.9% infusion, , Intravenous, Continuous, Krystal Ramirez MD, Last Rate: 20 mL/hr at 07/11/18 1616    Physical Examination:  General Exam: Awake, alert, NAD.  Neck: supple without meningismus.  Neuro:   HCF's:  Normal language, memory, and concentration.  Oriented x 3, normal fund of knowledge.  Hypophonic and hoarse voice   CN's:  VFF to confrontation. Pupils were 3 mm, reactive with no RAPD. EOMI without nystagmus.  Facial sensation was normal.  Face was symmetrical without weakness.  Hearing was intact to conversation.  Tongue protruded midline, palate irais symmetrically.  SCM's and traps were normal.   Motor:  Normal bulk, tone, and strength throughout, except for LUE with 3/5 strength. Wrist extension slightly improved but still 2/5 - not able to overcome gravity.  MSR's 2+ and symmetrical.  Toes down-going to plantar stim bilaterally.   Sensory: intact to all modalities in all extremities.   Cerebellar: F to N and H to S normal.   Gait: deferred due to pt condition.    Results:   MRI brain-no acute abnormality, nonspecific small vessel disease noted  MRI cervical spine-normal spinal cord,  multilevel degenerative disc disease and arthropathy noted  EEG- mild diffuse encephalopathy, no epileptiform discharges or seizures.  Remainder of the labs and imaging were reviewed    Assessment/Plan:    Db Watson is a 52 year old male with alcohol abuse who presents with ongoing changes in mental status after withdrawal from alcohol along with new onset wrist drop.      #AMS:   Etiology likely mild encephalopathy that has now cleared as patient was mentating at his baseline today on exam.   No signs of ischemia/hemorrhage noted on MRI.     #left wrist drop likely peripheral radial nerve palsy:   Initially thought to be infarct vs radiculopathy but MRI shows no signs of impingement or infarct.   Likely palsy from extended hospital stay.   Expect to improve in strength as he has already shown improving signs since yesterday.  -PT/OT to help with strength training    At this time, we are signing off. Please do not hesitate to contact the Neurocritical Care Team if there are any changes in mental status.     Vascular neurology pager number

## 2018-07-11 NOTE — PROGRESS NOTES
EEG CLINICAL NEUROPHYSIOLOGY PRELIMINARY REPORT    Portable EEG performed late yesterday reviewed. Mild diffuse encephalopathy. Low voltage EEG. No epileptiform discharges or electrographic seizures. Full report to follow.    Rubin Chavez MD  Pager [phone]

## 2018-07-11 NOTE — PLAN OF CARE
Problem: Patient Care Overview  Goal: Plan of Care/Patient Progress Review  Outcome: Improving  Pt A&Ox4. Calm and cooperative with Precedex at 0.5. Antifungal meds changed per ID and PICC line removed and tip sent to lab for culture. Insulin gtt d/c'd and sliding scale initated. Up to chair x2 with assist of 2. Parents at bedside and updated on plan of care.

## 2018-07-12 ENCOUNTER — APPOINTMENT (OUTPATIENT)
Dept: OCCUPATIONAL THERAPY | Facility: CLINIC | Age: 53
DRG: 166 | End: 2018-07-12
Payer: COMMERCIAL

## 2018-07-12 LAB
BUN SERPL-MCNC: 19 MG/DL (ref 7–30)
CALCIUM SERPL-MCNC: 8 MG/DL (ref 8.5–10.1)
CHLORIDE SERPL-SCNC: 106 MMOL/L (ref 94–109)
CO2 SERPL-SCNC: 25 MMOL/L (ref 20–32)
CREAT SERPL-MCNC: 0.76 MG/DL (ref 0.66–1.25)
GFR SERPL CREATININE-BSD FRML MDRD: >90 ML/MIN/1.7M2
GLUCOSE BLDC GLUCOMTR-MCNC: 106 MG/DL (ref 70–99)
GLUCOSE BLDC GLUCOMTR-MCNC: 110 MG/DL (ref 70–99)
GLUCOSE BLDC GLUCOMTR-MCNC: 131 MG/DL (ref 70–99)
GLUCOSE BLDC GLUCOMTR-MCNC: 139 MG/DL (ref 70–99)
GLUCOSE BLDC GLUCOMTR-MCNC: 92 MG/DL (ref 70–99)
GLUCOSE BLDC GLUCOMTR-MCNC: 93 MG/DL (ref 70–99)
GLUCOSE SERPL-MCNC: 130 MG/DL (ref 70–99)
MAGNESIUM SERPL-MCNC: 2.2 MG/DL (ref 1.6–2.3)
PHOSPHATE SERPL-MCNC: 3.7 MG/DL (ref 2.5–4.5)
POTASSIUM SERPL-SCNC: 3.9 MMOL/L (ref 3.4–5.3)
SODIUM SERPL-SCNC: 139 MMOL/L (ref 133–144)

## 2018-07-12 PROCEDURE — 25000125 ZZHC RX 250: Performed by: INTERNAL MEDICINE

## 2018-07-12 PROCEDURE — 25000132 ZZH RX MED GY IP 250 OP 250 PS 637: Performed by: NURSE PRACTITIONER

## 2018-07-12 PROCEDURE — 25000128 H RX IP 250 OP 636: Performed by: INTERNAL MEDICINE

## 2018-07-12 PROCEDURE — 99233 SBSQ HOSP IP/OBS HIGH 50: CPT | Performed by: PSYCHIATRY & NEUROLOGY

## 2018-07-12 PROCEDURE — 25000132 ZZH RX MED GY IP 250 OP 250 PS 637: Performed by: INTERNAL MEDICINE

## 2018-07-12 PROCEDURE — 25000132 ZZH RX MED GY IP 250 OP 250 PS 637: Performed by: HOSPITALIST

## 2018-07-12 PROCEDURE — 40000133 ZZH STATISTIC OT WARD VISIT: Performed by: OCCUPATIONAL THERAPIST

## 2018-07-12 PROCEDURE — 27210437 ZZH NUTRITION PRODUCT SEMIELEM INTERMED LITER

## 2018-07-12 PROCEDURE — 80048 BASIC METABOLIC PNL TOTAL CA: CPT | Performed by: INTERNAL MEDICINE

## 2018-07-12 PROCEDURE — 97530 THERAPEUTIC ACTIVITIES: CPT | Mod: GO | Performed by: OCCUPATIONAL THERAPIST

## 2018-07-12 PROCEDURE — 99233 SBSQ HOSP IP/OBS HIGH 50: CPT | Performed by: NURSE PRACTITIONER

## 2018-07-12 PROCEDURE — 12000000 ZZH R&B MED SURG/OB

## 2018-07-12 PROCEDURE — 36415 COLL VENOUS BLD VENIPUNCTURE: CPT | Performed by: INTERNAL MEDICINE

## 2018-07-12 PROCEDURE — 84100 ASSAY OF PHOSPHORUS: CPT | Performed by: INTERNAL MEDICINE

## 2018-07-12 PROCEDURE — 00000146 ZZHCL STATISTIC GLUCOSE BY METER IP

## 2018-07-12 PROCEDURE — 83735 ASSAY OF MAGNESIUM: CPT | Performed by: INTERNAL MEDICINE

## 2018-07-12 PROCEDURE — 97535 SELF CARE MNGMENT TRAINING: CPT | Mod: GO | Performed by: OCCUPATIONAL THERAPIST

## 2018-07-12 RX ORDER — QUETIAPINE FUMARATE 100 MG/1
100 TABLET, FILM COATED ORAL 2 TIMES DAILY
Status: DISCONTINUED | OUTPATIENT
Start: 2018-07-13 | End: 2018-07-17

## 2018-07-12 RX ORDER — HYDROMORPHONE HYDROCHLORIDE 1 MG/ML
.3-.5 INJECTION, SOLUTION INTRAMUSCULAR; INTRAVENOUS; SUBCUTANEOUS
Status: CANCELLED | OUTPATIENT
Start: 2018-07-12

## 2018-07-12 RX ADMIN — MICONAZOLE NITRATE: 20 CREAM TOPICAL at 22:22

## 2018-07-12 RX ADMIN — QUETIAPINE FUMARATE 200 MG: 200 TABLET ORAL at 22:22

## 2018-07-12 RX ADMIN — DOCUSATE SODIUM 100 MG: 50 LIQUID ORAL at 22:21

## 2018-07-12 RX ADMIN — AMPHOTERICIN B 300 MG: 50 INJECTION, POWDER, LYOPHILIZED, FOR SOLUTION INTRAVENOUS at 21:33

## 2018-07-12 RX ADMIN — QUETIAPINE FUMARATE 200 MG: 200 TABLET ORAL at 08:32

## 2018-07-12 RX ADMIN — ACETAMINOPHEN 650 MG: 325 TABLET, FILM COATED ORAL at 16:56

## 2018-07-12 RX ADMIN — DEXMEDETOMIDINE HYDROCHLORIDE 0.5 MCG/KG/HR: 100 INJECTION, SOLUTION INTRAVENOUS at 00:53

## 2018-07-12 RX ADMIN — Medication 40 MG: at 08:33

## 2018-07-12 RX ADMIN — DOCUSATE SODIUM 100 MG: 50 LIQUID ORAL at 08:33

## 2018-07-12 RX ADMIN — METOPROLOL TARTRATE 50 MG: 100 TABLET ORAL at 22:22

## 2018-07-12 RX ADMIN — ENOXAPARIN SODIUM 40 MG: 40 INJECTION SUBCUTANEOUS at 12:08

## 2018-07-12 RX ADMIN — Medication 40 MG: at 22:22

## 2018-07-12 RX ADMIN — SENNOSIDES A AND B 10 ML: 415.36 LIQUID ORAL at 22:21

## 2018-07-12 RX ADMIN — SODIUM CHLORIDE, PRESERVATIVE FREE 500 ML: 5 INJECTION INTRAVENOUS at 16:57

## 2018-07-12 RX ADMIN — Medication 15 ML: at 08:33

## 2018-07-12 RX ADMIN — Medication 1 PACKET: at 22:23

## 2018-07-12 RX ADMIN — Medication 1 PACKET: at 08:34

## 2018-07-12 RX ADMIN — SODIUM CHLORIDE, PRESERVATIVE FREE: 5 INJECTION INTRAVENOUS at 02:29

## 2018-07-12 RX ADMIN — HYDROCHLOROTHIAZIDE 25 MG: 25 TABLET ORAL at 08:34

## 2018-07-12 RX ADMIN — METOPROLOL TARTRATE 50 MG: 100 TABLET ORAL at 08:33

## 2018-07-12 RX ADMIN — POLYETHYLENE GLYCOL 3350 17 G: 17 POWDER, FOR SOLUTION ORAL at 22:23

## 2018-07-12 RX ADMIN — AMLODIPINE BESYLATE 2.5 MG: 2.5 TABLET ORAL at 08:32

## 2018-07-12 NOTE — PROGRESS NOTES
[facility]    Hospitalist Progress Note    Assessment & Plan   Db Watson is a 52 year old male who was admitted on 6/25/2018. The Hospitalist Service was consulted to assume care as Mr. Watson is transferred out of the ICU.  Patient has a history of alcoholism, hypertension, dyslipidemia, Ménière's disease.    ICU Summary: The patient was initially transferred to the intensive care unit for acute agitation and delirium in the setting of acute alcohol withdrawal and alcoholic pancreatitis.  The patient required Precedex drip, and eventual airway protection - intubated on 6/27. 6/28 Tube feeding was initiated, 6/30: Patient continued to be agitated, a head CT demonstrated no acute changes. 7/3: Patient had increasing purulent ET tube secretions requiring bronchoscopy, which were sent for culture growing gram-negative rods and GPC. 7/5-6: Sputum demonstrates MSSA, CXR demonstrates LLL pneumonia. 7/8: Patient extubated, but remains delirious and hallucinating with increasing restlessness and agitation.  Blood culture positive for yeast, ID consulted; placed on Micafungin.    Acute alcohol withdrawal  Metabolic encephalopathy  Pancreatitis: Patient was initially urgently transferred from the medical floor to the intensive care unit for airway protection and Precedex drip due to acute alcohol withdrawal and agitation.  Patient had pancreatitis upon admission, peak lipase approximately 900, is clinically improved.  Despite approximately 10 days, the patient remained agitated and not redirectable.  EEG negative for seizure activity.  Neurology was consulted.  Precedex was slowly weaned off, and patient's mentation has significantly improved.  Seroquel was initiated for agitation.  Psychiatry evaluated the patient, and suggests to gradually decrease Seroquel as his agitation improves and he becomes more functional and oriented.  --I will reduce his Seroquel to 100 mg twice daily beginning tomorrow 7/13.   Ideally if the patient is able to tolerate the decrease in Seroquel, this could be eventually changed to as needed med.  --Hydromorphone and oxy available for abdominal pain  --Continue electrolyte monitoring and replacement while on enteral feedings  -- Appreciate nutrition's assistance and recommendations for enteral feedings  --Continue q4hour glucose monitoring while on enteral feedings  --High resistance insulin sliding scale coverage  --Continue telemetry while on high dose seroquel  --PT/OT following  --Reassess need for lovenox if patient is able to ambulate more    Acute respiratory failure  MSSA, LLL pneumonia  Fungemia, Rhodotorula Species: The patient was initially intubated for airway protection due to sedation in the setting of his alcohol withdrawal.  Soon after intubation, the patient developed respiratory distress requiring urgent bronchoscopy.  Eventually, sputum culture was positive for GNR and GPC and blood cultures from 7/3 positive for Rhodotorula species, SUKUMAR pending.  ID was consulted. Repeat BC drawn on 7/9 and are still pending. He is currently on day# 9 of Zosyn for PNA. ID recommendations are to treat empirically with Liposomal Amphotericin until SUKUMAR are back. PICC has been removed, ideally we will avoid long term IV lines.  The patient remains on room air, with adequate oxygen saturation.  Work of breathing appears normal.  The patient is quite deconditioned and becomes short of breath easily with movement.  --Continue Zosyn, per ID recommendation  --Continue liposomal amphotericin  --Will recheck 2V CXR 7/13    History of Hypertension: Patient is a long-standing history of hypertension treated with amlodipine, hydrochlorothiazide, metoprolol XL.  He states he is largely compliant with his medications prior to admission.  Since extubation, the patient's blood pressure has been within normal limits to mildly elevated.  -140/70-80.  --Consider increasing amlodipine if BP continues to  increase  --Hydralazine PRN    Alcoholism   History of Insomnia  Anxiety: The patient notes he had severe anxiety causing resistant insomnia, not improved by trazodone.  He feels this anxiety and insomnia and drove him to go to the local bar and seek comfort and companionship.  We discussed his alcoholism was likely worsening his anxiety and insomnia, and hope with abstaining from alcohol, his anxiety and insomnia may improve.  Psychiatry does not recommend starting an antidepressant yet.  The patient is motivated to abstain from alcohol, and is interested in chemical dependency treatment.  --Appreciate psychiatry's input and recommendations  --CD should see the patient prior to discharge  --We will enter a care coordinator consult to help determine the patient's insurance coverage for outpatient services      DVT Prophylaxis: Enoxaparin (Lovenox) SQ  Code Status: Full Code    Disposition: Expected discharge in 3-4 days once safe disposition has been identified, CD eval scheduled or completed, medications adjusted, VS normalized, able to eat without enteral feeding.      Interval History   See ICU summary    -Data reviewed today: I reviewed all new labs and imaging results over the last 24 hours.  Physical Exam   Temp: 98.1  F (36.7  C) Temp src: Oral BP: 123/80   Heart Rate: 113 Resp: 26 SpO2: 94 % O2 Device: None (Room air)    Vitals:    07/10/18 0500 07/11/18 0500 07/12/18 0500   Weight: 98.8 kg (217 lb 13 oz) 97.7 kg (215 lb 6.2 oz) 97.1 kg (214 lb 1.1 oz)     Vital Signs with Ranges  Temp:  [98.1  F (36.7  C)-98.4  F (36.9  C)] 98.1  F (36.7  C)  Heart Rate:  [] 113  Resp:  [14-30] 26  BP: (105-154)/(32-94) 123/80  SpO2:  [90 %-97 %] 94 %  I/O last 3 completed shifts:  In: 3622.27 [P.O.:20; I.V.:1137.27; NG/GT:865; IV Piggyback:500]  Out: 3010 [Urine:3010]    Constitutional: Awake, alert, cooperative, no apparent distress.  Eyes: Conjunctiva and pupils examined and normal.  HEENT: Moist mucous membranes,  normal dentition.  Respiratory: LLL coarse crackles, SHARMIN diminished, right lobes clear/diminished. No wheezing noted.  Cardiovascular: Irregular rate and regular rhythm, normal S1 and S2, and mild systolic murmur noted.  GI: Soft, non-distended, non-tender, normal bowel sounds.  Skin: Warm and dry.   Neurologic: Occasional short term memory loss. Global decrease in strength from deconditioning. No gross focal neuro deficits.   Psychiatric: Alert, oriented to person, place and time, appears mildly anxious when discussing disposition.    Medications     IV fluid REPLACEMENT ONLY         sodium chloride 0.9%  500 mL Intravenous Q24H     acetaminophen  650 mg Oral Q24H     amLODIPine  2.5 mg Oral Daily     amphotericin B liposome (AMBISOME) intermittent infusion  3 mg/kg Intravenous Q24H     docusate  100 mg Oral or Feeding Tube BID     enoxaparin  40 mg Subcutaneous Q24H     hydrochlorothiazide  25 mg Oral Daily     insulin aspart  1-12 Units Subcutaneous Q4H     metoprolol  50 mg Oral BID     miconazole   Topical BID     multivitamins with minerals  15 mL Oral Daily     pantoprazole  40 mg Oral BID     polyethylene glycol  17 g Oral BID     protein modular  1 packet Per Feeding Tube BID     QUEtiapine  200 mg Oral BID     sennosides  10 mL Oral BID     sodium chloride (PF)  3 mL Intracatheter Q8H       Data     Recent Labs  Lab 07/12/18  0440 07/11/18  0525 07/10/18  0935 07/10/18  0520  07/09/18  0405 07/08/18  0400   WBC  --  8.5  --  9.6  --   --  10.3   HGB  --  13.3  --  12.9*  --   --  13.4   MCV  --  104*  --  104*  --   --  103*   PLT  --  481*  --  495*  --   --  422   INR  --   --   --  1.38*  --   --   --     141  --  140  --  142 143   POTASSIUM 3.9 4.1 3.7 4.0  < > 3.3* 3.6   CHLORIDE 106 107  --  109  --  107 106   CO2 25 24  --  23  --  27 27   BUN 19 19  --  18  --  20 17   CR 0.76 0.61*  --  0.62*  --  0.60* 0.57*   ANIONGAP  --  10  --  8  --  8 10   DEMETRIA 8.0* 8.3*  --  8.2*  --  8.3* 8.5   GLC  130* 118*  --  157*  --  116* 134*   ALBUMIN  --   --   --  2.4*  --   --  2.4*   PROTTOTAL  --   --   --  7.4  --   --  7.6   BILITOTAL  --   --   --  0.6  --   --  0.7   ALKPHOS  --   --   --  79  --   --  81   ALT  --   --   --  61  --   --  68   AST  --   --   --  77*  --   --  87*   < > = values in this interval not displayed.    Imaging:   No results found for this or any previous visit (from the past 24 hour(s)).      Maureen Shook, CNP  Text Page  (7 am - 6 pm)

## 2018-07-12 NOTE — PLAN OF CARE
Problem: Patient Care Overview  Goal: Plan of Care/Patient Progress Review  Outcome: Improving  Neuro: Patient has been calm and cooperative through shift, occasionally forgetful to time, but reorients. Moves all extremities. On Precedex and able to wean down on shift. RASS goal 0 to -1.  Cardiac: SR. BP stable through shift.  Pulmonary: RA, lung sounds clear.  GI:  Tolerating ice chips. TF infusing at goal.  : [place] in place draining clear, yellow urine.  Integumentary: Excoriated area to bottom. Cares provided as ordered.    Patient tolerated dose of IV Amphotericin B. Morning labs have been reviewed. Plan of care reviewed with patient and patient's family. Continue to monitor and assess.    Jamie Pinzon RN, BSN, CCRN

## 2018-07-12 NOTE — PLAN OF CARE
Problem: Patient Care Overview  Goal: Plan of Care/Patient Progress Review  Discharge Planner OT   Patient plan for discharge: none stated  Current status: sit <> stand with MIN A with ww. Pt able to stand with CGA due to unsteadiness for oral facial hygiene with cues to use L Hand as much as possible and complete hygiene tasks with cues for tech with SBA. Pt with L wrist and finger weak flexion and requires AAROM for extension. Pt educated in self AAROM to complete on own with L wrist and fingers. Pt requires CGA x 1-2 with ww for chair to bee transfer and sit to supine with MIN A x 1-2 with cues for tech.   Barriers to return to prior living situation: decreased balance, strength, AROM in L wrist and fingers decreased, decreased safety.  Recommendations for discharge: ARC  Rationale for recommendations: daily intensive therapy to increase ADL/IADL and functional mobility independence and safety to PLOF.        Entered by: Ignacia Lundberg 07/12/2018 3:15 PM

## 2018-07-12 NOTE — PROGRESS NOTES
[facility]    Infectious Disease Progress Note    Date of Service (when I saw the patient): 07/12/2018     Assessment & Plan   Db Watson is a 52 year old male who was admitted on 6/25/2018.     Impression:  1. 52 y.o male admitted about 3 weeks ago now.   2. Admission diagnosis was pancreatitis and alcoholic hepatitis.   3. Admission course has been complicated by delirium, resp failure, MSSA pneumonia.   4. Now blood cultures from 7/3 is coming back positive for Rhodotorula species, SUKUMAR pending, only 1 blood culture was drawn that day with repeat blood cultures not done till 7/9 which are still pending.   5. He is currently on IV zosyn day 9 for pneumonia.   6. Also on Micafungin.      Recommendations:   Fungemia with Rhodotorula most likely real given, patient with liver disease, pancreatitis, tpn use, multiple lines in the ICU.   SUKUMAR on the Rhodotorula is pending, and empiric treatment is Amphotericin till the SUKUMAR are back, on  Liposomal Amphotericin.   PICC now out.   Follow up on the pending blood cultures.   Try to avoid any longterm IV lines if possible.          Mitchel Beth MD    Interval History   Afebrile     Physical Exam   Temp: 98.1  F (36.7  C) Temp src: Oral BP: 123/80   Heart Rate: 88 Resp: 19 SpO2: 97 % O2 Device: None (Room air)    Vitals:    07/10/18 0500 07/11/18 0500 07/12/18 0500   Weight: 98.8 kg (217 lb 13 oz) 97.7 kg (215 lb 6.2 oz) 97.1 kg (214 lb 1.1 oz)     Vital Signs with Ranges  Temp:  [98.1  F (36.7  C)-98.4  F (36.9  C)] 98.1  F (36.7  C)  Heart Rate:  [] 88  Resp:  [14-30] 19  BP: (105-154)/(32-94) 123/80  SpO2:  [90 %-97 %] 97 %    Constitutional: Awake,interactive   Lungs: Clear to auscultation bilaterally, no crackles or wheezing  Cardiovascular: Regular rate and rhythm, normal S1 and S2, and no murmur noted  Abdomen: Normal bowel sounds, soft, non-distended, non-tender  Skin: No rashes, no cyanosis, no edema  Other:    Medications     dexmedetomidine  Stopped (07/12/18 1120)     IV fluid REPLACEMENT ONLY       sodium chloride 20 mL/hr at 07/12/18 0229       sodium chloride 0.9%  500 mL Intravenous Q24H     acetaminophen  650 mg Oral Q24H     amLODIPine  2.5 mg Oral Daily     amphotericin B liposome (AMBISOME) intermittent infusion  3 mg/kg Intravenous Q24H     docusate  100 mg Oral or Feeding Tube BID     enoxaparin  40 mg Subcutaneous Q24H     hydrochlorothiazide  25 mg Oral Daily     insulin aspart  1-12 Units Subcutaneous Q4H     metoprolol  50 mg Oral BID     miconazole   Topical BID     multivitamins with minerals  15 mL Oral Daily     pantoprazole  40 mg Oral BID     polyethylene glycol  17 g Oral BID     protein modular  1 packet Per Feeding Tube BID     QUEtiapine  200 mg Oral BID     sennosides  10 mL Oral BID     sodium chloride (PF)  3 mL Intracatheter Q8H       Data   All microbiology laboratory data reviewed.  Recent Labs   Lab Test  07/11/18   0525  07/10/18   0520  07/08/18   0400   WBC  8.5  9.6  10.3   HGB  13.3  12.9*  13.4   HCT  39.6*  38.7*  39.4*   MCV  104*  104*  103*   PLT  481*  495*  422     Recent Labs   Lab Test  07/12/18   0440  07/11/18   0525  07/10/18   0520   CR  0.76  0.61*  0.62*     No lab results found.  Recent Labs   Lab Test  07/11/18   1750  07/09/18   0720  07/09/18   0405  07/04/18   0642  07/03/18   1440  07/03/18   1127  07/02/18   2350   CULT  No growth after 14 hours  No growth after 3 days  No growth after 3 days  Moderate growth  Staphylococcus aureus  *  Plus  Light growth  Normal david    Cultured on the 4th day of incubation:  Rhodotorula species  *  Critical Value/Significant Value, preliminary result only, called to and read back by  Yessi Singh RN, @3545 07/07/18..    Susceptibility testing in progress  No growth  Heavy growth  Staphylococcus aureus  *  Plus  Light growth  Normal david

## 2018-07-12 NOTE — CONSULTS
"[facility] Psychiatric Consult Progress Note    Interval History:   Pt seen, chart reviewed, case discussed with nursing staff and treating clinicians.  Db is still in the ICU on a Precedex drip.  Per discussion with nursing his agitation level has diminished and they are trying to wean him off of that.  He is oriented to, but still intermittently confused.  He has an NG tube in place.  He seems vague about his desire to enter chemical dependency treatment.  A chemical dependency assessment has been ordered, he mentioned that he still thinks he has insurance with a \"[employer] policy\" but this deserves clarification.  I am not able to accurately assess his level of depression given the current clinical situation.  He is not expressing any thoughts of self-harm, he was generally pleasant with me this morning but does not have a lot of recollection regarding the last few days in the hospital.   I would not be starting antidepressants at this point, he is on a sizable dose of quetiapine to manage his agitation which can be reduced gradually as he becomes more functional, is ambulating, oriented.  I would suggest reducing this to 100 mg twice daily once he gets off the Precedex drip in the next 24 hours, if he maintains stability then that can be changed to an as needed dose if agitation resurfaces.     Review of systems:   10 point Review of Systems completed by Dr. Muñiz, and is  is negative other than noted in the HPI     Medications:       sodium chloride 0.9%  500 mL Intravenous Q24H     acetaminophen  650 mg Oral Q24H     amLODIPine  2.5 mg Oral Daily     amphotericin B liposome (AMBISOME) intermittent infusion  3 mg/kg Intravenous Q24H     docusate  100 mg Oral or Feeding Tube BID     enoxaparin  40 mg Subcutaneous Q24H     hydrochlorothiazide  25 mg Oral Daily     insulin aspart  1-12 Units Subcutaneous Q4H     metoprolol  50 mg Oral BID     miconazole   Topical BID     multivitamins with " minerals  15 mL Oral Daily     pantoprazole  40 mg Oral BID     polyethylene glycol  17 g Oral BID     protein modular  1 packet Per Feeding Tube BID     QUEtiapine  200 mg Oral BID     sennosides  10 mL Oral BID     sodium chloride (PF)  3 mL Intracatheter Q8H     acetaminophen, bisacodyl, IV fluid REPLACEMENT ONLY, glucose **OR** dextrose **OR** glucagon, haloperidol lactate, hydrALAZINE, HYDROmorphone, hypromellose-dextran, lidocaine 4%, lidocaine (buffered or not buffered), magnesium sulfate, magnesium sulfate, melatonin, miconazole, naloxone, ondansetron **OR** ondansetron, oxyCODONE IR, polyethylene glycol, potassium chloride, potassium chloride with lidocaine, potassium chloride, potassium chloride, potassium chloride, senna-docusate **OR** senna-docusate, sodium chloride (PF), sodium chloride (PF)    Mental Status Examination:     Appearance:  awake, alert, dressed in hospital scrubs, appeared as age stated, poorly groomed and alert  Eye Contact:  fair  Speech:  dysarthria  Language:Normal  Psychomotor Behavior:  fidgeting  Mood:  anxious  Affect:  mood congruent  Thought Process:  tangental no loose associations  Thought Content:  no evidence of suicidal ideation or homicidal ideation and no evidence of psychotic thought  Oriented to:  person, place, day, but this has been fluctuating  Attention Span and Concentration:  limited  Recent and Remote Memory:  poor  Fund of Knowledge: delayed  Muscle Strength and Tone: weak  Gait and Station: Normal  Insight:  limited  Judgment:  limited        Labs/Vitals:     Recent Results (from the past 24 hour(s))   Glucose by meter    Collection Time: 07/11/18  8:35 AM   Result Value Ref Range    Glucose 165 (H) 70 - 99 mg/dL   Glucose by meter    Collection Time: 07/11/18  9:35 AM   Result Value Ref Range    Glucose 203 (H) 70 - 99 mg/dL   Glucose by meter    Collection Time: 07/11/18 10:55 AM   Result Value Ref Range    Glucose 82 70 - 99 mg/dL   Glucose by meter     Collection Time: 07/11/18  1:58 PM   Result Value Ref Range    Glucose 122 (H) 70 - 99 mg/dL   Yeast culture    Collection Time: 07/11/18  5:50 PM   Result Value Ref Range    Specimen Description Catheter tip PICC     Culture Micro PENDING    Glucose by meter    Collection Time: 07/11/18  6:07 PM   Result Value Ref Range    Glucose 124 (H) 70 - 99 mg/dL   Glucose by meter    Collection Time: 07/11/18  9:40 PM   Result Value Ref Range    Glucose 133 (H) 70 - 99 mg/dL   Glucose by meter    Collection Time: 07/12/18  2:02 AM   Result Value Ref Range    Glucose 131 (H) 70 - 99 mg/dL   Creatinine    Collection Time: 07/12/18  4:40 AM   Result Value Ref Range    Creatinine 0.76 0.66 - 1.25 mg/dL    GFR Estimate >90 >60 mL/min/1.7m2    GFR Estimate If Black >90 >60 mL/min/1.7m2   Magnesium    Collection Time: 07/12/18  4:40 AM   Result Value Ref Range    Magnesium 2.2 1.6 - 2.3 mg/dL   Phosphorus    Collection Time: 07/12/18  4:40 AM   Result Value Ref Range    Phosphorus 3.7 2.5 - 4.5 mg/dL   Potassium    Collection Time: 07/12/18  4:40 AM   Result Value Ref Range    Potassium 3.9 3.4 - 5.3 mmol/L   Urea nitrogen    Collection Time: 07/12/18  4:40 AM   Result Value Ref Range    Urea Nitrogen 19 7 - 30 mg/dL   Calcium    Collection Time: 07/12/18  4:40 AM   Result Value Ref Range    Calcium 8.0 (L) 8.5 - 10.1 mg/dL   Chloride    Collection Time: 07/12/18  4:40 AM   Result Value Ref Range    Chloride 106 94 - 109 mmol/L   Glucose    Collection Time: 07/12/18  4:40 AM   Result Value Ref Range    Glucose 130 (H) 70 - 99 mg/dL   Sodium    Collection Time: 07/12/18  4:40 AM   Result Value Ref Range    Sodium 139 133 - 144 mmol/L   Glucose by meter    Collection Time: 07/12/18  6:05 AM   Result Value Ref Range    Glucose 139 (H) 70 - 99 mg/dL     B/P: 154/92, T: 98.2, P: Data Unavailable, R: 22    Impression:   Db presents with a severe alcohol use disorder, significant withdrawal, a protected delirium requiring ICU care.  He  is over 2 weeks into this, there is no way at this point this represents alcohol withdrawal.  I would avoid benzodiazepines, continue supportive medical management.  I would avoid antidepressants at this point, this is something that can be addressed if he agrees to going to treatment and achieves some sobriety.  He can be weaned off of Seroquel in the next 3-4 days if he maintains stability, it seems appropriate at this point to wean him off the Precedex drip.      DIagnoses:   1.  Alcohol use disorder, severe  2.  Delirium secondary to alcohol withdrawal  3.  Pancreatitis  4. ETOH hepatitis, mild         Plan:   1.  Continue quetiapine 200 mg twice daily today, reduce this to 100 mg twice daily tomorrow, 50 mg twice daily by Saturday, then change to 50 mg every 6 hours as needed if he continues to progress and is weaned off of Precedex  2.  I do not see any reason to keep him on a CIWA  protocol at this point he is well beyond the window for alcohol withdrawal  3.  Offer chemical dependency assessment when he is coherent, based on review of the records I do not think we have the basis to commit him at this point, he may need a rule 25 on an outpatient basis if he does not have insurance.        Attestation:  Patient has been seen and evaluated by me,  Demetrius Muñiz MD

## 2018-07-13 ENCOUNTER — APPOINTMENT (OUTPATIENT)
Dept: GENERAL RADIOLOGY | Facility: CLINIC | Age: 53
DRG: 166 | End: 2018-07-13
Attending: NURSE PRACTITIONER
Payer: COMMERCIAL

## 2018-07-13 ENCOUNTER — APPOINTMENT (OUTPATIENT)
Dept: PHYSICAL THERAPY | Facility: CLINIC | Age: 53
DRG: 166 | End: 2018-07-13
Payer: COMMERCIAL

## 2018-07-13 LAB
ANION GAP SERPL CALCULATED.3IONS-SCNC: 8 MMOL/L (ref 3–14)
BACTERIA SPEC CULT: ABNORMAL
BACTERIA SPEC CULT: ABNORMAL
BUN SERPL-MCNC: 31 MG/DL (ref 7–30)
CALCIUM SERPL-MCNC: 8.8 MG/DL (ref 8.5–10.1)
CHLORIDE SERPL-SCNC: 105 MMOL/L (ref 94–109)
CO2 SERPL-SCNC: 27 MMOL/L (ref 20–32)
CREAT SERPL-MCNC: 1 MG/DL (ref 0.66–1.25)
GFR SERPL CREATININE-BSD FRML MDRD: 79 ML/MIN/1.7M2
GLUCOSE BLDC GLUCOMTR-MCNC: 115 MG/DL (ref 70–99)
GLUCOSE BLDC GLUCOMTR-MCNC: 120 MG/DL (ref 70–99)
GLUCOSE BLDC GLUCOMTR-MCNC: 129 MG/DL (ref 70–99)
GLUCOSE BLDC GLUCOMTR-MCNC: 130 MG/DL (ref 70–99)
GLUCOSE BLDC GLUCOMTR-MCNC: 131 MG/DL (ref 70–99)
GLUCOSE BLDC GLUCOMTR-MCNC: 149 MG/DL (ref 70–99)
GLUCOSE SERPL-MCNC: 141 MG/DL (ref 70–99)
INTERPRETATION ECG - MUSE: NORMAL
INTERPRETATION ECG - MUSE: NORMAL
Lab: ABNORMAL
MAGNESIUM SERPL-MCNC: 2.2 MG/DL (ref 1.6–2.3)
PHOSPHATE SERPL-MCNC: 3.1 MG/DL (ref 2.5–4.5)
POTASSIUM SERPL-SCNC: 3.7 MMOL/L (ref 3.4–5.3)
SODIUM SERPL-SCNC: 140 MMOL/L (ref 133–144)
SPECIMEN SOURCE: ABNORMAL

## 2018-07-13 PROCEDURE — 40000193 ZZH STATISTIC PT WARD VISIT

## 2018-07-13 PROCEDURE — 80048 BASIC METABOLIC PNL TOTAL CA: CPT | Performed by: INTERNAL MEDICINE

## 2018-07-13 PROCEDURE — 25000132 ZZH RX MED GY IP 250 OP 250 PS 637: Performed by: HOSPITALIST

## 2018-07-13 PROCEDURE — 97116 GAIT TRAINING THERAPY: CPT | Mod: GP

## 2018-07-13 PROCEDURE — 36415 COLL VENOUS BLD VENIPUNCTURE: CPT | Performed by: INTERNAL MEDICINE

## 2018-07-13 PROCEDURE — 84100 ASSAY OF PHOSPHORUS: CPT | Performed by: INTERNAL MEDICINE

## 2018-07-13 PROCEDURE — 99232 SBSQ HOSP IP/OBS MODERATE 35: CPT | Performed by: HOSPITALIST

## 2018-07-13 PROCEDURE — 25000128 H RX IP 250 OP 636: Performed by: INTERNAL MEDICINE

## 2018-07-13 PROCEDURE — 25000125 ZZHC RX 250: Performed by: INTERNAL MEDICINE

## 2018-07-13 PROCEDURE — 97110 THERAPEUTIC EXERCISES: CPT | Mod: GP

## 2018-07-13 PROCEDURE — 25000132 ZZH RX MED GY IP 250 OP 250 PS 637: Performed by: INTERNAL MEDICINE

## 2018-07-13 PROCEDURE — 97530 THERAPEUTIC ACTIVITIES: CPT | Mod: GP

## 2018-07-13 PROCEDURE — G0463 HOSPITAL OUTPT CLINIC VISIT: HCPCS

## 2018-07-13 PROCEDURE — 25000132 ZZH RX MED GY IP 250 OP 250 PS 637: Performed by: NURSE PRACTITIONER

## 2018-07-13 PROCEDURE — 27210437 ZZH NUTRITION PRODUCT SEMIELEM INTERMED LITER

## 2018-07-13 PROCEDURE — 00000146 ZZHCL STATISTIC GLUCOSE BY METER IP

## 2018-07-13 PROCEDURE — 12000000 ZZH R&B MED SURG/OB

## 2018-07-13 PROCEDURE — 71046 X-RAY EXAM CHEST 2 VIEWS: CPT

## 2018-07-13 PROCEDURE — 83735 ASSAY OF MAGNESIUM: CPT | Performed by: INTERNAL MEDICINE

## 2018-07-13 PROCEDURE — 40000141 ZZH STATISTIC PERIPHERAL IV START W/O US GUIDANCE

## 2018-07-13 RX ORDER — SODIUM CHLORIDE 9 MG/ML
INJECTION, SOLUTION INTRAVENOUS CONTINUOUS
Status: DISCONTINUED | OUTPATIENT
Start: 2018-07-13 | End: 2018-07-16

## 2018-07-13 RX ADMIN — Medication 40 MG: at 20:40

## 2018-07-13 RX ADMIN — AMLODIPINE BESYLATE 2.5 MG: 2.5 TABLET ORAL at 10:13

## 2018-07-13 RX ADMIN — SODIUM CHLORIDE, PRESERVATIVE FREE 500 ML: 5 INJECTION INTRAVENOUS at 17:35

## 2018-07-13 RX ADMIN — MICONAZOLE NITRATE: 20 CREAM TOPICAL at 20:34

## 2018-07-13 RX ADMIN — ENOXAPARIN SODIUM 40 MG: 40 INJECTION SUBCUTANEOUS at 13:04

## 2018-07-13 RX ADMIN — METOPROLOL TARTRATE 50 MG: 100 TABLET ORAL at 11:00

## 2018-07-13 RX ADMIN — Medication 40 MG: at 10:13

## 2018-07-13 RX ADMIN — QUETIAPINE FUMARATE 100 MG: 100 TABLET ORAL at 10:13

## 2018-07-13 RX ADMIN — HYDROCHLOROTHIAZIDE 25 MG: 25 TABLET ORAL at 10:13

## 2018-07-13 RX ADMIN — SODIUM CHLORIDE: 9 INJECTION, SOLUTION INTRAVENOUS at 23:58

## 2018-07-13 RX ADMIN — SODIUM CHLORIDE: 9 INJECTION, SOLUTION INTRAVENOUS at 11:14

## 2018-07-13 RX ADMIN — ACETAMINOPHEN 325 MG: 325 TABLET, FILM COATED ORAL at 01:17

## 2018-07-13 RX ADMIN — Medication 1 PACKET: at 10:14

## 2018-07-13 RX ADMIN — MICONAZOLE NITRATE: 20 CREAM TOPICAL at 10:29

## 2018-07-13 RX ADMIN — Medication 15 ML: at 10:13

## 2018-07-13 RX ADMIN — Medication 1 PACKET: at 20:40

## 2018-07-13 RX ADMIN — QUETIAPINE FUMARATE 100 MG: 100 TABLET ORAL at 20:40

## 2018-07-13 RX ADMIN — ACETAMINOPHEN 650 MG: 325 TABLET, FILM COATED ORAL at 17:35

## 2018-07-13 RX ADMIN — AMPHOTERICIN B 300 MG: 50 INJECTION, POWDER, LYOPHILIZED, FOR SOLUTION INTRAVENOUS at 18:09

## 2018-07-13 RX ADMIN — METOPROLOL TARTRATE 50 MG: 100 TABLET ORAL at 22:42

## 2018-07-13 RX ADMIN — POLYETHYLENE GLYCOL 3350 17 G: 17 POWDER, FOR SOLUTION ORAL at 20:30

## 2018-07-13 NOTE — PROGRESS NOTES
[facility]  Hospitalist Progress Note        Eliecer Pamela Shiv, DO  07/13/2018        Interval History:      Patient states he is doing better today. Discussed plan of care including blood cultures and nutrition.          Assessment and Plan:        52 year old male who was admitted on 6/25/2018. The Hospitalist Service was consulted to assume care as Mr. Watson is transferred out of the ICU.  Patient has a history of alcoholism, hypertension, dyslipidemia, Ménière's disease.     52M initially transferred to the intensive care unit for acute agitation and delirium in the setting of acute alcohol withdrawal and alcoholic pancreatitis, however, gradually improving and progressing clinically.      Acute alcohol withdrawal, Metabolic encephalopathy, Pancreatitis: Patient was initially transferred from the medical floor to the intensive care unit for airway protection and Precedex drip due to acute alcohol withdrawal and agitation.  Patient had pancreatitis upon admission, peak lipase approximately 900, is clinically improved.  Despite approximately 10 days, the patient remained agitated and not redirectable.  EEG negative for seizure activity.  Neurology was consulted.  Precedex was slowly weaned off, and patient's mentation has significantly improved.  Seroquel was initiated for agitation.  Psychiatry evaluated the patient, and suggests to gradually decrease Seroquel as his agitation improves and he becomes more functional and oriented.  - Seroquel 100mg BID.   - Pain control.   - Advance diet today, if tolerating reasonably will plan to dc NG tube.   - PT/OT following     Acute respiratory failure, MSSA, LLL pneumonia, Fungemia, Rhodotorula Species: The patient was initially intubated for airway protection due to sedation in the setting of his alcohol withdrawal.  Soon after intubation, the patient developed respiratory distress requiring urgent bronchoscopy.  Eventually, sputum culture was positive  for GNR and GPC and blood cultures from 7/3 positive for Rhodotorula species, SUKUMAR pending.  ID was consulted. Repeat BC drawn on 7/9 and are still pending. He is currently on day# 9 of Zosyn for PNA. ID recommendations are to treat empirically with Liposomal Amphotericin until SUKUMAR are back. PICC has been removed, ideally we will avoid long term IV lines.  The patient remains on room air, with adequate oxygen saturation.  Work of breathing appears normal.  The patient is quite deconditioned and becomes short of breath easily with movement.  - ID following.   - Continue liposomal amphotericin     History of Hypertension: Patient is a long-standing history of hypertension treated with amlodipine, hydrochlorothiazide, metoprolol XL.  He states he is largely compliant with his medications prior to admission.  Since extubation, the patient's blood pressure has been within normal limits to mildly elevated.  -140/70-80.  - Continue amlodipine.   - Hydralazine PRN     Alcoholism, History of Insomnia, Anxiety: The patient notes he had severe anxiety causing resistant insomnia, not improved by trazodone.  He feels this anxiety and insomnia and drove him to go to the local bar and seek comfort and companionship.  We discussed his alcoholism was likely worsening his anxiety and insomnia, and hope with abstaining from alcohol, his anxiety and insomnia may improve.  Psychiatry does not recommend starting an antidepressant yet.  The patient is motivated to abstain from alcohol, and is interested in chemical dependency treatment.  - Appreciate psychiatry's input and recommendations  - CD should see the patient prior to discharge     DVT Prophylaxis: Enoxaparin (Lovenox) SQ    Code: Full Code     Disposition: Expected discharge once safe disposition has been identified, CD eval completed, medications adjusted, able to eat without enteral feeding.     Pain: # Pain Assessment:  Current Pain Score 7/13/2018   Patient currently in  "pain? denies   Pain score (0-10) -   Pain location -   CPOT pain score -   Db s pain level was assessed and he currently denies pain.                     Physical Exam:      Heart Rate: 108    Blood pressure 136/85, pulse 109, temperature 98.5  F (36.9  C), temperature source Oral, resp. rate 20, height 1.727 m (5' 8\"), weight 97.8 kg (215 lb 9.8 oz), SpO2 95 %.    Vitals:    18 0500 18 2129 18 0701   Weight: 97.1 kg (214 lb 1.1 oz) 98.8 kg (217 lb 12.8 oz) 97.8 kg (215 lb 9.8 oz)       Vital Sign Ranges  Temperature Temp  Av.5  F (36.9  C)  Min: 98.1  F (36.7  C)  Max: 99.1  F (37.3  C)   Blood pressure Systolic (24hrs), Av , Min:100 , Max:158        Diastolic (24hrs), Av, Min:56, Max:97      Pulse Pulse  Av.5  Min: 109  Max: 110   Respirations Resp  Av  Min: 19  Max: 38   Pulse oximetry SpO2  Av.3 %  Min: 93 %  Max: 98 %     Vital Signs with Ranges  Temp:  [98.1  F (36.7  C)-99.1  F (37.3  C)] 98.5  F (36.9  C)  Pulse:  [109-110] 109  Heart Rate:  [] 108  Resp:  [19-38] 20  BP: (100-158)/(56-97) 136/85  SpO2:  [93 %-98 %] 95 %    I/O Last 3 Shifts:   I/O last 3 completed shifts:  In: 13965 [P.O.:480; I.V.:211; NG/GT:9761; IV Piggyback:500]  Out: 2750 [Urine:2450; Stool:300]    I/O past 24 hours:     Intake/Output Summary (Last 24 hours) at 18 0843  Last data filed at 18 0752   Gross per 24 hour   Intake            01407 ml   Output             3025 ml   Net             8451 ml     GENERAL: Alert and oriented. NAD. Conversational, appropriate.   HEENT: Normocephalic. EOMI. No icterus or injection. Nares normal. NG present.   LUNGS: Coarse to bases. No dyspnea at rest.   HEART: Irregular rate. Extremities perfused.   ABDOMEN: Soft, nontender, and nondistended. Positive bowel sounds.   EXTREMITIES: No LE edema noted.   NEUROLOGIC: Moves extremities x4 on command. No acute focal neurologic abnormalities noted.          Prior to Admission Medications: "        Prescriptions Prior to Admission   Medication Sig Dispense Refill Last Dose     amLODIPine (NORVASC) 2.5 MG tablet Take 1 tablet (2.5 mg) by mouth daily +++ appointment needed for additional refills +++ 30 tablet 0 6/25/2018 at AM     aspirin 81 MG tablet Take 1 tablet by mouth daily.  3 6/25/2018 at AM     B Complex Vitamins (VITAMIN B COMPLEX) tablet Take 1 tablet by mouth daily. 100 tablet 12 Past Week at Unknown time     fish oil-omega-3 fatty acids (FISH OIL) 1000 MG capsule Take 1 g by mouth daily    Past Week at Unknown time     hypromellose (ARTIFICIAL TEARS) 0.5 % SOLN ophthalmic solution Place 1 drop into both eyes every hour as needed for dry eyes    at PRN     meclizine 25 MG CHEW Take 25 mg by mouth every 6 hours as needed.    Indications: Sensation of Spinning or Whirling   Past Month at PRN     MELATONIN PO Take 5 mg by mouth nightly as needed   Past Month at PRN     metoprolol succinate (TOPROL-XL) 50 MG 24 hr tablet Take 1 tablet (50 mg) by mouth 2 times daily  60 tablet 8 6/25/2018 at AM     traZODone (DESYREL) 50 MG tablet 1-2 at bedtime as needed for sleep 60 tablet 1 Past Week at PRN     hydrochlorothiazide (HYDRODIURIL) 25 MG tablet Take 1 tablet (25 mg) by mouth daily 30 tablet 0 Taking            Medications:        Current Facility-Administered Medications   Medication Last Rate     IV fluid REPLACEMENT ONLY       Current Facility-Administered Medications   Medication Dose Route Frequency     sodium chloride 0.9%  500 mL Intravenous Q24H     acetaminophen  650 mg Oral Q24H     amLODIPine  2.5 mg Oral Daily     amphotericin B liposome (AMBISOME) intermittent infusion  3 mg/kg Intravenous Q24H     docusate  100 mg Oral or Feeding Tube BID     enoxaparin  40 mg Subcutaneous Q24H     hydrochlorothiazide  25 mg Oral Daily     insulin aspart  1-12 Units Subcutaneous Q4H     metoprolol  50 mg Oral BID     miconazole   Topical BID     multivitamins with minerals  15 mL Oral Daily      pantoprazole  40 mg Oral BID     polyethylene glycol  17 g Oral BID     protein modular  1 packet Per Feeding Tube BID     QUEtiapine  100 mg Oral BID     sennosides  10 mL Oral BID     sodium chloride (PF)  3 mL Intracatheter Q8H     Current Facility-Administered Medications   Medication Dose Route Frequency     acetaminophen  325 mg Oral Q8H PRN     bisacodyl  10 mg Rectal Daily PRN     IV fluid REPLACEMENT ONLY   Intravenous Continuous PRN     glucose  15-30 g Oral Q15 Min PRN    Or     dextrose  25-50 mL Intravenous Q15 Min PRN    Or     glucagon  1 mg Subcutaneous Q15 Min PRN     haloperidol lactate  5 mg Intravenous Q6H PRN     hydrALAZINE  10 mg Intravenous Q6H PRN     HYDROmorphone  0.3-0.5 mg Intravenous Q2H PRN     hypromellose-dextran  2-3 drop Ophthalmic Q1H PRN     lidocaine 4%   Topical Q1H PRN     lidocaine (buffered or not buffered)  1 mL Other Q1H PRN     magnesium sulfate  2 g Intravenous Daily PRN     magnesium sulfate  4 g Intravenous Q4H PRN     melatonin  3 mg Oral At Bedtime PRN     miconazole   Topical Q1H PRN     naloxone  0.1-0.4 mg Intravenous Q2 Min PRN     ondansetron  4 mg Oral Q6H PRN    Or     ondansetron  4 mg Intravenous Q6H PRN     oxyCODONE IR  5-10 mg Oral Q3H PRN     polyethylene glycol  17 g Oral Daily PRN     potassium chloride  20-40 mEq Oral or Feeding Tube Q2H PRN     potassium chloride with lidocaine  10 mEq Intravenous Q1H PRN     potassium chloride  10 mEq Intravenous Q1H PRN     potassium chloride  20 mEq Intravenous Q1H PRN     potassium chloride  20-40 mEq Oral Q2H PRN     senna-docusate  1 tablet Oral BID PRN    Or     senna-docusate  2 tablet Oral BID PRN     sodium chloride (PF)  10-20 mL Intracatheter Q1H PRN     sodium chloride (PF)  3 mL Intracatheter Q1H PRN            Data:      Lab data reviewed.     Recent Labs  Lab 07/12/18  0440 07/11/18  0525 07/10/18  0935 07/10/18  0520  07/09/18  0405 07/08/18  0400   HGB  --  13.3  --  12.9*  --   --  13.4   MCV  --   104*  --  104*  --   --  103*   PLT  --  481*  --  495*  --   --  422   INR  --   --   --  1.38*  --   --   --     141  --  140  --  142 143   POTASSIUM 3.9 4.1 3.7 4.0  < > 3.3* 3.6   CHLORIDE 106 107  --  109  --  107 106   CO2 25 24  --  23  --  27 27   BUN 19 19  --  18  --  20 17   CR 0.76 0.61*  --  0.62*  --  0.60* 0.57*   ANIONGAP  --  10  --  8  --  8 10   DEMETRIA 8.0* 8.3*  --  8.2*  --  8.3* 8.5   * 118*  --  157*  --  116* 134*   < > = values in this interval not displayed.        Imaging:      Imaging data reviewed.     KELLEY RaygozaO.  [facility]  Pager [phone]

## 2018-07-13 NOTE — PLAN OF CARE
Problem: Patient Care Overview  Goal: Plan of Care/Patient Progress Review  Outcome: No Change  A&Ox4, forgetful, delayed response. VSS ex BP elevated at times. VMS intact ex. LUE hand drop, numbness in L thumb,LUE strength 3/5. Generalized trace edema. Dressing on R buttock CDI. Voiding in urinal at bedside. A1-2 gb/walker. Tube feed at 50ml/hr with 60ml free water free flush q4hrs. Tolerating full liquid diet. 2PIVs SL, IV abx given x1. Plan for SW consult tomorrow. DC pending. Continue to monitor.

## 2018-07-13 NOTE — PROVIDER NOTIFICATION
Notified hospitalist of new numbness in left thumb. No interventions at this time, continue to monitor, notify is symptoms progress.

## 2018-07-13 NOTE — PROGRESS NOTES
Care Transition Initial Assessment -      Met with: patient, parents and significant other  Active Problems:    Pancreatitis    Alcohol withdrawal hallucinosis (H)   Metabolic encephalopathy,  DATA  Lives With: alone  Living Arrangements: apartment, other (see comments)      Identified issues/concerns regarding health management: Complications from alcohol withdrawal.  In need physical rehabilitation and therapies are recommending ARU.  Referral made to ARU.  Patient's insurance from his previous employer  on 18.  Patient has not looked into [employer] plan yet though acknowledges he needs to do this. He states he has 60 days from last day of coverage to sign up  Discussed recommendation for ARU and gave patient/patient and SO written information about the program.  Explained he will need a supportive d/c plan from ARU.    His SO followed writer outside of his room.  Stated she has  Been trying to get him into CD treatment for 8 years. He has hid his use from his parents.  Writer explained he can have a CD assessment once we have active insurance and once his mental status is clear.       ASSESSMENT  Cognitive Status:  Patient participated in the conversation but was focusing on his tubes with his hands during the whole conversation.  Question how much he comprehended of he conversation.  Concerns to be addressed:   Financial costs for the patient includes TBD  Patient given options and choices for discharge he was given the recommendation for AU  Patient/family is agreeable to the plan?  Question is comprehesion  Patient Goals and Preferences: TBD.  Patient anticipates discharging to:  TBD    PLAN  On Monday need to follow up with family/parents to determine what support is available to patient after ARU. Writer plans to speak with parents to understand their ability to assist patient if needed after ARU.  Need to follow up with patient regarding [employer].  Update ARU.

## 2018-07-13 NOTE — PROGRESS NOTES
[facility]    Infectious Disease Progress Note    Date of Service (when I saw the patient): 07/13/2018     Assessment & Plan   Db Watson is a 52 year old male who was admitted on 6/25/2018.     Impression:  1. 52 y.o male admitted about 3 weeks ago now.   2. Admission diagnosis was pancreatitis and alcoholic hepatitis.   3. Admission course has been complicated by delirium, resp failure, MSSA pneumonia.   4. Now blood cultures from 7/3 is coming back positive for Rhodotorula species, SUKUMAR pending, only 1 blood culture was drawn that day with repeat blood cultures not done till 7/9 which are still pending.   5. He is currently on IV zosyn day 9 for pneumonia.   6. Also on Micafungin.      Recommendations:   Fungemia with Rhodotorula most likely real given, patient with liver disease, pancreatitis, tpn use, multiple lines in the ICU.   SUKUMAR on the Rhodotorula shows susceptible only to  Amphotericin on  Liposomal Amphotericin. Only 1 cultures was done then, No other positive cultures.   Creatinine went up to 1 today, if continues to rise will stop Liposomal Amphotericin as only 1 positive cultures, the line is out and repeat culture is negative.  PICC now out. Try to avoid any longterm IV lines if possible.          Mitchel Beth MD    Interval History   Afebrile     Physical Exam   Temp: 98.5  F (36.9  C) Temp src: Oral BP: 136/85 Pulse: 109 Heart Rate: 108 Resp: 20 SpO2: 95 % O2 Device: None (Room air)    Vitals:    07/12/18 0500 07/12/18 2129 07/13/18 0701   Weight: 97.1 kg (214 lb 1.1 oz) 98.8 kg (217 lb 12.8 oz) 97.8 kg (215 lb 9.8 oz)     Vital Signs with Ranges  Temp:  [98.3  F (36.8  C)-99.1  F (37.3  C)] 98.5  F (36.9  C)  Pulse:  [109-110] 109  Heart Rate:  [] 108  Resp:  [19-38] 20  BP: (100-158)/(76-97) 136/85  SpO2:  [93 %-98 %] 95 %    Constitutional: Awake,interactive   Lungs: Clear to auscultation bilaterally, no crackles or wheezing  Cardiovascular: Regular rate and rhythm, normal  S1 and S2, and no murmur noted  Abdomen: Normal bowel sounds, soft, non-distended, non-tender  Skin: No rashes, no cyanosis, no edema  Other:    Medications     IV fluid REPLACEMENT ONLY       sodium chloride         sodium chloride 0.9%  500 mL Intravenous Q24H     acetaminophen  650 mg Oral Q24H     amLODIPine  2.5 mg Oral Daily     amphotericin B liposome (AMBISOME) intermittent infusion  3 mg/kg Intravenous Q24H     docusate  100 mg Oral or Feeding Tube BID     enoxaparin  40 mg Subcutaneous Q24H     hydrochlorothiazide  25 mg Oral Daily     insulin aspart  1-12 Units Subcutaneous Q4H     metoprolol  50 mg Oral BID     miconazole   Topical BID     multivitamins with minerals  15 mL Oral Daily     pantoprazole  40 mg Oral BID     polyethylene glycol  17 g Oral BID     protein modular  1 packet Per Feeding Tube BID     QUEtiapine  100 mg Oral BID     sennosides  10 mL Oral BID     sodium chloride (PF)  3 mL Intracatheter Q8H       Data   All microbiology laboratory data reviewed.  Recent Labs   Lab Test  07/11/18   0525  07/10/18   0520  07/08/18   0400   WBC  8.5  9.6  10.3   HGB  13.3  12.9*  13.4   HCT  39.6*  38.7*  39.4*   MCV  104*  104*  103*   PLT  481*  495*  422     Recent Labs   Lab Test  07/13/18   0820  07/12/18   0440  07/11/18   0525   CR  1.00  0.76  0.61*     No lab results found.  Recent Labs   Lab Test  07/11/18   1750  07/09/18   0720  07/09/18   0405  07/04/18   0642  07/03/18   1440  07/03/18   1127  07/02/18   2350   CULT  No growth after 14 hours  No growth after 4 days  No growth after 4 days  Moderate growth  Staphylococcus aureus  *  Plus  Light growth  Normal david    Cultured on the 4th day of incubation:  Rhodotorula species  *  Critical Value/Significant Value, preliminary result only, called to and read back by  Yesis Singh RN, @[id number] 07/07/18.DH.    No growth  Heavy growth  Staphylococcus aureus  *  Plus  Light growth  Normal david

## 2018-07-13 NOTE — PLAN OF CARE
Problem: Patient Care Overview  Goal: Plan of Care/Patient Progress Review  Outcome: No Change  A/O x4, forgetful with delayed response. VSS on RA. C/o generalized pain, given tylenol 1x this shift. Up x2 to BR. CMS intact. LUE hand drop with numbness in L thumb. LUE 3/5 in strength. Generalized trace edema. Mepilex on R buttock CDI. Use of urinal at the bs. Continent of B&B. Tolerating full liquid diet and TF 50 ml/h with 60 ml flushes q4h. /149, given insulin once this shift. CXR completed last night, pending results. IV SL patent. SW consult pending. LS clear/diminished. D/C pending progress, will continue to monitor.

## 2018-07-13 NOTE — PROGRESS NOTES
Report called to LILA Weiner on [id number]. Patient transferred on monitor with RN. All belongings sent with patient. Patient in stable condition on transfer.

## 2018-07-13 NOTE — PROGRESS NOTES
Paged by nurse stating patient with some numbness in left thumb. Patient has left hand droop from suspected peripheral nerve palsy, without any changes noted on MRI. I discussed with the nurse to look for any evidence of impingement (IV lines, arm positioning). If symptoms worsen then I would consider evaluating patient for issues with his cervical spine/brachial plexus, but at this point I think some variability in his fingers is within normal limits given his known peripheral neuropathy.

## 2018-07-13 NOTE — PLAN OF CARE
Problem: Patient Care Overview  Goal: Plan of Care/Patient Progress Review  Outcome: Improving  A&Ox4, VS BP slightly elevated, scheduled Metoprolol given. Tachy, max . Pain to bilat knees managed with repo and ice packs. NG in place with TF @50/hr continuous. LS clear, +BS, +BM. Up A1 to BR, voiding well. Received shower this shift. Tolerating full liquids, advanced to Regular diet,  and 131. IVF@75/hr. Visiting with family. D/C pending progress.

## 2018-07-13 NOTE — CONSULTS
7/13/18 chem dep consult acknowledged.  Spoke with Rhina De Los Santos () regarding patient's insurance, had business office verify benefits and patient's [facility] termed 6/30/18.  At this time, patient would need to seek Rule 25 funding from [facility] for chem dep services.  Updated Rhina and she said she will be talking with patient about possible [employer] option.  If patient's insurance does get reinstated please reorder consult and we chem dep can complete consult request.  Will close consult at this time.

## 2018-07-13 NOTE — PROGRESS NOTES
"CLINICAL NUTRITION SERVICES - REASSESSMENT NOTE      Recommendations Ordered by Registered Dietitian (RD):   Made pt \"not appropriate\" for room service so that meals will be automatically sent  Medical Food Supplement - will send Boost Plus w/meals     Future/Additional Recommendations:   Consider change to noc TF once po intake improves     Malnutrition:   (6/27)  % Weight Loss:  None noted  % Intake: <75% for > 7 days (non-severe malnutrition)  Subcutaneous Fat Loss:  None observed  Muscle Loss:  None observed  Fluid Retention:  Mild 2+ LE (likely not nutrition related)      Malnutrition Diagnosis: Patient does not meet two of the above criteria diagnosing for malnutrition        EVALUATION OF PROGRESS TOWARD GOALS   Diet:  Full liquids. Pt has only had 1 meal of full liquids, lunch yesterday. He did not order dinner last night or breakfast this morning.    Nutrition Support:  Patient continues on goal TF regimen as follows ~   Nutrition Support Enteral:  Type of Feeding Tube: Nasoduodenal   Enteral Frequency:  Continuous  Enteral Regimen: Peptamen 1.5 at 50 mL/hr   Total Enteral Provisions: 1800 kcal, 82 g protein (1 g/kg and 87% needs), 226 g CHO, no fiber, 924 mL H2O, 5280 International Units Vit A, 216 mg Vit C, 26 mg Zinc   Free Water Flush: 60 mL every 4 hours   Also receiving ProSource 1 pkt BID = 80 kcal and 22 g protein   Total (TF + ProSource)= 1880 kcal (24 kcal/kg and 96% needs), 104 g protein (1.3 g/kg)    Intake/tolerance:   BS <150, on high SSI  BM - small, loose stools, 300 mL past 24 hr. Receiving Miralax, Colace and Senokot.      ASSESSED NUTRITION NEEDS:  Dosing Weight (78 kg adjusted for overwt)       Estimated Energy Needs: 3275-1282 kcals (25-30 Kcal/Kg)  Justification: obese and vented  Estimated Protein Needs: 101-117 grams protein (1.3-1.5 g pro/Kg)  Justification: stress and wound healing      NEW FINDINGS:   General trace edema  Vitals:    06/25/18 1812 06/26/18 0620 06/28/18 0017 " "06/29/18 0600   Weight: 104.3 kg (230 lb) 102.5 kg (225 lb 15.5 oz) 98.7 kg (217 lb 9.5 oz) 100.2 kg (220 lb 14.4 oz)    06/30/18 0600 07/03/18 0639 07/04/18 0530 07/05/18 0400   Weight: 103.6 kg (228 lb 6.3 oz) 102.9 kg (226 lb 13.7 oz) 103.5 kg (228 lb 2.8 oz) 106.5 kg (234 lb 12.6 oz)    07/06/18 0200 07/07/18 0400 07/08/18 0400 07/09/18 0300   Weight: 104 kg (229 lb 4.5 oz) 101.5 kg (223 lb 12.3 oz) 99.6 kg (219 lb 9.3 oz) 99.4 kg (219 lb 2.2 oz)    07/10/18 0500 07/11/18 0500 07/12/18 0500 07/12/18 2129   Weight: 98.8 kg (217 lb 13 oz) 97.7 kg (215 lb 6.2 oz) 97.1 kg (214 lb 1.1 oz) 98.8 kg (217 lb 12.8 oz)    07/13/18 0701   Weight: 97.8 kg (215 lb 9.8 oz)         Previous Goals:   Peptamen 1.5 at 50 mL/hr + ProSource 1p pkt BID will continue to meet % needs  Evaluation: Met    Previous Nutrition Diagnosis:   None identified      CURRENT NUTRITION DIAGNOSIS  Inadequate oral intake related to decreased appetite and possibly confusion as evidenced by diet just advanced to full liquids and has only had 1 meal.    INTERVENTIONS  Recommendations / Nutrition Prescription  Full liquids, ADAT per MD    Implementation  Made pt \"not appropriate\" for room service so that meals will be automatically sent  Medical Food Supplement - will send Boost Plus w/meals    Goals  Peptamen 1.5 at 50 mL/hr + ProSource BID will continue to meet needs      MONITORING AND EVALUATION:  Progress towards goals will be monitored and evaluated per protocol and Practice Guidelines        "

## 2018-07-13 NOTE — PROGRESS NOTES
[facility] Nurse Inpatient Adult Pressure Injury (PI) Assessment     Follow-up Assessment of PI(s) on pt's:   Right buttock    Data:   Patient History:      per MD note(s): 52 yom admitted with acute EtOH pancreatitis and EtOH withdrawal, intubated for airway protection in setting of acute withdrawal.     Elton Assessment and sub scores:   Elton Score  Avg: 15.1  Min: 11  Max: 22     Positioning: Mepilex dressing and Pillows    Mattress:  Standard , Atmos Air mattress       Moisture Management:  Urinal, toilet with assist      Current Diet / Nutrition:         Active Diet Order      Advance Diet as Tolerated: Full Liquid Diet    Tube feeding continues    Labs:   Recent Labs   Lab Test  07/11/18   0525  07/10/18   0520   06/30/18   1010   ALBUMIN   --   2.4*   < >   --    HGB  13.3  12.9*   < >  14.8   INR   --   1.38*   --    --    WBC  8.5  9.6   < >  8.3   A1C   --    --    --   6.2*    < > = values in this interval not displayed.                                                                                                                        Pressure Injury Assessment  (location):   Right buttock    Wound History:   Pt had previously been vented and sedated, mildly obese with dense heavy tissue, was difficult to reposition.  Pt now out of ICU since last night and is able to reposition fairly independently, up moving around with walker and assist as he is still unsteady and weak.  Pt has a bumpy dry pale pink rash to abdomen, groin, which also extends to upper legs and buttocks, not a typical candida rash, possible other fungal infection vs allergy.  Nursing applying Bhumika antifungal cream to groin/abd folds.        Wound Base: previous areas of shallow moist pink tissue have all resurfaced with new thin pink dry tissue.  A few small scattered areas of blanchable erythema noted.       Palpation of the wound bed:  normal    Slough appearance:  none    Eschar appearance:   "none    Periwound Skin: deep fold between buttocks; coccyx and gluteal cleft appear intact    Color: normal and consistent with surrounding tissue    Temperature  normal     Drainage:  n/a         Odor: n/a    Pain:  denies         Intervention:     Patient's chart evaluated.      Elton Interventions:  Current Elton Interventions and Care Plan reviewed and updated, appropriate at this time.    Skin assessed    Orders  updated    Supplies  reviewed    Discussed plan of care with Nurse, pt           Assessment:     Pressure Injury (PI) located on right buttock: previous scattered Stage 2's with friction component, have all resolved.  Skin intact but thin and fragile in areas.      Pt able to reposition independently now.  May still need reminders to turn.      Bumpy pink rash of unclear etiology to lower torso, abdomen, groin, upper legs.  Pt denies discomfort.           Plan:     Nursing to notify the Provider(s) and re-consult the [facility] Nurse if wound(s) deteriorate(s)or if the wound care plan needs reevaluation.    If pt is refusing to turn or reposition they must be educated on the  potential injury from not off loading pressure.  Then this \"educated refusal\" needs to be documented as an \"educated refusal to turn/ reposition\" and document if alert, etc.      Skin care to buttocks and PIP (pressure injury prevention):   - leave buttocks open to air; can cleanse with Bhumika perineal lotion BID and prn to help condition and protect skin    - keep HOB as low as tolerated to reduce shear  - assist/remind pt to reposition every 1-2 hrs, side to side  - float heels when in bed      WOC Nurse will return: will sign off as skin is healed; please reconsult if further issues    "

## 2018-07-13 NOTE — PLAN OF CARE
Problem: Patient Care Overview  Goal: Plan of Care/Patient Progress Review  Discharge Planner PT   Patient plan for discharge: None stated  Current status: Pt is min assist for ambulation with rolling walker, needs cueing to avoid obstacles. Pt min assist/CGA for txfrs, needs cueing for safety and to increase eccentric control.  Barriers to return to prior living situation: Increased fall risk due to decreased strength and balance, decreased activity tolerance  Recommendations for discharge: ARU  Rationale for recommendations: Pt is motivated to participate and wanting to get stronger, would benefit from cont daily therapies to further strengthen and improve safety and overall functional mobility. At this time, pt is not safe to ambulate without assistance.       Entered by: Pallavi Whatley 07/13/2018 3:28 PM

## 2018-07-14 ENCOUNTER — APPOINTMENT (OUTPATIENT)
Dept: PHYSICAL THERAPY | Facility: CLINIC | Age: 53
DRG: 166 | End: 2018-07-14
Payer: COMMERCIAL

## 2018-07-14 ENCOUNTER — APPOINTMENT (OUTPATIENT)
Dept: OCCUPATIONAL THERAPY | Facility: CLINIC | Age: 53
DRG: 166 | End: 2018-07-14
Payer: COMMERCIAL

## 2018-07-14 PROBLEM — R78.81 POSITIVE BLOOD CULTURE: Status: ACTIVE | Noted: 2018-07-14

## 2018-07-14 LAB
ALBUMIN SERPL-MCNC: 2.8 G/DL (ref 3.4–5)
ALP SERPL-CCNC: 93 U/L (ref 40–150)
ALT SERPL W P-5'-P-CCNC: 63 U/L (ref 0–70)
ANION GAP SERPL CALCULATED.3IONS-SCNC: 7 MMOL/L (ref 3–14)
AST SERPL W P-5'-P-CCNC: 59 U/L (ref 0–45)
BILIRUB SERPL-MCNC: 0.6 MG/DL (ref 0.2–1.3)
BUN SERPL-MCNC: 38 MG/DL (ref 7–30)
CALCIUM SERPL-MCNC: 8.3 MG/DL (ref 8.5–10.1)
CHLORIDE SERPL-SCNC: 105 MMOL/L (ref 94–109)
CO2 SERPL-SCNC: 26 MMOL/L (ref 20–32)
CREAT SERPL-MCNC: 1.26 MG/DL (ref 0.66–1.25)
ERYTHROCYTE [DISTWIDTH] IN BLOOD BY AUTOMATED COUNT: 13.8 % (ref 10–15)
GFR SERPL CREATININE-BSD FRML MDRD: 60 ML/MIN/1.7M2
GLUCOSE BLDC GLUCOMTR-MCNC: 129 MG/DL (ref 70–99)
GLUCOSE BLDC GLUCOMTR-MCNC: 131 MG/DL (ref 70–99)
GLUCOSE BLDC GLUCOMTR-MCNC: 147 MG/DL (ref 70–99)
GLUCOSE BLDC GLUCOMTR-MCNC: 147 MG/DL (ref 70–99)
GLUCOSE BLDC GLUCOMTR-MCNC: 151 MG/DL (ref 70–99)
GLUCOSE BLDC GLUCOMTR-MCNC: 162 MG/DL (ref 70–99)
GLUCOSE SERPL-MCNC: 157 MG/DL (ref 70–99)
HCT VFR BLD AUTO: 37.9 % (ref 40–53)
HGB BLD-MCNC: 12.6 G/DL (ref 13.3–17.7)
MAGNESIUM SERPL-MCNC: 2 MG/DL (ref 1.6–2.3)
MCH RBC QN AUTO: 34.3 PG (ref 26.5–33)
MCHC RBC AUTO-ENTMCNC: 33.2 G/DL (ref 31.5–36.5)
MCV RBC AUTO: 103 FL (ref 78–100)
PHOSPHATE SERPL-MCNC: 2.7 MG/DL (ref 2.5–4.5)
PLATELET # BLD AUTO: 420 10E9/L (ref 150–450)
POTASSIUM SERPL-SCNC: 4.2 MMOL/L (ref 3.4–5.3)
PROT SERPL-MCNC: 7.6 G/DL (ref 6.8–8.8)
RBC # BLD AUTO: 3.67 10E12/L (ref 4.4–5.9)
SODIUM SERPL-SCNC: 138 MMOL/L (ref 133–144)
WBC # BLD AUTO: 9.4 10E9/L (ref 4–11)

## 2018-07-14 PROCEDURE — 25000132 ZZH RX MED GY IP 250 OP 250 PS 637: Performed by: HOSPITALIST

## 2018-07-14 PROCEDURE — 99232 SBSQ HOSP IP/OBS MODERATE 35: CPT | Performed by: INTERNAL MEDICINE

## 2018-07-14 PROCEDURE — 40000133 ZZH STATISTIC OT WARD VISIT: Performed by: OCCUPATIONAL THERAPY ASSISTANT

## 2018-07-14 PROCEDURE — 25000132 ZZH RX MED GY IP 250 OP 250 PS 637: Performed by: INTERNAL MEDICINE

## 2018-07-14 PROCEDURE — 80053 COMPREHEN METABOLIC PANEL: CPT | Performed by: HOSPITALIST

## 2018-07-14 PROCEDURE — 83735 ASSAY OF MAGNESIUM: CPT | Performed by: HOSPITALIST

## 2018-07-14 PROCEDURE — 25000128 H RX IP 250 OP 636: Performed by: INTERNAL MEDICINE

## 2018-07-14 PROCEDURE — 00000146 ZZHCL STATISTIC GLUCOSE BY METER IP

## 2018-07-14 PROCEDURE — 85027 COMPLETE CBC AUTOMATED: CPT | Performed by: HOSPITALIST

## 2018-07-14 PROCEDURE — 97110 THERAPEUTIC EXERCISES: CPT | Mod: GP

## 2018-07-14 PROCEDURE — 99207 ZZC CDG-MDM COMPONENT: MEETS LOW - DOWN CODED: CPT | Performed by: INTERNAL MEDICINE

## 2018-07-14 PROCEDURE — 97116 GAIT TRAINING THERAPY: CPT | Mod: GP

## 2018-07-14 PROCEDURE — 84100 ASSAY OF PHOSPHORUS: CPT | Performed by: HOSPITALIST

## 2018-07-14 PROCEDURE — 27210437 ZZH NUTRITION PRODUCT SEMIELEM INTERMED LITER

## 2018-07-14 PROCEDURE — 36415 COLL VENOUS BLD VENIPUNCTURE: CPT | Performed by: HOSPITALIST

## 2018-07-14 PROCEDURE — 25000128 H RX IP 250 OP 636: Performed by: HOSPITALIST

## 2018-07-14 PROCEDURE — 97535 SELF CARE MNGMENT TRAINING: CPT | Mod: GO | Performed by: OCCUPATIONAL THERAPY ASSISTANT

## 2018-07-14 PROCEDURE — 25000132 ZZH RX MED GY IP 250 OP 250 PS 637: Performed by: NURSE PRACTITIONER

## 2018-07-14 PROCEDURE — 40000193 ZZH STATISTIC PT WARD VISIT

## 2018-07-14 PROCEDURE — 25000125 ZZHC RX 250: Performed by: INTERNAL MEDICINE

## 2018-07-14 PROCEDURE — 12000000 ZZH R&B MED SURG/OB

## 2018-07-14 PROCEDURE — 97530 THERAPEUTIC ACTIVITIES: CPT | Mod: GO | Performed by: OCCUPATIONAL THERAPY ASSISTANT

## 2018-07-14 RX ADMIN — Medication 40 MG: at 22:39

## 2018-07-14 RX ADMIN — QUETIAPINE FUMARATE 100 MG: 100 TABLET ORAL at 08:35

## 2018-07-14 RX ADMIN — AMPHOTERICIN B 300 MG: 50 INJECTION, POWDER, LYOPHILIZED, FOR SOLUTION INTRAVENOUS at 22:36

## 2018-07-14 RX ADMIN — MAGNESIUM SULFATE HEPTAHYDRATE 2 G: 40 INJECTION, SOLUTION INTRAVENOUS at 13:12

## 2018-07-14 RX ADMIN — ACETAMINOPHEN 650 MG: 325 TABLET, FILM COATED ORAL at 18:02

## 2018-07-14 RX ADMIN — ENOXAPARIN SODIUM 40 MG: 40 INJECTION SUBCUTANEOUS at 13:12

## 2018-07-14 RX ADMIN — Medication 40 MG: at 08:35

## 2018-07-14 RX ADMIN — MICONAZOLE NITRATE: 20 CREAM TOPICAL at 08:41

## 2018-07-14 RX ADMIN — HYDROCHLOROTHIAZIDE 25 MG: 25 TABLET ORAL at 08:35

## 2018-07-14 RX ADMIN — Medication 15 ML: at 08:35

## 2018-07-14 RX ADMIN — METOPROLOL TARTRATE 50 MG: 100 TABLET ORAL at 22:39

## 2018-07-14 RX ADMIN — SODIUM CHLORIDE, PRESERVATIVE FREE 500 ML: 5 INJECTION INTRAVENOUS at 18:02

## 2018-07-14 RX ADMIN — METOPROLOL TARTRATE 50 MG: 100 TABLET ORAL at 09:58

## 2018-07-14 RX ADMIN — AMLODIPINE BESYLATE 2.5 MG: 2.5 TABLET ORAL at 08:35

## 2018-07-14 RX ADMIN — SODIUM CHLORIDE: 9 INJECTION, SOLUTION INTRAVENOUS at 16:53

## 2018-07-14 RX ADMIN — QUETIAPINE FUMARATE 100 MG: 100 TABLET ORAL at 22:40

## 2018-07-14 NOTE — PLAN OF CARE
Problem: Patient Care Overview  Goal: Plan of Care/Patient Progress Review  Discharge Planner PT   Patient plan for discharge: Home with family support  Current status: Pt is supervision for transfers, verbal cues for hand placement. Pt is min assist for ambulation without assistive device, no overt LOB but continues to be unsteady with ambulation. Pt motivated to participate and able to tolerate standing exercise and balance challenges.  Barriers to return to prior living situation: Decreased balance, increased fall risk  Recommendations for discharge: ARU  Rationale for recommendations: At this time continue to recommend 24 hour supervision and assist with mobility tasks due to balance deficits, continued PT to further increase balance and overall safety with mobility.       Entered by: Pallavi Whatley 07/14/2018 4:05 PM

## 2018-07-14 NOTE — PROGRESS NOTES
[facility]    Infectious Disease Progress Note (Covering for Dr Beth / Awilda)    Date of Service : 07/14/2018     HPI    51 yo admitted 6/25/18 w alcoholic hepatitis / pancreatitis  Treated for MSSA pneumonia w antibiotics and ICU  / vent support  July 3 blood culture (+) a fungus (Rhodotorula species). R in vitro to azoles & echinocandins  On ampho B       ROS  No new change vision  No new joint pain  No new back pain  Wonders when will get feeding tube out  No new shortness of breath  No new skin sores    Remainder of 15 pt ROS negative    PFSH  No change    Exam  Feeding tube in  Awake, alert, normal speech  Lungs clear  CV no murmer  abd distended, mild diffuse tenderness  Skin no discrete nodules or rash    Impression:  1. 52 y.o male admitted about 3 weeks ago now.   2. Admission diagnosis was pancreatitis and alcoholic hepatitis.   3. Admission course has been complicated by delirium, resp failure, MSSA pneumonia.   4. Now blood cultures from 7/3 is coming back positive for Rhodotorula species, SUKUMAR pending, only 1 blood culture was drawn that day with repeat blood cultures not done till 7/9 which are still pending.   5. He is currently on IV zosyn day 9 for pneumonia.   6. Also on Micafungin.       Assessment & Plan     Doing well  No new indication complication of fungemia or tx (ie ampho B)  No clue to new focus of infection  Cont ampho B / monitor creat & K+ and Mg++  Note serum creat up a bit,          Guzman Chaney MD      Medications     IV fluid REPLACEMENT ONLY       sodium chloride 75 mL/hr at 07/14/18 1653       sodium chloride 0.9%  500 mL Intravenous Q24H     acetaminophen  650 mg Oral Q24H     amLODIPine  2.5 mg Oral Daily     amphotericin B liposome (AMBISOME) intermittent infusion  3 mg/kg Intravenous Q24H     docusate  100 mg Oral or Feeding Tube BID     enoxaparin  40 mg Subcutaneous Q24H     hydrochlorothiazide  25 mg Oral Daily     insulin aspart  1-12 Units  Subcutaneous Q4H     metoprolol  50 mg Oral BID     miconazole   Topical BID     multivitamins with minerals  15 mL Oral Daily     pantoprazole  40 mg Oral BID     polyethylene glycol  17 g Oral BID     protein modular  1 packet Per Feeding Tube BID     QUEtiapine  100 mg Oral BID     sennosides  10 mL Oral BID     sodium chloride (PF)  3 mL Intracatheter Q8H       Data   All microbiology laboratory data reviewed.    July 14  Creat Up to 1.26  Potassium 4.2  Magnesium 2.0    Recent Labs   Lab Test  07/14/18   1017  07/11/18   0525  07/10/18   0520   WBC  9.4  8.5  9.6   HGB  12.6*  13.3  12.9*   HCT  37.9*  39.6*  38.7*   MCV  103*  104*  104*   PLT  420  481*  495*     Recent Labs   Lab Test  07/14/18   1017  07/13/18   0820  07/12/18   0440   CR  1.26*  1.00  0.76         Recent Labs   Lab Test  07/11/18   1750  07/09/18   0720  07/09/18   0405  07/04/18   0642  07/03/18   1440  07/03/18   1127  07/02/18   2350   CULT  Culture negative after 2 days  No growth after 5 days  No growth after 5 days  Moderate growth  Staphylococcus aureus  *  Plus  Light growth  Normal david    Cultured on the 4th day of incubation:  Rhodotorula species  *  Critical Value/Significant Value, preliminary result only, called to and read back by  Yessi Singh RN, @7153 07/07/18.DH.    No growth  Heavy growth  Staphylococcus aureus  *  Plus  Light growth  Normal david

## 2018-07-14 NOTE — PLAN OF CARE
Problem: Patient Care Overview  Goal: Plan of Care/Patient Progress Review  Discharge Planner OT   Patient plan for discharge: none stated  Current status:  pt completed supine to sit EOB SBA, Bert sit to stand, amb with BERT FWW to/from bathroom, stood at sink for ADLS with CGA/Bert for balance, pt with left wrist drop and difficulty with incorporating LUE into ADL task. Pt struggled to open condiments with bilateral hand task but refused any assistance required extra time and effort to complete.   Barriers to return to prior living situation: decreased balance, strength, AROM in L wrist and fingers decreased, decreased safety.  Recommendations for discharge: ARC per plan established by the Occupational Therapist  Rationale for recommendations: daily intensive therapy to increase ADL/IADL and functional mobility independence and safety to PLOF.        Entered by: Cleopatra Joel 07/14/2018 9:10 AM

## 2018-07-14 NOTE — PLAN OF CARE
Problem: Patient Care Overview  Goal: Plan of Care/Patient Progress Review  Outcome: Improving  Pt a/o x4. SBA x1 to BR. Tolerating regular diet. TF 50 ml/h continuous with 60 ml q4h flush tolerated. TF & NG possibly d/c'd today pending tolerance to diet. VSS on RA. Tachycardic at times. Did state chronic knee pain, but refused intervention. IVF NS 75 ml/h continuous patent. Trace generalized edema. /162, both corrected per order. Some improvement observed in left wrist drop. D/C pending progress, nursing will continue to monitor.

## 2018-07-14 NOTE — PROGRESS NOTES
[facility]    Hospitalist Progress Note    Date of Service (when I saw the patient): 07/14/2018  52 year old male who was admitted on 6/25/2018.   Patient has a history of alcoholism, hypertension, dyslipidemia, Ménière's disease.      52M initially transferred to the intensive care unit for acute agitation and delirium in the setting of acute alcohol withdrawal and alcoholic pancreatitis, however, gradually improving and progressing clinically.         Acute alcohol withdrawal, Metabolic encephalopathy, Pancreatitis: Patient was initially transferred from the medical floor to the intensive care unit for airway protection and Precedex drip due to acute alcohol withdrawal and agitation.  Patient had pancreatitis upon admission, peak lipase approximately 900, is clinically improved. Patient has improved  EEG negative for seizure activity.  Neurology was consulted.  Precedex was slowly weaned off, and patient's mentation has significantly improved.  Seroquel was initiated for agitation.  Psychiatry evaluated the patient, and suggests to gradually decrease Seroquel as his agitation improves and he becomes more functional and oriented.  - Seroquel 100mg BID.   - Pain control.   Patient is eating well and remains on TPN      Acute respiratory failure, MSSA, LLL pneumonia, Fungemia, Rhodotorula Species: The patient was initially intubated for airway protection due to sedation in the setting of his alcohol withdrawal.  Soon after intubation, the patient developed respiratory distress requiring urgent bronchoscopy.  Eventually, sputum culture was positive for GNR and GPC and blood cultures from 7/3 positive for Rhodotorula species,  ID following.   - Continue liposomal amphotericin      History of Hypertension: Patient is a long-standing history of hypertension treated with amlodipine, hydrochlorothiazide, metoprolol XL.  He states he is largely compliant with his medications prior to admission.  Since  extubation, the patient's blood pressure has been within normal limits to mildly elevated.  -140/70-80.  - Continue amlodipine.     Alcoholism, History of Insomnia, Anxiety: Patient will need ongoing counseling for this as well as chemical dependency      Elevated creatinine value on amphotericin B    This and electrolytes will need to be followed  DVT Prophylaxis: Enoxaparin (Lovenox) SQ     Code: Full Code      Disposition: Expected discharge once safe disposition has been identified, CD eval completed, medications adjusted, able to eat without enteral feeding.    Interval History   Patient is presently being visited by his brother, niece who is also a counselor  The offered a lot of support to him and have lot of questions about his health  Patient denies any significant complaints  10 point ROS of systems including Constitutional, Eyes, Respiratory, Cardiovascular, Gastroenterology, Genitourinary, Integumentary, Muscularskeletal, Psychiatric were all negative except for pertinent positives noted in my HPI.      -Data reviewed today: I reviewed all new labs and imaging results over the last 24 hours. I personally reviewed culture results.    Physical Exam   Temp: 98.4  F (36.9  C) Temp src: Oral BP: 142/90 Pulse: 107 Heart Rate: 120 Resp: 20 SpO2: 95 % O2 Device: None (Room air)    Vitals:    07/12/18 0500 07/12/18 2129 07/13/18 0701   Weight: 97.1 kg (214 lb 1.1 oz) 98.8 kg (217 lb 12.8 oz) 97.8 kg (215 lb 9.8 oz)     Vital Signs with Ranges  Temp:  [98.2  F (36.8  C)-98.4  F (36.9  C)] 98.4  F (36.9  C)  Pulse:  [107-110] 107  Heart Rate:  [] 120  Resp:  [18-20] 20  BP: (109-142)/(69-90) 142/90  SpO2:  [94 %-97 %] 95 %  I/O last 3 completed shifts:  In: 3059 [P.O.:480; I.V.:746; NG/GT:1333; IV Piggyback:500]  Out: 2325 [Urine:1675; Stool:650]    Constitutional: Alert and oriented  Respiratory: No rales  Cardiovascular: Normal S1 and S2 and no murmur  GI: Soft nontender  Skin/Integumen: IV sites are  not infected  Other: Neurological exam is nonfocal    Medications     IV fluid REPLACEMENT ONLY       sodium chloride 75 mL/hr at 07/13/18 2358       sodium chloride 0.9%  500 mL Intravenous Q24H     acetaminophen  650 mg Oral Q24H     amLODIPine  2.5 mg Oral Daily     amphotericin B liposome (AMBISOME) intermittent infusion  3 mg/kg Intravenous Q24H     docusate  100 mg Oral or Feeding Tube BID     enoxaparin  40 mg Subcutaneous Q24H     hydrochlorothiazide  25 mg Oral Daily     insulin aspart  1-12 Units Subcutaneous Q4H     metoprolol  50 mg Oral BID     miconazole   Topical BID     multivitamins with minerals  15 mL Oral Daily     pantoprazole  40 mg Oral BID     polyethylene glycol  17 g Oral BID     protein modular  1 packet Per Feeding Tube BID     QUEtiapine  100 mg Oral BID     sennosides  10 mL Oral BID     sodium chloride (PF)  3 mL Intracatheter Q8H       Data     Recent Labs  Lab 07/14/18  1017 07/13/18  0820 07/12/18  0440 07/11/18  0525  07/10/18  0520   WBC 9.4  --   --  8.5  --  9.6   HGB 12.6*  --   --  13.3  --  12.9*   *  --   --  104*  --  104*     --   --  481*  --  495*   INR  --   --   --   --   --  1.38*    140 139 141  --  140   POTASSIUM 4.2 3.7 3.9 4.1  < > 4.0   CHLORIDE 105 105 106 107  --  109   CO2 26 27 25 24  --  23   BUN 38* 31* 19 19  --  18   CR 1.26* 1.00 0.76 0.61*  --  0.62*   ANIONGAP 7 8  --  10  --  8   DEMETRIA 8.3* 8.8 8.0* 8.3*  --  8.2*   * 141* 130* 118*  --  157*   ALBUMIN 2.8*  --   --   --   --  2.4*   PROTTOTAL 7.6  --   --   --   --  7.4   BILITOTAL 0.6  --   --   --   --  0.6   ALKPHOS 93  --   --   --   --  79   ALT 63  --   --   --   --  61   AST 59*  --   --   --   --  77*   < > = values in this interval not displayed.    No results found for this or any previous visit (from the past 24 hour(s)).

## 2018-07-14 NOTE — PLAN OF CARE
Problem: Patient Care Overview  Goal: Plan of Care/Patient Progress Review  Outcome: Improving  A&Ox4.  SBA.  Tolerated advancement to regular diet.  VSS on RA ex tachycardic.  Acknowledges chronic knee pain, but declines intervention.  States PCD's help.  Running IVF.  Received IV Amphotericin.  , 120.  NG patent and running TF.  May discontinue tomorrow with toleration in diet.  Trace generalized edema.  Ongoing left wrist droop.  SW, PT, OT consults.  Discharge pending.

## 2018-07-15 ENCOUNTER — APPOINTMENT (OUTPATIENT)
Dept: PHYSICAL THERAPY | Facility: CLINIC | Age: 53
DRG: 166 | End: 2018-07-15
Payer: COMMERCIAL

## 2018-07-15 LAB
BACTERIA SPEC CULT: NO GROWTH
BACTERIA SPEC CULT: NO GROWTH
GLUCOSE BLDC GLUCOMTR-MCNC: 106 MG/DL (ref 70–99)
GLUCOSE BLDC GLUCOMTR-MCNC: 114 MG/DL (ref 70–99)
GLUCOSE BLDC GLUCOMTR-MCNC: 146 MG/DL (ref 70–99)
Lab: NORMAL
Lab: NORMAL
MAGNESIUM SERPL-MCNC: 2.4 MG/DL (ref 1.6–2.3)
SPECIMEN SOURCE: NORMAL
SPECIMEN SOURCE: NORMAL

## 2018-07-15 PROCEDURE — 25000132 ZZH RX MED GY IP 250 OP 250 PS 637: Performed by: INTERNAL MEDICINE

## 2018-07-15 PROCEDURE — 25000128 H RX IP 250 OP 636: Performed by: INTERNAL MEDICINE

## 2018-07-15 PROCEDURE — 25000132 ZZH RX MED GY IP 250 OP 250 PS 637: Performed by: NURSE PRACTITIONER

## 2018-07-15 PROCEDURE — 83735 ASSAY OF MAGNESIUM: CPT | Performed by: INTERNAL MEDICINE

## 2018-07-15 PROCEDURE — 00000146 ZZHCL STATISTIC GLUCOSE BY METER IP

## 2018-07-15 PROCEDURE — 25000132 ZZH RX MED GY IP 250 OP 250 PS 637: Performed by: HOSPITALIST

## 2018-07-15 PROCEDURE — 25000125 ZZHC RX 250: Performed by: INTERNAL MEDICINE

## 2018-07-15 PROCEDURE — 99232 SBSQ HOSP IP/OBS MODERATE 35: CPT | Performed by: INTERNAL MEDICINE

## 2018-07-15 PROCEDURE — 97116 GAIT TRAINING THERAPY: CPT | Mod: GP

## 2018-07-15 PROCEDURE — 40000193 ZZH STATISTIC PT WARD VISIT

## 2018-07-15 PROCEDURE — 97110 THERAPEUTIC EXERCISES: CPT | Mod: GP

## 2018-07-15 PROCEDURE — 36415 COLL VENOUS BLD VENIPUNCTURE: CPT | Performed by: INTERNAL MEDICINE

## 2018-07-15 PROCEDURE — 12000000 ZZH R&B MED SURG/OB

## 2018-07-15 RX ORDER — METOPROLOL TARTRATE 50 MG
50 TABLET ORAL 2 TIMES DAILY
Status: DISCONTINUED | OUTPATIENT
Start: 2018-07-15 | End: 2018-07-21 | Stop reason: HOSPADM

## 2018-07-15 RX ADMIN — METOPROLOL TARTRATE 50 MG: 50 TABLET ORAL at 21:51

## 2018-07-15 RX ADMIN — Medication 1 PACKET: at 00:51

## 2018-07-15 RX ADMIN — Medication 1 PACKET: at 09:24

## 2018-07-15 RX ADMIN — SODIUM CHLORIDE, PRESERVATIVE FREE 500 ML: 5 INJECTION INTRAVENOUS at 18:18

## 2018-07-15 RX ADMIN — METOPROLOL TARTRATE 50 MG: 100 TABLET ORAL at 11:20

## 2018-07-15 RX ADMIN — DOCUSATE SODIUM 100 MG: 50 LIQUID ORAL at 21:52

## 2018-07-15 RX ADMIN — ACETAMINOPHEN 650 MG: 325 TABLET, FILM COATED ORAL at 18:17

## 2018-07-15 RX ADMIN — SODIUM CHLORIDE: 9 INJECTION, SOLUTION INTRAVENOUS at 14:52

## 2018-07-15 RX ADMIN — Medication 40 MG: at 09:24

## 2018-07-15 RX ADMIN — AMPHOTERICIN B 300 MG: 50 INJECTION, POWDER, LYOPHILIZED, FOR SOLUTION INTRAVENOUS at 18:50

## 2018-07-15 RX ADMIN — AMLODIPINE BESYLATE 2.5 MG: 2.5 TABLET ORAL at 09:24

## 2018-07-15 RX ADMIN — QUETIAPINE FUMARATE 100 MG: 100 TABLET ORAL at 09:24

## 2018-07-15 RX ADMIN — SODIUM CHLORIDE: 9 INJECTION, SOLUTION INTRAVENOUS at 00:52

## 2018-07-15 RX ADMIN — SENNOSIDES A AND B 10 ML: 415.36 LIQUID ORAL at 21:53

## 2018-07-15 RX ADMIN — MICONAZOLE NITRATE: 20 CREAM TOPICAL at 09:25

## 2018-07-15 RX ADMIN — HYDROCHLOROTHIAZIDE 25 MG: 25 TABLET ORAL at 09:24

## 2018-07-15 RX ADMIN — QUETIAPINE FUMARATE 100 MG: 100 TABLET ORAL at 21:51

## 2018-07-15 RX ADMIN — Medication 15 ML: at 09:24

## 2018-07-15 RX ADMIN — Medication 40 MG: at 21:55

## 2018-07-15 RX ADMIN — ENOXAPARIN SODIUM 40 MG: 40 INJECTION SUBCUTANEOUS at 12:58

## 2018-07-15 RX ADMIN — POLYETHYLENE GLYCOL 3350 17 G: 17 POWDER, FOR SOLUTION ORAL at 21:50

## 2018-07-15 NOTE — PROGRESS NOTES
[facility]    Infectious Disease Progress Note (Covering for Dr Beth / Awilda)    Date of Service : 07/15/2018     HPI    51 yo admitted 6/25/18 w alcoholic hepatitis / pancreatitis  Treated for MSSA pneumonia w antibiotics and ICU  / vent support  July 3 blood culture (+) a fungus (Rhodotorula species). R in vitro to azoles & echinocandins  On ampho B      PFSH  No change      Impression:  1. 52 y.o male admitted 6/25/18 with pancreatitis and alcoholic hepatitis.   2. complicated by delirium, resp failure, MSSA pneumonia and now FUNGEMIA  3. blood cultures from 7/3 (+) for Rhodotorula species (yeast) - repeat bld cx's negative      Assessment & Plan     Continue ampho B  Monitor K/Mg/Creat  Duration of therapy per Dr Kirit Chaney MD      Medications     sodium chloride 75 mL/hr at 07/15/18 1452       sodium chloride 0.9%  500 mL Intravenous Q24H     acetaminophen  650 mg Oral Q24H     amLODIPine  2.5 mg Oral Daily     amphotericin B liposome (AMBISOME) intermittent infusion  3 mg/kg Intravenous Q24H     docusate  100 mg Oral or Feeding Tube BID     enoxaparin  40 mg Subcutaneous Q24H     hydrochlorothiazide  25 mg Oral Daily     metoprolol tartrate  50 mg Oral BID     miconazole   Topical BID     multivitamins with minerals  15 mL Oral Daily     pantoprazole  40 mg Oral BID     polyethylene glycol  17 g Oral BID     QUEtiapine  100 mg Oral BID     sennosides  10 mL Oral BID     sodium chloride (PF)  3 mL Intracatheter Q8H       Data   All microbiology laboratory data reviewed.    July 15 Creat not done  K+  not done  Mg++ 2.4    Lab Test  07/14/18      WBC  9.4   HGB  12.6*   HCT  37.9*   MCV  103*   PLT  420     Lab Test  07/14/18 07/13/18      CR  1.26*  1.00         Recent Labs   Lab Test  07/11/18   1750  07/09/18   0720  07/09/18   0405  07/04/18   0642  07/03/18   1440  07/03/18   1127  07/02/18   2350   CULT  Culture negative after 2 days  No growth  No growth   Moderate growth  Staphylococcus aureus  *  Plus  Light growth  Normal david    Cultured on the 4th day of incubation:  Rhodotorula species  *  Critical Value/Significant Value, preliminary result only, called to and read back by  Yessi Singh RN, @3169 07/07/18.DH.    No growth  Heavy growth  Staphylococcus aureus  *  Plus  Light growth  Normal david

## 2018-07-15 NOTE — PLAN OF CARE
Problem: Patient Care Overview  Goal: Plan of Care/Patient Progress Review  OT: pt declined OT at this time secondary to stating he just fell back to sleep, did not sleep much last night, feeling groggy, request OT to return later as able.

## 2018-07-15 NOTE — PROGRESS NOTES
[facility]    Hospitalist Progress Note    Date of Service (when I saw the patient): 07/15/2018  52 year old male who was admitted on 6/25/2018.   Patient has a history of alcoholism, hypertension, dyslipidemia, Ménière's disease.      52M initially transferred to the intensive care unit for acute agitation and delirium in the setting of acute alcohol withdrawal and alcoholic pancreatitis, however, gradually improving and progressing clinically.         Acute alcohol withdrawal, Metabolic encephalopathy, Pancreatitis: Patient was initially transferred from the medical floor to the intensive care unit for airway protection and Precedex drip due to acute alcohol withdrawal and agitation.  Patient had pancreatitis upon admission, peak lipase approximately 900, is clinically improved. Patient has improved  EEG negative for seizure activity.  Neurology was consulted.  Precedex was slowly weaned off, and patient's mentation has significantly improved.  Seroquel was initiated for agitation.  Psychiatry evaluated the patient, and suggests to gradually decrease Seroquel as his agitation improves and he becomes more functional and oriented.  - Seroquel 100mg BID.   - Pain control.   Patient is eating well, on tube feeding continuously.  His appetite is good, ok from my perspective to remove feeding tube.      Acute respiratory failure, MSSA, LLL pneumonia, Fungemia, Rhodotorula Species: The patient was initially intubated for airway protection due to sedation in the setting of his alcohol withdrawal.  Soon after intubation, the patient developed respiratory distress requiring urgent bronchoscopy.  Eventually, sputum culture was positive for GNR and GPC and blood cultures from 7/3 positive for Rhodotorula species,  ID following.   - Continue liposomal amphotericin      History of Hypertension: Patient is a long-standing history of hypertension treated with amlodipine, hydrochlorothiazide, metoprolol XL.  He  states he is largely compliant with his medications prior to admission.  Since extubation, the patient's blood pressure has been within normal limits to mildly elevated.  -140/70-80.  - Continue amlodipine.     Alcoholism, History of Insomnia, Anxiety: Patient will need ongoing counseling for this as well as chemical dependency      Elevated creatinine value on amphotericin B    This and electrolytes will need to be followed  DVT Prophylaxis: Enoxaparin (Lovenox) SQ     Code: Full Code      Disposition: Expected discharge once safe disposition has been identified.  Ok to remove feeding tube.    Interval History   Chart reviewed, patient seen.  Denies pain, anxious to get feeding tube out.      -Data reviewed today: I reviewed all new labs and imaging results over the last 24 hours. I personally reviewed culture results.    Physical Exam   Temp: 98.8  F (37.1  C) Temp src: Oral BP: 124/73 Pulse: 108 Heart Rate: 87 Resp: 18 SpO2: 93 % O2 Device: None (Room air)    Vitals:    07/12/18 0500 07/12/18 2129 07/13/18 0701   Weight: 97.1 kg (214 lb 1.1 oz) 98.8 kg (217 lb 12.8 oz) 97.8 kg (215 lb 9.8 oz)     Vital Signs with Ranges  Temp:  [97.6  F (36.4  C)-98.8  F (37.1  C)] 98.8  F (37.1  C)  Pulse:  [] 108  Heart Rate:  [86-97] 87  Resp:  [16-18] 18  BP: (124-153)/(73-86) 124/73  SpO2:  [93 %-98 %] 93 %  I/O last 3 completed shifts:  In: 2559 [P.O.:240; I.V.:2259; NG/GT:60]  Out: 1550 [Urine:1550]    Constitutional: Alert and oriented  Respiratory: No rales  Cardiovascular: Normal S1 and S2 and no murmur  GI: Soft nontender  Skin/Integumen: IV sites are not infected  Other: Neurological exam is nonfocal    Medications     IV fluid REPLACEMENT ONLY       sodium chloride 75 mL/hr at 07/15/18 0052       sodium chloride 0.9%  500 mL Intravenous Q24H     acetaminophen  650 mg Oral Q24H     amLODIPine  2.5 mg Oral Daily     amphotericin B liposome (AMBISOME) intermittent infusion  3 mg/kg Intravenous Q24H      docusate  100 mg Oral or Feeding Tube BID     enoxaparin  40 mg Subcutaneous Q24H     hydrochlorothiazide  25 mg Oral Daily     insulin aspart  1-12 Units Subcutaneous Q4H     metoprolol  50 mg Oral BID     miconazole   Topical BID     multivitamins with minerals  15 mL Oral Daily     pantoprazole  40 mg Oral BID     polyethylene glycol  17 g Oral BID     protein modular  1 packet Per Feeding Tube BID     QUEtiapine  100 mg Oral BID     sennosides  10 mL Oral BID     sodium chloride (PF)  3 mL Intracatheter Q8H       Data     Recent Labs  Lab 07/14/18  1017 07/13/18  0820 07/12/18  0440 07/11/18  0525  07/10/18  0520   WBC 9.4  --   --  8.5  --  9.6   HGB 12.6*  --   --  13.3  --  12.9*   *  --   --  104*  --  104*     --   --  481*  --  495*   INR  --   --   --   --   --  1.38*    140 139 141  --  140   POTASSIUM 4.2 3.7 3.9 4.1  < > 4.0   CHLORIDE 105 105 106 107  --  109   CO2 26 27 25 24  --  23   BUN 38* 31* 19 19  --  18   CR 1.26* 1.00 0.76 0.61*  --  0.62*   ANIONGAP 7 8  --  10  --  8   DEMETRIA 8.3* 8.8 8.0* 8.3*  --  8.2*   * 141* 130* 118*  --  157*   ALBUMIN 2.8*  --   --   --   --  2.4*   PROTTOTAL 7.6  --   --   --   --  7.4   BILITOTAL 0.6  --   --   --   --  0.6   ALKPHOS 93  --   --   --   --  79   ALT 63  --   --   --   --  61   AST 59*  --   --   --   --  77*   < > = values in this interval not displayed.    No results found for this or any previous visit (from the past 24 hour(s)).

## 2018-07-15 NOTE — PLAN OF CARE
Problem: Patient Care Overview  Goal: Plan of Care/Patient Progress Review  Outcome: Improving  VSS. Pt c/o of knee pain. Heat packs, essential oils used.  A/O. Tolerating regular diet. /147/131, SSI. Tube feeding Peptamin at 50ml.  /147.  Up with SBA. IVF. Mg 2.0, being replaced. Redraw in am. D/C pending progress looking at ARU.

## 2018-07-15 NOTE — PLAN OF CARE
Problem: Patient Care Overview  Goal: Plan of Care/Patient Progress Review  Outcome: Improving  VSS. Pt c/o of chronic knee pain, declines intervention. Up SBA. Tube feeding d/c'd. Nasal tube removed. /114 now d/c'd. Tolerating regular diet. IVF. Mg 2.4 after replacement yesterday. D/C pending progress. Recommending ARU.  Insurance issues need to be worked through. Family brought in laptop and patient working on looking at [employer] options.

## 2018-07-15 NOTE — PLAN OF CARE
Problem: Patient Care Overview  Goal: Plan of Care/Patient Progress Review  Discharge Planner PT   Patient plan for discharge: Home   Current status: Pt requires SBA for ambulation without AD x300', mild instability and quick speed. Performs sit <> stand with SBA. Progressed standing ex and balance. Pt continues to require supervision for mobility.  Barriers to return to prior living situation: falls risk, assist for mobility  Recommendations for discharge: TCU  Rationale for recommendations: Pt continues to require 24 hour supervision and assist with mobility tasks due to balance deficits, continued PT to further increase balance and overall safety with mobility. If pt insists on home would recommend 24 hour supervision and home health v outpatient PT to progress balance/mobility.       Entered by: Pallavi Dill 07/15/2018 2:48 PM

## 2018-07-16 ENCOUNTER — APPOINTMENT (OUTPATIENT)
Dept: PHYSICAL THERAPY | Facility: CLINIC | Age: 53
DRG: 166 | End: 2018-07-16
Payer: COMMERCIAL

## 2018-07-16 ENCOUNTER — APPOINTMENT (OUTPATIENT)
Dept: OCCUPATIONAL THERAPY | Facility: CLINIC | Age: 53
DRG: 166 | End: 2018-07-16
Payer: COMMERCIAL

## 2018-07-16 LAB
CREAT SERPL-MCNC: 1.29 MG/DL (ref 0.66–1.25)
GFR SERPL CREATININE-BSD FRML MDRD: 59 ML/MIN/1.7M2
SPECIMEN SOURCE: NORMAL
YEAST SPEC QL CULT: NORMAL

## 2018-07-16 PROCEDURE — 82565 ASSAY OF CREATININE: CPT | Performed by: HOSPITALIST

## 2018-07-16 PROCEDURE — 97530 THERAPEUTIC ACTIVITIES: CPT | Mod: GO | Performed by: OCCUPATIONAL THERAPY ASSISTANT

## 2018-07-16 PROCEDURE — 97116 GAIT TRAINING THERAPY: CPT | Mod: GP

## 2018-07-16 PROCEDURE — 25000132 ZZH RX MED GY IP 250 OP 250 PS 637: Performed by: HOSPITALIST

## 2018-07-16 PROCEDURE — 36415 COLL VENOUS BLD VENIPUNCTURE: CPT | Performed by: HOSPITALIST

## 2018-07-16 PROCEDURE — 12000000 ZZH R&B MED SURG/OB

## 2018-07-16 PROCEDURE — 25000128 H RX IP 250 OP 636: Performed by: INTERNAL MEDICINE

## 2018-07-16 PROCEDURE — 97750 PHYSICAL PERFORMANCE TEST: CPT | Mod: GP

## 2018-07-16 PROCEDURE — 25000132 ZZH RX MED GY IP 250 OP 250 PS 637: Performed by: INTERNAL MEDICINE

## 2018-07-16 PROCEDURE — 40000133 ZZH STATISTIC OT WARD VISIT: Performed by: OCCUPATIONAL THERAPY ASSISTANT

## 2018-07-16 PROCEDURE — 40000141 ZZH STATISTIC PERIPHERAL IV START W/O US GUIDANCE

## 2018-07-16 PROCEDURE — 99232 SBSQ HOSP IP/OBS MODERATE 35: CPT | Performed by: INTERNAL MEDICINE

## 2018-07-16 PROCEDURE — 97535 SELF CARE MNGMENT TRAINING: CPT | Mod: GO | Performed by: OCCUPATIONAL THERAPY ASSISTANT

## 2018-07-16 PROCEDURE — 40000193 ZZH STATISTIC PT WARD VISIT

## 2018-07-16 PROCEDURE — 25000132 ZZH RX MED GY IP 250 OP 250 PS 637: Performed by: NURSE PRACTITIONER

## 2018-07-16 RX ORDER — ACETAMINOPHEN 650 MG/1
650 SUPPOSITORY RECTAL EVERY 4 HOURS PRN
Status: DISCONTINUED | OUTPATIENT
Start: 2018-07-16 | End: 2018-07-21 | Stop reason: HOSPADM

## 2018-07-16 RX ORDER — DOCUSATE SODIUM 100 MG/1
100 CAPSULE, LIQUID FILLED ORAL 2 TIMES DAILY
Status: DISCONTINUED | OUTPATIENT
Start: 2018-07-16 | End: 2018-07-21 | Stop reason: HOSPADM

## 2018-07-16 RX ORDER — PANTOPRAZOLE SODIUM 40 MG/1
40 TABLET, DELAYED RELEASE ORAL
Status: DISCONTINUED | OUTPATIENT
Start: 2018-07-16 | End: 2018-07-21 | Stop reason: HOSPADM

## 2018-07-16 RX ORDER — ACETAMINOPHEN 325 MG/1
650 TABLET ORAL EVERY 4 HOURS PRN
Status: DISCONTINUED | OUTPATIENT
Start: 2018-07-16 | End: 2018-07-21 | Stop reason: HOSPADM

## 2018-07-16 RX ORDER — MULTIPLE VITAMINS W/ MINERALS TAB 9MG-400MCG
1 TAB ORAL DAILY
Status: DISCONTINUED | OUTPATIENT
Start: 2018-07-16 | End: 2018-07-21 | Stop reason: HOSPADM

## 2018-07-16 RX ORDER — SENNOSIDES 8.6 MG
8.6 TABLET ORAL 2 TIMES DAILY
Status: DISCONTINUED | OUTPATIENT
Start: 2018-07-16 | End: 2018-07-21 | Stop reason: HOSPADM

## 2018-07-16 RX ADMIN — ACETAMINOPHEN 650 MG: 325 TABLET, FILM COATED ORAL at 23:06

## 2018-07-16 RX ADMIN — MULTIPLE VITAMINS W/ MINERALS TAB 1 TABLET: TAB at 10:07

## 2018-07-16 RX ADMIN — METOPROLOL TARTRATE 50 MG: 50 TABLET ORAL at 22:31

## 2018-07-16 RX ADMIN — AMPHOTERICIN B 300 MG: 50 INJECTION, POWDER, LYOPHILIZED, FOR SOLUTION INTRAVENOUS at 19:17

## 2018-07-16 RX ADMIN — ACETAMINOPHEN 650 MG: 325 TABLET, FILM COATED ORAL at 18:33

## 2018-07-16 RX ADMIN — HYDROCHLOROTHIAZIDE 25 MG: 25 TABLET ORAL at 09:14

## 2018-07-16 RX ADMIN — ENOXAPARIN SODIUM 40 MG: 40 INJECTION SUBCUTANEOUS at 13:31

## 2018-07-16 RX ADMIN — QUETIAPINE FUMARATE 100 MG: 100 TABLET ORAL at 22:31

## 2018-07-16 RX ADMIN — PANTOPRAZOLE SODIUM 40 MG: 40 TABLET, DELAYED RELEASE ORAL at 18:33

## 2018-07-16 RX ADMIN — SODIUM CHLORIDE, PRESERVATIVE FREE 500 ML: 5 INJECTION INTRAVENOUS at 18:33

## 2018-07-16 RX ADMIN — QUETIAPINE FUMARATE 100 MG: 100 TABLET ORAL at 09:15

## 2018-07-16 RX ADMIN — METOPROLOL TARTRATE 50 MG: 50 TABLET ORAL at 09:14

## 2018-07-16 RX ADMIN — AMLODIPINE BESYLATE 2.5 MG: 2.5 TABLET ORAL at 09:14

## 2018-07-16 RX ADMIN — PANTOPRAZOLE SODIUM 40 MG: 40 TABLET, DELAYED RELEASE ORAL at 10:07

## 2018-07-16 RX ADMIN — SODIUM CHLORIDE: 9 INJECTION, SOLUTION INTRAVENOUS at 01:45

## 2018-07-16 NOTE — CONSULTS
Care Transition Initial Assessment -   Reason For Consult: discharge planning relating to rehab and Substance Use.  Met with: patient and spoke with brother Blu.  Patient gave write to speak with all contacts listed on his face sheet.  Active Problems:    Pancreatitis    Alcohol withdrawal hallucinosis (H)    Positive blood culture       DATA  Lives With: alone  Living Arrangements: apartment, status of patient's apartment is to be determined.  Patient's brother emptied his apartment when patient was in ICU because the lease was to .  Patient is unemployed, he was denied unemployment and has till  to appeal the decision.  Patient's health insurance thru his employer terminated .  Patient is eligible to signed up for [employer] however due to lack of funds cannot afford the monthly premium.  Support system:  Patient has supportive brother and his wife who live in [place].  Parents and grandmother live in [place], [place] (near [place])  Brother has offered for his home temporarily to patient upon release from physical rehab care.  Blu works full time and his wife part time.      Identified issues/concerns regarding health management:  Acute Rehab is recommended for PT and OT and he also needs inpatient care to complete his course of IV antibiotic therapy.  On , writer did meet briefly with patient with his SO and parents present to present ARU.  At that time patient's cognition was compromised.  Today when writer met with patient again he was strongly opposed to rehab.  MD met with patient and explained he needs to enter a rehab facilityl  Writers impression reasons for patient's opposition  were related to dislike for feeling like a prisioner because he is on fall risk precautions and wanting to get out of the hospital.   He also wants to be able to address his unemployment compensation status before the deadline of .  We took each problem one by one and are involving his brother to  assist.  Unemployment: Patient reviewed his unemployment letter and has found out he can appeal on line.  He has his computer here.  Insruance:  In discussing his financial situation, he currently cannot afford [employer] so writer has asked the financial counselor to assist patient in applying for MA. He will need this payer source for rehab care.  Housing: patient cannot afford his apartment and brother his home for housing post rehab. Patient also believes he can stay with his parents.    During conversation with brother, Blu, he did express concern for patient's alcohol use.  He feels his brother has used hard liquor for 20-25 years.  He feels patient does use alcohol as a coping tool.  When he has addressed his concern patient states he can stop drinking.    Will determine if CD can see patient here or if the evaluation will need to occur in the community post rehab.     ASSESSMENT  Cognitive Status:  Alert and oriented.  Processing information.  Concerns to be addressed: Rehab, insurance and future plans for CD evaluation.   PLAN  Await PT input today - is the recommendation ARU or TCU.  Wait for MA application to be completed.  Financial costs for the patient includes TBD.  Patient given options and choices for discharges:  Choice offered are  limited due to his payer source  Patient/family is agreeable to the plan?  Patient reluctantly agreeing to rehab placement.  Patient Goals and Preferences: d/c to the community.  Patient anticipates discharging to:  rehab

## 2018-07-16 NOTE — PROGRESS NOTES
[facility]    Infectious Disease Progress Note    Date of Service (when I saw the patient): 07/16/2018     Assessment & Plan   Db Watson is a 52 year old male who was admitted on 6/25/2018.     Impression:  1. 52 y.o male admitted about 3 weeks ago now.   2. Admission diagnosis was pancreatitis and alcoholic hepatitis.   3. Admission course has been complicated by delirium, resp failure, MSSA pneumonia.   4. Rhodotorula fungemia.   5. Fungemia with Rhodotorula most likely real given, patient with liver disease, pancreatitis, tpn use, multiple lines in the ICU.   SUKUMAR on the Rhodotorula shows susceptible only to  Amphotericin on  Liposomal Amphotericin. Only 1 cultures was done then, No other positive cultures.   PICC now out.     REC:  1. Cont Ampho B.  Day 6  2. Monitor cr, lytes carefully  3. Pt asks about outpt IV rx, but pt homeless and Ampho would be too risky.         Sharad Sams MD    Interval History   Afebrile.  Feels well.  No c/o.  Creat rel stable at 1.29.     Physical Exam   Temp: 97.9  F (36.6  C) Temp src: Oral BP: 127/83 Pulse: 108 Heart Rate: 101 Resp: 18 SpO2: 95 % O2 Device: None (Room air)    Vitals:    07/12/18 0500 07/12/18 2129 07/13/18 0701   Weight: 97.1 kg (214 lb 1.1 oz) 98.8 kg (217 lb 12.8 oz) 97.8 kg (215 lb 9.8 oz)     Vital Signs with Ranges  Temp:  [97.2  F (36.2  C)-97.9  F (36.6  C)] 97.9  F (36.6  C)  Pulse:  [108] 108  Heart Rate:  [] 101  Resp:  [18-20] 18  BP: (124-146)/(73-85) 127/83  SpO2:  [93 %-95 %] 95 %    Constitutional: Awake,interactive   Lungs: Clear to auscultation bilaterally, no crackles or wheezing  Cardiovascular: Regular rate and rhythm, normal S1 and S2, and no murmur noted  Abdomen: Normal bowel sounds, soft, non-distended, non-tender  Skin: No rashes, no cyanosis, no edema  Other:    Medications     sodium chloride 75 mL/hr at 07/16/18 0145       sodium chloride 0.9%  500 mL Intravenous Q24H     acetaminophen  650 mg Oral  Q24H     amLODIPine  2.5 mg Oral Daily     amphotericin B liposome (AMBISOME) intermittent infusion  3 mg/kg Intravenous Q24H     docusate  100 mg Oral or Feeding Tube BID     enoxaparin  40 mg Subcutaneous Q24H     hydrochlorothiazide  25 mg Oral Daily     metoprolol tartrate  50 mg Oral BID     miconazole   Topical BID     multivitamin, therapeutic with minerals  1 tablet Oral Daily     pantoprazole  40 mg Oral BID AC     polyethylene glycol  17 g Oral BID     QUEtiapine  100 mg Oral BID     sennosides  10 mL Oral BID     sodium chloride (PF)  3 mL Intracatheter Q8H       Data   All microbiology laboratory data reviewed.  Recent Labs   Lab Test  07/14/18   1017  07/11/18   0525  07/10/18   0520   WBC  9.4  8.5  9.6   HGB  12.6*  13.3  12.9*   HCT  37.9*  39.6*  38.7*   MCV  103*  104*  104*   PLT  420  481*  495*     Recent Labs   Lab Test  07/16/18   0845  07/14/18   1017  07/13/18   0820   CR  1.29*  1.26*  1.00     No lab results found.  Recent Labs   Lab Test  07/11/18   1750  07/09/18   0720  07/09/18   0405  07/04/18   0642  07/03/18   1440  07/03/18   1127  07/02/18   2350   CULT  Culture negative after 2 days  No growth  No growth  Moderate growth  Staphylococcus aureus  *  Plus  Light growth  Normal david    Cultured on the 4th day of incubation:  Rhodotorula species  *  Critical Value/Significant Value, preliminary result only, called to and read back by  Yessi Singh RN, @8908 07/07/18.DH.    No growth  Heavy growth  Staphylococcus aureus  *  Plus  Light growth  Normal david

## 2018-07-16 NOTE — PLAN OF CARE
Problem: Patient Care Overview  Goal: Plan of Care/Patient Progress Review  Discharge Planner PT   Patient plan for discharge: Rehab  Current status: Pt is CGA for gait in hallway with balance challenges and no AD, some lateral path deviaiton noted, pt has some instability when attempting to open doors, CGA provided and assist. Up/down 12 steps in [place], BHR required, reciprocal pattern, pt impulsive, instability noted, cues to slow down movement to ensure safe ft placement. BBS completed see previous care plan note  Barriers to return to prior living situation: Assist for mobility, 40/56 BBS, impulsive with mobility  Recommendations for discharge: TCU  Rationale for recommendations: Pt would benefit from TCU to improve balance, strength, endurance and safety with functional mobility as pt below baseline        Entered by: Domonique Nur 07/16/2018 3:59 PM

## 2018-07-16 NOTE — PROGRESS NOTES
SW:  D:  Met with patient with his parents present.  Explained due to his progress TCU is now the recommended level of therapy.  Parents request a [facility] location for convenience to visits.  Referral made to [facility].  They will clinically assess and will review patient's Long Term Care MA application was it is completed.

## 2018-07-16 NOTE — PROGRESS NOTES
CLINICAL NUTRITION SERVICES - REASSESSMENT NOTE      Malnutrition:   (6/27)  % Weight Loss:  None noted  % Intake: <75% for > 7 days (non-severe malnutrition)  Subcutaneous Fat Loss:  None observed  Muscle Loss:  None observed  Fluid Retention:  Mild 2+ LE (likely not nutrition related)      Malnutrition Diagnosis: Patient does not meet two of the above criteria necessary for diagnosing malnutrition        EVALUATION OF PROGRESS TOWARD GOALS   Diet:  Regular, Boost with meals  Nutrition Support:  FT was removed and TF d/c'd yesterday, 7/15.  Intake:  Per flow sheets, pt is eating %.      NEW FINDINGS:   Vitals:    06/25/18 1812 06/26/18 0620 06/28/18 0017 06/29/18 0600   Weight: 104.3 kg (230 lb) 102.5 kg (225 lb 15.5 oz) 98.7 kg (217 lb 9.5 oz) 100.2 kg (220 lb 14.4 oz)    06/30/18 0600 07/03/18 0639 07/04/18 0530 07/05/18 0400   Weight: 103.6 kg (228 lb 6.3 oz) 102.9 kg (226 lb 13.7 oz) 103.5 kg (228 lb 2.8 oz) 106.5 kg (234 lb 12.6 oz)    07/06/18 0200 07/07/18 0400 07/08/18 0400 07/09/18 0300   Weight: 104 kg (229 lb 4.5 oz) 101.5 kg (223 lb 12.3 oz) 99.6 kg (219 lb 9.3 oz) 99.4 kg (219 lb 2.2 oz)    07/10/18 0500 07/11/18 0500 07/12/18 0500 07/12/18 2129   Weight: 98.8 kg (217 lb 13 oz) 97.7 kg (215 lb 6.2 oz) 97.1 kg (214 lb 1.1 oz) 98.8 kg (217 lb 12.8 oz)    07/13/18 0701   Weight: 97.8 kg (215 lb 9.8 oz)         Previous Goals:   Peptamen 1.5 at 50 mL/hr + ProSource BID will continue to meet needs  Evaluation: TF d/c'd yesterday    Previous Nutrition Diagnosis:   Inadequate oral intake related to decreased appetite and possibly confusion as evidenced by diet just advanced to full liquids and has only had 1 meal.  Evaluation: Resolved      CURRENT NUTRITION DIAGNOSIS  No nutrition diagnosis identified at this time     INTERVENTIONS  Recommendations / Nutrition Prescription  Continue current diet and supplements    Implementation  No interventions at this time    Goals  Pt will continue to consume at  least 75% of meals      MONITORING AND EVALUATION:  Progress towards goals will be monitored and evaluated per protocol and Practice Guidelines      Andra Mandel RD  Pager [phone] (M-F)            [phone] (W/E & Hol)

## 2018-07-16 NOTE — PLAN OF CARE
Problem: Patient Care Overview  Goal: Plan of Care/Patient Progress Review  Outcome: Improving  A&Ox4.  SBA.  Tolerates regular diet.  VSS on RA ex ongoing HTN with BP max 146/83.  Generalized soreness; declines intervention.  Received amphotericin.  IVF running.  Minor dependent edema in BLE's.  PCD's on when in bed.  Discharge pending.

## 2018-07-16 NOTE — PROGRESS NOTES
[facility]    Hospitalist Progress Note    Date of Service (when I saw the patient): 07/16/2018  52 year old male who was admitted on 6/25/2018.   Patient has a history of alcoholism, hypertension, dyslipidemia, Ménière's disease.      52M initially transferred to the intensive care unit for acute agitation and delirium in the setting of acute alcohol withdrawal and alcoholic pancreatitis, however, gradually improving and progressing clinically.         Acute alcohol withdrawal, Metabolic encephalopathy, Pancreatitis: Patient was initially transferred from the medical floor to the intensive care unit for airway protection and Precedex drip due to acute alcohol withdrawal and agitation.  Patient had pancreatitis upon admission, peak lipase approximately 900, is clinically improved. Patient has improved  EEG negative for seizure activity.  Neurology was consulted.  Precedex was slowly weaned off, and patient's mentation has significantly improved.  Seroquel was initiated for agitation.  Psychiatry evaluated the patient, and suggests to gradually decrease Seroquel as his agitation improves and he becomes more functional and oriented.  - Seroquel 100mg BID.   - Pain control.   Patient is eating well.Feeding tube removed 7/15.        Acute respiratory failure, MSSA, LLL pneumonia, Fungemia, Rhodotorula Species: The patient was initially intubated for airway protection due to sedation in the setting of his alcohol withdrawal.  Soon after intubation, the patient developed respiratory distress requiring urgent bronchoscopy.  Eventually, sputum culture was positive for GNR and GPC and blood cultures from 7/3 positive for Rhodotorula species,  ID following.   - Continue liposomal amphotericin, will need at least 5 more days per ID      History of Hypertension: Patient is a long-standing history of hypertension treated with amlodipine, hydrochlorothiazide, metoprolol XL.  He states he is largely compliant with  his medications prior to admission.  Since extubation, the patient's blood pressure has been within normal limits to mildly elevated.  -140/70-80.  - Continue amlodipine.     Alcoholism, History of Insomnia, Anxiety: Patient will need ongoing counseling for this as well as chemical dependency      Elevated creatinine value on amphotericin B    This and electrolytes will need to be followed  DVT Prophylaxis: Enoxaparin (Lovenox) SQ     Code: Full Code      Disposition: Strongly recommend TCU vs ARU, do not think outpatient infusion clinic is a viable option as he is homeless and would have a 45min commute daily.    Interval History   Chart reviewed, patient seen.  Denies pain, anxious to get feeding tube out.      -Data reviewed today: I reviewed all new labs and imaging results over the last 24 hours. I personally reviewed culture results.    Physical Exam   Temp: 97.9  F (36.6  C) Temp src: Oral BP: 127/83   Heart Rate: 101 Resp: 18 SpO2: 95 % O2 Device: None (Room air)    Vitals:    07/12/18 0500 07/12/18 2129 07/13/18 0701   Weight: 97.1 kg (214 lb 1.1 oz) 98.8 kg (217 lb 12.8 oz) 97.8 kg (215 lb 9.8 oz)     Vital Signs with Ranges  Temp:  [97.2  F (36.2  C)-97.9  F (36.6  C)] 97.9  F (36.6  C)  Heart Rate:  [] 101  Resp:  [18-20] 18  BP: (127-146)/(81-85) 127/83  SpO2:  [93 %-95 %] 95 %  I/O last 3 completed shifts:  In: 5080 [P.O.:1140; I.V.:3380; NG/GT:60; IV Piggyback:500]  Out: 4250 [Urine:4250]    Constitutional: Alert and oriented  Respiratory: No rales  Cardiovascular: Normal S1 and S2 and no murmur  GI: Soft nontender  Skin/Integumen: IV sites are not infected  Other: Neurological exam is nonfocal    Medications     sodium chloride 75 mL/hr at 07/16/18 0145       sodium chloride 0.9%  500 mL Intravenous Q24H     acetaminophen  650 mg Oral Q24H     amLODIPine  2.5 mg Oral Daily     amphotericin B liposome (AMBISOME) intermittent infusion  3 mg/kg Intravenous Q24H     docusate  100 mg Oral or  Feeding Tube BID     enoxaparin  40 mg Subcutaneous Q24H     hydrochlorothiazide  25 mg Oral Daily     metoprolol tartrate  50 mg Oral BID     miconazole   Topical BID     multivitamin, therapeutic with minerals  1 tablet Oral Daily     pantoprazole  40 mg Oral BID AC     polyethylene glycol  17 g Oral BID     QUEtiapine  100 mg Oral BID     sennosides  10 mL Oral BID     sodium chloride (PF)  3 mL Intracatheter Q8H       Data     Recent Labs  Lab 07/16/18  0845 07/14/18  1017 07/13/18  0820 07/12/18  0440 07/11/18  0525  07/10/18  0520   WBC  --  9.4  --   --  8.5  --  9.6   HGB  --  12.6*  --   --  13.3  --  12.9*   MCV  --  103*  --   --  104*  --  104*   PLT  --  420  --   --  481*  --  495*   INR  --   --   --   --   --   --  1.38*   NA  --  138 140 139 141  --  140   POTASSIUM  --  4.2 3.7 3.9 4.1  < > 4.0   CHLORIDE  --  105 105 106 107  --  109   CO2  --  26 27 25 24  --  23   BUN  --  38* 31* 19 19  --  18   CR 1.29* 1.26* 1.00 0.76 0.61*  --  0.62*   ANIONGAP  --  7 8  --  10  --  8   DEMETRIA  --  8.3* 8.8 8.0* 8.3*  --  8.2*   GLC  --  157* 141* 130* 118*  --  157*   ALBUMIN  --  2.8*  --   --   --   --  2.4*   PROTTOTAL  --  7.6  --   --   --   --  7.4   BILITOTAL  --  0.6  --   --   --   --  0.6   ALKPHOS  --  93  --   --   --   --  79   ALT  --  63  --   --   --   --  61   AST  --  59*  --   --   --   --  77*   < > = values in this interval not displayed.    No results found for this or any previous visit (from the past 24 hour(s)).

## 2018-07-16 NOTE — PLAN OF CARE
Problem: Patient Care Overview  Goal: Plan of Care/Patient Progress Review  Discharge Planner OT   Patient plan for discharge: none stated  Current status: pt completed supine to/from sit EOB independent, SBA sit to stand, amb with pushing IV pole to/from bathroom SBA, stood at sink for ADLS SBA, pt had difficulty with managing and manipulating of objects in left hand due to decrease strength and coordination however pt persistent and with time/effort able to manage.   Barriers to return to prior living situation: decreased balance, strength, AROM in L wrist and fingers decreased, decreased safety.  Recommendations for discharge: ARC per plan established by the Occupational Therapist  Rationale for recommendations: daily intensive therapy to increase ADL/IADL and functional mobility independence and safety to PLOF.        Entered by: Cleopatra Joel 07/16/2018 8:57 AM

## 2018-07-16 NOTE — PROGRESS NOTES
07/16/18 1546   Signing Clinician's Name / Credentials   Signing clinician's name / credentials Domonique Nur DPT   Mcneil Balance Scale (ARTI MUÑIZ, EDA S, JIM CAMPOVERDE, DAYO CAMPOS: MEASURING BALANCE IN THE ELDERLY: VALIDATION OF AN INSTRUMENT. CAN. J. PUB. HEALTH, JULY/AUGUST SUPPLEMENT 2:S7-11, 1992.)   Sit To Stand 4   Standing Unsupported 4   Sitting Unsupported 4   Stand to Sit 4   Transfers 4   Standing with Eyes Closed 3   Standing Unsupported, Feet Together 3   Reach Forward With Outstretched Arm 2   Retrieve Object From Floor 3   Turning to Look Behind 4   Turn 360 Degrees 2   Placing Alternate Foot on Stool (4-6 inches) 2   Unsupported Tandem Stand (Demonstrate to Subject) 0   One Leg Stand 1   Total Score (A score of 45 or less has been correlated with an increased risk of falls)   Total Score (out of 56) 40     Mcneil Balance Scale (BBS) Cutoff Scores: A score of ? 45/56 indicates an increased risk for falls.   Patient Score: 40/56    The BBS is a measure of static and dynamic standing balance that has been validated in community dwelling elderly individuals and individuals who have Parkinson's Disease, MS, and those who are s/p CVA and TBI. The test is administered without an assistive device. Scores from the Mcneil are used to determine the probability of falling based on the patient's previous history of falls and their test performance.     Minimal Detectable Change = 6.5   & Minimal Detectable Change (Parkinson's Disease) = 5 according to Orville & Edgarey 2008    Assessment (rationale for performing, application to patient s function & care plan): Gait instability   Minutes billed as physical performance test: 10

## 2018-07-16 NOTE — PROGRESS NOTES
SW:  [facility] ARU liaison July has reviewed patient's record today and states based on his progress he is no longer appropriate for ARU.  Plan:  Will now need to focus on TCU's which will accept his payor source of MA pending.

## 2018-07-16 NOTE — PLAN OF CARE
Problem: Patient Care Overview  Goal: Plan of Care/Patient Progress Review  Outcome: No Change  A&O x4, up with assist of 1 +walker.  VSS on room air.  Regular diet.  Denies pain.  PIV with IVF infusing @75.  Continue to monitor.

## 2018-07-17 ENCOUNTER — APPOINTMENT (OUTPATIENT)
Dept: OCCUPATIONAL THERAPY | Facility: CLINIC | Age: 53
DRG: 166 | End: 2018-07-17
Payer: COMMERCIAL

## 2018-07-17 ENCOUNTER — APPOINTMENT (OUTPATIENT)
Dept: PHYSICAL THERAPY | Facility: CLINIC | Age: 53
DRG: 166 | End: 2018-07-17
Payer: COMMERCIAL

## 2018-07-17 LAB
ANION GAP SERPL CALCULATED.3IONS-SCNC: 9 MMOL/L (ref 3–14)
BUN SERPL-MCNC: 27 MG/DL (ref 7–30)
CALCIUM SERPL-MCNC: 8.5 MG/DL (ref 8.5–10.1)
CHLORIDE SERPL-SCNC: 106 MMOL/L (ref 94–109)
CO2 SERPL-SCNC: 25 MMOL/L (ref 20–32)
CREAT SERPL-MCNC: 1.41 MG/DL (ref 0.66–1.25)
GFR SERPL CREATININE-BSD FRML MDRD: 53 ML/MIN/1.7M2
GLUCOSE SERPL-MCNC: 132 MG/DL (ref 70–99)
PLATELET # BLD AUTO: 298 10E9/L (ref 150–450)
POTASSIUM SERPL-SCNC: 3.6 MMOL/L (ref 3.4–5.3)
SODIUM SERPL-SCNC: 140 MMOL/L (ref 133–144)

## 2018-07-17 PROCEDURE — 99207 ZZC CDG-MDM COMPONENT: MEETS LOW - DOWN CODED: CPT | Performed by: INTERNAL MEDICINE

## 2018-07-17 PROCEDURE — 97116 GAIT TRAINING THERAPY: CPT | Mod: GP | Performed by: PHYSICAL THERAPY ASSISTANT

## 2018-07-17 PROCEDURE — 25000132 ZZH RX MED GY IP 250 OP 250 PS 637: Performed by: INTERNAL MEDICINE

## 2018-07-17 PROCEDURE — 97530 THERAPEUTIC ACTIVITIES: CPT | Mod: GO

## 2018-07-17 PROCEDURE — 25000128 H RX IP 250 OP 636: Performed by: INTERNAL MEDICINE

## 2018-07-17 PROCEDURE — 36415 COLL VENOUS BLD VENIPUNCTURE: CPT | Performed by: INTERNAL MEDICINE

## 2018-07-17 PROCEDURE — 99232 SBSQ HOSP IP/OBS MODERATE 35: CPT | Performed by: INTERNAL MEDICINE

## 2018-07-17 PROCEDURE — 40000193 ZZH STATISTIC PT WARD VISIT: Performed by: PHYSICAL THERAPY ASSISTANT

## 2018-07-17 PROCEDURE — 25000132 ZZH RX MED GY IP 250 OP 250 PS 637: Performed by: HOSPITALIST

## 2018-07-17 PROCEDURE — 97535 SELF CARE MNGMENT TRAINING: CPT | Mod: GO

## 2018-07-17 PROCEDURE — 85049 AUTOMATED PLATELET COUNT: CPT | Performed by: INTERNAL MEDICINE

## 2018-07-17 PROCEDURE — 12000000 ZZH R&B MED SURG/OB

## 2018-07-17 PROCEDURE — 97530 THERAPEUTIC ACTIVITIES: CPT | Mod: GP | Performed by: PHYSICAL THERAPY ASSISTANT

## 2018-07-17 PROCEDURE — 40000133 ZZH STATISTIC OT WARD VISIT

## 2018-07-17 PROCEDURE — 80048 BASIC METABOLIC PNL TOTAL CA: CPT | Performed by: INTERNAL MEDICINE

## 2018-07-17 PROCEDURE — 87040 BLOOD CULTURE FOR BACTERIA: CPT | Performed by: INTERNAL MEDICINE

## 2018-07-17 RX ORDER — AMLODIPINE BESYLATE 10 MG/1
10 TABLET ORAL DAILY
Status: DISCONTINUED | OUTPATIENT
Start: 2018-07-18 | End: 2018-07-21 | Stop reason: HOSPADM

## 2018-07-17 RX ORDER — AMLODIPINE BESYLATE 2.5 MG/1
2.5 TABLET ORAL ONCE
Status: COMPLETED | OUTPATIENT
Start: 2018-07-17 | End: 2018-07-17

## 2018-07-17 RX ORDER — QUETIAPINE FUMARATE 50 MG/1
50 TABLET, FILM COATED ORAL 2 TIMES DAILY
Status: DISCONTINUED | OUTPATIENT
Start: 2018-07-17 | End: 2018-07-19

## 2018-07-17 RX ADMIN — HYDROCHLOROTHIAZIDE 25 MG: 25 TABLET ORAL at 09:38

## 2018-07-17 RX ADMIN — AMLODIPINE BESYLATE 2.5 MG: 2.5 TABLET ORAL at 09:38

## 2018-07-17 RX ADMIN — PANTOPRAZOLE SODIUM 40 MG: 40 TABLET, DELAYED RELEASE ORAL at 19:20

## 2018-07-17 RX ADMIN — ACETAMINOPHEN 650 MG: 325 TABLET, FILM COATED ORAL at 19:21

## 2018-07-17 RX ADMIN — SODIUM CHLORIDE 500 ML: 9 INJECTION, SOLUTION INTRAVENOUS at 10:46

## 2018-07-17 RX ADMIN — METOPROLOL TARTRATE 50 MG: 50 TABLET ORAL at 20:20

## 2018-07-17 RX ADMIN — AMLODIPINE BESYLATE 2.5 MG: 2.5 TABLET ORAL at 10:50

## 2018-07-17 RX ADMIN — MULTIPLE VITAMINS W/ MINERALS TAB 1 TABLET: TAB at 09:38

## 2018-07-17 RX ADMIN — AMPHOTERICIN B 300 MG: 50 INJECTION, POWDER, LYOPHILIZED, FOR SOLUTION INTRAVENOUS at 20:07

## 2018-07-17 RX ADMIN — PANTOPRAZOLE SODIUM 40 MG: 40 TABLET, DELAYED RELEASE ORAL at 06:52

## 2018-07-17 RX ADMIN — SODIUM CHLORIDE, PRESERVATIVE FREE 500 ML: 5 INJECTION INTRAVENOUS at 19:21

## 2018-07-17 RX ADMIN — METOPROLOL TARTRATE 50 MG: 50 TABLET ORAL at 09:38

## 2018-07-17 RX ADMIN — QUETIAPINE FUMARATE 50 MG: 50 TABLET ORAL at 20:20

## 2018-07-17 RX ADMIN — ENOXAPARIN SODIUM 40 MG: 40 INJECTION SUBCUTANEOUS at 14:26

## 2018-07-17 ASSESSMENT — ACTIVITIES OF DAILY LIVING (ADL)
ADLS_ACUITY_SCORE: 12
ADLS_ACUITY_SCORE: 13
ADLS_ACUITY_SCORE: 13

## 2018-07-17 NOTE — PROGRESS NOTES
[facility]    Infectious Disease Progress Note    Date of Service (when I saw the patient): 07/17/2018     Assessment & Plan   Db Watson is a 52 year old male who was admitted on 6/25/2018.     Impression:  1. 52 y.o male admitted about 3 weeks ago now.   2. Admission diagnosis was pancreatitis and alcoholic hepatitis.   3. Admission course has been complicated by delirium, resp failure, MSSA pneumonia.   4. Rhodotorula fungemia.   5. Fungemia with Rhodotorula most likely real given, patient with liver disease, pancreatitis, tpn use, multiple lines in the ICU.   SUKUMAR on the Rhodotorula shows susceptible only to  Amphotericin on  Liposomal Amphotericin. Only 1 cultures was done then, No other positive cultures.   PICC now out.     REC:  1. Cont Ampho B.  Day 7.  Would continue rx until dose on Friday.  2. Monitor cr, lytes carefully  3. Pt asks about outpt IV rx, but pt homeless and Ampho would be too risky.         Sharad Sams MD    Interval History   Afebrile.  Feels well.  No c/o.  Creat rel stable at 1.29.     Physical Exam   Temp: 98.4  F (36.9  C) Temp src: Oral BP: (!) 156/94   Heart Rate: 84 Resp: 18 SpO2: 94 % O2 Device: None (Room air)    Vitals:    07/12/18 0500 07/12/18 2129 07/13/18 0701   Weight: 97.1 kg (214 lb 1.1 oz) 98.8 kg (217 lb 12.8 oz) 97.8 kg (215 lb 9.8 oz)     Vital Signs with Ranges  Temp:  [97.5  F (36.4  C)-98.4  F (36.9  C)] 98.4  F (36.9  C)  Heart Rate:  [] 84  Resp:  [18] 18  BP: (122-156)/(66-94) 156/94  SpO2:  [94 %-95 %] 94 %    Constitutional: Awake,interactive   Lungs: Clear to auscultation bilaterally, no crackles or wheezing  Cardiovascular: Regular rate and rhythm, normal S1 and S2, and no murmur noted  Abdomen: Normal bowel sounds, soft, non-distended, non-tender  Skin: No rashes, no cyanosis, no edema  Other:    Medications       sodium chloride 0.9%  500 mL Intravenous Q24H     acetaminophen  650 mg Oral Q24H     amLODIPine  2.5 mg Oral  Daily     amphotericin B liposome (AMBISOME) intermittent infusion  3 mg/kg Intravenous Q24H     docusate sodium  100 mg Oral BID     enoxaparin  40 mg Subcutaneous Q24H     hydrochlorothiazide  25 mg Oral Daily     metoprolol tartrate  50 mg Oral BID     miconazole   Topical BID     multivitamin, therapeutic with minerals  1 tablet Oral Daily     pantoprazole  40 mg Oral BID AC     polyethylene glycol  17 g Oral BID     QUEtiapine  100 mg Oral BID     sennosides  8.6 mg Oral BID     sodium chloride (PF)  3 mL Intracatheter Q8H       Data   All microbiology laboratory data reviewed.  Recent Labs   Lab Test  07/17/18   0838  07/14/18   1017  07/11/18   0525  07/10/18   0520   WBC   --   9.4  8.5  9.6   HGB   --   12.6*  13.3  12.9*   HCT   --   37.9*  39.6*  38.7*   MCV   --   103*  104*  104*   PLT  298  420  481*  495*     Recent Labs   Lab Test  07/16/18   0845  07/14/18   1017  07/13/18   0820   CR  1.29*  1.26*  1.00     No lab results found.  Recent Labs   Lab Test  07/11/18   1750  07/09/18   0720  07/09/18   0405  07/04/18   0642  07/03/18   1440  07/03/18   1127  07/02/18   2350   CULT  No yeast isolated  No growth  No growth  Moderate growth  Staphylococcus aureus  *  Plus  Light growth  Normal david    Cultured on the 4th day of incubation:  Rhodotorula species  *  Critical Value/Significant Value, preliminary result only, called to and read back by  Yessi Singh RN, @9619 07/07/18.DH.    No growth  Heavy growth  Staphylococcus aureus  *  Plus  Light growth  Normal david

## 2018-07-17 NOTE — PROVIDER NOTIFICATION
MD Notification    Notified Person: MD    Notified Person Name: Dr. Shaw    Notification Date/Time: 7/16/18 3394/[id number]    Notification Interaction: Paged/Phone    Purpose of Notification: Patient has reports of generalized soreness with amphotericin infusion.  Was given 650 mg Tylenol before infusion earlier this evening.  Doesn't have any PRN analgesics ordered.    Orders Received: PRN Tylenol    Comments:

## 2018-07-17 NOTE — PLAN OF CARE
"Problem: Patient Care Overview  Goal: Plan of Care/Patient Progress Review  Discharge Planner PT   Patient plan for discharge: home \"I don't know\"  Current status: Gait training 400' SBA with no AD, up/down 10 stairs x1 with L rail x1 with no rail CGA. Dynamic balance activities CGA with some Min A needed. Poor balance with balance activities, but functional mobility is good with cues to slow down. Good transfers with sit to/from stand x5 in 10 seconds.   Barriers to return to prior living situation: Assist for mobility, some balance issues 40/56 BBS, impulsive with mobility  Recommendations for discharge: TCU per plan established by physical therapist  Rationale for recommendations: Pt would benefit from TCU to improve balance, strength, endurance and safety with functional mobility as pt below baseline, however pt stated he would probably refuse TCU d/t financial concerns       Entered by: Juan Alfaro 07/17/2018 8:23 AM         "

## 2018-07-17 NOTE — PLAN OF CARE
Problem: Patient Care Overview  Goal: Plan of Care/Patient Progress Review  Outcome: Improving  Pt offers no complaints of pain. BP elevated extra BP med ordered and given. Pt getting bolus for increase in creatine. Pt ate fair. States he does not like the food. Pt frustrated with still having to be here. Walked the halls and tolerated well.

## 2018-07-17 NOTE — PROGRESS NOTES
[facility]    Hospitalist Progress Note    Assessment & Plan   52 year old male who was admitted on 6/25/2018.   Patient has a history of alcoholism, hypertension, dyslipidemia, Ménière's disease.Initially transferred to the intensive care unit for acute agitation and delirium in the setting of acute alcohol withdrawal and alcoholic pancreatitis, however, gradually improving and progressing clinically.      Fungemia, Rhodotorula Species:  -Patient initially was treated for MSSA pneumonia with antibiotics, required vent support in the ICU.  Also was on TPN with multiple lines in the ICU.  -PICC line since has been discontinued  -7/3 blood culture positive for Rhodotorula species fungus  -Continue liposomal amphotericin B per infectious disease, discussed with Dr. Sams; plan is to treat through till this Friday 7/20.  -Repeat blood cultures have been negative      Acute respiratory failure, MSSA, LLL pneumonia:   -The patient was initially intubated for airway protection due to sedation in the setting of his alcohol withdrawal.  Soon after intubation, the patient developed respiratory distress requiring urgent bronchoscopy.  Eventually, sputum culture was positive for GNR and GPC and blood cultures from 7/3 positive for Rhodotorula species,   -Patient was initially on IV Zosyn  -Off antibiotics at this time    Acute alcohol withdrawal, Metabolic encephalopathy,   Alcohol dependence  Acute Pancreatitis:   -Patient was initially transferred from the medical floor to the intensive care unit for airway protection and Precedex drip due to acute alcohol withdrawal and agitation.  Patient had pancreatitis upon admission, peak lipase approximately 900, is clinically improved.   -Neurology was consulted.  Precedex was slowly weaned off, and patient's mentation has significantly improved. Seroquel was initiated for agitation.  Psychiatry evaluated the patient, and suggests to gradually decrease Seroquel as his  agitation improves and he becomes more functional and oriented.  -Psychiatry recommended offering chemical dependency assessment when his quadrant, no basis for commitment at this point, may need  rule 25 as outpatient  -Decrease Seroquel to 50 mg twice a day per psychiatry recommendation with plans to taper off by the time of discharge  -Patient is eating well.  -Feeding tube removed 7/15.      History of Hypertension:   YURI  -Patient is a long-standing history of hypertension treated with amlodipine, hydrochlorothiazide, metoprolol XL.    -Continue amlodipine and metoprolol.  Give additional dose of amlodipine 2.5 mg ×1 now  -Amlodipine at 10 mg daily starting tomorrow morning 7/18  -Hold hydrochlorothiazide as creatinine is slightly worse at 1.41  -Normal saline 500 mL today, recheck creatinine in the morning     # Pain Assessment:  Current Pain Score 7/17/2018   Patient currently in pain? yes   Pain score (0-10) -   Pain location Shoulder   Pain descriptors Aching   CPOT pain score -   Db robles pain level was assessed and he currently denies pain.        D/W: RN  DVT Prophylaxis: Enoxaparin (Lovenox) SQ  Code Status: Full Code    Disposition: Expected discharge likely 7/20    Cristian Woodson MD    Interval History   Recent denies new complaints.  Claims that he is getting stronger.  Appetite satisfactory.  No nausea or vomiting.  Creatinine marginally worse today.    -Data reviewed today: I reviewed all new labs and imaging results over the last 24 hours. I personally reviewed no images or EKG's today.      Physical Exam   Temp: 98.4  F (36.9  C) Temp src: Oral BP: (!) 156/94   Heart Rate: 84 Resp: 18 SpO2: 94 % O2 Device: None (Room air)    Vitals:    07/12/18 0500 07/12/18 2129 07/13/18 0701   Weight: 97.1 kg (214 lb 1.1 oz) 98.8 kg (217 lb 12.8 oz) 97.8 kg (215 lb 9.8 oz)     Vital Signs with Ranges  Temp:  [97.5  F (36.4  C)-98.4  F (36.9  C)] 98.4  F (36.9  C)  Heart Rate:  [] 84  Resp:  [18] 18  BP:  (122-156)/(66-94) 156/94  SpO2:  [94 %-95 %] 94 %  I/O last 3 completed shifts:  In: 1043 [P.O.:730; I.V.:313]  Out: 550 [Urine:550]    Constitutional: AAOX3, NAD  HEENT: Moist oral mucosa, no oral lesions, No pallor or icterus  Neck- Supple, Good ROM, No JVD  Respiratory:  No crackles, No wheezes, CTA B/L, Normal WOB  Cardiovascular: RRR, No murmur  GI: Soft, Non- tender, BS- normoactive  Skin/Integument: Warm and dry, no rashes  MSK: No joint deformity or swelling, no edema  Neuro: CN- grossly intact      Medications           sodium chloride 0.9%  500 mL Intravenous Q24H     acetaminophen  650 mg Oral Q24H     amLODIPine  2.5 mg Oral Daily     amphotericin B liposome (AMBISOME) intermittent infusion  3 mg/kg Intravenous Q24H     docusate sodium  100 mg Oral BID     enoxaparin  40 mg Subcutaneous Q24H     hydrochlorothiazide  25 mg Oral Daily     metoprolol tartrate  50 mg Oral BID     miconazole   Topical BID     multivitamin, therapeutic with minerals  1 tablet Oral Daily     pantoprazole  40 mg Oral BID AC     polyethylene glycol  17 g Oral BID     QUEtiapine  100 mg Oral BID     sennosides  8.6 mg Oral BID     sodium chloride (PF)  3 mL Intracatheter Q8H       Data       Recent Labs  Lab 07/17/18  0838 07/16/18  0845 07/14/18  1017 07/13/18  0820  07/11/18  0525   WBC  --   --  9.4  --   --  8.5   HGB  --   --  12.6*  --   --  13.3   MCV  --   --  103*  --   --  104*     --  420  --   --  481*     --  138 140  < > 141   POTASSIUM 3.6  --  4.2 3.7  < > 4.1   CHLORIDE 106  --  105 105  < > 107   CO2 25  --  26 27  < > 24   BUN 27  --  38* 31*  < > 19   CR 1.41* 1.29* 1.26* 1.00  < > 0.61*   ANIONGAP 9  --  7 8  --  10   DEMETRIA 8.5  --  8.3* 8.8  < > 8.3*   *  --  157* 141*  < > 118*   ALBUMIN  --   --  2.8*  --   --   --    PROTTOTAL  --   --  7.6  --   --   --    BILITOTAL  --   --  0.6  --   --   --    ALKPHOS  --   --  93  --   --   --    ALT  --   --  63  --   --   --    AST  --   --  59*   --   --   --    < > = values in this interval not displayed.    No results found for this or any previous visit (from the past 24 hour(s)).

## 2018-07-17 NOTE — PLAN OF CARE
Problem: Patient Care Overview  Goal: Plan of Care/Patient Progress Review  OT: Treatment attempt, however pt just starting to eat upon arrival, requesting to reschedule therapy.

## 2018-07-17 NOTE — PLAN OF CARE
Problem: Patient Care Overview  Goal: Plan of Care/Patient Progress Review  Outcome: No Change  A&O x4, up independently in his room.  Regular diet.  L shoulder pain, denied interventions.  BLE edema 1-2+. PIV saline locked.  Continue to monitor.

## 2018-07-17 NOTE — PLAN OF CARE
Problem: Patient Care Overview  Goal: Plan of Care/Patient Progress Review  Discharge Planner OT   Patient plan for discharge: Not sure  Current status: Pt I transfer (sit>stand and toilet transfer) and ambulating to bathroom during session today. Able to perform bilateral g/h task standing sink and perform toileting I. Pt stating he is having pain in L wrist/hand still but it is improving, 3/10 currently.  Barriers to return to prior living situation: Potential underlying cog deficits   Recommendations for discharge: TCU  Rationale for recommendations: Will perform cog screen prior to discharge, based on diagnosis and chart review. Pending results, pt may be able to discharge home and get outpatient OT for further therapy to working on fine motor tasks with L hand.        Entered by: Meagan Mims 07/17/2018 4:57 PM

## 2018-07-17 NOTE — PLAN OF CARE
Problem: Patient Care Overview  Goal: Plan of Care/Patient Progress Review  Outcome: Improving  A&Ox4.  Independent.  Tolerates regular diet.  VSS on RA ex ongoing HTN with BP max 142/89.  Generalized soreness following Amphotericin infusion.  New PRN Tylenol order given end of shift.  New IV this enno saline locked.  Edema in BLE's, worse at feet/ankles.  Refuses PCD's; ambulation promoted and walked halls a few times per self and with therapy.  Discharge pending.

## 2018-07-18 ENCOUNTER — APPOINTMENT (OUTPATIENT)
Dept: OCCUPATIONAL THERAPY | Facility: CLINIC | Age: 53
DRG: 166 | End: 2018-07-18
Payer: COMMERCIAL

## 2018-07-18 LAB
ANION GAP SERPL CALCULATED.3IONS-SCNC: 9 MMOL/L (ref 3–14)
BUN SERPL-MCNC: 26 MG/DL (ref 7–30)
CALCIUM SERPL-MCNC: 8.5 MG/DL (ref 8.5–10.1)
CHLORIDE SERPL-SCNC: 106 MMOL/L (ref 94–109)
CO2 SERPL-SCNC: 24 MMOL/L (ref 20–32)
CREAT SERPL-MCNC: 1.33 MG/DL (ref 0.66–1.25)
ERYTHROCYTE [DISTWIDTH] IN BLOOD BY AUTOMATED COUNT: 13.4 % (ref 10–15)
GFR SERPL CREATININE-BSD FRML MDRD: 57 ML/MIN/1.7M2
GLUCOSE SERPL-MCNC: 108 MG/DL (ref 70–99)
HCT VFR BLD AUTO: 35.3 % (ref 40–53)
HGB BLD-MCNC: 12.2 G/DL (ref 13.3–17.7)
MCH RBC QN AUTO: 34.8 PG (ref 26.5–33)
MCHC RBC AUTO-ENTMCNC: 34.6 G/DL (ref 31.5–36.5)
MCV RBC AUTO: 101 FL (ref 78–100)
PLATELET # BLD AUTO: 310 10E9/L (ref 150–450)
POTASSIUM SERPL-SCNC: 3.5 MMOL/L (ref 3.4–5.3)
RBC # BLD AUTO: 3.51 10E12/L (ref 4.4–5.9)
SODIUM SERPL-SCNC: 139 MMOL/L (ref 133–144)
WBC # BLD AUTO: 7.6 10E9/L (ref 4–11)

## 2018-07-18 PROCEDURE — 80048 BASIC METABOLIC PNL TOTAL CA: CPT | Performed by: INTERNAL MEDICINE

## 2018-07-18 PROCEDURE — 85027 COMPLETE CBC AUTOMATED: CPT | Performed by: INTERNAL MEDICINE

## 2018-07-18 PROCEDURE — 36415 COLL VENOUS BLD VENIPUNCTURE: CPT | Performed by: INTERNAL MEDICINE

## 2018-07-18 PROCEDURE — 12000000 ZZH R&B MED SURG/OB

## 2018-07-18 PROCEDURE — 99232 SBSQ HOSP IP/OBS MODERATE 35: CPT | Performed by: INTERNAL MEDICINE

## 2018-07-18 PROCEDURE — 25000132 ZZH RX MED GY IP 250 OP 250 PS 637: Performed by: HOSPITALIST

## 2018-07-18 PROCEDURE — 97535 SELF CARE MNGMENT TRAINING: CPT | Mod: GO

## 2018-07-18 PROCEDURE — 25000132 ZZH RX MED GY IP 250 OP 250 PS 637: Performed by: INTERNAL MEDICINE

## 2018-07-18 PROCEDURE — 97110 THERAPEUTIC EXERCISES: CPT | Mod: GO

## 2018-07-18 PROCEDURE — 25000128 H RX IP 250 OP 636: Performed by: INTERNAL MEDICINE

## 2018-07-18 PROCEDURE — 40000133 ZZH STATISTIC OT WARD VISIT

## 2018-07-18 RX ADMIN — SODIUM CHLORIDE, PRESERVATIVE FREE 500 ML: 5 INJECTION INTRAVENOUS at 17:24

## 2018-07-18 RX ADMIN — ACETAMINOPHEN 650 MG: 325 TABLET, FILM COATED ORAL at 17:23

## 2018-07-18 RX ADMIN — PANTOPRAZOLE SODIUM 40 MG: 40 TABLET, DELAYED RELEASE ORAL at 17:24

## 2018-07-18 RX ADMIN — METOPROLOL TARTRATE 50 MG: 50 TABLET ORAL at 08:28

## 2018-07-18 RX ADMIN — QUETIAPINE FUMARATE 50 MG: 50 TABLET ORAL at 21:44

## 2018-07-18 RX ADMIN — AMPHOTERICIN B 300 MG: 50 INJECTION, POWDER, LYOPHILIZED, FOR SOLUTION INTRAVENOUS at 18:27

## 2018-07-18 RX ADMIN — QUETIAPINE FUMARATE 50 MG: 50 TABLET ORAL at 08:28

## 2018-07-18 RX ADMIN — ENOXAPARIN SODIUM 40 MG: 40 INJECTION SUBCUTANEOUS at 11:48

## 2018-07-18 RX ADMIN — PANTOPRAZOLE SODIUM 40 MG: 40 TABLET, DELAYED RELEASE ORAL at 07:28

## 2018-07-18 RX ADMIN — METOPROLOL TARTRATE 50 MG: 50 TABLET ORAL at 21:44

## 2018-07-18 RX ADMIN — AMLODIPINE BESYLATE 10 MG: 10 TABLET ORAL at 08:28

## 2018-07-18 RX ADMIN — MULTIPLE VITAMINS W/ MINERALS TAB 1 TABLET: TAB at 08:27

## 2018-07-18 ASSESSMENT — ACTIVITIES OF DAILY LIVING (ADL)
ADLS_ACUITY_SCORE: 13

## 2018-07-18 NOTE — PLAN OF CARE
Problem: Patient Care Overview  Goal: Plan of Care/Patient Progress Review  Outcome: Improving  A/O x4. VSS on RA. Up independently. Denies pain/nausea/SOB. Tolerating diet. Creat improved to 1.33. +2 BLE edema. Plan to continue IV amphotericin until Friday. Patient would like to get medication a couple hours earlier 7/20 as has to drive to [place] after discharge, Dr. Sams said this would be fine. Nursing will continue to monitor.

## 2018-07-18 NOTE — PROGRESS NOTES
[facility]    Infectious Disease Progress Note    Date of Service (when I saw the patient): 07/18/2018     Assessment & Plan   Db Watson is a 52 year old male who was admitted on 6/25/2018.     Impression:  1. 52 y.o male admitted about 3 weeks ago now.   2. Admission diagnosis was pancreatitis and alcoholic hepatitis.   3. Admission course has been complicated by delirium, resp failure, MSSA pneumonia.   4. Rhodotorula fungemia.   5. Fungemia with Rhodotorula most likely real given, patient with liver disease, pancreatitis, tpn use, multiple lines in the ICU.   SUKUMAR on the Rhodotorula shows susceptible only to  Amphotericin on  Liposomal Amphotericin. Only 1 cultures was done then, No other positive cultures.   PICC now out.     REC:  1. Cont Ampho B.  Day 8.  Would continue rx until dose on Friday.  2. Monitor cr, lytes carefully.  If creat cont to rise can consider QOD Ampho.  3. Pt asks about outpt IV rx, but pt homeless and Ampho would be too risky.         Sharad Sams MD    Interval History   Afebrile.  Feels well.  No c/o.  Creat increased yesterday to 1.41.  Today's result pending.    Physical Exam   Temp: 98.4  F (36.9  C) Temp src: Oral BP: (!) 139/93   Heart Rate: 95 Resp: 16 SpO2: 94 % O2 Device: None (Room air)    Vitals:    07/12/18 0500 07/12/18 2129 07/13/18 0701   Weight: 97.1 kg (214 lb 1.1 oz) 98.8 kg (217 lb 12.8 oz) 97.8 kg (215 lb 9.8 oz)     Vital Signs with Ranges  Temp:  [98  F (36.7  C)-98.4  F (36.9  C)] 98.4  F (36.9  C)  Heart Rate:  [73-97] 95  Resp:  [16-20] 16  BP: (117-150)/(76-93) 139/93  SpO2:  [94 %-95 %] 94 %    Constitutional: Awake,interactive   Lungs: Clear to auscultation bilaterally, no crackles or wheezing  Cardiovascular: Regular rate and rhythm, normal S1 and S2, and no murmur noted  Abdomen: Normal bowel sounds, soft, non-distended, non-tender  Skin: No rashes, no cyanosis, no edema  Other:    Medications       sodium chloride 0.9%  500 mL  Intravenous Q24H     acetaminophen  650 mg Oral Q24H     amLODIPine  10 mg Oral Daily     amphotericin B liposome (AMBISOME) intermittent infusion  3 mg/kg Intravenous Q24H     docusate sodium  100 mg Oral BID     enoxaparin  40 mg Subcutaneous Q24H     metoprolol tartrate  50 mg Oral BID     miconazole   Topical BID     multivitamin, therapeutic with minerals  1 tablet Oral Daily     pantoprazole  40 mg Oral BID AC     polyethylene glycol  17 g Oral BID     QUEtiapine  50 mg Oral BID     sennosides  8.6 mg Oral BID     sodium chloride (PF)  3 mL Intracatheter Q8H       Data   All microbiology laboratory data reviewed.  Recent Labs   Lab Test  07/17/18   0838  07/14/18   1017  07/11/18   0525  07/10/18   0520   WBC   --   9.4  8.5  9.6   HGB   --   12.6*  13.3  12.9*   HCT   --   37.9*  39.6*  38.7*   MCV   --   103*  104*  104*   PLT  298  420  481*  495*     Recent Labs   Lab Test  07/17/18   0838  07/16/18   0845  07/14/18   1017   CR  1.41*  1.29*  1.26*     No lab results found.  Recent Labs   Lab Test  07/17/18   1015  07/17/18   1005  07/11/18   1750  07/09/18   0720  07/09/18   0405  07/04/18   0642  07/03/18   1440  07/03/18   1127  07/02/18   2350   CULT  No growth after 16 hours  No growth after 16 hours  No yeast isolated  No growth  No growth  Moderate growth  Staphylococcus aureus  *  Plus  Light growth  Normal david    Cultured on the 4th day of incubation:  Rhodotorula species  *  Critical Value/Significant Value, preliminary result only, called to and read back by  Yessi Singh RN, @[id number] 07/07/18.DH.    No growth  Heavy growth  Staphylococcus aureus  *  Plus  Light growth  Normal david

## 2018-07-18 NOTE — PLAN OF CARE
Problem: Patient Care Overview  Goal: Plan of Care/Patient Progress Review  Discharge Planner OT   Patient plan for discharge: Brothers house and assist from brother  Current status: SLUMs was administered and pt scoring 28/30, indicating normal level of cognition. Pt not able to achieve L wrist AROM, but able to complete PROM WNL. Provided thera-band and exercises for pt to utilize during hospital stay and upon discharge. Discussed how OP OT would be beneficial to further work on increasing AROM/decreased sensation in L wrist.   Barriers to return to prior living situation: None   Recommendations for discharge: Brothers house and assist from brother as needed for bilateral tasks and OP OT for splinting for L wrist drop.  Rationale for recommendations: If pt performing complex bilateral activity at home his brother would be able to assist him, do not anticipate pt will need much assistance at home. Would benefit from OP OT to work on addressing L wrist AROM/sensation. No further OT needs in the hospital at this time. OT order complete.        Entered by: Meagan Mims 07/18/2018 4:39 PM      Occupational Therapy Discharge Summary    Reason for therapy discharge:    All goals and outcomes met, no further needs identified.    Progress towards therapy goal(s). See goals on Care Plan [facility] electronic health record for goal details.  Goals met    Therapy recommendation(s):    Continued therapy is recommended.  Rationale/Recommendations:  OP OT for L wrist drop, need for splinting.

## 2018-07-18 NOTE — PLAN OF CARE
Problem: Patient Care Overview  Goal: Plan of Care/Patient Progress Review  Outcome: Improving  Pt a/o x4. Independent to BR. Use of urinal at bs. IV SL patent. VSS on RA, denies pain. Tolerating regular diet, adequate PO intake. CMS intact. BLE +1-2 edema. D/C pending progress, nursing will continue to monitor.

## 2018-07-18 NOTE — PROGRESS NOTES
[facility]    Hospitalist Progress Note    Assessment & Plan   52 year old male who was admitted on 6/25/2018.   Patient has a history of alcoholism, hypertension, dyslipidemia, Ménière's disease.Initially transferred to the intensive care unit for acute agitation and delirium in the setting of acute alcohol withdrawal and alcoholic pancreatitis, however, gradually improving and progressing clinically.      Fungemia, Rhodotorula Species:  -Patient initially was treated for MSSA pneumonia with antibiotics, required vent support in the ICU.  Also was on TPN with multiple lines in the ICU.  -PICC line since has been discontinued  -7/3 blood culture positive for Rhodotorula species fungus  -Continue liposomal amphotericin B per infectious disease; plan is to treat through till this Friday 7/20.  -Repeat blood cultures have been negative      Acute respiratory failure, MSSA, LLL pneumonia:   -The patient was initially intubated for airway protection due to sedation in the setting of his alcohol withdrawal.  Soon after intubation, the patient developed respiratory distress requiring urgent bronchoscopy.  Eventually, sputum culture was positive for GNR and GPC and blood cultures from 7/3 positive for Rhodotorula species,   -Patient was initially on IV Zosyn  -Off antibiotics at this time  -Saturating well on room air.    Acute alcohol withdrawal, Metabolic encephalopathy,   Alcohol dependence  Acute Pancreatitis:   -Patient was initially transferred from the medical floor to the intensive care unit for airway protection and Precedex drip due to acute alcohol withdrawal and agitation.  Patient had pancreatitis upon admission, peak lipase approximately 900, is clinically improved.   -Neurology was consulted.  Precedex was slowly weaned off, and patient's mentation has significantly improved. Seroquel was initiated for agitation.  Psychiatry evaluated the patient, and suggests to gradually decrease Seroquel  as his agitation improves and he becomes more functional and oriented.  -Psychiatry recommended offering chemical dependency assessment when his quadrant, no basis for commitment at this point, may need  rule 25 as outpatient  -Continue Seroquel to 50 mg twice a day per psychiatry recommendation for 1 more day, will change to as needed starting 7/19   -Encephalopathy much improved and back to baseline      History of Hypertension:   YURI  -Patient is a long-standing history of hypertension treated with amlodipine, hydrochlorothiazide, metoprolol XL.    -Continue amlodipine and metoprolol.   -Amlodipine increased to 10 mg daily  -Hold hydrochlorothiazide due to YURI     # Pain Assessment:  Current Pain Score 7/18/2018   Patient currently in pain? denies   Pain score (0-10) -   Pain location -   Pain descriptors -   CPOT pain score -   Db robles pain level was assessed and he currently denies pain.        D/W: RN  DVT Prophylaxis: Enoxaparin (Lovenox) SQ  Code Status: Full Code    Disposition: Expected discharge likely 7/20    Cristian Woodson MD    Interval History      Denies new complaints. No dyspnea. Renal function slightly better.    -Data reviewed today: I reviewed all new labs and imaging results over the last 24 hours. I personally reviewed no images or EKG's today.      Physical Exam   Temp: 98.4  F (36.9  C) Temp src: Oral BP: 109/69   Heart Rate: 83 Resp: 16 SpO2: 94 % O2 Device: None (Room air)    Vitals:    07/12/18 0500 07/12/18 2129 07/13/18 0701   Weight: 97.1 kg (214 lb 1.1 oz) 98.8 kg (217 lb 12.8 oz) 97.8 kg (215 lb 9.8 oz)     Vital Signs with Ranges  Temp:  [98  F (36.7  C)-98.4  F (36.9  C)] 98.4  F (36.9  C)  Heart Rate:  [73-97] 83  Resp:  [16-18] 16  BP: (109-142)/(69-93) 109/69  SpO2:  [94 %] 94 %  I/O last 3 completed shifts:  In: 1600 [P.O.:1100; IV Piggyback:500]  Out: 250 [Urine:250]    Constitutional: AAOX3, NAD  HEENT: Moist oral mucosa  Respiratory:   CTA B/L, Normal WOB  Cardiovascular:  RRR, No murmur  GI: Soft, Non- tender, BS- normoactive  Skin/Integument: Warm and dry, no rashes  MSK: No joint deformity or swelling, no edema  Neuro: CN- grossly intact      Medications           sodium chloride 0.9%  500 mL Intravenous Q24H     acetaminophen  650 mg Oral Q24H     amLODIPine  10 mg Oral Daily     amphotericin B liposome (AMBISOME) intermittent infusion  3 mg/kg Intravenous Q24H     docusate sodium  100 mg Oral BID     enoxaparin  40 mg Subcutaneous Q24H     metoprolol tartrate  50 mg Oral BID     miconazole   Topical BID     multivitamin, therapeutic with minerals  1 tablet Oral Daily     pantoprazole  40 mg Oral BID AC     polyethylene glycol  17 g Oral BID     QUEtiapine  50 mg Oral BID     sennosides  8.6 mg Oral BID     sodium chloride (PF)  3 mL Intracatheter Q8H       Data       Recent Labs  Lab 07/18/18  0845 07/17/18  0838 07/16/18  0845 07/14/18  1017   WBC 7.6  --   --  9.4   HGB 12.2*  --   --  12.6*   *  --   --  103*    298  --  420    140  --  138   POTASSIUM 3.5 3.6  --  4.2   CHLORIDE 106 106  --  105   CO2 24 25  --  26   BUN 26 27  --  38*   CR 1.33* 1.41* 1.29* 1.26*   ANIONGAP 9 9  --  7   DEMETRIA 8.5 8.5  --  8.3*   * 132*  --  157*   ALBUMIN  --   --   --  2.8*   PROTTOTAL  --   --   --  7.6   BILITOTAL  --   --   --  0.6   ALKPHOS  --   --   --  93   ALT  --   --   --  63   AST  --   --   --  59*       No results found for this or any previous visit (from the past 24 hour(s)).

## 2018-07-18 NOTE — PLAN OF CARE
Problem: Patient Care Overview  Goal: Plan of Care/Patient Progress Review  Outcome: Improving  A&O x4, up independently in his room. VSS on RA. Regular diet. Denied pain.  BLE edema 1-2+.  Continue to monitor.

## 2018-07-19 ENCOUNTER — APPOINTMENT (OUTPATIENT)
Dept: PHYSICAL THERAPY | Facility: CLINIC | Age: 53
DRG: 166 | End: 2018-07-19
Payer: COMMERCIAL

## 2018-07-19 LAB
ANION GAP SERPL CALCULATED.3IONS-SCNC: 11 MMOL/L (ref 3–14)
BUN SERPL-MCNC: 31 MG/DL (ref 7–30)
CALCIUM SERPL-MCNC: 8 MG/DL (ref 8.5–10.1)
CHLORIDE SERPL-SCNC: 106 MMOL/L (ref 94–109)
CO2 SERPL-SCNC: 24 MMOL/L (ref 20–32)
CREAT SERPL-MCNC: 1.73 MG/DL (ref 0.66–1.25)
GFR SERPL CREATININE-BSD FRML MDRD: 42 ML/MIN/1.7M2
GLUCOSE SERPL-MCNC: 110 MG/DL (ref 70–99)
POTASSIUM SERPL-SCNC: 3.2 MMOL/L (ref 3.4–5.3)
POTASSIUM SERPL-SCNC: 3.8 MMOL/L (ref 3.4–5.3)
SODIUM SERPL-SCNC: 141 MMOL/L (ref 133–144)

## 2018-07-19 PROCEDURE — 36415 COLL VENOUS BLD VENIPUNCTURE: CPT | Performed by: INTERNAL MEDICINE

## 2018-07-19 PROCEDURE — 25000132 ZZH RX MED GY IP 250 OP 250 PS 637: Performed by: INTERNAL MEDICINE

## 2018-07-19 PROCEDURE — 97116 GAIT TRAINING THERAPY: CPT | Mod: GP | Performed by: PHYSICAL THERAPY ASSISTANT

## 2018-07-19 PROCEDURE — 12000000 ZZH R&B MED SURG/OB

## 2018-07-19 PROCEDURE — 25000128 H RX IP 250 OP 636: Performed by: INTERNAL MEDICINE

## 2018-07-19 PROCEDURE — 25000132 ZZH RX MED GY IP 250 OP 250 PS 637: Performed by: HOSPITALIST

## 2018-07-19 PROCEDURE — 99232 SBSQ HOSP IP/OBS MODERATE 35: CPT | Performed by: INTERNAL MEDICINE

## 2018-07-19 PROCEDURE — 84132 ASSAY OF SERUM POTASSIUM: CPT | Performed by: INTERNAL MEDICINE

## 2018-07-19 PROCEDURE — 97530 THERAPEUTIC ACTIVITIES: CPT | Mod: GP | Performed by: PHYSICAL THERAPY ASSISTANT

## 2018-07-19 PROCEDURE — 80048 BASIC METABOLIC PNL TOTAL CA: CPT | Performed by: INTERNAL MEDICINE

## 2018-07-19 PROCEDURE — 40000193 ZZH STATISTIC PT WARD VISIT: Performed by: PHYSICAL THERAPY ASSISTANT

## 2018-07-19 RX ORDER — SODIUM CHLORIDE 9 MG/ML
INJECTION, SOLUTION INTRAVENOUS CONTINUOUS
Status: DISCONTINUED | OUTPATIENT
Start: 2018-07-19 | End: 2018-07-21

## 2018-07-19 RX ORDER — QUETIAPINE FUMARATE 50 MG/1
50 TABLET, FILM COATED ORAL 2 TIMES DAILY PRN
Status: DISCONTINUED | OUTPATIENT
Start: 2018-07-19 | End: 2018-07-21 | Stop reason: HOSPADM

## 2018-07-19 RX ADMIN — POTASSIUM CHLORIDE 40 MEQ: 1.5 POWDER, FOR SOLUTION ORAL at 09:49

## 2018-07-19 RX ADMIN — POTASSIUM CHLORIDE 20 MEQ: 1.5 POWDER, FOR SOLUTION ORAL at 11:47

## 2018-07-19 RX ADMIN — SODIUM CHLORIDE: 9 INJECTION, SOLUTION INTRAVENOUS at 11:47

## 2018-07-19 RX ADMIN — METOPROLOL TARTRATE 50 MG: 50 TABLET ORAL at 08:17

## 2018-07-19 RX ADMIN — PANTOPRAZOLE SODIUM 40 MG: 40 TABLET, DELAYED RELEASE ORAL at 06:51

## 2018-07-19 RX ADMIN — QUETIAPINE FUMARATE 50 MG: 50 TABLET ORAL at 08:17

## 2018-07-19 RX ADMIN — AMLODIPINE BESYLATE 10 MG: 10 TABLET ORAL at 08:17

## 2018-07-19 RX ADMIN — PANTOPRAZOLE SODIUM 40 MG: 40 TABLET, DELAYED RELEASE ORAL at 16:11

## 2018-07-19 RX ADMIN — METOPROLOL TARTRATE 50 MG: 50 TABLET ORAL at 20:24

## 2018-07-19 RX ADMIN — ENOXAPARIN SODIUM 40 MG: 40 INJECTION SUBCUTANEOUS at 11:47

## 2018-07-19 RX ADMIN — MULTIPLE VITAMINS W/ MINERALS TAB 1 TABLET: TAB at 08:17

## 2018-07-19 ASSESSMENT — ACTIVITIES OF DAILY LIVING (ADL)
ADLS_ACUITY_SCORE: 13

## 2018-07-19 NOTE — PROGRESS NOTES
SW:  D:  PT and OT notes reviewed.    PT continues to recommend TCU however patient declines due to financial issues and he does have a safe plan of staying at his brother's for the time being.    I;  Writer met with patient to re-visit social issues we discussed earlier this week.  The issues are housing, unemployment application appeal, insurance status and his substance abuse.  Housing:  He still owes for July rent and if his  can rent his apartment by Augusta 1, he will be out of his lease.  For the time being patient plans to stay with his brother but he plans for a short stay.  His brother lives in [place].  Unemployment:  He is working on-line to complete the appeal which needs to be completed by July 23.  Insurance:  Financial Counselor, Jody, reports an on-line MA application was completed and patient qualifies.  ID number is in EPIC.  Substance Abuse:  Patient volunteers that he does not plan to drink. He identifies that he drinks when he is lonely and bored.  His goal is to become employed again quickly working two jobs to catch up on his finances so he feels he will be kept busy.  He also explains he attends [employer] in [place] and plans to reach out to the staff for resources regarding housing and employment networking.  [facility] also has a support groups and counseling which he plans to reach out to and attend.    Also discussed that he will qualify for a Rule 25 assessment for a CD evaluation and treatment however he does not express interest.  He explains he just has to make the decision not to drink.  He explains he doesn't ever want to experience this present episode of hospitalization again.  Writer did enter the phone number for [facility] which is the agency patient would need to call to schedule a Rule 25 assessment.      A:  Patient is more centered today.  He is taking steps to address his needs.  Writer had spoken with his brother earlier this week who  did state he has offered his home to his brother.  With patient's current MA he is eligible for out patient therapies and follow up medical appointments.    P:  No further intervention needed at this time however social work staff will follow thru Plan of Care rounds should new needs arise.

## 2018-07-19 NOTE — PLAN OF CARE
Problem: Patient Care Overview  Goal: Plan of Care/Patient Progress Review  Outcome: Improving  Pt a/o x4. Up ad nguyen independently to BR. VSS on RA. IV SL patent. Denies pain/n/v/SOB. Adequate PO intake. Left hand droop observed and improving. Discharge pending progress, nursing will continue to monitor.

## 2018-07-19 NOTE — PROGRESS NOTES
[facility]    Infectious Disease Progress Note    Date of Service (when I saw the patient): 07/19/2018     Assessment & Plan   Db Watson is a 52 year old male who was admitted on 6/25/2018.     Impression:  1. 52 y.o male admitted about 3 weeks ago now.   2. Admission diagnosis was pancreatitis and alcoholic hepatitis.   3. Admission course has been complicated by delirium, resp failure, MSSA pneumonia.   4. Rhodotorula fungemia.   5. Fungemia with Rhodotorula most likely real given, patient with liver disease, pancreatitis, tpn use, multiple lines in the ICU.   SUKUMAR on the Rhodotorula shows susceptible only to  Amphotericin on  Liposomal Amphotericin. Only 1 cultures was done then, No other positive cultures.   PICC now out.     REC:  1.  With increase in creat to 1.7 will hold Ampho today.  If creat down again tomorrow will give final dose.  2.  I encouraged pt to push fluids.         Sharad Sams MD    Interval History   Afebrile.  Feels well.  No c/o.  Creat increased yesterday to 1.41.  Today's result pending.    Physical Exam   Temp: 98.2  F (36.8  C) Temp src: Oral BP: 148/86   Heart Rate: 80 Resp: 16 SpO2: 92 % O2 Device: None (Room air)    Vitals:    07/12/18 0500 07/12/18 2129 07/13/18 0701   Weight: 97.1 kg (214 lb 1.1 oz) 98.8 kg (217 lb 12.8 oz) 97.8 kg (215 lb 9.8 oz)     Vital Signs with Ranges  Temp:  [97.9  F (36.6  C)-98.5  F (36.9  C)] 98.2  F (36.8  C)  Heart Rate:  [] 80  Resp:  [16] 16  BP: (109-148)/(69-86) 148/86  SpO2:  [92 %-96 %] 92 %    Constitutional: Awake,interactive   Lungs: Clear to auscultation bilaterally, no crackles or wheezing  Cardiovascular: Regular rate and rhythm, normal S1 and S2, and no murmur noted  Abdomen: Normal bowel sounds, soft, non-distended, non-tender  Skin: No rashes, no cyanosis, no edema  Other:    Medications       sodium chloride 0.9%  500 mL Intravenous Q24H     acetaminophen  650 mg Oral Q24H     amLODIPine  10 mg Oral  Daily     amphotericin B liposome (AMBISOME) intermittent infusion  3 mg/kg Intravenous Q24H     docusate sodium  100 mg Oral BID     enoxaparin  40 mg Subcutaneous Q24H     metoprolol tartrate  50 mg Oral BID     miconazole   Topical BID     multivitamin, therapeutic with minerals  1 tablet Oral Daily     pantoprazole  40 mg Oral BID AC     polyethylene glycol  17 g Oral BID     QUEtiapine  50 mg Oral BID     sennosides  8.6 mg Oral BID     sodium chloride (PF)  3 mL Intracatheter Q8H       Data   All microbiology laboratory data reviewed.  Recent Labs   Lab Test  07/18/18   0845  07/17/18   0838  07/14/18   1017  07/11/18   0525   WBC  7.6   --   9.4  8.5   HGB  12.2*   --   12.6*  13.3   HCT  35.3*   --   37.9*  39.6*   MCV  101*   --   103*  104*   PLT  310  298  420  481*     Recent Labs   Lab Test  07/19/18   0806  07/18/18   0845  07/17/18   0838   CR  1.73*  1.33*  1.41*     No lab results found.  Recent Labs   Lab Test  07/17/18   1015  07/17/18   1005  07/11/18   1750  07/09/18   0720  07/09/18   0405  07/04/18   0642  07/03/18   1440  07/03/18   1127  07/02/18   2350   CULT  No growth after 2 days  No growth after 2 days  No yeast isolated  No growth  No growth  Moderate growth  Staphylococcus aureus  *  Plus  Light growth  Normal david    Cultured on the 4th day of incubation:  Rhodotorula species  *  Critical Value/Significant Value, preliminary result only, called to and read back by  Yessi Singh RN, @[id number] 07/07/18.DH.    No growth  Heavy growth  Staphylococcus aureus  *  Plus  Light growth  Normal david

## 2018-07-19 NOTE — PLAN OF CARE
Problem: Patient Care Overview  Goal: Plan of Care/Patient Progress Review  Outcome: Improving  A/O x4. VSS on RA. Up independently. Denies pain/nausea/SOB. Tolerating diet. Creat worse today, 1.73, started on IVF at 75cc/hr. Plan to hold amphotericin and if creat better tomorrow patient can receive last dose and discharge. Dr. Latrell sharma'd patient to receive abx tomorrow a couple hours earlier b/c patient has to drive to [place] tomorrow. +2 BLE edema. K 3.2, replaced and recheck 3.8. Nursing will continue to monitor.

## 2018-07-19 NOTE — PLAN OF CARE
Problem: Patient Care Overview  Goal: Plan of Care/Patient Progress Review  Outcome: Improving  A&O x4. VSS on RA. Up independently. Denies pain/nausea/SOB. Tolerating diet. +2 BLE edema. Plan to continue IV amphotericin until Friday.

## 2018-07-19 NOTE — PLAN OF CARE
Problem: Patient Care Overview  Goal: Plan of Care/Patient Progress Review  Discharge Planner PT   Patient plan for discharge: Pt's brother's house  Current status: Gait training 350' with no AD SBA with LOB x1 which pt claims is baseline d/t Ménière's disease. Up/down 20 stairs with R rail CGA with step through pattern w/o issue. Pt refused SEC or suggested use of walking stick. Pt perfomed sit/stand x5 <10 seconds. Pt completed dynamic balance exercises Min A. Pt left Ind in room with all needs met.  Barriers to return to prior living situation: Assist for mobility, some balance issues 40/56 BBS, impulsive with mobility  Recommendations for discharge: TCU per plan established by physical therapist, but noted that pt is planning to transfer home to his brother's house with assist prn.  Rationale for recommendations: Pt would benefit from TCU to improve balance, strength, endurance and safety with functional mobility, however pt stated he would refuse TCU d/t financial concerns       Entered by: Juan Alfaro 07/19/2018 11:21 AM

## 2018-07-19 NOTE — DISCHARGE INSTRUCTIONS
"If you do decide you would like a Substance Abuse/Chemical Dependency evaluation you can call [facility]  [phone].  The menu option to press is \"0\".    Please Call Neurology Clinic in 2 weeks once insurance has been approved  [phone]  "

## 2018-07-19 NOTE — PROGRESS NOTES
[facility]    Hospitalist Progress Note    Assessment & Plan   52 year old male who was admitted on 6/25/2018.   Patient has a history of alcoholism, hypertension, dyslipidemia, Ménière's disease.Initially transferred to the intensive care unit for acute agitation and delirium in the setting of acute alcohol withdrawal and alcoholic pancreatitis, however, gradually improving and progressing clinically.      Fungemia, Rhodotorula Species:  -Patient initially was treated for MSSA pneumonia with antibiotics, required vent support in the ICU.  Also was on TPN with multiple lines in the ICU.  -PICC line since has been discontinued  -7/3 blood culture positive for Rhodotorula species fungus  -Currently on liposomal amphotericin B per infectious disease; on hold today due to worsening creatinine, will get last dose tomorrow if creatinine improves  -Start normal saline at 75 mL/h  -Repeat blood cultures have been negative    YURI:  -Presented with normal creatinine, now worse at 1.73  -Most likely due to amphotericin B  -Hold amphotericin B today  -Normal saline at 75 mL/h  -Continue to hold hydrochlorothiazide.    Acute respiratory failure, MSSA, LLL pneumonia:   -The patient was initially intubated for airway protection due to sedation in the setting of his alcohol withdrawal.  Soon after intubation, the patient developed respiratory distress requiring urgent bronchoscopy.  Eventually, sputum culture was positive for GNR and GPC and blood cultures from 7/3 positive for Rhodotorula species,   -Patient was initially on IV Zosyn  -Off antibiotics at this time  -Saturating well on room air.    Acute alcohol withdrawal, Metabolic encephalopathy,   Alcohol dependence  Acute Pancreatitis:   -Patient was initially transferred from the medical floor to the intensive care unit for airway protection and Precedex drip due to acute alcohol withdrawal and agitation.  Patient had pancreatitis upon admission, peak lipase  approximately 900, is clinically improved.   -Neurology was consulted.  Precedex was slowly weaned off, and patient's mentation has significantly improved. Seroquel was initiated for agitation.  Psychiatry evaluated the patient, and suggests to gradually decrease Seroquel as his agitation improves and he becomes more functional and oriented.  -Psychiatry recommended offering chemical dependency assessment when his quadrant, no basis for commitment at this point, may need  rule 25 as outpatient  -Continue Seroquel to 50 mg twice a day per psychiatry recommendation for 1 more day, will change to as needed starting 7/19   -Encephalopathy much improved and back to baseline      History of Hypertension:   -Patient is a long-standing history of hypertension treated with amlodipine, hydrochlorothiazide, metoprolol XL.    -Continue amlodipine and metoprolol.   -Amlodipine increased to 10 mg daily  -Hold hydrochlorothiazide due to YURI    ?  Left radial nerve palsy:  -Continue occupational therapy  -MRI C-spine on 7/11 fairly unremarkable to explain for left hand weakness  -EMG in 2 weeks if persistent wrist drop  -We will arrange outpatient neurology follow-up in 2 weeks at the time of discharge     # Pain Assessment:  Current Pain Score 7/19/2018   Patient currently in pain? denies   Pain score (0-10) -   Pain location -   Pain descriptors -   CPOT pain score -   Db robles pain level was assessed and he currently denies pain.        D/W: RN  DVT Prophylaxis: Enoxaparin (Lovenox) SQ  Code Status: Full Code    Disposition: Expected discharge likely 7/20    Cristian Woodson MD    Interval History      Ongoing left hand weakness.  Creatinine slightly worse today.  Denies new complaints.    -Data reviewed today: I reviewed all new labs and imaging results over the last 24 hours. I personally reviewed no images or EKG's today.      Physical Exam   Temp: 98.2  F (36.8  C) Temp src: Oral BP: 148/86   Heart Rate: 80 Resp: 16 SpO2: 92  % O2 Device: None (Room air)    Vitals:    07/12/18 0500 07/12/18 2129 07/13/18 0701   Weight: 97.1 kg (214 lb 1.1 oz) 98.8 kg (217 lb 12.8 oz) 97.8 kg (215 lb 9.8 oz)     Vital Signs with Ranges  Temp:  [97.9  F (36.6  C)-98.5  F (36.9  C)] 98.2  F (36.8  C)  Heart Rate:  [] 80  Resp:  [16] 16  BP: (109-148)/(69-86) 148/86  SpO2:  [92 %-96 %] 92 %  I/O last 3 completed shifts:  In: 1280 [P.O.:1280]  Out: 800 [Urine:800]    Constitutional: AAOX3, NAD  HEENT: Moist oral mucosa  Respiratory:   CTA B/L, Normal WOB  Cardiovascular: RRR, No murmur  GI: Soft, Non- tender, BS- normoactive  Skin/Integument: Warm and dry, no rashes  MSK: No joint deformity or swelling, no edema  Neuro: CN- grossly intact; left wrist drop-unchanged      Medications           sodium chloride 0.9%  500 mL Intravenous Q24H     acetaminophen  650 mg Oral Q24H     amLODIPine  10 mg Oral Daily     amphotericin B liposome (AMBISOME) intermittent infusion  3 mg/kg Intravenous Q24H     docusate sodium  100 mg Oral BID     enoxaparin  40 mg Subcutaneous Q24H     metoprolol tartrate  50 mg Oral BID     miconazole   Topical BID     multivitamin, therapeutic with minerals  1 tablet Oral Daily     pantoprazole  40 mg Oral BID AC     polyethylene glycol  17 g Oral BID     QUEtiapine  50 mg Oral BID     sennosides  8.6 mg Oral BID     sodium chloride (PF)  3 mL Intracatheter Q8H       Data       Recent Labs  Lab 07/19/18  0806 07/18/18  0845 07/17/18  0838  07/14/18  1017   WBC  --  7.6  --   --  9.4   HGB  --  12.2*  --   --  12.6*   MCV  --  101*  --   --  103*   PLT  --  310 298  --  420    139 140  --  138   POTASSIUM 3.2* 3.5 3.6  --  4.2   CHLORIDE 106 106 106  --  105   CO2 24 24 25  --  26   BUN 31* 26 27  --  38*   CR 1.73* 1.33* 1.41*  < > 1.26*   ANIONGAP 11 9 9  --  7   DEMETRIA 8.0* 8.5 8.5  --  8.3*   * 108* 132*  --  157*   ALBUMIN  --   --   --   --  2.8*   PROTTOTAL  --   --   --   --  7.6   BILITOTAL  --   --   --   --  0.6    ALKPHOS  --   --   --   --  93   ALT  --   --   --   --  63   AST  --   --   --   --  59*   < > = values in this interval not displayed.    No results found for this or any previous visit (from the past 24 hour(s)).

## 2018-07-20 LAB
ANION GAP SERPL CALCULATED.3IONS-SCNC: 11 MMOL/L (ref 3–14)
BUN SERPL-MCNC: 29 MG/DL (ref 7–30)
CALCIUM SERPL-MCNC: 8.1 MG/DL (ref 8.5–10.1)
CHLORIDE SERPL-SCNC: 108 MMOL/L (ref 94–109)
CO2 SERPL-SCNC: 23 MMOL/L (ref 20–32)
CREAT SERPL-MCNC: 1.81 MG/DL (ref 0.66–1.25)
GFR SERPL CREATININE-BSD FRML MDRD: 40 ML/MIN/1.7M2
GLUCOSE SERPL-MCNC: 107 MG/DL (ref 70–99)
MAGNESIUM SERPL-MCNC: 1.4 MG/DL (ref 1.6–2.3)
MAGNESIUM SERPL-MCNC: 2.6 MG/DL (ref 1.6–2.3)
PLATELET # BLD AUTO: 269 10E9/L (ref 150–450)
POTASSIUM SERPL-SCNC: 3.6 MMOL/L (ref 3.4–5.3)
SODIUM SERPL-SCNC: 142 MMOL/L (ref 133–144)

## 2018-07-20 PROCEDURE — 80048 BASIC METABOLIC PNL TOTAL CA: CPT | Performed by: HOSPITALIST

## 2018-07-20 PROCEDURE — 85049 AUTOMATED PLATELET COUNT: CPT | Performed by: HOSPITALIST

## 2018-07-20 PROCEDURE — 25000132 ZZH RX MED GY IP 250 OP 250 PS 637: Performed by: NURSE PRACTITIONER

## 2018-07-20 PROCEDURE — 83735 ASSAY OF MAGNESIUM: CPT | Performed by: INTERNAL MEDICINE

## 2018-07-20 PROCEDURE — 99232 SBSQ HOSP IP/OBS MODERATE 35: CPT | Performed by: INTERNAL MEDICINE

## 2018-07-20 PROCEDURE — 25000128 H RX IP 250 OP 636: Performed by: HOSPITALIST

## 2018-07-20 PROCEDURE — 25000132 ZZH RX MED GY IP 250 OP 250 PS 637: Performed by: INTERNAL MEDICINE

## 2018-07-20 PROCEDURE — 12000000 ZZH R&B MED SURG/OB

## 2018-07-20 PROCEDURE — 83735 ASSAY OF MAGNESIUM: CPT | Performed by: HOSPITALIST

## 2018-07-20 PROCEDURE — 36415 COLL VENOUS BLD VENIPUNCTURE: CPT | Performed by: INTERNAL MEDICINE

## 2018-07-20 PROCEDURE — 25000128 H RX IP 250 OP 636: Performed by: INTERNAL MEDICINE

## 2018-07-20 PROCEDURE — 25000132 ZZH RX MED GY IP 250 OP 250 PS 637: Performed by: HOSPITALIST

## 2018-07-20 PROCEDURE — 36415 COLL VENOUS BLD VENIPUNCTURE: CPT | Performed by: HOSPITALIST

## 2018-07-20 RX ORDER — AMLODIPINE BESYLATE 10 MG/1
10 TABLET ORAL DAILY
Qty: 30 TABLET | Refills: 1 | Status: SHIPPED | OUTPATIENT
Start: 2018-07-20 | End: 2018-07-24

## 2018-07-20 RX ADMIN — METOPROLOL TARTRATE 50 MG: 50 TABLET ORAL at 20:48

## 2018-07-20 RX ADMIN — SODIUM CHLORIDE: 9 INJECTION, SOLUTION INTRAVENOUS at 00:49

## 2018-07-20 RX ADMIN — SODIUM CHLORIDE: 9 INJECTION, SOLUTION INTRAVENOUS at 16:34

## 2018-07-20 RX ADMIN — MAGNESIUM SULFATE IN WATER 4 G: 40 INJECTION, SOLUTION INTRAVENOUS at 14:09

## 2018-07-20 RX ADMIN — METOPROLOL TARTRATE 50 MG: 50 TABLET ORAL at 09:46

## 2018-07-20 RX ADMIN — MULTIPLE VITAMINS W/ MINERALS TAB 1 TABLET: TAB at 09:45

## 2018-07-20 RX ADMIN — DOCUSATE SODIUM 100 MG: 100 CAPSULE, LIQUID FILLED ORAL at 20:49

## 2018-07-20 RX ADMIN — AMLODIPINE BESYLATE 10 MG: 10 TABLET ORAL at 09:45

## 2018-07-20 RX ADMIN — PANTOPRAZOLE SODIUM 40 MG: 40 TABLET, DELAYED RELEASE ORAL at 16:24

## 2018-07-20 RX ADMIN — ENOXAPARIN SODIUM 40 MG: 40 INJECTION SUBCUTANEOUS at 13:35

## 2018-07-20 RX ADMIN — SENNOSIDES 8.6 MG: 8.6 TABLET, FILM COATED ORAL at 20:48

## 2018-07-20 RX ADMIN — PANTOPRAZOLE SODIUM 40 MG: 40 TABLET, DELAYED RELEASE ORAL at 06:57

## 2018-07-20 ASSESSMENT — ACTIVITIES OF DAILY LIVING (ADL)
ADLS_ACUITY_SCORE: 12
ADLS_ACUITY_SCORE: 13
ADLS_ACUITY_SCORE: 13
ADLS_ACUITY_SCORE: 12
ADLS_ACUITY_SCORE: 13
ADLS_ACUITY_SCORE: 12

## 2018-07-20 NOTE — DISCHARGE SUMMARY
[facility]    Discharge Summary  Hospitalist    Date of Admission:  6/25/2018  Date of Discharge:  7/21/2018  Discharging Provider: Annabelle Manuel MD    Discharge Diagnoses        Fungemia with Rhodotorula Species  Acute hypoxic respiratory failure  YURI due to amphotericin use  MSSA, LLL pneumonia   Acute alcohol withdrawal,   Acute Metabolic encephalopathy-resolved  Alcohol dependence  Acute Pancreatiti    History of Hypertension   ?  Left radial nerve palsy    Hospital Course   52 year old male who was admitted on 6/25/2018.   Patient has a history of alcoholism, hypertension, dyslipidemia, Ménière's disease.Initially transferred to the intensive care unit for acute agitation and delirium in the setting of acute alcohol withdrawal and alcoholic pancreatitis, however, gradually improving and progressing clinically.       Fungemia, Rhodotorula Species:  -Patient initially was treated for MSSA pneumonia with antibiotics, required vent support in the ICU.  Also was on TPN with multiple lines in the ICU.  -PICC line since discontinued  -7/3 blood culture positive for Rhodotorula species fungus  -Was on liposomal amphotericin B per infectious disease since 7/13; discontinued 7/19 due to rising creatinine.  Patient did not receive further doses; discussed with infectious disease, will stop altogether  -Repeat blood cultures have been negative  -plan for follow-up blood culture in about 1 week as outpatient     YURI:  Hypomagnesemia  -Presented with normal creatinine, peaked at 1.81  -Most likely due to amphotericin B  -Continue to hold hydrochlorothiazide.  -Discussed curbside with Dr. Boyd, nephrology, continue hydration and wait for recovery of renal function  - improved to 1.34 on the day of discharge  -Hypomagnesemia likely due to amphotericin use, replace per protocol.     Acute respiratory failure, MSSA, LLL pneumonia:   -The patient was initially intubated for airway protection due to sedation  in the setting of his alcohol withdrawal.  Soon after intubation, the patient developed respiratory distress requiring urgent bronchoscopy.  Eventually, sputum culture was positive for GNR and GPC and blood cultures from 7/3 positive for Rhodotorula species,   -Patient was initially on IV Zosyn  -Off antibiotics for >96 hrs  -Saturating well on room air.     Acute alcohol withdrawal, Metabolic encephalopathy,   Alcohol dependence  Acute Pancreatitis:   -Patient was initially transferred from the medical floor to the intensive care unit for airway protection and Precedex drip due to acute alcohol withdrawal and agitation.  Patient had pancreatitis upon admission, peak lipase approximately 900, is clinically improved.   -Neurology was consulted.  Precedex was slowly weaned off, and patient's mentation has significantly improved. Seroquel was initiated for agitation.  Psychiatry evaluated the patient, and suggested to gradually decrease Seroquel as his agitation improves and he becomes more functional and oriented.  -Psychiatry recommended offering chemical dependency assessment, no basis for commitment at this point, may need  rule 25 as outpatient  -social workers have provided information on rule 25  -Weaned off Seroquel  -Encephalopathy much improved and back to baseline      History of Hypertension:   -Patient is a long-standing history of hypertension treated with amlodipine, hydrochlorothiazide, metoprolol XL.    -Continue amlodipine and metoprolol.   -Amlodipine increased to 10 mg daily  -Hold hydrochlorothiazide due to YURI     ?  Left radial nerve palsy:  -Continue occupational therapy  -MRI C-spine and brain on 7/11 fairly unremarkable to explain for left hand weakness  -Elevated by neurology, now signed off  -EMG in 2 weeks if persistent wrist drop  -We will arrange outpatient neurology follow-up in 2 weeks at the time of discharge    # Discharge Pain Plan:   - Patient currently has NO PAIN and is not being  prescribed pain medications on discharge.      Annabelle Manuel MD    Significant Results and Procedures   See below    Pending Results     Unresulted Labs Ordered in the Past 30 Days of this Admission     Date and Time Order Name Status Description    7/20/2018 1607 Magnesium In process     7/17/2018 0912 Blood culture Preliminary     7/17/2018 0912 Blood culture Preliminary           Code Status   Full Code       Primary Care Physician   Jennifer Demarco    Physical Exam   Temp: 98.3  F (36.8  C) Temp src: Oral BP: 140/86   Heart Rate: 82 Resp: 16 SpO2: 96 % O2 Device: None (Room air)      Constitutional: AAOX3, NAD  Neck- Supple, Good ROM, No JVD  Respiratory: CTA B/L, Normal WOB  Cardiovascular: RRR, No murmur  GI: Soft, Non- tender, BS- normoactive  Skin/Integument: Warm and dry, no rashes  MSK: No joint deformity or swelling, no edema  Neuro: CN- grossly intact    Discharge Disposition   Discharged to home  Condition at discharge: Stable    Consultations This Hospital Stay   PSYCHIATRY IP CONSULT  CHEMICAL DEPENDENCY IP CONSULT  NUTRITION SERVICES ADULT IP CONSULT  PHARMACY IP CONSULT  CARE TRANSITION RN/SW IP CONSULT  NUTRITION SERVICES ADULT IP CONSULT  VASCULAR ACCESS ADULT IP CONSULT  PHARMACY TO DOSE VANCO  PHARMACY IP CONSULT  WOUND OSTOMY CONTINENCE NURSE  IP CONSULT  PHYSICAL THERAPY ADULT IP CONSULT  OCCUPATIONAL THERAPY ADULT IP CONSULT  PHARMACY TO DOSE MEDICATION  NEUROLOGY IP CONSULT  PSYCHOLOGY ADULT IP CONSULT  INFECTIOUS DISEASES IP CONSULT  PSYCHIATRY IP CONSULT  CHEMICAL DEPENDENCY IP CONSULT  PSYCHIATRY IP CONSULT  CHEMICAL DEPENDENCY IP CONSULT  SOCIAL WORK IP CONSULT    Time Spent on this Encounter   IAnnabelle, personally saw the patient today and spent greater than 30 minutes discharging this patient.    Discharge Orders     Reason for your hospital stay   Fungemia     Follow-up and recommended labs and tests   Follow up with primary care provider, Jennifer Demarco, within 7 days for  hospital follow- up.  The following labs/tests are recommended: BMP and blood culture X 2 on Tuesday 7/24/18. Please fax BC reports to Dr Kaiser office     Activity   Your activity upon discharge: activity as tolerated     Full Code     Diet   Follow this diet upon discharge: Orders Placed This Encounter     Room Service     Advance Diet as Tolerated: Regular Diet Adult         Discharge Medications   Current Discharge Medication List      CONTINUE these medications which have CHANGED    Details   amLODIPine (NORVASC) 10 MG tablet Take 1 tablet (10 mg) by mouth daily +++ appointment needed for additional refills +++  Qty: 30 tablet, Refills: 1    Associated Diagnoses: Hypertension goal BP (blood pressure) < 140/90         CONTINUE these medications which have NOT CHANGED    Details   aspirin 81 MG tablet Take 1 tablet by mouth daily.  Refills: 3    Associated Diagnoses: Essential hypertension      B Complex Vitamins (VITAMIN B COMPLEX) tablet Take 1 tablet by mouth daily.  Qty: 100 tablet, Refills: 12      fish oil-omega-3 fatty acids (FISH OIL) 1000 MG capsule Take 1 g by mouth daily       hypromellose (ARTIFICIAL TEARS) 0.5 % SOLN ophthalmic solution Place 1 drop into both eyes every hour as needed for dry eyes      meclizine 25 MG CHEW Take 25 mg by mouth every 6 hours as needed.    Indications: Sensation of Spinning or Whirling      MELATONIN PO Take 5 mg by mouth nightly as needed      metoprolol succinate (TOPROL-XL) 50 MG 24 hr tablet Take 1 tablet (50 mg) by mouth 2 times daily   Qty: 60 tablet, Refills: 8    Associated Diagnoses: Essential hypertension with goal blood pressure less than 140/90      traZODone (DESYREL) 50 MG tablet 1-2 at bedtime as needed for sleep  Qty: 60 tablet, Refills: 1    Associated Diagnoses: Anxiety and depression         STOP taking these medications       hydrochlorothiazide (HYDRODIURIL) 25 MG tablet Comments:   Reason for Stopping:             Allergies   Allergies   Allergen  Reactions     Zoloft [Sertraline] Nausea and Vomiting     Data   Most Recent 3 CBC's:  Recent Labs   Lab Test  07/20/18   0950  07/18/18   0845  07/17/18   0838  07/14/18   1017  07/11/18   0525   WBC   --   7.6   --   9.4  8.5   HGB   --   12.2*   --   12.6*  13.3   MCV   --   101*   --   103*  104*   PLT  269  310  298  420  481*      Most Recent 3 BMP's:  Recent Labs   Lab Test  07/20/18   0950  07/19/18   1610  07/19/18   0806  07/18/18   0845   NA  142   --   141  139   POTASSIUM  3.6  3.8  3.2*  3.5   CHLORIDE  108   --   106  106   CO2  23   --   24  24   BUN  29   --   31*  26   CR  1.81*   --   1.73*  1.33*   ANIONGAP  11   --   11  9   DEMETRIA  8.1*   --   8.0*  8.5   GLC  107*   --   110*  108*     Most Recent 2 LFT's:  Recent Labs   Lab Test  07/14/18   1017  07/10/18   0520   AST  59*  77*   ALT  63  61   ALKPHOS  93  79   BILITOTAL  0.6  0.6     Most Recent INR's and Anticoagulation Dosing History:  Anticoagulation Dose History     Recent Dosing and Labs Latest Ref Rng & Units 7/10/2018    INR 0.86 - 1.14 1.38(H)        Most Recent 3 Troponin's:  Recent Labs   Lab Test  11/07/17   2144  08/28/12   1227   TROPI  <0.015  <0.012     Most Recent Cholesterol Panel:  Recent Labs   Lab Test  09/14/16   0846   CHOL  204*   LDL  129*   HDL  36*   TRIG  196*     Most Recent 6 Bacteria Isolates From Any Culture (See EPIC Reports for Culture Details):  Recent Labs   Lab Test  07/17/18   1015  07/17/18   1005  07/11/18   1750  07/09/18   0720  07/09/18   0405  07/04/18   0642   CULT  No growth after 3 days  No growth after 3 days  No yeast isolated  No growth  No growth  Moderate growth  Staphylococcus aureus  *  Plus  Light growth  Normal david       Most Recent TSH, T4 and A1c Labs:  Recent Labs   Lab Test  06/30/18   1010  11/27/17   1223  09/29/14   1108   TSH   --   0.90  0.35*   T4   --    --   0.86   A1C  6.2*   --    --        Results for orders placed or performed during the hospital encounter of 06/25/18    US Abdomen Limited    Narrative    RIGHT UPPER QUADRANT ULTRASOUND 6/25/2018 8:02 PM    HISTORY:  RUQ pain;     COMPARISON: None.    FINDINGS:    Gallbladder:  Normal with no cholelithiasis, wall thickening or focal  tenderness.      Bile ducts:   CHD is normal diameter.  No intrahepatic biliary  dilatation.    Liver:  Liver is echogenic consistent with fatty infiltration    Pancreas:  Totally obscured by gas.    Right kidney:   Normal.       Impression    IMPRESSION:    1. No gallstones are identified.  2. Echogenic liver consistent with fatty infiltration.    MANASA FRYE MD   XR Chest Port 1 View    Narrative    XR CHEST PORT 1 VW  6/27/2018 4:35 AM     INDICATION: ETT placement.    COMPARISON: 8/28/2012.      Impression    IMPRESSION: ETT tip at the level of the aortic arch. Enteric tube tip  projects off of the lower aspect of the film. No focal air-space  disease. Heart size within normal limits.    HUSEYIN LOZOYA MD   XR Abdomen Port 1 View    Narrative    ABDOMEN ONE VIEW PORTABLE  6/29/2018 12:20 PM     HISTORY: TF placement.     COMPARISON: None.      Impression    IMPRESSION: Tip of the feeding tube is projected over the proximal  third portion of the duodenum. Bowel gas pattern is nonobstructive.    MARA DAVILA MD   CT Head w/o Contrast    Narrative    CT SCAN OF THE HEAD WITHOUT CONTRAST   6/30/2018 12:35 PM     HISTORY: Unequal pupils, AMS.  Assess for acute intracranial  pathology.     TECHNIQUE:  Axial images of the head and coronal reformations without  IV contrast material.  Radiation dose for this scan was reduced using  automated exposure control, adjustment of the mA and/or kV according  to patient size, or iterative reconstruction technique.    COMPARISON: None.    FINDINGS: There is some mild cerebral atrophy. The brain parenchyma is  otherwise normal. There is no evidence for intracranial hemorrhage,  mass effect, acute infarct, or skull fracture.      Impression    IMPRESSION:  Moderate cerebral atrophy. No evidence for intracranial  hemorrhage or any acute process.    LINDA SZYMANSKI MD   XR Chest Port 1 View    Narrative    CHEST ONE VIEW PORTABLE   7/1/2018 2:48 PM     HISTORY: ET tube position.    COMPARISON: 6/27/2018.      Impression    IMPRESSION: ET tube is in good position approximally 6 cm above the  tom. Nasal gastric tube or feeding tube is present in at least the  stomach. There is a shallow inspiratory effort causing some crowding  of central pulmonary vascularity. There is also some increased density  in the right paratracheal region which is probably due to overlapping  shadows but a new infiltrate in this area cannot be excluded.    LINDA SZYMANSKI MD   XR Chest Port 1 View    Narrative    CHEST ONE VIEW PORTABLE July 2, 2018 6:21 AM     HISTORY: Follow infiltrate.     COMPARISON: 7/1/2018.      Impression    IMPRESSION: There is a consolidative infiltrate in the left lower lobe  most likely due to pneumonia. ET tube and feeding tube remain in  place. There is also some mild perihilar vascular congestion or edema.  Heart size is mildly prominent but this is portable technique and is  felt to be within normal limits. No definite pleural effusions.    LINDA SZYMANSKI MD   XR Chest Port 1 View    Narrative    CHEST PORTABLE ONE VIEW July 3, 2018 6:15 AM     HISTORY: Follow infiltrate.    COMPARISON: 7/2/2018.       Impression    IMPRESSION: No significant change since prior. Small bilateral pleural  effusions and hazy bibasilar opacities left greater than right again  seen. Support devices remain in place.    RL ROBERTSON MD   XR Chest Port 1 View    Narrative    CHEST ONE VIEW PORTABLE    7/4/2018 6:20 AM     HISTORY: Follow infiltrate.     COMPARISON: 7/3/2018.      Impression    IMPRESSION: Worsening infiltrate in the right perihilar region as well  as the retrocardiac region. Findings are concerning for pneumonia.  Asymmetric pulmonary edema could also have this appearance.  Likely  small left pleural effusion is new since prior. No pneumothorax  visualized. Support devices remain in place.    RL ROBERTSON MD   XR Chest Port 1 View    Narrative    XR CHEST PORT 1 VW  7/5/2018 6:00 AM     HISTORY:  Follow infiltrate;     COMPARISON: Film dated 7/4/2018    FINDINGS:  ET tube and feeding tubes are in place. Right PICC line is  in place. The tip is near the junction of the superior vena cava and  right atrium. No significant change in the bilateral infiltrates.      Impression    IMPRESSION: No significant change in the bilateral infiltrate. Right  PICC line is now in place.    MANASA FRYE MD   XR Chest Port 1 View    Narrative    XR CHEST PORT 1 VW  7/6/2018 6:25 AM     HISTORY:  Follow infiltrate;     COMPARISON: Film performed on 7/5/2018    FINDINGS:  ET tube is in place. Feeding tube is again noted. Right  PICC line is in place. Bilateral infiltrates are present. These are  stable.      Impression    IMPRESSION: Stable, no significant change in the bilateral infiltrates  when compared to 7/5/2018.    MANASA FRYE MD   XR Chest Port 1 View    Narrative    CHEST ONE VIEW PORTABLE   7/7/2018 5:50 AM     HISTORY: Follow infiltrate;     COMPARISON: 7/6/2018 0625 hours chest x-ray      Impression    IMPRESSION: Redemonstrated bilateral infiltrates. Left lower lobe  opacity stable. Slight increase in left perihilar infiltrate.  Redemonstrated right perihilar infiltrate and lobulated appearing  fullness at the right hilar region. Right hilar adenopathy cannot be  excluded.    Midline ET tube above the tom, right PICC line projecting over  right atrium and enteric feeding tube extending below the diaphragm,  tip not included on this film.    DIPTI BARTLETT MD   XR Chest Port 1 View    Narrative    CHEST PORTABLE ONE VIEW   7/8/2018 5:54 AM     HISTORY: Follow infiltrate.     COMPARISON: 7/7/2018 chest x-ray.      Impression    IMPRESSION: Since the comparison study the endotracheal tube has  been  removed. Right perihilar opacity improved. Mild pulmonary vascular  prominence more so than yesterday. Otherwise stable.    KARMEN BROWN MD   MR Brain w/o & w Contrast    Narrative    MRI BRAIN WITHOUT AND WITH CONTRAST July 11, 2018 4:49 AM    HISTORY: Persistent delirium with left wrist drop;      TECHNIQUE: Multiplanar, multisequence MRI of the brain without and  with 10mL Gadavist    COMPARISON: CT head 6/30/2018.    FINDINGS: There is generalized atrophy of the brain. White matter T2  hyperintensities are seen in the cerebral hemispheres consistent with  sequelae of small vessel ischemic disease. There is no evidence of  hemorrhage, mass, acute infarct, or anomaly. There are no gadolinium  enhancing lesions.      There is scattered mucosal thickening in the paranasal sinuses. The  arteries at the base of the brain and the dural venous sinuses appear  patent.       Impression    IMPRESSION:    1. No acute abnormality.  2. Brain atrophy more than expected for age but unchanged.  3. White matter low-attenuation consistent with sequelae of small  vessel ischemic disease.      ZOË MENEZES MD   MR Cervical Spine w/o & w Contrast    Narrative    MRI CERVICAL SPINE WITHOUT AND WITH CONTRAST  7/11/2018 4:48 AM     HISTORY: Persistent delirium with left wrist drop - rule out cord  lesion.     TECHNIQUE: Multiplanar, multisequence MRI of the cervical spine  without and with 10 mL Gadavist.    COMPARISON: None.    FINDINGS: The cervical spinal cord and visualized portions of the  thoracic spinal cord appear normal.  The visualized portions of the  brainstem and cerebellum appear normal.   No abnormal gadolinium  enhancement is seen in the thecal sac or spinal cord.    Alignment: Normal.    Craniocervical Junction and C1-C2:  Normal.    C2-C3:  Normal disc, facet joints, spinal canal and neural foramina.    C3-C4:  Minimal degenerative disc disease and mild bilateral  degenerative facet arthropathy.    C4-C5:   Minimal degenerative disc disease. Otherwise normal.    C5-C6:  Moderate degenerative disc disease. Mild bilateral  degenerative facet arthropathy.    C6-C7:  Severe degenerative disc disease. Mild spinal canal stenosis.  Mild right foraminal stenosis.    C7-T1: Minimal degenerative disc disease. Otherwise normal.    T1-T2:  Normal disc, facet joints, spinal canal and neural foramina.     T2-T3:  Normal disc, facet joints, spinal canal and neural foramina.    Paraspinous Soft Tissues:  Normal as visualized.    Bone Marrow:  Normal signal intensity.      Impression    IMPRESSION:    1. Spinal cord appears normal.  2. Multilevel degenerative disc disease and degenerative facet  arthropathy as described above.      ZOË MENEZES MD   XR Chest 2 Views    Narrative    CHEST TWO VIEWS  7/13/2018 2:51 AM     HISTORY:  Follow up Infiltrate.     COMPARISON: 7/8/2018      Impression    IMPRESSION: Feeding tube passes into the small bowel. PICC line has  been removed. Pulmonary infiltrates have resolved. Normal heart size  and pulmonary vascularity, no pleural effusion.    ZOË MENEZES MD

## 2018-07-20 NOTE — PLAN OF CARE
Problem: Patient Care Overview  Goal: Plan of Care/Patient Progress Review  Outcome: Improving  VSS.  No c/o chest pain or SOB.  No c/o pain this shift.  Creatinine increased to 1.81.  MD spoke with pt and encouraged him to stay until the AM to have his Creat rechecked.  Amphotericin is being held at this time.  Pt's mg was also low at 1.4.  Protocol has been activated.  Pt is in agreement with the plan at this time.  Discharge in AM pending progress.

## 2018-07-20 NOTE — PROGRESS NOTES
[facility]    Hospitalist Progress Note    Assessment & Plan   52 year old male who was admitted on 6/25/2018.   Patient has a history of alcoholism, hypertension, dyslipidemia, Ménière's disease.Initially transferred to the intensive care unit for acute agitation and delirium in the setting of acute alcohol withdrawal and alcoholic pancreatitis, however, gradually improving and progressing clinically.      Fungemia, Rhodotorula Species:  -Patient initially was treated for MSSA pneumonia with antibiotics, required vent support in the ICU.  Also was on TPN with multiple lines in the ICU.  -PICC line since has been discontinued  -7/3 blood culture positive for Rhodotorula species fungus  -Was on liposomal amphotericin B per infectious disease since 7/13; discontinued today due to rising creatinine.  Patient did not receive a dose on 719 and 720; discussed with infectious disease, will stop altogether  -Repeat blood cultures have been negative  -plan for follow-up blood culture in about 1 week as outpatient    YURI:  Hypomagnesemia  -Presented with normal creatinine, now worse at 1.81  -Most likely due to amphotericin B  -Amphotericin now held for last 48 hours; and DCed on 7/20  -Normal saline at 75 mL/h  -Continue to hold hydrochlorothiazide.  -Discussed curbside with Dr. Boyd, nephrology, continue hydration and wait for recovery of renal function, if creatinine not improving by tomorrow formal nephrology consult  -Hypomagnesemia likely due to amphotericin use, replace per protocol.    Acute respiratory failure, MSSA, LLL pneumonia:   -The patient was initially intubated for airway protection due to sedation in the setting of his alcohol withdrawal.  Soon after intubation, the patient developed respiratory distress requiring urgent bronchoscopy.  Eventually, sputum culture was positive for GNR and GPC and blood cultures from 7/3 positive for Rhodotorula species,   -Patient was initially on IV  Zosyn  -Off antibiotics for >72 hrs  -Saturating well on room air.    Acute alcohol withdrawal, Metabolic encephalopathy,   Alcohol dependence  Acute Pancreatitis:   -Patient was initially transferred from the medical floor to the intensive care unit for airway protection and Precedex drip due to acute alcohol withdrawal and agitation.  Patient had pancreatitis upon admission, peak lipase approximately 900, is clinically improved.   -Neurology was consulted.  Precedex was slowly weaned off, and patient's mentation has significantly improved. Seroquel was initiated for agitation.  Psychiatry evaluated the patient, and suggests to gradually decrease Seroquel as his agitation improves and he becomes more functional and oriented.  -Psychiatry recommended offering chemical dependency assessment when his quadrant, no basis for commitment at this point, may need  rule 25 as outpatient  -Weaned off Seroquel  -Encephalopathy much improved and back to baseline      History of Hypertension:   -Patient is a long-standing history of hypertension treated with amlodipine, hydrochlorothiazide, metoprolol XL.    -Continue amlodipine and metoprolol.   -Amlodipine increased to 10 mg daily  -Hold hydrochlorothiazide due to YURI    ?  Left radial nerve palsy:  -Continue occupational therapy  -MRI C-spine and brain on 7/11 fairly unremarkable to explain for left hand weakness  -Elevated by neurology, now signed off  -EMG in 2 weeks if persistent wrist drop  -We will arrange outpatient neurology follow-up in 2 weeks at the time of discharge     # Pain Assessment:  Current Pain Score 7/20/2018   Patient currently in pain? denies   Pain score (0-10) -   Pain location -   Pain descriptors -   CPOT pain score -   Db robles pain level was assessed and he currently denies pain.        D/W: RN  DVT Prophylaxis: Enoxaparin (Lovenox) SQ  Code Status: Full Code    Disposition: Expected discharge likely 7/21; if creatinine with improving  trend    Cristian Woodson MD    Interval History      Creatinine slightly worse today.  Patient with adequate urine output.  Denies chest pain or dyspnea.  Left hand weakness improving    -Data reviewed today: I reviewed all new labs and imaging results over the last 24 hours. I personally reviewed no images or EKG's today.      Physical Exam   Temp: 98.8  F (37.1  C) Temp src: Oral BP: 135/86   Heart Rate: 87 Resp: 16 SpO2: 94 % O2 Device: None (Room air)    Vitals:    07/12/18 0500 07/12/18 2129 07/13/18 0701   Weight: 97.1 kg (214 lb 1.1 oz) 98.8 kg (217 lb 12.8 oz) 97.8 kg (215 lb 9.8 oz)     Vital Signs with Ranges  Temp:  [97.9  F (36.6  C)-99.1  F (37.3  C)] 98.8  F (37.1  C)  Heart Rate:  [80-89] 87  Resp:  [16] 16  BP: (131-139)/(72-86) 135/86  SpO2:  [94 %-96 %] 94 %  I/O last 3 completed shifts:  In: 2616 [P.O.:1250; I.V.:1366]  Out: 400 [Urine:400]    Constitutional: AAOX3, NAD  HEENT: Moist oral mucosa  Respiratory:   CTA B/L, Normal WOB  Cardiovascular: RRR, No murmur  GI: Soft, Non- tender, BS- normoactive  Skin/Integument: Warm and dry, no rashes  MSK: No joint deformity or swelling, no edema  Neuro: CN- grossly intact; left wrist drop-marginally improving      Medications       sodium chloride 75 mL/hr at 07/20/18 0049         sodium chloride 0.9%  500 mL Intravenous Q24H     acetaminophen  650 mg Oral Q24H     amLODIPine  10 mg Oral Daily     amphotericin B liposome (AMBISOME) intermittent infusion  3 mg/kg Intravenous Q24H     docusate sodium  100 mg Oral BID     enoxaparin  40 mg Subcutaneous Q24H     metoprolol tartrate  50 mg Oral BID     miconazole   Topical BID     multivitamin, therapeutic with minerals  1 tablet Oral Daily     pantoprazole  40 mg Oral BID AC     polyethylene glycol  17 g Oral BID     sennosides  8.6 mg Oral BID     sodium chloride (PF)  3 mL Intracatheter Q8H       Data       Recent Labs  Lab 07/20/18  0950 07/19/18  1610 07/19/18  0806 07/18/18  0845 07/17/18  0838   07/14/18  1017   WBC  --   --   --  7.6  --   --  9.4   HGB  --   --   --  12.2*  --   --  12.6*   MCV  --   --   --  101*  --   --  103*     --   --  310 298  --  420     --  141 139 140  --  138   POTASSIUM 3.6 3.8 3.2* 3.5 3.6  --  4.2   CHLORIDE 108  --  106 106 106  --  105   CO2 23  --  24 24 25  --  26   BUN 29  --  31* 26 27  --  38*   CR 1.81*  --  1.73* 1.33* 1.41*  < > 1.26*   ANIONGAP 11  --  11 9 9  --  7   DEMETRIA 8.1*  --  8.0* 8.5 8.5  --  8.3*   *  --  110* 108* 132*  --  157*   ALBUMIN  --   --   --   --   --   --  2.8*   PROTTOTAL  --   --   --   --   --   --  7.6   BILITOTAL  --   --   --   --   --   --  0.6   ALKPHOS  --   --   --   --   --   --  93   ALT  --   --   --   --   --   --  63   AST  --   --   --   --   --   --  59*   < > = values in this interval not displayed.    No results found for this or any previous visit (from the past 24 hour(s)).

## 2018-07-20 NOTE — PROGRESS NOTES
[facility]    Infectious Disease Progress Note    Date of Service (when I saw the patient): 07/20/2018     Assessment & Plan   Db Watson is a 52 year old male who was admitted on 6/25/2018.     Impression:  1. 52 y.o male admitted about 3 weeks ago now.   2. Admission diagnosis was pancreatitis and alcoholic hepatitis.   3. Admission course has been complicated by delirium, resp failure, MSSA pneumonia.   4. Rhodotorula fungemia.   5. Fungemia with Rhodotorula most likely real given, patient with liver disease, pancreatitis, tpn use, multiple lines in the ICU.   SUKUMAR on the Rhodotorula shows susceptible only to  Amphotericin on  Liposomal Amphotericin. Only 1 cultures was done then, No other positive cultures.   PICC now out.     REC:  1.  Awaiting creat from today.  If down again will give last dose of Ampho and pt can be discharged.  2. Pt will come to our office mid next week for repeat blood cx x 2 off antifungal         Sharad Sams MD    Interval History   Afebrile.  Feels well.  No c/o.  Creat pend.  Repeat blood cxs NGTD.    Physical Exam   Temp: 98.8  F (37.1  C) Temp src: Oral BP: 135/86   Heart Rate: 87 Resp: 16 SpO2: 94 % O2 Device: None (Room air)    Vitals:    07/12/18 0500 07/12/18 2129 07/13/18 0701   Weight: 97.1 kg (214 lb 1.1 oz) 98.8 kg (217 lb 12.8 oz) 97.8 kg (215 lb 9.8 oz)     Vital Signs with Ranges  Temp:  [97.9  F (36.6  C)-99.1  F (37.3  C)] 98.8  F (37.1  C)  Heart Rate:  [80-89] 87  Resp:  [16] 16  BP: (131-139)/(72-86) 135/86  SpO2:  [94 %-96 %] 94 %    Constitutional: Awake,interactive   Lungs: Clear to auscultation bilaterally, no crackles or wheezing  Cardiovascular: Regular rate and rhythm, normal S1 and S2, and no murmur noted  Abdomen: Normal bowel sounds, soft, non-distended, non-tender  Skin: No rashes, no cyanosis, no edema  Other:    Medications     sodium chloride 75 mL/hr at 07/20/18 0049       sodium chloride 0.9%  500 mL Intravenous Q24H      acetaminophen  650 mg Oral Q24H     amLODIPine  10 mg Oral Daily     amphotericin B liposome (AMBISOME) intermittent infusion  3 mg/kg Intravenous Q24H     docusate sodium  100 mg Oral BID     enoxaparin  40 mg Subcutaneous Q24H     metoprolol tartrate  50 mg Oral BID     miconazole   Topical BID     multivitamin, therapeutic with minerals  1 tablet Oral Daily     pantoprazole  40 mg Oral BID AC     polyethylene glycol  17 g Oral BID     sennosides  8.6 mg Oral BID     sodium chloride (PF)  3 mL Intracatheter Q8H       Data   All microbiology laboratory data reviewed.  Recent Labs   Lab Test  07/18/18   0845  07/17/18   0838  07/14/18   1017  07/11/18   0525   WBC  7.6   --   9.4  8.5   HGB  12.2*   --   12.6*  13.3   HCT  35.3*   --   37.9*  39.6*   MCV  101*   --   103*  104*   PLT  310  298  420  481*     Recent Labs   Lab Test  07/19/18   0806  07/18/18   0845  07/17/18   0838   CR  1.73*  1.33*  1.41*     No lab results found.  Recent Labs   Lab Test  07/17/18   1015  07/17/18   1005  07/11/18   1750  07/09/18   0720  07/09/18   0405  07/04/18   0642  07/03/18   1440  07/03/18   1127  07/02/18   2350   CULT  No growth after 2 days  No growth after 2 days  No yeast isolated  No growth  No growth  Moderate growth  Staphylococcus aureus  *  Plus  Light growth  Normal david    Cultured on the 4th day of incubation:  Rhodotorula species  *  Critical Value/Significant Value, preliminary result only, called to and read back by  Yessi Singh RN, @2086 07/07/18..    No growth  Heavy growth  Staphylococcus aureus  *  Plus  Light growth  Normal david

## 2018-07-20 NOTE — PLAN OF CARE
Problem: Patient Care Overview  Goal: Plan of Care/Patient Progress Review  Outcome: Adequate for Discharge Date Met: 07/20/18  Doing awesome. Plan to go home today pending Cr improvement. Up independently. No complaints. Refuses assessment of sacral area. On amphotercin B, will have last dose today at 1600.

## 2018-07-21 VITALS
HEIGHT: 68 IN | TEMPERATURE: 98.3 F | RESPIRATION RATE: 16 BRPM | HEART RATE: 80 BPM | BODY MASS INDEX: 32.68 KG/M2 | WEIGHT: 215.61 LBS | OXYGEN SATURATION: 95 % | DIASTOLIC BLOOD PRESSURE: 96 MMHG | SYSTOLIC BLOOD PRESSURE: 139 MMHG

## 2018-07-21 LAB
ANION GAP SERPL CALCULATED.3IONS-SCNC: 10 MMOL/L (ref 3–14)
BUN SERPL-MCNC: 23 MG/DL (ref 7–30)
CALCIUM SERPL-MCNC: 8.1 MG/DL (ref 8.5–10.1)
CHLORIDE SERPL-SCNC: 108 MMOL/L (ref 94–109)
CO2 SERPL-SCNC: 23 MMOL/L (ref 20–32)
CREAT SERPL-MCNC: 1.34 MG/DL (ref 0.66–1.25)
GFR SERPL CREATININE-BSD FRML MDRD: 56 ML/MIN/1.7M2
GLUCOSE SERPL-MCNC: 105 MG/DL (ref 70–99)
POTASSIUM SERPL-SCNC: 3.2 MMOL/L (ref 3.4–5.3)
SODIUM SERPL-SCNC: 141 MMOL/L (ref 133–144)

## 2018-07-21 PROCEDURE — 80048 BASIC METABOLIC PNL TOTAL CA: CPT | Performed by: INTERNAL MEDICINE

## 2018-07-21 PROCEDURE — 25000128 H RX IP 250 OP 636: Performed by: INTERNAL MEDICINE

## 2018-07-21 PROCEDURE — 25000132 ZZH RX MED GY IP 250 OP 250 PS 637: Performed by: INTERNAL MEDICINE

## 2018-07-21 PROCEDURE — 25000132 ZZH RX MED GY IP 250 OP 250 PS 637: Performed by: HOSPITALIST

## 2018-07-21 PROCEDURE — 36415 COLL VENOUS BLD VENIPUNCTURE: CPT | Performed by: INTERNAL MEDICINE

## 2018-07-21 PROCEDURE — 99239 HOSP IP/OBS DSCHRG MGMT >30: CPT | Performed by: INTERNAL MEDICINE

## 2018-07-21 RX ADMIN — PANTOPRAZOLE SODIUM 40 MG: 40 TABLET, DELAYED RELEASE ORAL at 08:59

## 2018-07-21 RX ADMIN — MULTIPLE VITAMINS W/ MINERALS TAB 1 TABLET: TAB at 08:59

## 2018-07-21 RX ADMIN — SODIUM CHLORIDE: 9 INJECTION, SOLUTION INTRAVENOUS at 05:29

## 2018-07-21 RX ADMIN — METOPROLOL TARTRATE 50 MG: 50 TABLET ORAL at 08:59

## 2018-07-21 RX ADMIN — AMLODIPINE BESYLATE 10 MG: 10 TABLET ORAL at 08:59

## 2018-07-21 ASSESSMENT — ACTIVITIES OF DAILY LIVING (ADL)
ADLS_ACUITY_SCORE: 12

## 2018-07-21 NOTE — PLAN OF CARE
Problem: Patient Care Overview  Goal: Plan of Care/Patient Progress Review  Outcome: No Change  A&O x4. VSS on RA. Ind in room. No pain. Tolerating diet Last Creat 1.81, recheck this AM result 1.34. K 3.2, pt refusing intervention. IVF dc'd. Anticipating discharge today.

## 2018-07-21 NOTE — PLAN OF CARE
Problem: Patient Care Overview  Goal: Plan of Care/Patient Progress Review  Outcome: Adequate for Discharge Date Met: 07/21/18  Discharge    Patient discharged to home via car with friend.   Care plan note: A&O x4. VSS on RA. Independent. LS clear, denies SOB. BS+, tolerating regular diet, denies nausea. Denies pain. K 3.2, refusing intervention. Creat recheck 1.34. Discharge home today.     Listed belongings gathered and returned to patient. Yes  Care Plan and Patient education resolved: Yes  Prescriptions if needed, hard copies sent with patient  NA  Home and hospital acquired medications returned to patient: Yes  Medication Bin checked and emptied on discharge Yes  Follow up appointment made for patient: Yes

## 2018-07-21 NOTE — PLAN OF CARE
Problem: Patient Care Overview  Goal: Plan of Care/Patient Progress Review  Pt is A&O x 4, calm and cooperative for cares. VSS on RA, denied pain and SOB. Creatinine trending up and latest is 1.81.  MD is aware. Amphotericin discontinued. Receiving IVF at 75 cc/hr.Up and independent in the room.  Magnisium was 1.4 in AM,changed per protocol, recheck come 2.6. Regular diet and tolerating well. Anticipated Discharge tomorrow. Will continue to monitor.

## 2018-07-21 NOTE — PLAN OF CARE
Problem: Patient Care Overview  Goal: Plan of Care/Patient Progress Review  Physical Therapy Discharge Summary    Reason for therapy discharge:    Discharged to home.    Progress towards therapy goal(s). See goals on Care Plan in [facility] electronic health record for goal details.  Goals met    Therapy recommendation(s):    No further therapy is recommended.

## 2018-07-23 ENCOUNTER — TELEPHONE (OUTPATIENT)
Dept: FAMILY MEDICINE | Facility: CLINIC | Age: 53
End: 2018-07-23

## 2018-07-23 ENCOUNTER — PATIENT OUTREACH (OUTPATIENT)
Dept: CARE COORDINATION | Facility: CLINIC | Age: 53
End: 2018-07-23

## 2018-07-23 LAB
BACTERIA SPEC CULT: NO GROWTH
BACTERIA SPEC CULT: NO GROWTH
Lab: NORMAL
Lab: NORMAL
SPECIMEN SOURCE: NORMAL
SPECIMEN SOURCE: NORMAL

## 2018-07-23 NOTE — LETTER
[facility]  Complex Care Plan  About Me  Patient Name:  Db Watson    YOB: 1965  Age:     53 year old   Sebastián MRN:   [id number] Telephone Information:    Home Phone [phone]   Mobile [phone]       Address:    [address] [place] [address] Email address:  [email or web address]      Emergency Contact(s)  Name Relationship Lgl Grd Work Phone Home Phone Mobile Phone   1. JENNIFER WATSON Brother No none none [phone]   2. ANA LILIA WATSON Mother No  [phone]    3. ELIO BURNS Significant ot* No none [phone]   4. ALEX HANCOCK Relative No   [phone]           Primary language:  English     needed? No   [place] [facility]:  [phone] op. 1  Other communication barriers:    Preferred Method of Communication:  Maria E  Current living arrangement: I live alone  Mobility Status/ Medical Equipment: Independent    Health Maintenance  Health Maintenance Reviewed: Due/Overdue  (colon screen, depression, HIV)    My Access Plan  Medical Emergency 911   Primary Clinic Line [facility] [phone]   24 Hour Appointment Line [phone] or  [phone] ([phone]) (toll-free)   24 Hour Nurse Line [phone] (toll-free)   Preferred Urgent Care     [facility]  [phone]   Preferred Pharmacy [id number] [address]     Behavioral Health Crisis Line The National Suicide Prevention Lifeline at 1-244.307.4066 or 911     My Care Team Members    Patient Care Team       Relationship Specialty Notifications Start End    Jennifer Demarco MD PCP - General Family Practice  11/30/17     Phone: [phone] Fax: [phone] [address] [facility] [address]    Mariam Page LSW Lead Care Coordinator Primary Care - CC  7/23/18     Phone: [phone] Fax: [phone]                My Care Plans  Self Management  and Treatment Plan  Goals and (Comments)  Goals        General    # 1 Financial Wellbeing (pt-stated)     Notes - Note created  7/23/2018 11:14 AM by Mariam Page LSW    Goal Statement: I will visit [facility]  Measure of Success: apply for programs  Supportive Steps to Achieve: Provided with resources  Barriers:   Strengths: Capable of visiting the [facility] office to see what programs he is elig. For.  Date to Achieve By: Aug             Action Plans on File:           My Medical and Care Information  Problem List   Patient Active Problem List   Diagnosis     Hypertension goal BP (blood pressure) < 140/90     Abnormal LFTs     Habitual alcohol use     Atypical chest pain     HL (hearing loss)     Tinnitus     Abnormal MRI of head     Hyperlipidemia LDL goal <160     BMI 33.0-33.9,adult     Dizziness     Elevated fasting glucose     Meniere's disease, left     Morbid obesity (H)     Bowel obstruction     SBO (small bowel obstruction)     Pancreatitis     Alcohol withdrawal hallucinosis (H)     Positive blood culture      Current Medications and Allergies:  See printed Medication Report.    Care Coordination Start Date: 7/23/2018   Frequency of Care Coordination:     Form Last Updated: 07/23/2018

## 2018-07-23 NOTE — TELEPHONE ENCOUNTER
Patient discharged from Inpatient.      Discharge location: [facility]  Discharge date: 7/21/18  Diagnosis: Pancreatitis, Alcohol-Induced Acute Pancreatitis, Unspecified Complication Status, Pancreatitis, Unspecified Pancreatitis     Please follow up as appropriate. If no follow up required, please close encounter.      Anay OSHEA, Patient Care

## 2018-07-23 NOTE — LETTER
"[place] CARE COORDINATION  [address] [address]  [phone]     July 23, 2018    Db Watson  [address] [place] [address] [address] [address]      Dear Db,    I am a clinic care coordinator who works with Jennifer Demarco MD at the [facility]. I wanted to thank you for spending the time to talk with me.  I wanted to introduce myself and provide you with my contact information so that you can call me with questions or concerns about your health care. Below is a description of clinic care coordination and how I can further assist you.     The clinic care coordinator is a registered nurse and/or  who understand the health care system. The goal of clinic care coordination is to help you manage your health and improve access to the [facility] in the most efficient manner. The registered nurse can assist you in meeting your health care goals by providing education, coordinating services, and strengthening the communication among your providers. The  can assist you with financial, behavioral, psychosocial, chemical dependency, counseling, and/or psychiatric resources.    As we discussed, You might want to consider attending an AA group for support. They offer groups at all times of the day, 7 days week.    If you decide you would like to proceed treatment, you  would need to contact the [facility] to have a \"Rule 25 Assessment completed. This determines the type of treatment will best meet your needs.     [address], to apply for [facility] programs. Food support, medical , cash assistance, emergency assistance     [employer] [phone]    Please feel free to contact me at [phone], with any questions or concerns. We at [facility] are focused on providing you with the highest-quality healthcare experience possible and that all starts with " you.     Sincerely,       Natalia Pgae [facility]  , Clinic Care Coordination  Clinics:  Claire Marti Rogers, [place]  [phone]   7/23/2018   12:02 PM    Enclosed: I have enclosed a copy of the Complex Care Plan. This has helpful information and goals that we have talked about. Please keep this in an easy to access place to use as needed.

## 2018-07-23 NOTE — PROGRESS NOTES
Clinic Care Coordination Contact    Clinic Care Coordination Contact    OUTREACH - Social Work    Referral Information:  Referral Source: IP Handoff   Date of Admission:  6/25/2018  Date of Discharge:  7/21/2018  Discharging Provider: Annabelle Manuel MD   Discharge Diagnoses    Fungemia with Rhodotorula Species  Acute hypoxic respiratory failure  YURI due to amphotericin use  MSSA, LLL pneumonia   Acute alcohol withdrawal,   Acute Metabolic encephalopathy-resolved  Alcohol dependence  Acute Pancreatiti    History of Hypertension   ?  Left radial nerve palsy    Primary Diagnosis: CD    Chief Complaint   Patient presents with     Clinic Care Coordination - Post Hospital     SW        [place] Utilization: frequent ED visits  Clinic Utilization  Difficulty keeping appointments:: No  Utilization    Last refreshed: 7/23/2018  6:35 AM:  No Show Count (past year) 2       Last refreshed: 7/23/2018  6:35 AM:  ED visits 3       Last refreshed: 7/23/2018  6:35 AM:  Hospital admissions 1          Current as of: 7/23/2018  6:35 AM         Clinical Concerns:  Current Medical Concerns:    Patient Active Problem List   Diagnosis     Hypertension goal BP (blood pressure) < 140/90     Abnormal LFTs     Habitual alcohol use     Atypical chest pain     HL (hearing loss)     Tinnitus     Abnormal MRI of head     Hyperlipidemia LDL goal <160     BMI 33.0-33.9,adult     Dizziness     Elevated fasting glucose     Meniere's disease, left     Morbid obesity (H)     Bowel obstruction     SBO (small bowel obstruction)     Pancreatitis     Alcohol withdrawal hallucinosis (H)     Positive blood culture      Current Behavioral Concerns: anxiety  Education Provided to patient: Role of ml, resources for CD and [facility] benefits     Health Maintenance Reviewed: Due/Overdue  (colon screen, depression, HIV)  Medication Management:  Said he has medication for Anxiety but doesn't take  Has other meds    Functional Status:  Dependent ADLs::  "Independent  Mobility Status: Independent    Living Situation:  Current living arrangement:: I live alone  Type of residence:: Apartment    Diet/Exercise/Sleep:  Inadequate nutrition (GOAL):: Yes (not working, concerned he will need food shelf)    Transportation:  Transportation concerns (GOAL):: No Pt.drives  Transportation means:: Regular car     Psychosocial:  Mental health DX:: Yes (depression /anxiety)     Financial/Insurance:  Thinks his Medical . Hospital assisted with application for [place] while in [place]; Discussed need to verify that was not just for 30 day coverage to cover the hospital stay.   Financial/Insurance concerns (GOAL):: Yes (Unemployed since early . appeal to Unemployment , Behind on rent) Will apply for [facility] programs  Hoping to have a job in next 2-3 weeks.    Resources and Interventions:  Current Resources:  [facility] BUILD Service [place]. Not interested in An resources for support in not drinking alcohol.  Feels he only drank because he was bored. \"I don't crave it \"  Would not attend AA or consider treatment.    Community Resources:  (declined CD resources) Did discuss options for treatment and AA groups.   Equipment Currently Used at Home: none     Referrals Placed: Community Resources, [facility] Resources (declined CD resources)     Goals:   Goals        General    # 1 Financial Wellbeing (pt-stated)     Notes - Note created  2018 11:14 AM by Mariam Page LSW    Goal Statement: I will visit [facility]  Measure of Success: apply for programs  Supportive Steps to Achieve: Provided with resources  Barriers:   Strengths: Capable of visiting the [facility] office to see what programs he is elig. For.  Date to Achieve By: Aug          Patient/Caregiver understanding:minimizes Alcohol abuse and affects on his health        Future Appointments              Tomorrow Jennifer Demarco MD [facility]      Plan: Pt. Will continue to " abstain for ETOH, see PCP tomorrow and visit the [place] [place].   SW will follow up in 4 weeks    Natalia Page [facility]  , Clinic Care Coordination  Clinics:  Claire Marti Rogers, [place]  [phone]   7/23/2018   11:25 AM

## 2018-07-24 ENCOUNTER — OFFICE VISIT (OUTPATIENT)
Dept: FAMILY MEDICINE | Facility: CLINIC | Age: 53
End: 2018-07-24
Payer: MEDICAID

## 2018-07-24 VITALS
TEMPERATURE: 98.1 F | BODY MASS INDEX: 33.15 KG/M2 | DIASTOLIC BLOOD PRESSURE: 80 MMHG | WEIGHT: 218 LBS | OXYGEN SATURATION: 97 % | HEART RATE: 75 BPM | SYSTOLIC BLOOD PRESSURE: 126 MMHG

## 2018-07-24 DIAGNOSIS — I10 HYPERTENSION GOAL BP (BLOOD PRESSURE) < 140/90: ICD-10-CM

## 2018-07-24 DIAGNOSIS — B49 FUNGEMIA: Primary | ICD-10-CM

## 2018-07-24 DIAGNOSIS — F10.20 ALCOHOLISM (H): ICD-10-CM

## 2018-07-24 DIAGNOSIS — G56.32 RADIAL NERVE PALSY, LEFT: ICD-10-CM

## 2018-07-24 DIAGNOSIS — E66.01 MORBID OBESITY (H): ICD-10-CM

## 2018-07-24 LAB
ANION GAP SERPL CALCULATED.3IONS-SCNC: 7 MMOL/L (ref 3–14)
BUN SERPL-MCNC: 20 MG/DL (ref 7–30)
CALCIUM SERPL-MCNC: 8.6 MG/DL (ref 8.5–10.1)
CHLORIDE SERPL-SCNC: 105 MMOL/L (ref 94–109)
CO2 SERPL-SCNC: 28 MMOL/L (ref 20–32)
CREAT SERPL-MCNC: 1.17 MG/DL (ref 0.66–1.25)
GFR SERPL CREATININE-BSD FRML MDRD: 66 ML/MIN/1.7M2
GLUCOSE SERPL-MCNC: 88 MG/DL (ref 70–99)
POTASSIUM SERPL-SCNC: 3.5 MMOL/L (ref 3.4–5.3)
SODIUM SERPL-SCNC: 140 MMOL/L (ref 133–144)

## 2018-07-24 PROCEDURE — 99214 OFFICE O/P EST MOD 30 MIN: CPT | Performed by: FAMILY MEDICINE

## 2018-07-24 PROCEDURE — 36415 COLL VENOUS BLD VENIPUNCTURE: CPT | Performed by: FAMILY MEDICINE

## 2018-07-24 PROCEDURE — 80048 BASIC METABOLIC PNL TOTAL CA: CPT | Performed by: FAMILY MEDICINE

## 2018-07-24 PROCEDURE — 87040 BLOOD CULTURE FOR BACTERIA: CPT | Performed by: FAMILY MEDICINE

## 2018-07-24 RX ORDER — METOPROLOL SUCCINATE 50 MG/1
TABLET, EXTENDED RELEASE ORAL
Qty: 60 TABLET | Refills: 4 | Status: SHIPPED | OUTPATIENT
Start: 2018-07-24

## 2018-07-24 RX ORDER — AMLODIPINE BESYLATE 10 MG/1
10 TABLET ORAL DAILY
Qty: 30 TABLET | Refills: 4 | Status: SHIPPED | OUTPATIENT
Start: 2018-07-24 | End: 2019-03-07

## 2018-07-24 ASSESSMENT — PAIN SCALES - GENERAL: PAINLEVEL: MILD PAIN (3)

## 2018-07-24 NOTE — MR AVS SNAPSHOT
After Visit Summary   7/24/2018    Db Watson    MRN: [id number]           Patient Information     Date Of Birth          1965        Visit Information        Provider Department      7/24/2018 10:40 AM Jennifer Demarco MD [facility]        Today's Diagnoses     Fungemia    -  1    Radial nerve palsy, left        Hypertension goal BP (blood pressure) < 140/90        Essential hypertension with goal blood pressure less than 140/90          Care Instructions     EMG and Neurology is recommended.  You can call [phone] to schedule this at the [facility] in [place] (formerly the [facility]).    You may take Advil for pain as directed.     Labs today. I will notify you of results when I receive them.     Refills of blood pressure medications today. Take as directed.     Follow up in two months at clinic.           Follow-ups after your visit        Additional Services     NEUROLOGY ADULT REFERRAL       Your provider has referred you for the following:   Consult at [facility]: [facility] [phone]   [email or web address]/ and   EMG at [facility]: [facility] [phone]   [email or web address]    Please be aware that coverage of these services is subject to the terms and limitations of your health insurance plan.  Call member services at your health plan with any benefit or coverage questions.      Please bring the following with you to your appointment:    (1) Any X-Rays, CTs or MRIs which have been performed.  Contact the facility where they were done to arrange for  prior to your scheduled appointment.    (2) List of current medications  (3) This referral request   (4) Any documents/labs given to you for this referral                  Your next 10 appointments already scheduled      Jul 24, 2018 10:40 AM CDT   Office Visit with Jennifer Demarco MD   [facility]    [address] [address] [phone]           Bring a current list of meds and any records pertaining to this visit. For Physicals, please bring immunization records and any forms needing to be filled out. Please arrive 10 minutes early to complete paperwork.              Who to contact     If you have questions or need follow up information about today's clinic visit or your schedule please contact [facility] directly at [phone].  Normal or non-critical lab and imaging results will be communicated to you by MyChart, letter or phone within 4 business days after the clinic has received the results. If you do not hear from us within 7 days, please contact the clinic through [employer] Vo[employer] or phone. If you have a critical or abnormal lab result, we will notify you by phone as soon as possible.  Submit refill requests through [employer] or call your pharmacy and they will forward the refill request to us. Please allow 3 business days for your refill to be completed.          Additional Information About Your Visit        [email or web address] Information     [employer] gives you secure access to your electronic health record. If you see a primary care provider, you can also send messages to your care team and make appointments. If you have questions, please call your primary care clinic.  If you do not have a primary care provider, please call [phone] and they will assist you.        Care EveryWhere ID     This is your Care EveryWhere ID. This could be used by other organizations to access your [facility] medical records  [id number]        Your Vitals Were     Pulse Temperature Pulse Oximetry BMI (Body Mass Index)          75 98.1  F (36.7  C) (Oral) 97% 33.15 kg/m2         Blood Pressure from Last 3 Encounters:   07/24/18 126/80   07/21/18 (!) 139/96    02/26/18 (!) 150/98    Weight from Last 3 Encounters:   07/24/18 98.9 kg (218 lb)   07/13/18 97.8 kg (215 lb 9.8 oz)   02/26/18 107.8 kg (237 lb 11.2 oz)              We Performed the Following     Basic metabolic panel     Blood culture     Blood culture     NEUROLOGY ADULT REFERRAL          Today's Medication Changes          These changes are accurate as of 7/24/18 10:18 AM.  If you have any questions, ask your nurse or doctor.               These medicines have changed or have updated prescriptions.        Dose/Directions    amLODIPine 10 MG tablet   Commonly known as:  NORVASC   This may have changed:  additional instructions   Used for:  Hypertension goal BP (blood pressure) < 140/90   Changed by:  Jennifer Demarco MD        Dose:  10 mg   Take 1 tablet (10 mg) by mouth daily   Quantity:  30 tablet   Refills:  4       metoprolol succinate 50 MG 24 hr tablet   Commonly known as:  TOPROL-XL   This may have changed:  See the new instructions.   Used for:  Essential hypertension with goal blood pressure less than 140/90   Changed by:  Jennifer Demarco MD        Take 1 tablet (50 mg) by mouth 2 times daily   Quantity:  60 tablet   Refills:  4            Where to get your medicines      These medications were sent to [facility][id number] [address] [address]     Phone:  [phone]     amLODIPine 10 MG tablet    metoprolol succinate 50 MG 24 hr tablet                Primary Care Provider Office Phone # Fax #    Jennifer Demarco [phone] [address] [address]        Goals        General    # 1 Financial Wellbeing (pt-stated)     Notes - Note created  7/23/2018 11:14 AM by Mariam Page LSW    Goal Statement: I will visit [facility]  Measure of Success: apply for programs  Supportive Steps to Achieve: Provided with resources  Barriers:   Strengths: Capable of visiting the [facility]  office to see what programs he is shaista. For.  Date to Achieve By: Aug        Equal Access to Services     CARMENCITA RICHARD : Hadii myles Miller, mike palacios, humphreymadonna villalobosdaniellecynthia harvey, katlin pichardorinagaviota cohen. So [facility] [phone].    ATENCIÓN: Si habla español, tiene a [place] disposición servicios gratuitos de asistencia lingüística. Mango al [phone].    We comply with applicable federal civil rights laws and [place] laws. We do not discriminate on the basis of race, color, national origin, age, disability, sex, sexual orientation, or gender identity.            Thank you!     Thank you for choosing [facility]  for your care. Our goal is always to provide you with excellent care. Hearing back from our patients is one way we can continue to improve our services. Please take a few minutes to complete the written survey that you may receive in the mail after your visit with us. Thank you!             Your Updated Medication List - Protect others around you: Learn how to safely use, store and throw away your medicines at www.disposemymeds.org.          This list is accurate as of 7/24/18 10:18 AM.  Always use your most recent med list.                   Brand Name Dispense Instructions for use Diagnosis    amLODIPine 10 MG tablet    NORVASC    30 tablet    Take 1 tablet (10 mg) by mouth daily    Hypertension goal BP (blood pressure) < 140/90       aspirin 81 MG tablet      Take 1 tablet by mouth daily.    Essential hypertension       fish oil-omega-3 fatty acids 1000 MG capsule      Take 1 g by mouth daily        hypromellose 0.5 % Soln ophthalmic solution    ARTIFICIAL TEARS     Place 1 drop into both eyes every hour as needed for dry eyes        meclizine 25 MG Chew      Take 25 mg by mouth every 6 hours as needed.    Indications: Sensation of Spinning or Whirling        MELATONIN PO      Take 5 mg by mouth nightly as needed        metoprolol succinate 50 MG 24 hr  tablet    TOPROL-XL    60 tablet    Take 1 tablet (50 mg) by mouth 2 times daily    Essential hypertension with goal blood pressure less than 140/90       traZODone 50 MG tablet    DESYREL    60 tablet    1-2 at bedtime as needed for sleep    Anxiety and depression       vitamin B complex with vitamin C Tabs tablet    STRESS TAB    100 tablet    Take 1 tablet by mouth daily.

## 2018-07-24 NOTE — PROGRESS NOTES
"  SUBJECTIVE:   Db Watson is a 53 year old male who presents to clinic today for the following health issues:    -Patient needs labs drawn.    Concern - Possible Damaged nerve  Onset: During June, pt was in coma woke up with arm damaged     Description:   Left forearm to hand, numb and mild pain    Intensity: mild to moderate    Progression of Symptoms:  Improving    Accompanying Signs & Symptoms:  Can move some fingers, unable to lift wrist/hand     Previous history of similar problem:   None    Precipitating factors:   Worsened by: None    Alleviating factors:  Improved by: Use and stretches     Therapies Tried and outcome: None    Patient was hospitalized from  June 25 to July 21, 2018 for Alcohol dependence Acute Pancreatitis with complications of Fungemia, Rhodotorula Species and acute respiratory failure . Denies symptoms of shortness of breath.     Lost job while hospitalized. He stated that he is looking for work and is currently residing in [place] with brother.     Alcohol Withdrawal Hallucinosis and Acute alcohol withdrawal and Metabolic encephalopathy and Alcohol dependence Acute Pancreatitis and Alcohol Dependence   He stated that he does not experience cravings for alcohol, but drinks because he is bored. He stated that he doesn't need to drink alcohol and can drink soda or water around friends. However, he also reported that he likes \"it\". He stated that he had no desire to drink since hospitalization.  He stated that in the mean time he is looking for work and place to live.     Left Arm   During hospitalization he developed left lower arm and hand numbness and left wrist drop. Over last few weeks,he stated that strength and movement of the left arm has improved with moving the arm. Lower left arm is currently numb and has left wrist drop. He reports that he experiences occasional burning pain in the left lower arm, and intermittent episodes of shooting pain. He stated that pain in the nerve is " due to hypersensitivity.      Balance and Gait   he reported that he is stable on feet. Denies lightheadedness, vertigo, or headaches.     Sleep  He reports that he is sleeping well at home.        Weight   Wt Readings from Last 5 Encounters:   07/24/18 98.9 kg (218 lb)   07/13/18 97.8 kg (215 lb 9.8 oz)   02/26/18 107.8 kg (237 lb 11.2 oz)   02/16/18 99.8 kg (220 lb)   02/10/18 99.8 kg (220 lb)         Problem list and histories reviewed & adjusted, as indicated.  Additional history: as documented    Patient Active Problem List   Diagnosis     Hypertension goal BP (blood pressure) < 140/90     Abnormal LFTs     Habitual alcohol use     Atypical chest pain     HL (hearing loss)     Tinnitus     Abnormal MRI of head     Hyperlipidemia LDL goal <160     BMI 33.0-33.9,adult     Dizziness     Elevated fasting glucose     Meniere's disease, left     Morbid obesity (H)     Bowel obstruction     SBO (small bowel obstruction)     Pancreatitis     Alcohol withdrawal hallucinosis (H)     Positive blood culture     Past Surgical History:   Procedure Laterality Date     C NONSPECIFIC PROCEDURE      shoulder surgery for dislocation      C NONSPECIFIC PROCEDURE      eye surgery, lens implant right     EYE SURGERY       FRACTURE TX, WRIST RT/LT      Fracture TX Wrist RT/LT     ORTHOPEDIC SURGERY         Social History   Substance Use Topics     Smoking status: Never Smoker     Smokeless tobacco: Never Used      Comment: rarely, once per year maybe      Alcohol use Yes      Comment: 10 drinks / week      Family History   Problem Relation Age of Onset     Hypertension Mother      Diabetes Father      C.A.D. Father      bypass surgery         Current Outpatient Prescriptions   Medication Sig Dispense Refill     amLODIPine (NORVASC) 10 MG tablet Take 1 tablet (10 mg) by mouth daily +++ appointment needed for additional refills +++ 30 tablet 1     aspirin 81 MG tablet Take 1 tablet by mouth daily.  3     B Complex Vitamins (VITAMIN B  COMPLEX) tablet Take 1 tablet by mouth daily. 100 tablet 12     fish oil-omega-3 fatty acids (FISH OIL) 1000 MG capsule Take 1 g by mouth daily        meclizine 25 MG CHEW Take 25 mg by mouth every 6 hours as needed.    Indications: Sensation of Spinning or Whirling       MELATONIN PO Take 5 mg by mouth nightly as needed       metoprolol succinate (TOPROL-XL) 50 MG 24 hr tablet Take 1 tablet (50 mg) by mouth 2 times daily  60 tablet 8     traZODone (DESYREL) 50 MG tablet 1-2 at bedtime as needed for sleep 60 tablet 1     hypromellose (ARTIFICIAL TEARS) 0.5 % SOLN ophthalmic solution Place 1 drop into both eyes every hour as needed for dry eyes       Allergies   Allergen Reactions     Zoloft [Sertraline] Nausea and Vomiting     Recent Labs   Lab Test  07/21/18   0525  07/20/18   0950   07/14/18   1017   07/10/18   0520   07/08/18   0400   06/30/18   1010   11/27/17   1223   09/14/16   0846   09/29/14   1108  05/24/13   0818   A1C   --    --    --    --    --    --    --    --    --   6.2*   --    --    --    --    --    --   6.1*   LDL   --    --    --    --    --    --    --    --    --    --    --    --    --   129*   --   138*  172*   HDL   --    --    --    --    --    --    --    --    --    --    --    --    --   36*   --   36*  36*   TRIG   --    --    --    --    --    --    --    --    --    --    --    --    --   196*   --   186*  149   ALT   --    --    --   63   --   61   --   68   < >   --    < >   --    < >   --    < >  90*  185*   CR  1.34*  1.81*   < >  1.26*   < >  0.62*   < >  0.57*   < >   --    < >  0.68   < >   --    < >  0.84  0.80   GFRESTIMATED  56*  40*   < >  60*   < >  >90   < >  >90   < >   --    < >  >90   < >   --    < >  >90  Non  GFR Calc    >90   GFRESTBLACK  68  48*   < >  73   < >  >90   < >  >90   < >   --    < >  >90   < >   --    < >  >90   GFR Calc    >90   POTASSIUM  3.2*  3.6   < >  4.2   < >  4.0   < >  3.6   < >   --    < >  3.2*   < >   --     < >  3.9  3.9   TSH   --    --    --    --    --    --    --    --    --    --    --   0.90   --    --    --   0.35*   --     < > = values in this interval not displayed.      BP Readings from Last 3 Encounters:   07/24/18 126/80   07/21/18 (!) 139/96   02/26/18 (!) 150/98    Wt Readings from Last 3 Encounters:   07/24/18 98.9 kg (218 lb)   07/13/18 97.8 kg (215 lb 9.8 oz)   02/26/18 107.8 kg (237 lb 11.2 oz)                  Labs reviewed in EPIC    Reviewed and updated as needed this visit by clinical staff  Tobacco  Allergies  Meds  Med Hx  Surg Hx  Fam Hx  Soc Hx      Reviewed and updated as needed this visit by Provider  Tobacco  Allergies  Meds  Med Hx  Surg Hx  Fam Hx  Soc Hx        ROS:  Constitutional, HEENT, cardiovascular, pulmonary, GI, , musculoskeletal, neuro, skin, endocrine and psych systems are negative, except as otherwise noted.    This document serves as a record of the services and decisions personally performed and made by Jennifer Demarco MD. It was created on her behalf by Yoni Novoa, a trained medical scribe. The creation of this document is based on the provider's statements to the medical scribe.  Yoni Novoa July 24, 2018 9:56 AM      OBJECTIVE:     /80 (BP Location: Right arm, Patient Position: Chair, Cuff Size: Adult Large)  Pulse 75  Temp 98.1  F (36.7  C) (Oral)  Wt 98.9 kg (218 lb)  SpO2 97%  BMI 33.15 kg/m2  Body mass index is 33.15 kg/(m^2).  GENERAL: alert, no distress and obese  EYES: Eyes grossly normal to inspection, PERRL and conjunctivae and sclerae normal  NECK: no adenopathy, no asymmetry, masses, or scars and thyroid normal to palpation  RESP: lungs clear to auscultation - no rales, rhonchi or wheezes  CV: regular rate and rhythm, normal S1 S2, no S3 or S4, no murmur, click or rub, no peripheral edema and peripheral pulses strong  MS: LUE exam shows no erythema, induration, or nodules, no evidence of joint effusion and FROM at the shoulder,  "limited strength at the elbow, but passive range of motion is normal. Unable to extend the wrist, persistent wrist drop noted.    SKIN: no suspicious lesions or rashes  NEURO: weakness of left  and movement of the left wrist, decreased tone muscles of the forearm and hand on the left, sensory exam grossly normal and mentation intact  PSYCH: mentation appears normal, affect normal/bright    Diagnostic Test Results:    Results for orders placed or performed in visit on 07/24/18   Basic metabolic panel   Result Value Ref Range    Sodium 140 133 - 144 mmol/L    Potassium 3.5 3.4 - 5.3 mmol/L    Chloride 105 94 - 109 mmol/L    Carbon Dioxide 28 20 - 32 mmol/L    Anion Gap 7 3 - 14 mmol/L    Glucose 88 70 - 99 mg/dL    Urea Nitrogen 20 7 - 30 mg/dL    Creatinine 1.17 0.66 - 1.25 mg/dL    GFR Estimate 66 >60 mL/min/1.7m2    GFR Estimate If Black 80 >60 mL/min/1.7m2    Calcium 8.6 8.5 - 10.1 mg/dL   Blood culture   Result Value Ref Range    Specimen Description Blood Right Arm     Special Requests Aerobic and anaerobic bottles received     Culture Micro No growth after 3 hours    Blood culture   Result Value Ref Range    Specimen Description Blood Left Arm     Special Requests Aerobic and anaerobic bottles received     Culture Micro No growth after 3 hours        ASSESSMENT/PLAN:           Tobacco Cessation:   reports that he has never smoked. He has never used smokeless tobacco.      BMI:   Estimated body mass index is 33.15 kg/(m^2) as calculated from the following:    Height as of 7/12/18: 1.727 m (5' 8\").    Weight as of this encounter: 98.9 kg (218 lb).   Weight management plan: Discussed healthy diet and exercise guidelines and patient will follow up in 6 months in clinic to re-evaluate.    1. Fungemia  Cultures to be forwarded to Dr Kaiser office - fax there requested.    - Basic metabolic panel  - Blood culture  - Blood culture    2. Radial nerve palsy, left  EMG and consultation with neurology planned as " recommended by neurology in hospital.    - NEUROLOGY ADULT REFERRAL  advil as needed for pain.      3. Hypertension goal BP (blood pressure) < 140/90  Controlled.   - amLODIPine (NORVASC) 10 MG tablet; Take 1 tablet (10 mg) by mouth daily  Dispense: 30 tablet; Refill: 4  - metoprolol succinate (TOPROL-XL) 50 MG 24 hr tablet; Take 1 tablet (50 mg) by mouth 2 times daily  Dispense: 60 tablet; Refill: 4      5. Morbid obesity (H)  Wt Readings from Last 5 Encounters:   07/24/18 98.9 kg (218 lb)   07/13/18 97.8 kg (215 lb 9.8 oz)   02/26/18 107.8 kg (237 lb 11.2 oz)   02/16/18 99.8 kg (220 lb)   02/10/18 99.8 kg (220 lb)   monitor weight loss.       6. Alcoholism (H)  Denies need for alcohol treatment.  Has not consumed alcohol since discharge.    Support offered - patient declined.        Patient instructions discussed with patient    Patient Instructions    EMG and Neurology is recommended.  You can call [phone] to schedule this at the [facility] in [place] (formerly the [facility]).    You may take Advil for pain as directed.     Labs today. I will notify you of results when I receive them.     Refills of blood pressure medications today. Take as directed.     Follow up in two months at clinic.       The information in this document, created by the medical scribe for me, accurately reflects the services I personally performed and the decisions made by me. I have reviewed and approved this document for accuracy prior to leaving the patient care area.  July 24, 2018 11:43 AM      Jennifer Demarco MD  [facility]

## 2018-07-24 NOTE — PATIENT INSTRUCTIONS
EMG and Neurology is recommended.  You can call [phone] to schedule this at the [facility] in [place] (formerly the [facility]).    You may take Advil for pain as directed.     Labs today. I will notify you of results when I receive them.     Refills of blood pressure medications today. Take as directed.     Follow up in two months at clinic.

## 2018-07-25 NOTE — PROGRESS NOTES
Your blood cultures do not show any growth of infection at this time  Your blood sugar, potassium and kidney function are normal.   Please call or MyChart message me if you have any questions.    PSK

## 2018-07-30 ENCOUNTER — TELEPHONE (OUTPATIENT)
Dept: FAMILY MEDICINE | Facility: CLINIC | Age: 53
End: 2018-07-30

## 2018-07-30 NOTE — TELEPHONE ENCOUNTER
Blood culture results printed and in fax box.  Fax to ID office - Dr. Kaiser.  PSK            Follow-up and recommended labs and tests   Follow up with primary care provider, Jennifer Demarco, within 7 days for hospital follow- up.  The following labs/tests are recommended: BMP and blood culture X 2 on Tuesday 7/24/18. Please fax BC reports to Dr Kaiser office

## 2018-08-13 ENCOUNTER — TELEPHONE (OUTPATIENT)
Dept: FAMILY MEDICINE | Facility: CLINIC | Age: 53
End: 2018-08-13

## 2018-08-13 NOTE — TELEPHONE ENCOUNTER
What type of form?  Unemployment Insurance  What day did you drop off your forms? Monday, August 13, 2018  Is there a due date? ASAP (7-10 business days to compete forms)   How would you like to receive these forms? Please fax to number on the form.    Patient is applying for unemployment insurance due to inability to use his left hand.    What is the best number to contact you? Home [phone]  What time works best to contact you with in 4 hrs? Any  Is it okay to leave a message? Yes    Mychal Castillo

## 2018-08-13 NOTE — TELEPHONE ENCOUNTER
Unable to locate form. Asked , noemí said someone grabbed it from the MA bin.   [id number], MEDICAL ASSISTANT

## 2018-08-14 ENCOUNTER — MYC MEDICAL ADVICE (OUTPATIENT)
Dept: FAMILY MEDICINE | Facility: CLINIC | Age: 53
End: 2018-08-14

## 2018-08-14 NOTE — TELEPHONE ENCOUNTER
Fax form   Notify patient faxed.  Date put on form was from 7/10/18 -  9/30/18 - needs follow up prior to 9/30/18 to extend time for work excuse.    ALEX

## 2018-08-22 ENCOUNTER — PATIENT OUTREACH (OUTPATIENT)
Dept: CARE COORDINATION | Facility: CLINIC | Age: 53
End: 2018-08-22

## 2018-08-22 NOTE — PROGRESS NOTES
Clinic Care Coordination Contact  [facility]/Voicemail -  Social Work     Referral Source: IP Handoff  Clinical Data: Care Coordinator Outreach  Outreach attempted x 1 As a follow up to last conversation  Left message on voicemail with call back information and requested return call.  Plan: Care Coordinator mailed out care coordination introduction letter on 7/23/18. Care Coordinator will try to reach patient again in 3 - 4 weeks.    Natalia Page [facility]  , Clinic Care Coordination  Clinics:  [place] [place], [place]  [phone]   8/22/2018   2:59 PM

## 2018-09-14 ENCOUNTER — PATIENT OUTREACH (OUTPATIENT)
Dept: CARE COORDINATION | Facility: CLINIC | Age: 53
End: 2018-09-14

## 2018-09-14 NOTE — PROGRESS NOTES
Clinic Care Coordination Contact  Care Team Conversations - Social work     Follow -up call placed to patient. He continues to work part time for [employer]. Stated he really needs a full time steady job. Discussed working with  [employer]. He has worked with them on a resume.  Feels he might a couple of good leads on a job.  Discussed his hand, which is really bothering him. He is planning to make a neurology appt, as recommended by PCP.   Vague answer when asked about alcohol use. Chart indicated labs were better.    Plan: Pt has resources to explore . Will continue to apply for work   SW will follow up in 4-5 weeks.     Natalia Page [facility]  , Clinic Care Coordination  Clinics:  [place],  [place], [place]  [phone]   9/14/2018   3:40 PM

## 2018-10-26 ENCOUNTER — PATIENT OUTREACH (OUTPATIENT)
Dept: CARE COORDINATION | Facility: CLINIC | Age: 53
End: 2018-10-26

## 2018-10-26 NOTE — PROGRESS NOTES
Clinic Care Coordination Contact  [facility] [[email or web address]]/Voicemail - Social Work     Clinical Data: Care Coordinator Outreach  Outreach attempted x 1 As a follow up to last conversation Left message on voicemail with call back information and requested return call.  Plan: Care Coordinator will mail out care coordination Unable to Contact  letter with care coordinator contact information and explanation of care coordination services.  If no response by 11/9/18, Care Coordinator will try to reach patient again in 3- weeks     Natalia Page [facility]  , Clinic Care Coordination  Clinics:  [facility] [place], [place]  [phone]   10/26/2018   12:55 PM

## 2018-10-26 NOTE — LETTER
[facility]  Complex Care Plan  About Me  Patient Name:  Db Watson    YOB: 1965  Age:     53 year old   Sebastián MRN:   [id number] Telephone Information:    Home Phone [phone]   Mobile [phone]       Address:    [address] [place] [id number] Email address:  [email or web address]      Emergency Contact(s)  Name Relationship Lgl Grd Work Phone Home Phone Mobile Phone   1. JENNIFER WATSON Brother No none none [phone]   2. ANA LILIA WATSON Mother No  [phone]    3. ELIO BURNS Significant ot* No none [phone]   4. ALEX HANCOCK Relative No   [phone]           Primary language:  English     needed? No   [facility]:  [phone] op. 1  Other communication barriers:    Preferred Method of Communication:  Maria E  Current living arrangement:    Mobility Status/ Medical Equipment:      Health Maintenance  Health Maintenance Reviewed:      My Access Plan  Medical Emergency 911   Primary Clinic Line [facility] [phone]   24 Hour Appointment Line [phone] or  [phone]) (toll-free)   24 Hour Nurse Line [phone] (toll-free)   Preferred Urgent Care     Preferred Hospital     Preferred Pharmacy [id number] - [place], [place] - [address] Behavioral Health Crisis Line The National Suicide Prevention Lifeline at 1-947.211.8274 or 911     My Care Team Members    Patient Care Team       Relationship Specialty Notifications Start End    Jennifer Demarco MD PCP - General Family Practice  11/30/17     Phone: [phone] Fax: [phone] [address] [facility] [address] [facility] [address]    Mariam Page LSW Lead Care Coordinator Primary Care - [facility]  7/23/18     Phone: [phone] Fax: [phone]                My Care Plans  Self Management and Treatment Plan  Goals and (Comments)  Goals        General    # 1 Financial Wellbeing (pt-stated)     Notes -  Note created  7/23/2018 11:14 AM by Mariam Page LSW    Goal Statement: I will visit [facility]  Measure of Success: apply for programs  Supportive Steps to Achieve: Provided with resources  Barriers:   Strengths: Capable of visiting the [facility] office to see what programs he is elig. For.  Date to Achieve By: Aug      # 1 Financial Wellbeing (pt-stated)     Notes - Note created  9/14/2018  3:25 PM by Mariam Page LSW    Goal Statement: I need a full time job  Measure of Success: will have FT employment   Supportive Steps to Achieve: workforce  Barriers:   Strengths: interested  Date to Achieve By: Nov 2018  Patient expressed understanding of goal: yes               Action Plans on File:     Advance Care Plans/Directives Type:        My Medical and Care Information  Problem List   Patient Active Problem List   Diagnosis     Hypertension goal BP (blood pressure) < 140/90     Abnormal LFTs     Habitual alcohol use     Atypical chest pain     HL (hearing loss)     Tinnitus     Abnormal MRI of head     Hyperlipidemia LDL goal <160     BMI 33.0-33.9,adult     Dizziness     Elevated fasting glucose     Meniere's disease, left     Morbid obesity (H)     Bowel obstruction (H)     SBO (small bowel obstruction) (H)     Pancreatitis     Alcohol withdrawal hallucinosis (H)     Positive blood culture     Alcoholism (H)      Current Medications and Allergies:  See printed Medication Report.    Care Coordination Start Date: No linked episodes   Frequency of Care Coordination: monthly   Form Last Updated: 10/26/2018

## 2018-10-26 NOTE — LETTER
[facility] CARE COORDINATION  [place] [id number]  [phone]    October 26, 2018    Db Watson  [address] [place], [place] [id number]      Dear Db,    I am a clinic care coordinator who works with Jennifer Demarco MD at the [facility]. I recently tried to call and was unable to reach you.  This call was a follow up  to our last conversation.      I wanted to provide you with my contact information so that you can call me with questions or concerns about your health care. Below is a description of clinic care coordination and how I can further assist you.     The clinic care coordinator is a registered nurse and/or  who understand the health care system. The goal of clinic care coordination is to help you manage your health and improve access to the [facility] system in the most efficient manner. The registered nurse can assist you in meeting your health care goals by providing education, coordinating services, and strengthening the communication among your providers. The  can assist you with financial, behavioral, psychosocial, chemical dependency, counseling, and/or psychiatric resources.    Please feel free to contact me at [phone] with any questions or concerns. We at [facility] are focused on providing you with the highest-quality healthcare experience possible and that all starts with you.     Sincerely, Natalia Page [facility]  , Clinic Care Coordination  Clinics:  [place],  [place], [facility]  [phone]   10/26/2018   12:58 PM    Enclosed: I have enclosed a copy of the Complex Care Plan. This has helpful information and goals that we have talked about. Please keep this in an easy to access place to use as needed.

## 2018-11-13 ENCOUNTER — PATIENT OUTREACH (OUTPATIENT)
Dept: FAMILY MEDICINE | Facility: CLINIC | Age: 53
End: 2018-11-13

## 2018-11-13 NOTE — PROGRESS NOTES
Clinic Care Coordination Contact  [facility]/Voicemail -Social Work      Clinical Data: Care Coordinator Outreach  Outreach attempted x 2.  Left message on voicemail with call back information and requested return call.    Plan: Care Coordinator mailed out care coordination introduction letter on Nov 1st with no response . If no response to message, CC will attempt to contact pt one more time in 3 weeks.    Natalia Page [facility]  , Clinic Care Coordination  Clinics:  [facility] [place], [place]  [phone]   11/13/2018   4:20 PM

## 2018-12-07 ENCOUNTER — PATIENT OUTREACH (OUTPATIENT)
Dept: CARE COORDINATION | Facility: CLINIC | Age: 53
End: 2018-12-07

## 2018-12-07 NOTE — PROGRESS NOTES
Clinic Care Coordination Contact  [facility]/Voicemail -Social Work      Clinical Data: Care Coordinator Outreach  Outreach attempted x 3. As a follow up to last conversation Left message on voicemail with call back information and requested return call.  Plan: Care Coordinator mailed out care coordination introduction letter on 10/25/18 . Care Coordinator will do no further outreaches at this time.    Natalia Page [facility]  , Clinic Care Coordination  Clinics:  [facility], [place]  [phone]   12/7/2018   10:42 AM

## 2019-02-02 DIAGNOSIS — I10 HYPERTENSION GOAL BP (BLOOD PRESSURE) < 140/90: ICD-10-CM

## 2019-02-04 NOTE — TELEPHONE ENCOUNTER
"Requested Prescriptions   Pending Prescriptions Disp Refills     amLODIPine (NORVASC) 2.5 MG tablet [Pharmacy Med Name: AmLODIPine Besylate Oral Tablet 2.5 MG] 30 tablet 0     Sig: Take 1 tablet (2.5 mg) by mouth daily  appointment needed for additional refills     Calcium Channel Blockers Protocol  Passed - 2/2/2019  5:43 PM       Passed - Blood pressure under 140/90 in past 12 months    BP Readings from Last 3 Encounters:   07/24/18 126/80   07/21/18 (!) 139/96   02/26/18 (!) 150/98                Passed - Recent (12 mo) or future (30 days) visit within the authorizing provider's specialty    Patient had office visit in the last 12 months or has a visit in the next 30 days with authorizing provider or within the authorizing provider's specialty.  See \"Patient Info\" tab in inbasket, or \"Choose Columns\" in Meds & Orders section of the refill encounter.             Passed - Medication is active on med list       Passed - Patient is age 18 or older       Passed - Normal serum creatinine on file in past 12 months    Recent Labs   Lab Test 07/24/18  1020   CR 1.17             amLODIPine (NORVASC) 10 MG tablet  Last Written Prescription Date:  7/24/18  Last Fill Quantity: 30,  # refills: 4   Last office visit: No previous visit found with prescribing provider:  Dr. Demarco   Future Office Visit:      "

## 2019-02-05 RX ORDER — AMLODIPINE BESYLATE 2.5 MG/1
TABLET ORAL
Qty: 30 TABLET | Refills: 0 | OUTPATIENT
Start: 2019-02-05

## 2019-02-05 NOTE — TELEPHONE ENCOUNTER
7/20/18 2.5 mg of amlodipine was discontinued and increased to 10 mg daily. Refill denied as this is the incorrect dose for patient.       Fina Solis RN, BSN

## 2019-03-06 DIAGNOSIS — I10 HYPERTENSION GOAL BP (BLOOD PRESSURE) < 140/90: ICD-10-CM

## 2019-03-06 NOTE — TELEPHONE ENCOUNTER
"Requested Prescriptions   Pending Prescriptions Disp Refills     amLODIPine (NORVASC) 10 MG tablet 30 tablet 4     Sig: Take 1 tablet (10 mg) by mouth daily    Calcium Channel Blockers Protocol  Passed - 3/6/2019  8:17 AM       Passed - Blood pressure under 140/90 in past 12 months    BP Readings from Last 3 Encounters:   07/24/18 126/80   07/21/18 (!) 139/96   02/26/18 (!) 150/98                Passed - Recent (12 mo) or future (30 days) visit within the authorizing provider's specialty    Patient had office visit in the last 12 months or has a visit in the next 30 days with authorizing provider or within the authorizing provider's specialty.  See \"Patient Info\" tab in inbasket, or \"Choose Columns\" in Meds & Orders section of the refill encounter.             Passed - Medication is active on med list       Passed - Patient is age 18 or older       Passed - Normal serum creatinine on file in past 12 months    Recent Labs   Lab Test 07/24/18  1020   CR 1.17             amLODIPine (NORVASC) 10 MG tablet  Last Written Prescription Date:  7/24/18  Last Fill Quantity: 30,  # refills: 4   Last office visit: No previous visit found with prescribing provider:  Dr. Demarco   Future Office Visit:      "

## 2019-03-07 RX ORDER — AMLODIPINE BESYLATE 10 MG/1
10 TABLET ORAL DAILY
Qty: 30 TABLET | Refills: 0 | Status: SHIPPED | OUTPATIENT
Start: 2019-03-07 | End: 2019-04-27

## 2019-03-07 NOTE — TELEPHONE ENCOUNTER
Routing refill request to provider for review/approval because:  A break in medication: given #30 with 4 refills 7/24/18. Would have been out by 12/24/18 with this script.  Patient needs to be seen because:  Advised to follow up in September 2018. Has not been seen since July 2018.      Tiny Thompson RN

## 2019-04-27 DIAGNOSIS — I10 HYPERTENSION GOAL BP (BLOOD PRESSURE) < 140/90: ICD-10-CM

## 2019-04-29 RX ORDER — AMLODIPINE BESYLATE 10 MG/1
10 TABLET ORAL DAILY
Qty: 15 TABLET | Refills: 0 | Status: SHIPPED | OUTPATIENT
Start: 2019-04-29

## 2019-04-29 NOTE — TELEPHONE ENCOUNTER
"Requested Prescriptions   Pending Prescriptions Disp Refills     amLODIPine (NORVASC) 10 MG tablet [Pharmacy Med Name: amLODIPine Besylate Oral Tablet 10 MG] 30 tablet 0     Sig: Take 1 tablet (10 mg) by mouth daily +++ appointment needed for additional refills +++       Last Written Prescription Date:  03/07/19  Last Fill Quantity: 30,  # refills: 0   Last office visit: No previous visit found with prescribing provider:     Future Office Visit:        Calcium Channel Blockers Protocol  Passed - 4/27/2019  3:31 PM        Passed - Blood pressure under 140/90 in past 12 months     BP Readings from Last 3 Encounters:   07/24/18 126/80   07/21/18 (!) 139/96   02/26/18 (!) 150/98                 Passed - Recent (12 mo) or future (30 days) visit within the authorizing provider's specialty     Patient had office visit in the last 12 months or has a visit in the next 30 days with authorizing provider or within the authorizing provider's specialty.  See \"Patient Info\" tab in inbasket, or \"Choose Columns\" in Meds & Orders section of the refill encounter.              Passed - Medication is active on med list        Passed - Patient is age 18 or older        Passed - Normal serum creatinine on file in past 12 months     Recent Labs   Lab Test 07/24/18  1020   CR 1.17               "

## 2019-04-29 NOTE — TELEPHONE ENCOUNTER
Routing refill request to provider for review/approval because:  Cecelia given x1 and patient did not follow up, please advise  Jennifer Gutierrez RN

## 2020-03-01 ENCOUNTER — HEALTH MAINTENANCE LETTER (OUTPATIENT)
Age: 55
End: 2020-03-01

## 2020-12-13 ENCOUNTER — HEALTH MAINTENANCE LETTER (OUTPATIENT)
Age: 55
End: 2020-12-13

## 2021-01-17 NOTE — PROGRESS NOTES
[facility]  FAMILY PRACTICE OFFICE VISIT    CHIEF COMPLAINT:    Chief Complaint   Patient presents with   • Establish Care   • Headache     comes/goes - cause eye pain - on left side of head    • Lightheaded     x1 month        SUBJECTIVE:  Lisa Sanchez is a 32 year old female who presented requesting evaluation for lightheadedness and dizziness.  Patient states it started about 1 month ago, at that time did notice she had increased heart rate.  Patient states it has not been happening daily but multiple times per week.  Patient states last night she got dizzy, lightheaded, she closed her eyes, she became worried and noticed her heart rate was high.  Also started having some shortness of breath.  Patient denies blurry vision or room spinning.  Denies nausea, vomiting.  Patient states she does have pressured headaches, sometimes before getting dizzy.  Usually this happens for couple of minutes.  She denies any stressors.  Or feeling anxious.  She does drink plenty of water about 1 gal per day.  States she has been also having some chest tightness and shortness of breath associated.  She denies any cough or wheezing.  She denies any chest pain, shortness of breath or difficulty breathing at this time.  Patient denies recent travel.  Denies any swelling in her extremities.  Denies recent travel.  Patient denies history of diabetes, however states she did have gestational diabetes in the past.    Patient with history of hypertension on nifedipine, states she is tolerating medication well.  Medication was started while patient was pregnant.     REVIEW OF SYSTEMS:    All other systems are reviewed and are negative except as documented in the history of present illness.  All other systems are reviewed and are negative except as documented in the HPI.     OBJECTIVE:  PROBLEM LIST:    Patient Active Problem List   Diagnosis   • Irregular menstrual cycle   • Chronic headaches   • Morbid obesity with BMI  Intensivist Daily Note  07/06/2018      Brief History:    52 yom admitted with acute EtOH pancreatitis and EtOH withdrawal, intubated for airway protection in setting of acute withdrawal.    Interval Events:  -6/27: intubated, sedated and hemodynamically stable.   -6/28: started tube feeds yesterday, tolerating well.  1L IVF overnight for low UOP.  -6/29: failed SBT yesterday 2/2 tachycardia and agitation.  No acute events overnight.  -6/30: Continued with agitation. Head CT without acute chagnes. Volume loss noted.   -7/2: continues with agitation with lightening sedation; no other acute events.  Blood glucose levels elevated > 200.  -7/3: purulent ETT secretions overnight, sent for culture, growing GNR and GPC.  This morning febrile to 103.  7/4: bronched overnight after pulled on ETT creating cuff leak and high peak pressures; large mucous plug extracted.  Cultures sent. Back on propofol.  -7/5: no acute events overnight.  Sputum showing heavy growth staph aureus.  -7/6: sputum showing MSSA.  Agitated on max dose dex yesterday, prn versed ordered.  Follows some commands on low dose propofol this morning.      PMH:  Past Medical History:   Diagnosis Date     Abnormal MRI of head 1/11/2013     Atypical chest pain 9/4/2012     BMI 33.0-33.9,adult 5/24/2013     Hyperlipidemia LDL goal <160 5/4/2013     Hypertension      Ménière's disease        PSurgHx:  Past Surgical History:   Procedure Laterality Date     C NONSPECIFIC PROCEDURE      shoulder surgery for dislocation      C NONSPECIFIC PROCEDURE      eye surgery, lens implant right     EYE SURGERY       FRACTURE TX, WRIST RT/LT      Fracture TX Wrist RT/LT     ORTHOPEDIC SURGERY         Family History:  Family History     Problem (# of Occurrences) Relation (Name,Age of Onset)    C.A.D. (1) Father: bypass surgery    Diabetes (1) Father    Hypertension (1) Mother          Social History:  Social History     Social History     Marital status: Single     Spouse name:  N/A     Number of children: N/A     Years of education: N/A     Occupational History     Not on file.     Social History Main Topics     Smoking status: Never Smoker     Smokeless tobacco: Never Used      Comment: rarely, once per year maybe      Alcohol use Yes      Comment: 10 drinks / week      Drug use: No     Sexual activity: Yes     Partners: Female     Other Topics Concern     Parent/Sibling W/ Cabg, Mi Or Angioplasty Before 65f 55m? Yes     Social History Narrative       Allergy:  Allergies   Allergen Reactions     Zoloft [Sertraline] Nausea and Vomiting        Medications:  Current Facility-Administered Medications   Medication     acetaminophen (TYLENOL) tablet 325 mg     amLODIPine (NORVASC) tablet 2.5 mg     bisacodyl (DULCOLAX) Suppository 10 mg     chlorhexidine (PERIDEX) 0.12 % solution 15 mL     dexmedetomidine (PRECEDEX) 400 mcg in sodium chloride 0.9 % 100 mL infusion     dextrose 10 % 1,000 mL infusion     glucose gel 15-30 g    Or     dextrose 50 % injection 25-50 mL    Or     glucagon injection 1 mg     docusate (COLACE) 50 MG/5ML liquid 100 mg     enoxaparin (LOVENOX) injection 40 mg     hydrALAZINE (APRESOLINE) injection 10 mg     hydrochlorothiazide (HYDRODIURIL) tablet 25 mg     HYDROmorphone (PF) (DILAUDID) injection 0.3-0.5 mg     hypromellose-dextran (ARTIFICAL TEARS) 0.1-0.3 % ophthalmic solution 2-3 drop     insulin 1 unit/mL in saline (NovoLIN, HumuLIN Regular) drip - ADULT IV Infusion     lidocaine (LMX4) cream     lidocaine 1 % 1 mL     LORazepam (ATIVAN) tablet 1-2 mg    Or     LORazepam (ATIVAN) injection 1-2 mg     magnesium sulfate 2 g in water intermittent infusion     magnesium sulfate 4 g in 100 mL sterile water (premade)     melatonin tablet 3 mg     metoprolol (LOPRESSOR) suspension 50 mg     miconazole (MICATIN) 2 % cream     miconazole (MICATIN; MICRO GUARD) 2 % powder     midazolam (VERSED) injection 2 mg     multivitamins with minerals (CERTAVITE/CEROVITE) liquid 15 mL  of 45.0-49.9, adult (CMS/[facility])   • Supervision of high-risk pregnancy   • Chronic hypertension in pregnancy   • Herpes simplex vulvovaginitis   • History of IUFD   • Refusal of blood transfusions as patient is Sikh   • DM (diabetes mellitus), gestational   • LGA (large for gestational age) fetus affecting management of mother   • Seizure disorder (CMS/[facility])   • Anemia   •  (spontaneous vaginal delivery)   • Shoulder dystocia, delivered   • Menorrhagia with irregular cycle       PAST HISTORIES:  I have reviewed the past medical history, family history, social history, medications and allergies listed in the medical record as obtained by my nursing staff and support staff and agree with their documentation.    PHYSICAL EXAM:    Vital Signs:    Visit Vitals  /82   Pulse 88   Temp 98.2 °F (36.8 °C) (Temporal)   Resp 20   Ht 5' 8\" (1.727 m)   Wt (!) 145.2 kg   LMP 2021   Breastfeeding No   BMI 48.66 kg/m²       General:  Alert, cooperative, conversive.  Skin:  Warm and dry without rash.    Head:  Normocephalic-atraumatic.   Neck:  Trachea is midline.     Eyes:  Normal conjunctivae and sclerae.  Extraocular muscles intact, pupil reactive to light and accommodation bilateral.  ENT:  Mucous membranes are moist.  No nasal discharge, no tympanic membrane edema or erythema bilateral.  Cardiovascular:  Symmetrical pulses.  Regular rate and rhythm.  No chest wall tenderness to palpation.  Respiratory:   Normal respiratory effort.  Clear to auscultation.  No wheezes, rales or rhonchi.  Gastrointestinal:  Soft and nontender.  Normal bowel sounds.  No hepatomegaly or splenomegaly.   Musculoskeletal:  No deformity, no lower extremity tenderness to palpation or edema.   Back:  Normal alignment.    Neurologic:  Oriented times 4.  No focal deficits.  Psychiatric:  Cooperative.  Appropriate mood and affect.    LAB RESULTS:  EKG:  Heart rate 83, and nonspecific T-wave abnormality, normal sinus      naloxone (NARCAN) injection 0.1-0.4 mg     ondansetron (ZOFRAN-ODT) ODT tab 4 mg    Or     ondansetron (ZOFRAN) injection 4 mg     oxyCODONE IR (ROXICODONE) tablet 5-10 mg     pantoprazole (PROTONIX) 2 mg/mL suspension 40 mg     piperacillin-tazobactam (ZOSYN) 4.5 g vial to attach to  mL bag     polyethylene glycol (MIRALAX/GLYCOLAX) Packet 17 g     polyethylene glycol (MIRALAX/GLYCOLAX) Packet 17 g     potassium chloride (KLOR-CON) Packet 20-40 mEq     potassium chloride 10 mEq in 100 mL intermittent infusion with 10 mg lidocaine     potassium chloride 10 mEq in 100 mL sterile water intermittent infusion (premix)     potassium chloride 20 mEq in 50 mL intermittent infusion     potassium chloride SA (K-DUR/KLOR-CON M) CR tablet 20-40 mEq     propofol (DIPRIVAN) infusion     protein modular (PROSource TF) 1 packet     QUEtiapine (SEROquel) tablet 100 mg     senna-docusate (SENOKOT-S;PERICOLACE) 8.6-50 MG per tablet 1 tablet    Or     senna-docusate (SENOKOT-S;PERICOLACE) 8.6-50 MG per tablet 2 tablet     sennosides (SENOKOT) syrup 10 mL     sodium chloride (PF) 0.9% PF flush 10 mL     sodium chloride (PF) 0.9% PF flush 10-20 mL     sodium chloride (PF) 0.9% PF flush 3 mL     sodium chloride (PF) 0.9% PF flush 3 mL     sodium chloride 0.9% infusion         Physical examination:  Vital signs:  Temp: 96.8  F (36  C) Temp src: Axillary BP: 152/88   Heart Rate: 78 Resp: 17 SpO2: 96 % O2 Device: Mechanical Ventilator      General: intubated, sedated.  NAD  HEENT: neck supple, symmetrical  Lungs: Diminished breath sounds bilaterally. Low pitched wheezes.  CVS: Normal S1 & S2, no murmur  Abdomen: Bowel sounds present, soft, non tender  Extremities/musculoskeletal: 2+ LE edema  Skin: no rash  Exam of Line sites: No erythema or discharge.  Neuro: moves all 4 extremities      Intake/Output Summary (Last 24 hours) at 07/01/18 1159  Last data filed at 07/01/18 1000   Gross per 24 hour   Intake           4078.7 ml  rhythm.    ASSESSMENT and PLAN:   Lisa was seen today for establish care, headache and lightheaded.    Diagnoses and all orders for this visit:    Essential hypertension  -     COMPREHENSIVE METABOLIC PANEL  -     THYROID STIMULATING HORMONE REFLEX; Future  -     ELECTROCARDIOGRAM 12-LEAD  -     ECHO M-MODE/2D/DOPPLER (ROUTINE); Future  Blood pressure well controlled, for now recommended to continue taking nifedipine, limit salt intake and drink at least 8 glasses of water per day.  Blood work is recommended, will follow up with results.    History of gestational diabetes  -     GLYCOHEMOGLOBIN; Future    Intermittent lightheadedness  -     D DIMER, QUANTITATIVE; Future  -     meclizine (ANTIVERT) 25 MG tablet; Take 1 tablet by mouth 3 times daily as needed for Dizziness.  -     ECHO M-MODE/2D/DOPPLER (ROUTINE); Future    Chest tightness  -     CBC WITH DIFFERENTIAL; Future  -     COMPREHENSIVE METABOLIC PANEL  -     THYROID STIMULATING HORMONE REFLEX; Future  -     D DIMER, QUANTITATIVE; Future  -     XR CHEST PA AND LATERAL; Future  -     ECHO M-MODE/2D/DOPPLER (ROUTINE); Future  Patient did have EKG today, did show nonspecific T-wave abnormalities, no previous EKG to compare.  Patient denies any chest pain at this time however since chest tightness has been happening couple times per week recommended full cardiac and pulmonary evaluation.  Patient is instructed to have blood work done to rule out DVT or blood clots.  D-dimer has been ordered.  Chest x-ray also ordered, echocardiogram to be scheduled.  Patient is instructed to call 911 or go to emergency room if patient does develop chest pain lasting longer than 15 minutes, pressured radiating to her arm neck or shoulder.  Patient stated understanding and agrees with the plan.    Palpitations  -     CBC WITH DIFFERENTIAL; Future  -     COMPREHENSIVE METABOLIC PANEL  -     THYROID STIMULATING HORMONE REFLEX; Future  -     ELECTROCARDIOGRAM 12-LEAD  -     HOLTER    Output             4585 ml   Net           -506.3 ml       Data:    ROUTINE ICU LABS (Last four results)  CMP    Recent Labs  Lab 07/06/18  0445 07/05/18  0430 07/04/18  0608 07/03/18  0545    142 143 142   POTASSIUM 3.4 3.5 3.5 3.8   CHLORIDE 106 105 106 105   CO2 29 30 28 28   ANIONGAP 9 7 9 9   * 201* 233* 264*   BUN 15 16 17 20   CR 0.52* 0.60* 0.60* 0.66   GFRESTIMATED >90 >90 >90 >90   GFRESTBLACK >90 >90 >90 >90   DEMETRIA 8.1* 8.0* 8.0* 8.1*   MAG 1.9 1.9 2.0 1.9   PHOS 2.7 2.7 2.3* 3.4   PROTTOTAL 7.0 6.3* 7.0 6.7*   ALBUMIN 2.1* 2.1* 2.2* 2.2*   BILITOTAL 0.7 0.7 1.0 0.9   ALKPHOS 69 65 66 68   AST 76* 73* 79* 98*   ALT 58 51 53 49     CBC    Recent Labs  Lab 07/06/18  0445 07/05/18  0715 07/05/18  0610 07/05/18  0430   WBC 8.5 8.4 Canceled, Test credited Canceled, Test credited   RBC 3.86* 3.62* Canceled, Test credited Canceled, Test credited   HGB 13.5 12.6* Canceled, Test credited Canceled, Test credited   HCT 39.6* 37.5* Canceled, Test credited Canceled, Test credited   * 104* Canceled, Test credited Canceled, Test credited   MCH 35.0* 34.8* Canceled, Test credited Canceled, Test credited   MCHC 34.1 33.6 Canceled, Test credited Canceled, Test credited   RDW 12.9 13.2 Canceled, Test credited Canceled, Test credited    250 Canceled, Test credited Canceled, Test credited     INRNo lab results found in last 7 days.  Arterial Blood Gas  No lab results found in last 7 days.    Recent Results (from the past 24 hour(s))   XR Chest Port 1 View    Narrative    XR CHEST PORT 1 VW  7/6/2018 6:25 AM     HISTORY:  Follow infiltrate;     COMPARISON: Film performed on 7/5/2018    FINDINGS:  ET tube is in place. Feeding tube is again noted. Right  PICC line is in place. Bilateral infiltrates are present. These are  stable.      Impression    IMPRESSION: Stable, no significant change in the bilateral infiltrates  when compared to 7/5/2018.    MANASA FRYE MD         Assessment and  MONITOR; Future  -     ECHO M-MODE/2D/DOPPLER (ROUTINE); Future  Can be secondary due to anxiety versus electrolyte abnormalities versus thyroid disease.  Recommended Holter monitor.    SOB (shortness of breath)  -     XR CHEST PA AND LATERAL; Future  In no pulmonary distress at this time.  Recommended chest x-ray for further evaluation            Treatment options discussed with patient and explained in detail. The risks, benefits and potential side effects of possible medications were reviewed. Alternatives were discussed. Medication instructions and consequences of not taking the medications were discussed. Patient's understanding was assessed and patient agreed with the plan. Monitoring parameters and expected course outlined. Patient to call or come in if symptoms fail to respond as outlined, or worsen in any way.        Instructions provided as documented in the AVS (after visit summary).    The patient indicated understanding of the diagnosis and agreed with the plan of care.      Plan:    Neuro  ### Acute EtOH withdrawal Syndrome  ### Sedation/Analgesia  ### Meniere's disease  -Propofol gtt  -PRN Dilaudid and versed  -APAP and Dilaudid prns available  -Restart PTA meclizine once extubated and able to take PO  -Head CT 6/30 w/ moderate cerebral atrophy but no other acute processes  -Seroquel 100 mg BID today; EKG repeated, QTc ok    Respiratory  ### Acute Hypoxic Respiratory failure  ### Ventilator Associated Pneumonia -- CXR w/ LLL infiltrate, + sputum cultures => MSSA  -intubated for airway protection  -Daily SBTs  -Antibiotics as below  -Bronched 7/3 w/ mucous plug removal    CV:  ### HTN  ### Edema -- + 14L since admission  - cont. HCTZ 25 mg daily, Norvasc 2.5 mg daily, and Metoprolol 50 mg BID  -Lasix 20 mg IVP x1 today  -- good response to this dose  -PRN Hydralazine for BP > 175    GI/Liver:  ### EtOH Pancreatitis -- resolved  ### Fatty Liver Disease, likely 2/2 EtOH  -Lipase peaked at 903, is now normal; stop trending  -continuous IVF stopped  -Abdominal US 6/25 showing fatty liver    ID:  ### VAP -- LLL Infiltrate on CXR 7/2, + sputum cultures => MSSA   ### Fevers -- resolved  -sputum 7/2 and 7/4 (+) MSSA  -Blood and sputum from 7/3 NGTD; follow  -Procal 0.57 on 7/2  -Vanco d/c'd; Zosyn to complete 8 day course (7/3-    Heme:  ### Thrombocytopenia - resolved  ### Anemia -- mild; likely iatrogenic from blood draws  -monitor; transfuse for hgb < 7    Endocrine:  ### Hyperglycemia -- glucose now controlled  -Continue insulin gtt    FEN:  -Cont. multi-vitamin; d/c folate, has completed > 5d  -continue tube feeds -- cont free water @ 100 cc q4h, Na 144  -monitor lytes, replete per unit protocol    Prophy:  GI: PPI  DVT: SCDs; LMWH    ICU Cares:  Restrain needed: Yes   Lines needed: Yes    Code: Full  Dispo: ICU status    Family: Updated brother and ROMÁN 7/4    Billing: Critical care time 40 min excluding procedure time     Kadie Ramirez MD  Pulmonary and Critical Care  Medicine

## 2021-04-17 ENCOUNTER — HEALTH MAINTENANCE LETTER (OUTPATIENT)
Age: 56
End: 2021-04-17

## 2021-09-26 ENCOUNTER — HEALTH MAINTENANCE LETTER (OUTPATIENT)
Age: 56
End: 2021-09-26

## 2022-05-08 ENCOUNTER — HEALTH MAINTENANCE LETTER (OUTPATIENT)
Age: 57
End: 2022-05-08

## 2022-08-24 NOTE — PLAN OF CARE
Problem: Patient Care Overview  Goal: Plan of Care/Patient Progress Review  Outcome: Improving  A&O, VSS on RA. IVF infusing. Receiving IV ABX. HOB maintained @ 30 degrees for continuous tube feeding. BG low 100's during shift. Tolerating regular diet as well and eager to have NGT removed. Ambulating well in room w/SBA. Most recent K+ 4.2; Mg 2.0; D/C pending progress looking at ARU       Detail Level: Detailed

## 2023-01-14 ENCOUNTER — HEALTH MAINTENANCE LETTER (OUTPATIENT)
Age: 58
End: 2023-01-14

## 2023-06-02 ENCOUNTER — HEALTH MAINTENANCE LETTER (OUTPATIENT)
Age: 58
End: 2023-06-02

## 2024-01-29 NOTE — PLAN OF CARE
Problem: Patient Care Overview  Goal: Plan of Care/Patient Progress Review  Outcome: Improving  A&OX4, VSS on RA except for hypertension. Denies pain, n&V. Not scoring on CIWA until around shift change, pt started endorsing visual hallucination, gait became more wobbly, pacing in room. Scored a CIWA of 9, received a dose of PRN ativan per CIWA protocol. Recheck due at 2400. IVF rate changed to 75mL/hr. Tolerating regular diet. Up independently in room. BLE with mild edema. D/c expected in 1-2 days pending clinical improvement. Psych following. Continue to monitor.         Was the patient seen in the last year in this department? Yes   Does patient have an active prescription for medications requested? No   Received Request Via: Patient   Patient called requesting prescription be sent to a different pharmacy